# Patient Record
Sex: MALE | Race: BLACK OR AFRICAN AMERICAN | Employment: FULL TIME | URBAN - METROPOLITAN AREA
[De-identification: names, ages, dates, MRNs, and addresses within clinical notes are randomized per-mention and may not be internally consistent; named-entity substitution may affect disease eponyms.]

---

## 2017-02-09 ENCOUNTER — APPOINTMENT (OUTPATIENT)
Dept: GENERAL RADIOLOGY | Age: 47
End: 2017-02-09
Attending: EMERGENCY MEDICINE
Payer: SELF-PAY

## 2017-02-09 ENCOUNTER — HOSPITAL ENCOUNTER (EMERGENCY)
Age: 47
Discharge: HOME | End: 2017-02-10
Attending: EMERGENCY MEDICINE
Payer: SELF-PAY

## 2017-02-09 DIAGNOSIS — F17.200 SMOKER: ICD-10-CM

## 2017-02-09 DIAGNOSIS — R07.9 CHEST PAIN, UNSPECIFIED TYPE: Primary | ICD-10-CM

## 2017-02-09 LAB
ALBUMIN SERPL BCP-MCNC: 3.7 G/DL (ref 3.4–5)
ALBUMIN/GLOB SERPL: 1.1 {RATIO} (ref 0.8–1.7)
ALP SERPL-CCNC: 56 U/L (ref 45–117)
ALT SERPL-CCNC: 19 U/L (ref 16–61)
ANION GAP BLD CALC-SCNC: 8 MMOL/L (ref 3–18)
AST SERPL W P-5'-P-CCNC: 19 U/L (ref 15–37)
BASOPHILS # BLD AUTO: 0.1 K/UL (ref 0–0.1)
BASOPHILS # BLD: 1 % (ref 0–2)
BILIRUB SERPL-MCNC: 1.2 MG/DL (ref 0.2–1)
BUN SERPL-MCNC: 15 MG/DL (ref 7–18)
BUN/CREAT SERPL: 13 (ref 12–20)
CALCIUM SERPL-MCNC: 8.4 MG/DL (ref 8.5–10.1)
CHLORIDE SERPL-SCNC: 105 MMOL/L (ref 100–108)
CK MB CFR SERPL CALC: 0.4 % (ref 0–4)
CK MB SERPL-MCNC: 0.8 NG/ML (ref 0.5–3.6)
CK SERPL-CCNC: 205 U/L (ref 39–308)
CO2 SERPL-SCNC: 26 MMOL/L (ref 21–32)
CREAT SERPL-MCNC: 1.14 MG/DL (ref 0.6–1.3)
DIFFERENTIAL METHOD BLD: ABNORMAL
EOSINOPHIL # BLD: 0.2 K/UL (ref 0–0.4)
EOSINOPHIL NFR BLD: 3 % (ref 0–5)
ERYTHROCYTE [DISTWIDTH] IN BLOOD BY AUTOMATED COUNT: 13 % (ref 11.6–14.5)
GLOBULIN SER CALC-MCNC: 3.4 G/DL (ref 2–4)
GLUCOSE SERPL-MCNC: 157 MG/DL (ref 74–99)
HCT VFR BLD AUTO: 40.9 % (ref 36–48)
HGB BLD-MCNC: 13.7 G/DL (ref 13–16)
LYMPHOCYTES # BLD AUTO: 33 % (ref 21–52)
LYMPHOCYTES # BLD: 1.6 K/UL (ref 0.9–3.6)
MCH RBC QN AUTO: 31.8 PG (ref 24–34)
MCHC RBC AUTO-ENTMCNC: 33.5 G/DL (ref 31–37)
MCV RBC AUTO: 94.9 FL (ref 74–97)
MONOCYTES # BLD: 0.6 K/UL (ref 0.05–1.2)
MONOCYTES NFR BLD AUTO: 12 % (ref 3–10)
NEUTS SEG # BLD: 2.5 K/UL (ref 1.8–8)
NEUTS SEG NFR BLD AUTO: 51 % (ref 40–73)
PLATELET # BLD AUTO: 236 K/UL (ref 135–420)
PMV BLD AUTO: 10.3 FL (ref 9.2–11.8)
POTASSIUM SERPL-SCNC: 3.7 MMOL/L (ref 3.5–5.5)
PROT SERPL-MCNC: 7.1 G/DL (ref 6.4–8.2)
RBC # BLD AUTO: 4.31 M/UL (ref 4.7–5.5)
SODIUM SERPL-SCNC: 139 MMOL/L (ref 136–145)
TROPONIN I SERPL-MCNC: <0.02 NG/ML (ref 0–0.04)
WBC # BLD AUTO: 4.9 K/UL (ref 4.6–13.2)

## 2017-02-09 PROCEDURE — 93005 ELECTROCARDIOGRAM TRACING: CPT

## 2017-02-09 PROCEDURE — 71020 XR CHEST PA LAT: CPT

## 2017-02-09 PROCEDURE — 99284 EMERGENCY DEPT VISIT MOD MDM: CPT

## 2017-02-09 PROCEDURE — 80053 COMPREHEN METABOLIC PANEL: CPT | Performed by: EMERGENCY MEDICINE

## 2017-02-09 PROCEDURE — 82550 ASSAY OF CK (CPK): CPT | Performed by: EMERGENCY MEDICINE

## 2017-02-09 PROCEDURE — 85025 COMPLETE CBC W/AUTO DIFF WBC: CPT | Performed by: EMERGENCY MEDICINE

## 2017-02-10 VITALS
WEIGHT: 150 LBS | HEIGHT: 68 IN | RESPIRATION RATE: 18 BRPM | DIASTOLIC BLOOD PRESSURE: 73 MMHG | BODY MASS INDEX: 22.73 KG/M2 | TEMPERATURE: 97.8 F | HEART RATE: 72 BPM | OXYGEN SATURATION: 100 % | SYSTOLIC BLOOD PRESSURE: 116 MMHG

## 2017-02-10 LAB
ATRIAL RATE: 72 BPM
CALCULATED P AXIS, ECG09: 75 DEGREES
CALCULATED R AXIS, ECG10: 9 DEGREES
CALCULATED T AXIS, ECG11: 45 DEGREES
CK MB CFR SERPL CALC: 0.5 % (ref 0–4)
CK MB SERPL-MCNC: 0.8 NG/ML (ref 0.5–3.6)
CK SERPL-CCNC: 167 U/L (ref 39–308)
DIAGNOSIS, 93000: NORMAL
P-R INTERVAL, ECG05: 142 MS
Q-T INTERVAL, ECG07: 398 MS
QRS DURATION, ECG06: 94 MS
QTC CALCULATION (BEZET), ECG08: 435 MS
TROPONIN I SERPL-MCNC: <0.02 NG/ML (ref 0–0.04)
VENTRICULAR RATE, ECG03: 72 BPM

## 2017-02-10 PROCEDURE — 82550 ASSAY OF CK (CPK): CPT | Performed by: PHYSICIAN ASSISTANT

## 2017-02-10 NOTE — ED NOTES
Pt hourly rounding competed. Safety   Pt (x) resting on stretcher with side rails up and call bell in reach. () in chair    () in parents arms. Toileting   Pt offered ()Bedpan     (x)Assistance to Restroom     ()Urinal  Ongoing Updates  Updated on plan of care and status of test results.    Pain Management  Inquired as to comfort and offered comfort measures:    (x) warm blankets   () dimmed lights

## 2017-02-10 NOTE — ED NOTES
I have reviewed discharge instructions with the patient. The patient verbalized understanding. Patient looks comfortable, left ED in stable condition. Vital signs stable, denies chest pain or any other complaints at this time. Ambulatory, steady gait noted.

## 2017-02-10 NOTE — ED TRIAGE NOTES
Patient A/O x 4, complaining of chest pain off and on x 1 year. Patient states today pain has been worse. Patient complaining of chest tightness x 45 minutes. Denies SOB, N/V, abdominal pain. Patient received aspirin 325 mg PO in medic.

## 2017-02-10 NOTE — ED NOTES
Patient resting comfortably on stretcher at this time, denies any complaints of chest pain, SOB, N/V, abdominal pain. Will continue to closely monitor patient.

## 2017-02-10 NOTE — ED PROVIDER NOTES
HPI Comments: 40yo male with history of asthma and cigarette smoking presents to ER complaining of left back and chest discomfort. Patient states discomfort right after eating. Pain started in left upper back and then radiated to left chest.  Describes discomfort as pressure. States the discomfort lasted about 10 minutes. Denies any nausea, vomiting, abdominal pain, sweating. When pain occurred in chest,  patient states he walk over to neighbor's house who called EMS. Denies any worsening of chest pain with walking activity. Denies any sensation of food get stuck in throat/esophagus. Patient states his chest pain has not reoccurred. Patient states he has chest discomfort similar tonight \"everyday\", pressure like discomfort. States pain can be with rest or activity. Admits to sometimes he feels lightheaded with tingling discomfort only in left arm. Patient is a 40 y.o. male presenting with chest pain. Chest Pain (Angina)    Associated symptoms include back pain. Pertinent negatives include no abdominal pain, no cough, no diaphoresis, no fever, no headaches, no nausea, no numbness, no palpitations, no shortness of breath, no vomiting and no weakness. Past Medical History:   Diagnosis Date    Asthma        No past surgical history on file. No family history on file. Social History     Social History    Marital status: SINGLE     Spouse name: N/A    Number of children: N/A    Years of education: N/A     Occupational History    Not on file. Social History Main Topics    Smoking status: Current Every Day Smoker    Smokeless tobacco: Not on file    Alcohol use 0.5 oz/week     1 Cans of beer per week      Comment: every day    Drug use: No    Sexual activity: Not on file     Other Topics Concern    Not on file     Social History Narrative         ALLERGIES: Review of patient's allergies indicates no known allergies.     Review of Systems   Constitutional: Negative for activity change, appetite change, chills, diaphoresis, fatigue and fever. HENT: Negative. Respiratory: Negative for cough and shortness of breath. Cardiovascular: Positive for chest pain. Negative for palpitations and leg swelling. Gastrointestinal: Negative for abdominal pain, nausea and vomiting. Genitourinary: Negative for difficulty urinating. Musculoskeletal: Positive for back pain. Negative for gait problem and joint swelling. Skin: Negative. Neurological: Negative for weakness, light-headedness, numbness and headaches. Psychiatric/Behavioral: Negative for dysphoric mood. The patient is not nervous/anxious. All other systems reviewed and are negative. Vitals:    02/09/17 2153   BP: 135/85   Pulse: 72   Resp: 18   Temp: 96.5 °F (35.8 °C)   SpO2: 99%   Weight: 68 kg (150 lb)   Height: 5' 8\" (1.727 m)            Physical Exam   Constitutional: He is oriented to person, place, and time. He appears well-developed. No distress. Thin   HENT:   Head: Atraumatic. Mouth/Throat: Oropharynx is clear and moist.   Neck: Normal range of motion. Neck supple. No JVD present. Cardiovascular: Normal rate, regular rhythm and normal heart sounds. No murmur heard. Pulmonary/Chest: Effort normal and breath sounds normal.   Abdominal: Soft. Bowel sounds are normal. There is no tenderness. There is no guarding. Musculoskeletal: Normal range of motion. He exhibits no edema. Neurological: He is alert and oriented to person, place, and time. No cranial nerve deficit. Coordination normal.   Skin: Skin is warm and dry. He is not diaphoretic. Psychiatric: He has a normal mood and affect. Nursing note and vitals reviewed. MDM  Number of Diagnoses or Management Options  Diagnosis management comments: 40yo male presenting to ER complaining of left upper back and left chest pain that started about 30-40 minutes PTA immediately after eating. Denied getting food stuck in throat/esophagus/choking. Patient admits chest discomfort similar to pain he experiences everyday. Denies any reoccurrence of chest pain. Denies any other symptoms at this time. Afebrile, vitals WNL. EKG, CXR, labs reassuring. ED Course       Procedures      EKG- NSR WITH SINUS ARRHYTHMIA, RATE 72, NORMAL AXIS. INCOMPLETE RBBB.   NO STEMI, NO ST-T WAVE ABNORMALITIES  CXR- NO ACUTE PROCESS

## 2017-02-14 ENCOUNTER — HOSPITAL ENCOUNTER (OUTPATIENT)
Dept: NON INVASIVE DIAGNOSTICS | Age: 47
Discharge: HOME | End: 2017-02-14
Attending: PHYSICIAN ASSISTANT
Payer: SELF-PAY

## 2017-02-14 DIAGNOSIS — F17.200 SMOKER: ICD-10-CM

## 2017-02-14 DIAGNOSIS — R07.9 CHEST PAIN, UNSPECIFIED TYPE: ICD-10-CM

## 2017-02-14 PROCEDURE — A9500 TC99M SESTAMIBI: HCPCS

## 2017-02-14 PROCEDURE — 93017 CV STRESS TEST TRACING ONLY: CPT | Performed by: PHYSICIAN ASSISTANT

## 2017-02-14 PROCEDURE — 78452 HT MUSCLE IMAGE SPECT MULT: CPT | Performed by: PHYSICIAN ASSISTANT

## 2017-02-14 PROCEDURE — 74011250636 HC RX REV CODE- 250/636: Performed by: PHYSICIAN ASSISTANT

## 2017-02-14 RX ORDER — SODIUM CHLORIDE 9 MG/ML
250 INJECTION, SOLUTION INTRAVENOUS ONCE
Status: COMPLETED | OUTPATIENT
Start: 2017-02-14 | End: 2017-02-14

## 2017-02-14 RX ADMIN — SODIUM CHLORIDE 250 ML: 900 INJECTION, SOLUTION INTRAVENOUS at 08:20

## 2017-02-14 NOTE — PROGRESS NOTES
Patient was given 10.34 mCi of Sestamibi for the Resting pictures. Patient received 0.4 mg of Lexiscan for the exercise portion of the Stress test. Patient was then given 33 mCi of Sestamibi for the Stress pictures. Armband was removed and disposed of before the patient left.

## 2017-02-15 LAB
ATTENDING PHYSICIAN, CST07: NORMAL
DIAGNOSIS, 93000: NORMAL
DUKE TM SCORE RESULT, CST14: NORMAL
DUKE TREADMILL SCORE, CST13: NORMAL
ECG INTERP BEFORE EX, CST11: NORMAL
ECG INTERP DURING EX, CST12: NORMAL
FUNCTIONAL CAPACITY, CST17: NORMAL
KNOWN CARDIAC CONDITION, CST08: NORMAL
MAX. DIASTOLIC BP, CST04: 92 MMHG
MAX. HEART RATE, CST05: 151 BPM
MAX. SYSTOLIC BP, CST03: 160 MMHG
OVERALL BP RESPONSE TO EXERCISE, CST16: NORMAL
OVERALL HR RESPONSE TO EXERCISE, CST15: NORMAL
PEAK EX METS, CST10: 14.2 METS
PROTOCOL NAME, CST01: NORMAL
TEST INDICATION, CST09: NORMAL

## 2017-03-15 ENCOUNTER — HOSPITAL ENCOUNTER (OUTPATIENT)
Dept: LAB | Age: 47
Discharge: HOME | End: 2017-03-15

## 2017-03-15 ENCOUNTER — OFFICE VISIT (OUTPATIENT)
Dept: FAMILY MEDICINE CLINIC | Age: 47
End: 2017-03-15

## 2017-03-15 VITALS
TEMPERATURE: 96 F | BODY MASS INDEX: 20.31 KG/M2 | DIASTOLIC BLOOD PRESSURE: 83 MMHG | HEART RATE: 94 BPM | RESPIRATION RATE: 16 BRPM | WEIGHT: 134 LBS | HEIGHT: 68 IN | SYSTOLIC BLOOD PRESSURE: 139 MMHG | OXYGEN SATURATION: 99 %

## 2017-03-15 DIAGNOSIS — R94.39 ABNORMAL STRESS TEST: ICD-10-CM

## 2017-03-15 DIAGNOSIS — R09.81 NASAL CONGESTION: ICD-10-CM

## 2017-03-15 DIAGNOSIS — Z00.00 ROUTINE CHECK-UP: Primary | ICD-10-CM

## 2017-03-15 LAB
CHOLEST SERPL-MCNC: 210 MG/DL
EST. AVERAGE GLUCOSE BLD GHB EST-MCNC: 108 MG/DL
GLUCOSE POC: 111 MG/DL
HBA1C MFR BLD: 5.4 % (ref 4.2–5.6)
HDLC SERPL-MCNC: 121 MG/DL (ref 40–60)
HDLC SERPL: 1.7 {RATIO} (ref 0–5)
LDLC SERPL CALC-MCNC: 68.6 MG/DL (ref 0–100)
LIPID PROFILE,FLP: ABNORMAL
TRIGL SERPL-MCNC: 102 MG/DL (ref ?–150)
TSH SERPL DL<=0.05 MIU/L-ACNC: 0.3 UIU/ML (ref 0.36–3.74)
VLDLC SERPL CALC-MCNC: 20.4 MG/DL

## 2017-03-15 PROCEDURE — 84443 ASSAY THYROID STIM HORMONE: CPT | Performed by: FAMILY MEDICINE

## 2017-03-15 PROCEDURE — 80061 LIPID PANEL: CPT | Performed by: FAMILY MEDICINE

## 2017-03-15 PROCEDURE — 83036 HEMOGLOBIN GLYCOSYLATED A1C: CPT | Performed by: FAMILY MEDICINE

## 2017-03-15 RX ORDER — FLUTICASONE PROPIONATE 50 MCG
1 SPRAY, SUSPENSION (ML) NASAL DAILY
Qty: 1 BOTTLE | Refills: 0 | Status: SHIPPED | COMMUNITY
Start: 2017-03-15 | End: 2018-12-21 | Stop reason: ALTCHOICE

## 2017-03-15 NOTE — PATIENT INSTRUCTIONS
Call [employer] at [phone]. They will screen you for any insurance you might qualify for. This is the first step before you can receive discounts at the [facility] or [facility]. Additional contact numbers for [facility] are below:   For [place] patients: [phone]  For [place]/[facility] patients: [phone]  For [place]/[facility] patients: [phone]

## 2017-03-15 NOTE — PROGRESS NOTES
annia drawn. Appointment made for f/u in one month. Given PAP application. Discharge instructions reviewed with patient    Medication list and understanding of medications reviewed with patient. OTC and herbal medications reviewed and added to med list if applicable  Barriers to adherence assessed. Guidance given regarding new medications this visit, including reason for taking this medicine, and common side effects. Appointment for cardiology made.

## 2017-03-15 NOTE — PROGRESS NOTES
HPI  Victor Hugo Nuñez is a 40 y.o. male being seen today for   Chief Complaint   Patient presents with    Follow-up     ED and Diabetes   . he states that he had abnormal stress test in ED after episode of chest pain. Stress test in ED was abnormal but not totally consistent with CAD. Pt reports excellent exercise tolerance. Does smoke not quite 1/2 ppd. No known family history of heart disease. Also has a right groin bulge at times, would like eval    Nasal passages with constant congestion and tender nodules inside. Past Medical History:   Diagnosis Date    Asthma          ROS  Patient states that he is feeling well. Denies complaints of chest pain, shortness of breath, swelling of legs, dizziness or weakness. he denies nausea, vomiting or diarrhea. Current Outpatient Prescriptions   Medication Sig    fluticasone (FLONASE) 50 mcg/actuation nasal spray 1 [place] by Both Nostrils route daily.  acetaminophen (TYLENOL) 325 mg tablet Take 1 Tab by mouth every six (6) hours as needed for Pain. No current facility-administered medications for this visit. PE  Visit Vitals    /83 (BP 1 Location: Left arm, BP Patient Position: Sitting)    Pulse 94    Temp 96 °F (35.6 °C) (Oral)    Resp 16    Ht 5' 8\" (1.727 m)    Wt 134 lb (60.8 kg)    SpO2 99%    BMI 20.37 kg/m2        Alert and oriented with normal mood and affect. he is well developed and well nourished . Lungs are clear without wheezing. Heart rate is regular without murmurs or gallops. There is no lower extremity edema. Nasal passages with edema, clear mucus and ? Nasal polyps    Results for orders placed or performed in visit on 03/15/17   AMB POC GLUCOSE BLOOD, BY GLUCOSE MONITORING DEVICE   Result Value Ref Range    Glucose  mg/dL         Assessment and Plan:        ICD-10-CM ICD-9-CM    1. Routine check-up Z00.00 V70.0 AMB POC GLUCOSE BLOOD, BY GLUCOSE MONITORING DEVICE   2.  Abnormal stress test R94.39 794.39 TSH 3RD GENERATION      HEMOGLOBIN A1C WITH EAG      LIPID PANEL      REFERRAL TO CARDIOLOGY      T4, FREE   3. Nasal congestion  flonase sample R09.81 478.19      rtc for eval of possible inguinal hernia  Pap for beconase aq 42 mcg daily      Sharyle Addison, MD

## 2017-03-15 NOTE — PROGRESS NOTES
Chief Complaint   Patient presents with    Follow-up     Ed     1. When and where did you last receive medical care? Yes Where: [facility]    2. When and where did you last have preventive care such as mammogram, pap smears or colon screening? N/a   3. What is your current living situation (for example, live alone, live in home with immediate family members)? family    3. Do you have any problems with communication such trouble seeing, hearing, or understanding instructions? No    5. Do you have an advance directive? This is a document that you can give to family members with instructions for how you would want them to make health care decisions for you if you were unable to speak for yourself. (For example, unconscious, delerious)No    PMH/FH/Social Hx reviewed and updated as needed     Applicable screenings reviewed and updated as needed  Medication reconciliation performed. Patient does not need medication refills. Health Maintenance reviewed.     \"pt stated that he been losing weight dry mouth pt stated that this been going on for about 6 months\"

## 2017-03-15 NOTE — MR AVS SNAPSHOT
Visit Information Date & Time Provider Department Dept. Phone Encounter #  
 3/15/2017  9:45 AM Alok Barber MD [address] [place] [address] [id number] Your Appointments 3/16/2017  9:30 AM  
New Patient with Lázaro Rehman MD  
[facility]) Appt Note: abn stress care a [address], [address] [place] [address]  
[facility] [address] [place] [address] 4/19/2017 11:45 AM  
Follow Up with Alok Barber MD  
[address] [facility]) Appt Note: f/u APPOINTMENT  
 [facility] [address] [facility] [phone] [id number] Upcoming Health Maintenance Date Due Pneumococcal 19-64 Medium Risk (1 of 1 - PPSV23) 4/6/1990 DTaP/Tdap/Td series (1 - Tdap) 4/6/1992 INFLUENZA AGE 9 TO ADULT 8/1/2016 Allergies as of 3/15/2017  Review Complete On: 3/15/2017 By: Suni De Luna No Known Allergies Current Immunizations  Never Reviewed Name Date Rabies Vaccine IM 5/1/2015  9:02 PM  
  
 Not reviewed this visit You Were Diagnosed With   
  
 Codes Comments Routine check-up    -  Primary ICD-10-CM: Z00.00 ICD-9-CM: V70.0 Abnormal stress test     ICD-10-CM: R94.39 
ICD-9-CM: 794.39 Vitals BP Pulse Temp Resp Height(growth percentile) Weight(growth percentile) 139/83 (BP 1 Location: Left arm, BP Patient Position: Sitting) 94 96 °F (35.6 °C) (Oral) 16 5' 8\" (1.727 m) 134 lb (60.8 kg) SpO2 BMI Smoking Status 99% 20.37 kg/m2 Current Every Day Smoker Vitals History BMI and BSA Data Body Mass Index Body Surface Area  
 20.37 kg/m 2 1.71 m 2 Preferred Pharmacy Pharmacy Name Phone [facility] [address] [phone] Your Updated Medication List  
  
   
 This list is accurate as of: 3/15/17 11:13 AM.  Always use your most recent med list.  
  
  
  
  
 fluticasone 50 mcg/actuation nasal spray Commonly known as:  FLONASE  
1 [place] by Both Nostrils route daily. We Performed the Following AMB POC GLUCOSE BLOOD, BY GLUCOSE MONITORING DEVICE [17638 CPT(R)] REFERRAL TO CARDIOLOGY [KQB77 Custom] Comments:  
 Please evaluate patient for abnromal stress test  
  
Referral Information Referral ID Referred By Referred To  
  
 [id number] Paulinapardeep Andrewsneha [facility] WHITE Not Available Visits Status Start Date End Date 1 New Request 3/15/17 3/15/18 If your referral has a status of pending review or denied, additional information will be sent to support the outcome of this decision. Patient Instructions Call [employer] at [phone]. They will screen you for any insurance you might qualify for. This is the first step before you can receive discounts at the hospital or [facility] specialists. Additional contact numbers for [facility] are below: For [place]/[facility] patients: [phone] For [place]/[facility] patients: [phone] For [place]/[facility] patients: [phone] Introducing [facility] [place] [facility]! Britney Mccauley introduces [employer] patient portal. Now you can access parts of your medical record, email your doctor's office, and request medication refills online. 1. In your internet browser, go to [email or web address] 2. Click on the First Time User? Click Here link in the Sign In box. You will see the New Member Sign Up page. 3. Enter your [employer] Access Code exactly as it appears below. You will not need to use this code after youve completed the sign-up process. If you do not sign up before the expiration date, you must request a new code. · [employer] Access Code: [email or web address] Expires: 5/10/2017 11:13 PM 
 
 4. Enter the last four digits of your Social Security Number (xxxx) and Date of Birth (mm/dd/yyyy) as indicated and click Submit. You will be taken to the next sign-up page. 5. Create a [employer] ID. This will be your [employer] login ID and cannot be changed, so think of one that is secure and easy to remember. 6. Create a [employer] password. You can change your password at any time. 7. Enter your Password Reset Question and Answer. This can be used at a later time if you forget your password. 8. Enter your e-mail address. You will receive e-mail notification when new information is available in [address] 9. Click Sign Up. You can now view and download portions of your medical record. 10. Click the Download Summary menu link to download a portable copy of your medical information. If you have questions, please visit the Frequently Asked Questions section of the [employer] website. Remember, [employer] is NOT to be used for urgent needs. For medical emergencies, dial 911. Now available from your iPhone and Android! Please provide this summary of care documentation to your next provider. Your primary care clinician is listed as PROVIDER UNKNOWN. If you have any questions after today's visit, please call [phone].

## 2017-03-16 ENCOUNTER — OFFICE VISIT (OUTPATIENT)
Dept: CARDIOLOGY CLINIC | Age: 47
End: 2017-03-16

## 2017-03-16 VITALS
SYSTOLIC BLOOD PRESSURE: 124 MMHG | HEIGHT: 68 IN | BODY MASS INDEX: 20.16 KG/M2 | HEART RATE: 84 BPM | DIASTOLIC BLOOD PRESSURE: 81 MMHG | WEIGHT: 133 LBS

## 2017-03-16 DIAGNOSIS — R94.39 ABNORMAL STRESS TEST: ICD-10-CM

## 2017-03-16 DIAGNOSIS — F10.10 ALCOHOL ABUSE: ICD-10-CM

## 2017-03-16 DIAGNOSIS — R07.9 CHEST PAIN, UNSPECIFIED TYPE: Primary | ICD-10-CM

## 2017-03-16 DIAGNOSIS — Z71.6 TOBACCO ABUSE COUNSELING: ICD-10-CM

## 2017-03-16 PROBLEM — R09.81 NASAL CONGESTION: Status: ACTIVE | Noted: 2017-03-16

## 2017-03-16 RX ORDER — ACETAMINOPHEN 325 MG/1
325 TABLET ORAL
Qty: 50 TAB | Refills: 0
Start: 2017-03-16 | End: 2017-04-27

## 2017-03-16 NOTE — PROGRESS NOTES
HISTORY OF PRESENT ILLNESS  January Vargas is a 40 y.o. male. Chest Pain (Angina)    The history is provided by the patient. This is a recurrent problem. The current episode started more than 1 week ago (yrs). The problem has been gradually worsening (1 month). The problem occurs constantly (2 episodes in 1 month). The pain is associated with rest and movement. The pain is present in the lateral region and left side. The quality of the pain is described as dull. The pain does not radiate. Pertinent negatives include no claudication, no cough, no diaphoresis, no dizziness, no fever, no headaches, no malaise/fatigue, no nausea, no orthopnea, no palpitations, no PND, no shortness of breath and no vomiting. He has tried nothing for the symptoms. Review of Systems   Constitutional: Negative for chills, diaphoresis, fever, malaise/fatigue and weight loss. HENT: Negative for nosebleeds. Eyes: Negative for discharge. Respiratory: Negative for cough, shortness of breath and wheezing. Cardiovascular: Positive for chest pain. Negative for palpitations, orthopnea, claudication, leg swelling and PND. Gastrointestinal: Negative for diarrhea, nausea and vomiting. Genitourinary: Negative for dysuria and hematuria. Musculoskeletal: Negative for joint pain. Skin: Negative for rash. Neurological: Negative for dizziness, seizures, loss of consciousness and headaches. Endo/Heme/Allergies: Negative for polydipsia. Does not bruise/bleed easily. Psychiatric/Behavioral: Negative for depression and substance abuse. The patient does not have insomnia.       No Known Allergies    Past Medical History:   Diagnosis Date    Asthma        Family History   Problem Relation Age of Onset    Hypertension Mother     Cancer Father     Hypertension Brother     Asthma Daughter     Stroke Paternal Grandfather 79    Heart Attack Neg Hx        Social History   Substance Use Topics    Smoking status: Current Every Day Smoker     Packs/day: 0.40     Types: Cigarettes    Smokeless tobacco: Never Used      Comment: per patient stated 7 or 8 a day     Alcohol use 33.0 oz/week     35 Cans of beer, 20 Shots of liquor per week      Comment:  per patient 4 or 5 a day          Current Outpatient Prescriptions   Medication Sig    fluticasone (FLONASE) 50 mcg/actuation nasal spray 1 [place] by Both Nostrils route daily. No current facility-administered medications for this visit. History reviewed. No pertinent surgical history. Visit Vitals    /81    Pulse 84    Ht 5' 8\" (1.727 m)    Wt 60.3 kg (133 lb)    BMI 20.22 kg/m2       Diagnostic Studies:  I have reviewed the relevant tests done on the patient and show as follows  EKG tracings reviewed by me today. No flowsheet data found. Mr. Garald Spatz has a reminder for a \"due or due soon\" health maintenance. I have asked that he contact his primary care provider for follow-up on this health maintenance. 2/17 Stress Test     IMPRESSION:  1. Abnormal exercise nuclear stress test, primarily due to transient  ischemic dilatation of 1.28. 3-vessel coronary artery disease cannot be  excluded; however, the patient had excellent functional capacity  without chest pain. 2. Low-normal left ventricular systolic function calculated at 51%. No  regional wall motion abnormalities. 3. Small fixed apical defect. 4. Small amount of attenuation along the inferior wall, which improves  slightly during stress suggesting artifact. 5. No convincing evidence for focal ischemia or previous infarct. 6. Excellent functional capacity with the patient exercising over 12  minutes without chest pain and no EKG changes. Physical Exam   Constitutional: He is oriented to person, place, and time. He appears well-developed and well-nourished. No distress. HENT:   Head: Normocephalic and atraumatic. Mouth/Throat: Normal dentition. Eyes: Right eye exhibits no discharge.  Left eye exhibits no discharge. No scleral icterus. Neck: Neck supple. No JVD present. Carotid bruit is not present. No thyromegaly present. Cardiovascular: Normal rate, regular rhythm, S1 normal, S2 normal, normal heart sounds and intact distal pulses. Exam reveals no gallop and no friction rub. No murmur heard. Pulmonary/Chest: Effort normal and breath sounds normal. He has no wheezes. He has no rales. He exhibits tenderness (reproducible left precordial). Abdominal: Soft. He exhibits no mass. There is no tenderness. Musculoskeletal: He exhibits no edema. Lymphadenopathy:        Right cervical: No superficial cervical adenopathy present. Left cervical: No superficial cervical adenopathy present. Neurological: He is alert and oriented to person, place, and time. Skin: Skin is warm and dry. No rash noted. Psychiatric: He has a normal mood and affect. His behavior is normal.       ASSESSMENT and Pamella Allen was seen today for new patient, abnormal cardiac test and chest pain. Diagnoses and all orders for this visit:    Chest pain, unspecified type  Comments:  muscular clinically- try tylenol  Orders:  -     AMB POC EKG ROUTINE W/ 12 LEADS, INTER & REP  -     acetaminophen (TYLENOL) 325 mg tablet; Take 1 Tab by mouth every six (6) hours as needed for Pain. Abnormal stress test  Comments:  2/17 evidence of TID??CAD    Alcohol abuse  Comments:  Patient advised to take alcohol in moderation to avoid future cardiac and liver problems including arrhythmias, cardiomyopathy and congestive heart failure. Tobacco abuse counseling  Comments:  Patient advised to stop smoking to reduce future cardiovascular events. Pertinent laboratory and test data reviewed and discussed with patient. See patient instructions also for other medical advice given    There are no discontinued medications.     Follow-up Disposition:  Return in about 6 weeks (around 4/27/2017), or if symptoms worsen or fail to improve.

## 2017-03-16 NOTE — PATIENT INSTRUCTIONS
There are no discontinued medications. Orders Placed This Encounter    AMB POC EKG ROUTINE W/ 12 LEADS, INTER & REP     Order Specific Question:   Reason for Exam:     Answer:   new patient    acetaminophen (TYLENOL) 325 mg tablet     Sig: Take 1 Tab by mouth every six (6) hours as needed for Pain. Dispense:  50 Tab     Refill:  0          Chest Pain: Care Instructions  Your Care Instructions  There are many things that can cause chest pain. Some are not serious and will get better on their own in a few days. But some kinds of chest pain need more testing and treatment. Your doctor may have recommended a follow-up visit in the next 8 to 12 hours. If you are not getting better, you may need more tests or treatment. Even though your doctor has released you, you still need to watch for any problems. The doctor carefully checked you, but sometimes problems can develop later. If you have new symptoms or if your symptoms do not get better, get medical care right away. If you have worse or different chest pain or pressure that lasts more than 5 minutes or you passed out (lost consciousness), call 911 or seek other emergency help right away. A medical visit is only one step in your treatment. Even if you feel better, you still need to do what your doctor recommends, such as going to all suggested follow-up appointments and taking medicines exactly as directed. This will help you recover and help prevent future problems. How can you care for yourself at home? · Rest until you feel better. · Take your medicine exactly as prescribed. Call your doctor if you think you are having a problem with your medicine. · Do not drive after taking a prescription pain medicine. When should you call for help? Call 911 if:  · You passed out (lost consciousness). · You have severe difficulty breathing. · You have symptoms of a heart attack.  These may include:  ¨ Chest pain or pressure, or a strange feeling in your chest.  ¨ Sweating. ¨ Shortness of breath. ¨ Nausea or vomiting. ¨ Pain, pressure, or a strange feeling in your back, neck, jaw, or upper belly or in one or both shoulders or arms. ¨ Lightheadedness or sudden weakness. ¨ A fast or irregular heartbeat. After you call 911, the  may tell you to chew 1 adult-strength or 2 to 4 low-dose aspirin. Wait for an ambulance. Do not try to drive yourself. Call your doctor today if:  · You have any trouble breathing. · Your chest pain gets worse. · You are dizzy or lightheaded, or you feel like you may faint. · You are not getting better as expected. · You are having new or different chest pain. Where can you learn more? Go to http://dory-megan.info/. Enter A120 in the search box to learn more about \"Chest Pain: Care Instructions. \"  Current as of: May 27, 2016  Content Version: 11.1  © 6609-0560 Healthwise, Incorporated. Care instructions adapted under license by [employer] (which disclaims liability or warranty for this information). If you have questions about a medical condition or this instruction, always ask your healthcare professional. [place] [address] any warranty or liability for your use of this information.

## 2017-03-16 NOTE — PROGRESS NOTES
2/17 Stress Test     IMPRESSION:  1. Abnormal exercise nuclear stress test, primarily due to transient  ischemic dilatation of 1.28. 3-vessel coronary artery disease cannot be  excluded; however, the patient had excellent functional capacity  without chest pain. 2. Low-normal left ventricular systolic function calculated at 51%. No  regional wall motion abnormalities. 3. Small fixed apical defect. 4. Small amount of attenuation along the inferior wall, which improves  slightly during stress suggesting artifact. 5. No convincing evidence for focal ischemia or previous infarct. 6. Excellent functional capacity with the patient exercising over 12  minutes without chest pain and no EKG changes.

## 2017-03-16 NOTE — MR AVS SNAPSHOT
Visit Information Date & Time Provider Department Dept. Phone Encounter #  
 3/16/2017  9:30 AM Mary Camarillo MD [facility] [address] [place] [address] [id number] Follow-up Instructions Return in about 6 weeks (around 4/27/2017), or if symptoms worsen or fail to improve, for with ekg. Your Appointments 4/19/2017 11:45 AM  
Follow Up with Hailey Yo MD  
[address] [address] [facility]) Appt Note: f/u APPOINTMENT  
 [address] [facility] [phone] [facility]  
  
   
 [address] [facility] [phone] Upcoming Health Maintenance Date Due Pneumococcal 19-64 Medium Risk (1 of 1 - PPSV23) 4/6/1990 DTaP/Tdap/Td series (1 - Tdap) 4/6/1992 INFLUENZA AGE 9 TO ADULT 8/1/2016 Allergies as of 3/16/2017  Review Complete On: 3/16/2017 By: Mary Camarillo MD  
 No Known Allergies Current Immunizations  Never Reviewed Name Date Rabies Vaccine IM 5/1/2015  9:02 PM  
  
 Not reviewed this visit You Were Diagnosed With   
  
 Codes Comments Chest pain, unspecified type    -  Primary ICD-10-CM: R07.9 ICD-9-CM: 786.50 muscular clinically- try tylenol Abnormal stress test     ICD-10-CM: R94.39 
ICD-9-CM: 794.39 2/17 evidence of TID??CAD Alcohol abuse     ICD-10-CM: F10.10 ICD-9-CM: 305.00 Patient advised to take alcohol in moderation to avoid future cardiac and liver problems including arrhythmias, cardiomyopathy and congestive heart failure. Tobacco abuse counseling     ICD-10-CM: Z71.6 ICD-9-CM: V65.42, 305.1 Patient advised to stop smoking to reduce future cardiovascular events. Vitals BP Pulse Height(growth percentile) Weight(growth percentile) BMI Smoking Status 124/81 84 5' 8\" (1.727 m) 133 lb (60.3 kg) 20.22 kg/m2 Current Every Day Smoker Vitals History BMI and BSA Data Body Mass Index Body Surface Area  
 20.22 kg/m 2 1.7 m 2 Preferred Pharmacy Pharmacy Name Phone [facility] [address] [phone] Your Updated Medication List  
  
   
This list is accurate as of: 3/16/17 10:14 AM.  Always use your most recent med list.  
  
  
  
  
 acetaminophen 325 mg tablet Commonly known as:  TYLENOL Take 1 Tab by mouth every six (6) hours as needed for Pain. fluticasone 50 mcg/actuation nasal spray Commonly known as:  FLONASE  
1 [place] by Both Nostrils route daily. We Performed the Following AMB POC EKG ROUTINE W/ 12 LEADS, INTER & REP [76867 CPT(R)] Follow-up Instructions Return in about 6 weeks (around 4/27/2017), or if symptoms worsen or fail to improve, for with ekg. Patient Instructions There are no discontinued medications. Orders Placed This Encounter  AMB POC EKG ROUTINE W/ 12 LEADS, INTER & REP Order Specific Question:   Reason for Exam: Answer:   new patient  acetaminophen (TYLENOL) 325 mg tablet Sig: Take 1 Tab by mouth every six (6) hours as needed for Pain. Dispense:  50 Tab Refill:  0 Chest Pain: Care Instructions Your Care Instructions There are many things that can cause chest pain. Some are not serious and will get better on their own in a few days. But some kinds of chest pain need more testing and treatment. Your doctor may have recommended a follow-up visit in the next 8 to 12 hours. If you are not getting better, you may need more tests or treatment. Even though your doctor has released you, you still need to watch for any problems. The doctor carefully checked you, but sometimes problems can develop later. If you have new symptoms or if your symptoms do not get better, get medical care right away. If you have worse or different chest pain or pressure that lasts more than 5 minutes or you passed out (lost consciousness), call 911 or seek other emergency help right away. A medical visit is only one step in your treatment. Even if you feel better, you still need to do what your doctor recommends, such as going to all suggested follow-up appointments and taking medicines exactly as directed. This will help you recover and help prevent future problems. How can you care for yourself at home? · Rest until you feel better. · Take your medicine exactly as prescribed. Call your doctor if you think you are having a problem with your medicine. · Do not drive after taking a prescription pain medicine. When should you call for help? Call 911 if: 
· You passed out (lost consciousness). · You have severe difficulty breathing. · You have symptoms of a heart attack. These may include: ¨ Chest pain or pressure, or a strange feeling in your chest. 
¨ Sweating. ¨ Shortness of breath. ¨ Nausea or vomiting. ¨ Pain, pressure, or a strange feeling in your back, neck, jaw, or upper belly or in one or both shoulders or arms. ¨ Lightheadedness or sudden weakness. ¨ A fast or irregular heartbeat. After you call 911, the  may tell you to chew 1 adult-strength or 2 to 4 low-dose aspirin. Wait for an ambulance. Do not try to drive yourself. Call your doctor today if: 
· You have any trouble breathing. · Your chest pain gets worse. · You are dizzy or lightheaded, or you feel like you may faint. · You are not getting better as expected. · You are having new or different chest pain. Where can you learn more? Go to http://dory-megan.info/. Enter A120 in the search box to learn more about \"Chest Pain: Care Instructions. \" Current as of: May 27, 2016 Content Version: 11.1 © 4250-8627 [employer]. Care instructions adapted under license by [employer] (which disclaims liability or warranty for this information).  If you have questions about a medical condition or this instruction, always ask your healthcare professional. [employer] disclaims any warranty or liability for your use of this information. Introducing [facility]! [facility] introduces Quietyme patient portal. Now you can access parts of your medical record, email your doctor's office, and request medication refills online. 1. In your internet browser, go to [email or web address] 2. Click on the First Time User? Click Here link in the Sign In box. You will see the New Member Sign Up page. 3. Enter your Quietyme Access Code exactly as it appears below. You will not need to use this code after youve completed the sign-up process. If you do not sign up before the expiration date, you must request a new code. · Quietyme Access Code: [email or web address] Expires: 5/10/2017 11:13 PM 
 
4. Enter the last four digits of your Social Security Number (xxxx) and Date of Birth (mm/dd/yyyy) as indicated and click Submit. You will be taken to the next sign-up page. 5. Create a Quietyme ID. This will be your Quietyme login ID and cannot be changed, so think of one that is secure and easy to remember. 6. Create a Quietyme password. You can change your password at any time. 7. Enter your Password Reset Question and Answer. This can be used at a later time if you forget your password. 8. Enter your e-mail address. You will receive e-mail notification when new information is available in [address] 9. Click Sign Up. You can now view and download portions of your medical record. 10. Click the Download Summary menu link to download a portable copy of your medical information. If you have questions, please visit the Frequently Asked Questions section of the Quietyme website. Remember, Quietyme is NOT to be used for urgent needs. For medical emergencies, dial 911. Now available from your iPhone and Android! Please provide this summary of care documentation to your next provider. Your primary care clinician is listed as PROVIDER UNKNOWN. If you have any questions after today's visit, please call [phone].

## 2017-03-16 NOTE — PROGRESS NOTES
1. Have you been to the ER, urgent care clinic since your last visit? Hospitalized since your last visit? [facility] ER 2/9/17     2. Have you seen or consulted any other health care providers outside of the [address] [place] [address] since your last visit? Include any pap smears or colon screening. No     3. Since your last visit, have you had any of the following symptoms? Chest pain/ per patient stated CP started last night. Today he confirmed CP pain level 4 or 5     4. Have you had any blood work, X-rays or cardiac testing?       Yes      Requested: NO     In [facility]: YES        Medication confirmed verbally by patient

## 2017-04-27 ENCOUNTER — OFFICE VISIT (OUTPATIENT)
Dept: CARDIOLOGY CLINIC | Age: 47
End: 2017-04-27

## 2017-04-27 VITALS
HEIGHT: 68 IN | WEIGHT: 133 LBS | HEART RATE: 102 BPM | DIASTOLIC BLOOD PRESSURE: 69 MMHG | BODY MASS INDEX: 20.16 KG/M2 | SYSTOLIC BLOOD PRESSURE: 110 MMHG

## 2017-04-27 DIAGNOSIS — F10.10 ALCOHOL ABUSE: ICD-10-CM

## 2017-04-27 DIAGNOSIS — R79.89 LOW TSH LEVEL: ICD-10-CM

## 2017-04-27 DIAGNOSIS — R07.9 CHEST PAIN, UNSPECIFIED TYPE: Primary | ICD-10-CM

## 2017-04-27 DIAGNOSIS — Z71.6 TOBACCO ABUSE COUNSELING: ICD-10-CM

## 2017-04-27 RX ORDER — ASPIRIN 325 MG
TABLET, DELAYED RELEASE (ENTERIC COATED) ORAL
COMMUNITY
End: 2020-10-02

## 2017-04-27 NOTE — PATIENT INSTRUCTIONS
Please follow up with your PCP for thyroid abnormality  Medications Discontinued During This Encounter   Medication Reason    acetaminophen (TYLENOL) 325 mg tablet Not A Current Medication       Orders Placed This Encounter    AMB POC EKG ROUTINE W/ 12 LEADS, INTER & REP     Order Specific Question:   Reason for Exam:     Answer:   routine     Patient advised to stop smoking to reduce future cardiovascular events. Patient advised to take alcohol in moderation to avoid future cardiac and liver problems including arrhythmias, cardiomyopathy and congestive heart failure. Stopping Smoking: Care Instructions  Your Care Instructions  Cigarette smokers crave the nicotine in cigarettes. Giving it up is much harder than simply changing a habit. Your body has to stop craving the nicotine. It is hard to quit, but you can do it. There are many tools that people use to quit smoking. You may find that combining tools works best for you. There are several steps to quitting. First you get ready to quit. Then you get support to help you. After that, you learn new skills and behaviors to become a nonsmoker. For many people, a necessary step is getting and using medicine. Your doctor will help you set up the plan that best meets your needs. You may want to attend a smoking cessation program to help you quit smoking. When you choose a program, look for one that has proven success. Ask your doctor for ideas. You will greatly increase your chances of success if you take medicine as well as get counseling or join a cessation program.  Some of the changes you feel when you first quit tobacco are uncomfortable. Your body will miss the nicotine at first, and you may feel short-tempered and grumpy. You may have trouble sleeping or concentrating. Medicine can help you deal with these symptoms. You may struggle with changing your smoking habits and rituals. The last step is the tricky one:  Be prepared for the smoking urge to continue for a time. This is a lot to deal with, but keep at it. You will feel better. Follow-up care is a key part of your treatment and safety. Be sure to make and go to all appointments, and call your doctor if you are having problems. Its also a good idea to know your test results and keep a list of the medicines you take. How can you care for yourself at home? · Ask your family, friends, and coworkers for support. You have a better chance of quitting if you have help and support. · Join a support group, such as Nicotine Anonymous, for people who are trying to quit smoking. · Consider signing up for a smoking cessation program, such as the American Lung Association's Freedom from Smoking program.  · Set a quit date. Pick your date carefully so that it is not right in the middle of a big deadline or stressful time. Once you quit, do not even take a puff. Get rid of all ashtrays and lighters after your last cigarette. Clean your house and your clothes so that they do not smell of smoke. · Learn how to be a nonsmoker. Think about ways you can avoid those things that make you reach for a cigarette. ¨ Avoid situations that put you at greatest risk for smoking. For some people, it is hard to have a drink with friends without smoking. For others, they might skip a coffee break with coworkers who smoke. ¨ Change your daily routine. Take a different route to work or eat a meal in a different place. · Cut down on stress. Calm yourself or release tension by doing an activity you enjoy, such as reading a book, taking a hot bath, or gardening. · Talk to your doctor or pharmacist about nicotine replacement therapy, which replaces the nicotine in your body. You still get nicotine but you do not use tobacco. Nicotine replacement products help you slowly reduce the amount of nicotine you need.  These products come in several forms, many of them available over-the-counter:  ¨ Nicotine patches  ¨ Nicotine gum and lozenges  ¨ Nicotine inhaler  · Ask your doctor about bupropion (Wellbutrin) or varenicline (Chantix), which are prescription medicines. They do not contain nicotine. They help you by reducing withdrawal symptoms, such as stress and anxiety. · Some people find hypnosis, acupuncture, and massage helpful for ending the smoking habit. · Eat a healthy diet and get regular exercise. Having healthy habits will help your body move past its craving for nicotine. · Be prepared to keep trying. Most people are not successful the first few times they try to quit. Do not get mad at yourself if you smoke again. Make a list of things you learned and think about when you want to try again, such as next week, next month, or next year. Where can you learn more? Go to http://doryDegania Medicalmegan.info/. Enter O002 in the search box to learn more about \"Stopping Smoking: Care Instructions. \"  Current as of: May 26, 2016  Content Version: 11.2  © 9469-3189 [employer]. Care instructions adapted under license by "[employer] (which disclaims liability or warranty for this information). If you have questions about a medical condition or this instruction, always ask your healthcare professional. [address] any warranty or liability for your use of this information. Learning About Alcohol Misuse  What is alcohol misuse? Alcohol misuse means drinking so much that it causes problems for you or others. Early problems with alcohol can start at home. You may argue with loved ones about how much you're drinking. Your job may be affected because of drinking. You may drink when it's dangerous or illegal, such as when you drive. Drinking too much for a long time can lead to health conditions like high blood pressure and liver problems. What are the symptoms? Symptoms of alcohol misuse may include:  · Drinking much more than you planned.   · Drinking even though it's causing problems for you or others. · Putting yourself in situations where you might get hurt. · Wanting to cut down or stop drinking, but not being able to. · Feeling guilty about how much you're drinking. How is alcohol misuse treated? Getting help for problems with alcohol is up to you. But you don't have to do it alone. There are many people and kinds of treatments to help with alcohol problems. Talking to your doctor is the first step. When you get a doctor's help, treatment for alcohol problems can be safer and quicker. Treatment options can include:  · Treatment programs. Examples are group therapy, one or more types of counseling, and alcohol education. · Medicines. A doctor or counselor can help you know what kinds of medicines might help with cravings. · Free social support groups. These groups include AA (Alcoholics Anonymous) and SMART (Self-Management and Recovery Training). Your doctor can help you decide which type of program is best for you. Follow-up care is a key part of your treatment and safety. Be sure to make and go to all appointments, and call your doctor if you are having problems. It's also a good idea to know your test results and keep a list of the medicines you take. Where can you learn more? Go to http://dory-megan.info/. Enter 604 [id number] [id number] in the search box to learn more about \"Learning About Alcohol Misuse. \"  Current as of: January 3, 2017  Content Version: 11.2  © 9726-8029 [employer]. Care instructions adapted under license by [employer] (which disclaims liability or warranty for this information). If you have questions about a medical condition or this instruction, always ask your healthcare professional. [place] [address] any warranty or liability for your use of this information.

## 2017-04-27 NOTE — PROGRESS NOTES
HISTORY OF PRESENT ILLNESS  Keith Cortez is a 56 y.o. male. HPI Comments: 4/17 trying to reduce cigarettes and alcohol    Chest Pain (Angina)    The history is provided by the patient. This is a recurrent problem. The current episode started more than 1 week ago (yrs). The problem has been resolved (1 month). The problem occurs constantly (2 episodes in 1 month). The pain is associated with rest and movement. The pain is present in the lateral region and left side. The quality of the pain is described as dull. The pain does not radiate. Pertinent negatives include no claudication, no cough, no diaphoresis, no dizziness, no fever, no headaches, no malaise/fatigue, no nausea, no orthopnea, no palpitations, no PND, no shortness of breath and no vomiting. He has tried nothing for the symptoms. Review of Systems   Constitutional: Negative for chills, diaphoresis, fever, malaise/fatigue and weight loss. HENT: Negative for nosebleeds. Eyes: Negative for discharge. Respiratory: Negative for cough, shortness of breath and wheezing. Cardiovascular: Negative for chest pain, palpitations, orthopnea, claudication, leg swelling and PND. Gastrointestinal: Negative for diarrhea, nausea and vomiting. Genitourinary: Negative for dysuria and hematuria. Musculoskeletal: Negative for joint pain. Skin: Negative for rash. Neurological: Negative for dizziness, seizures, loss of consciousness and headaches. Endo/Heme/Allergies: Negative for polydipsia. Does not bruise/bleed easily. Psychiatric/Behavioral: Negative for depression and substance abuse. The patient does not have insomnia.       No Known Allergies    Past Medical History:   Diagnosis Date    Asthma        Family History   Problem Relation Age of Onset    Hypertension Mother    [employer] Father     Hypertension Brother     Asthma Daughter     Stroke Paternal Grandfather 79    Heart Attack Neg Hx        Social History   Substance Use Topics    Smoking status: Current Every Day Smoker     Packs/day: 0.40     Types: Cigarettes    Smokeless tobacco: Never Used      Comment: per patient stated 7 or 8 a day     Alcohol use 33.0 oz/week     35 Cans of beer, 20 Shots of liquor per week      Comment: per patient 4 a day         Current Outpatient Prescriptions   Medication Sig    aspirin delayed-release 325 mg tablet Take  by mouth every six (6) hours as needed for Pain.  fluticasone (FLONASE) 50 mcg/actuation nasal spray 1 [place] by Both Nostrils route daily. No current facility-administered medications for this visit. History reviewed. No pertinent surgical history. Visit Vitals    /69    Pulse (!) 102    Ht 5' 8\" (1.727 m)    Wt 60.3 kg (133 lb)    BMI 20.22 kg/m2       Diagnostic Studies:  I have reviewed the relevant tests done on the patient and show as follows  EKG tracings reviewed by me today. No flowsheet data found. Mr. Alida Anne has a reminder for a \"due or due soon\" health maintenance. I have asked that he contact his primary care provider for follow-up on this health maintenance. 2/17 Stress Test     IMPRESSION:  1. Abnormal exercise nuclear stress test, primarily due to transient  ischemic dilatation of 1.28. 3-vessel coronary artery disease cannot be  excluded; however, the patient had excellent functional capacity  without chest pain. 2. Low-normal left ventricular systolic function calculated at 51%. No  regional wall motion abnormalities. 3. Small fixed apical defect. 4. Small amount of attenuation along the inferior wall, which improves  slightly during stress suggesting artifact. 5. No convincing evidence for focal ischemia or previous infarct. 6. Excellent functional capacity with the patient exercising over 12  minutes without chest pain and no EKG changes. Physical Exam   Constitutional: He is oriented to person, place, and time. He appears well-developed and well-nourished.  No distress. HENT:   Head: Normocephalic and atraumatic. Mouth/Throat: Normal dentition. Eyes: Right eye exhibits no discharge. Left eye exhibits no discharge. No scleral icterus. Neck: Neck supple. No JVD present. Carotid bruit is not present. No thyromegaly present. Cardiovascular: Normal rate, regular rhythm, S1 normal, S2 normal, normal heart sounds and intact distal pulses. Exam reveals no gallop and no friction rub. No murmur heard. Pulmonary/Chest: Effort normal and breath sounds normal. He has no wheezes. He has no rales. He exhibits tenderness (reproducible left precordial). Abdominal: Soft. He exhibits no mass. There is no tenderness. Musculoskeletal: He exhibits no edema. Lymphadenopathy:        Right cervical: No superficial cervical adenopathy present. Left cervical: No superficial cervical adenopathy present. Neurological: He is alert and oriented to person, place, and time. Skin: Skin is warm and dry. No rash noted. Psychiatric: He has a normal mood and affect. His behavior is normal.       ASSESSMENT and PLAN        Buffy Fenton was seen today for chest pain. Diagnoses and all orders for this visit:    Chest pain, unspecified type  Comments:  Resolved  no further tsting for now  Orders:  -     AMB POC EKG ROUTINE W/ 12 LEADS, INTER & REP    Alcohol abuse  Comments:  4/17 20 beers/wk; Patient advised to take alcohol in moderation to avoid future cardiac and liver problems including arrhythmias, cardiomyopathy and congestive     Tobacco abuse counseling  Comments:  Patient advised to stop smoking to reduce future cardiovascular events. Low TSH level  Comments:  may have mild hyperthyroidism  w/u per PCP  I will rpt TSH and get freeT4        Pertinent laboratory and test data reviewed and discussed with patient.   See patient instructions also for other medical advice given    Medications Discontinued During This Encounter   Medication Reason    acetaminophen (TYLENOL) 325 mg tablet Not A Current Medication       Follow-up Disposition:  Return if symptoms worsen or fail to improve.

## 2017-04-27 NOTE — PROGRESS NOTES
1. Have you been to the ER, urgent care clinic since your last visit? Hospitalized since your last visit? NO  2. Have you seen or consulted any other health care providers outside of the [facility] since your last visit? Include any pap smears or colon screening. No     3. Since your last visit, have you had any of the following symptoms? None     4. Have you had any blood work, X-rays or cardiac testing? Dr. Delta Simmons ordered labs in [address] [place] [address] 3/15/17     Requested: NO     In [facility]: YES    5. Where do you normally have your labs drawn? [address]    6.  Do you need any refills today?  yes    Medications confirmed verbally by patient and bottles

## 2017-04-27 NOTE — LETTER
Sanjuana Scheuermann 1970 4/27/2017 Dear Kalina Eaton MD 
 
I had the pleasure of evaluating  Mr. Franc Lind in office today. Below are the relevant portions of my assessment and plan of care. ICD-10-CM ICD-9-CM 1. Chest pain, unspecified type R07.9 786.50 AMB POC EKG ROUTINE W/ 12 LEADS, INTER & REP Resolved 
no further tsting for now 2. Alcohol abuse F10.10 305.00   
 4/17 20 beers/wk; Patient advised to take alcohol in moderation to avoid future cardiac and liver problems including arrhythmias, cardiomyopathy and congestive 3. Tobacco abuse counseling Z71.6 V65.42   
  305.1 Patient advised to stop smoking to reduce future cardiovascular events. 4. Low TSH level R94.6 794.5   
 may have mild hyperthyroidism 
w/u per PCP I will rpt TSH and get freeT4 Current Outpatient Prescriptions Medication Sig Dispense Refill  aspirin delayed-release 325 mg tablet Take  by mouth every six (6) hours as needed for Pain.  fluticasone (FLONASE) 50 mcg/actuation nasal spray 1 [place] by Both Nostrils route daily. 1 Bottle 0 Orders Placed This Encounter  AMB POC EKG ROUTINE W/ 12 LEADS, INTER & REP Order Specific Question:   Reason for Exam: Answer:   routine  aspirin delayed-release 325 mg tablet Sig: Take  by mouth every six (6) hours as needed for Pain. If you have questions, please do not hesitate to call me. I look forward to following Mr. Franc Lind along with you. Sincerely, Cira Staton MD

## 2017-05-03 ENCOUNTER — OFFICE VISIT (OUTPATIENT)
Dept: FAMILY MEDICINE CLINIC | Age: 47
End: 2017-05-03

## 2017-05-03 ENCOUNTER — HOSPITAL ENCOUNTER (OUTPATIENT)
Dept: LAB | Age: 47
Discharge: HOME | End: 2017-05-03

## 2017-05-03 VITALS
WEIGHT: 134 LBS | HEART RATE: 96 BPM | DIASTOLIC BLOOD PRESSURE: 89 MMHG | TEMPERATURE: 98 F | OXYGEN SATURATION: 99 % | RESPIRATION RATE: 17 BRPM | HEIGHT: 68 IN | BODY MASS INDEX: 20.31 KG/M2 | SYSTOLIC BLOOD PRESSURE: 122 MMHG

## 2017-05-03 DIAGNOSIS — R79.89 LOW TSH LEVEL: ICD-10-CM

## 2017-05-03 DIAGNOSIS — R79.89 LOW TSH LEVEL: Primary | ICD-10-CM

## 2017-05-03 DIAGNOSIS — R07.9 CHEST PAIN, UNSPECIFIED TYPE: ICD-10-CM

## 2017-05-03 LAB
T4 FREE SERPL-MCNC: 1.1 NG/DL (ref 0.7–1.5)
TSH SERPL DL<=0.05 MIU/L-ACNC: 0.24 UIU/ML (ref 0.36–3.74)

## 2017-05-03 PROCEDURE — 84443 ASSAY THYROID STIM HORMONE: CPT | Performed by: FAMILY MEDICINE

## 2017-05-03 PROCEDURE — 84439 ASSAY OF FREE THYROXINE: CPT | Performed by: FAMILY MEDICINE

## 2017-05-03 NOTE — MR AVS SNAPSHOT
Visit Information Date & Time Provider Department Dept. Phone Encounter #  
 5/3/2017 10:45 AM Tod Garcia MD [address] [place] [address] [id number] Upcoming Health Maintenance Date Due Pneumococcal 19-64 Medium Risk (1 of 1 - PPSV23) 4/6/1990 DTaP/Tdap/Td series (1 - Tdap) 4/6/1992 INFLUENZA AGE 9 TO ADULT 8/1/2017 Allergies as of 5/3/2017  Review Complete On: 5/3/2017 By: Silverio Pierce No Known Allergies Current Immunizations  Never Reviewed Name Date Rabies Vaccine IM 5/1/2015  9:02 PM  
  
 Not reviewed this visit You Were Diagnosed With   
  
 Codes Comments Low TSH level    -  Primary ICD-10-CM: R94.6 ICD-9-CM: 794.5 Chest pain, unspecified type     ICD-10-CM: R07.9 ICD-9-CM: 786.50 Vitals BP Pulse Temp Resp Height(growth percentile) Weight(growth percentile) 122/89 (BP 1 Location: Right arm, BP Patient Position: Sitting) 96 98 °F (36.7 °C) (Oral) 17 5' 8\" (1.727 m) 134 lb (60.8 kg) SpO2 BMI Smoking Status 99% 20.37 kg/m2 Current Every Day Smoker Vitals History BMI and BSA Data Body Mass Index Body Surface Area  
 20.37 kg/m 2 1.71 m 2 Preferred Pharmacy Pharmacy Name Phone [facility] - [place] [address] [phone] Your Updated Medication List  
  
   
This list is accurate as of: 5/3/17 11:46 AM.  Always use your most recent med list.  
  
  
  
  
 aspirin delayed-release 325 mg tablet Take  by mouth every six (6) hours as needed for Pain. fluticasone 50 mcg/actuation nasal spray Commonly known as:  FLONASE  
1 [place] by Both Nostrils route daily. Patient Instructions Musculoskeletal Chest Pain: Care Instructions Your Care Instructions Chest pain is not always a sign that something is wrong with your heart or that you have another serious problem.  The doctor thinks your chest pain is caused by strained muscles or ligaments, inflamed chest cartilage, or another problem in your chest, rather than by your heart. You may need more tests to find the cause of your chest pain. Follow-up care is a key part of your treatment and safety. Be sure to make and go to all appointments, and call your doctor if you are having problems. Its also a good idea to know your test results and keep a list of the medicines you take. How can you care for yourself at home? · Take pain medicines exactly as directed. ¨ If the doctor gave you a prescription medicine for pain, take it as prescribed. ¨ If you are not taking a prescription pain medicine, ask your doctor if you can take an over-the-counter medicine. · Rest and protect the sore area. · Stop, change, or take a break from any activity that may be causing your pain or soreness. · Put ice or a cold pack on the sore area for 10 to 20 minutes at a time. Try to do this every 1 to 2 hours for the next 3 days (when you are awake) or until the swelling goes down. Put a thin cloth between the ice and your skin. · After 2 or 3 days, apply a heating pad set on low or a warm cloth to the area that hurts. Some doctors suggest that you go back and forth between hot and cold. · Do not wrap or tape your ribs for support. This may cause you to take smaller breaths, which could increase your risk of lung problems. · Mentholated creams such as Bengay or Icy Hot may soothe sore muscles. Follow the instructions on the package. · Follow your doctor's instructions for exercising. · Gentle stretching and massage may help you get better faster. Stretch slowly to the point just before pain begins, and hold the stretch for at least 15 to 30 seconds. Do this 3 or 4 times a day. Stretch just after you have applied heat. · As your pain gets better, slowly return to your normal activities. Any increased pain may be a sign that you need to rest a while longer. When should you call for help? Call 911 anytime you think you may need emergency care. For example, call if: 
· You have chest pain or pressure. This may occur with: ¨ Sweating. ¨ Shortness of breath. ¨ Nausea or vomiting. ¨ Pain that spreads from the chest to the neck, jaw, or one or both shoulders or arms. ¨ Dizziness or lightheadedness. ¨ A fast or uneven pulse. After calling 911, chew 1 adult-strength aspirin. Wait for an ambulance. Do not try to drive yourself. · You have sudden chest pain and shortness of breath, or you cough up blood. Call your doctor now or seek immediate medical care if: 
· You have any trouble breathing. · Your chest pain gets worse. · Your chest pain occurs consistently with exercise and is relieved by rest. 
Watch closely for changes in your health, and be sure to contact your doctor if: 
· Your chest pain does not get better after 1 week. Where can you learn more? Go to http://dory-megan.info/. Enter V293 in the search box to learn more about \"Musculoskeletal Chest Pain: Care Instructions. \" Current as of: May 27, 2016 Content Version: 11.2 © 8330-1538 [employer].. Care instructions adapted under license by [employer] (which disclaims liability or warranty for this information). If you have questions about a medical condition or this instruction, always ask your healthcare professional. [place] [address] any warranty or liability for your use of this information. Healthy Upper Back: Exercises Your Care Instructions Here are some examples of exercises for your upper back. Start each exercise slowly. Ease off the exercise if you start to have pain. Your doctor or physical therapist will tell you when you can start these exercises and which ones will work best for you. How to do the exercises Lower neck and upper back stretch 1. Stretch your arms out in front of your body.  Clasp one hand on top of your other hand. 2. Gently reach out so that you feel your shoulder blades stretching away from each other. 3. Gently bend your head forward. 4. Hold for 15 to 30 seconds. 5. Repeat 2 to 4 times. Midback stretch Note: If you have knee pain, do not do this exercise. 1. Kneel on the floor, and sit back on your ankles. 2. Lean forward, place your hands on the floor, and stretch your arms out in front of you. Rest your head between your arms. 3. Gently push your chest toward the floor, reaching as far in front of you as possible. 4. Hold for 15 to 30 seconds. 5. Repeat 2 to 4 times. Shoulder rolls 1. Sit comfortably with your feet shoulder-width apart. You can also do this exercise while standing. 2. Roll your shoulders up, then back, and then down in a smooth, circular motion. 3. Repeat 2 to 4 times. Wall push-up 1. Stand against a wall with your feet about 12 to 24 inches back from the wall. If you feel any pain when you do this exercise, stand closer to the wall. 2. Place your hands on the wall slightly wider apart than your shoulders, and lean forward. 3. Gently lean your body toward the wall. Then push back to your starting position. Keep the motion smooth and controlled. 4. Repeat 8 to 12 times. Resisted shoulder blade squeeze Note: For this exercise, you will need elastic exercise material, such as surgical tubing or Thera-Band. 1. Sit or stand, holding the band in both hands in front of you. Keep your elbows close to your sides, bent at a 90-degree angle. Your palms should face up. 2. Squeeze your shoulder blades together, and move your arms to the outside, stretching the band. Be sure to keep your elbows at your sides while you do this. 3. Relax. 4. Repeat 8 to 12 times. Resisted rows Note: For this exercise, you will need elastic exercise material, such as surgical tubing or Thera-Band.  
1. Put the band around a solid object, such as a bedpost, at about waist level. Hold one end of the band in each hand. 2. With your elbows at your sides and bent to 90 degrees, pull the band back to move your shoulder blades toward each other. Return to the starting position. 3. Repeat 8 to 12 times. Follow-up care is a key part of your treatment and safety. Be sure to make and go to all appointments, and call your doctor if you are having problems. It's also a good idea to know your test results and keep a list of the medicines you take. Where can you learn more? Go to http://dory-megan.info/. Enter S304 in the search box to learn more about \"Healthy Upper Back: Exercises. \" Current as of: May 23, 2016 Content Version: 11.2 © 5869-6047 [employer]. Care instructions adapted under license by [employer] (which disclaims liability or warranty for this information). If you have questions about a medical condition or this instruction, always ask your healthcare professional. [place] [address] any warranty or liability for your use of this information. Neck: Exercises Your Care Instructions Here are some examples of typical rehabilitation exercises for your condition. Start each exercise slowly. Ease off the exercise if you start to have pain. Your doctor or physical therapist will tell you when you can start these exercises and which ones will work best for you. How to do the exercises Note: Stretching should make you feel a gentle stretch, but no pain. Stop any strengthening exercise that makes pain worse. Neck stretch 6. This stretch works best if you keep your shoulder down as you lean away from it. To help you remember to do this, start by relaxing your shoulders and lightly holding on to your thighs or your chair. 7. Tilt your head toward your shoulder and hold for 15 to 30 seconds. Let the weight of your head stretch your muscles. 8. If you would like a little added stretch, use your hand to gently and steadily pull your head toward your shoulder. For example, keeping your right shoulder down, lean your head to the left. 9. Repeat 2 to 4 times toward each shoulder. Diagonal neck stretch 6. Turn your head slightly toward the direction you will be stretching, and tilt your head diagonally toward your chest and hold for 15 to 30 seconds. 7. If you would like a little added stretch, use your hand to gently and steadily pull your head forward on the diagonal. 
8. Repeat 2 to 4 times toward each side. Dorsal glide stretch 4. Sit or stand tall and look straight ahead. 5. Slowly tuck your chin as you glide your head backward over your body 6. Hold for a count of 6, and then relax for up to 10 seconds. 7. Repeat 8 to 12 times. Note: The dorsal glide stretches the back of the neck. If you feel pain, do not glide so far back. Some people find this exercise easier to do while lying on their backs with an ice pack on the neck. Chest and shoulder stretch 5. Sit or stand tall and glide your head backward as in the dorsal glide stretch. 6. Raise both arms so that your hands are next to your ears. 7. Take a deep breath, and as you breathe out, lower your elbows down and behind your back. You will feel your shoulder blades slide down and together, and at the same time you will feel a stretch across your chest and the front of your shoulders. 8. Hold for about 6 seconds, and then relax for up to 10 seconds. 9. Repeat 8 to 12 times. Strengthening: Hands on head 5. Move your head backward, forward, and side to side against gentle pressure from your hands, holding each position for about 6 seconds. 6. Repeat 8 to 12 times. Follow-up care is a key part of your treatment and safety. Be sure to make and go to all appointments, and call your doctor if you are having problems.  It's also a good idea to know your test results and keep a list of the medicines you take. Where can you learn more? Go to http://dory-megan.info/. Enter P975 in the search box to learn more about \"Neck: Exercises. \" Current as of: May 23, 2016 Content Version: 11.2 © 3661-8908 [employer]. Care instructions adapted under license by [employer] (which disclaims liability or warranty for this information). If you have questions about a medical condition or this instruction, always ask your healthcare professional. [place] [address] any warranty or liability for your use of this information. Introducing [facility]! [employer] introduces FPSI patient portal. Now you can access parts of your medical record, email your doctor's office, and request medication refills online. 1. In your internet browser, go to [email or web address]. [employer] Voice/[email or web address] 2. Click on the First Time User? Click Here link in the Sign In box. You will see the New Member Sign Up page. 3. Enter your FPSI Access Code exactly as it appears below. You will not need to use this code after youve completed the sign-up process. If you do not sign up before the expiration date, you must request a new code. · FPSI Access Code: [email or web address] Expires: 5/10/2017 11:13 PM 
 
4. Enter the last four digits of your Social Security Number (xxxx) and Date of Birth (mm/dd/yyyy) as indicated and click Submit. You will be taken to the next sign-up page. 5. Create a FPSI ID. This will be your FPSI login ID and cannot be changed, so think of one that is secure and easy to remember. 6. Create a FPSI password. You can change your password at any time. 7. Enter your Password Reset Question and Answer. This can be used at a later time if you forget your password. 8. Enter your e-mail address. You will receive e-mail notification when new information is available in [address] 9. Click Sign Up. You can now view and download portions of your medical record. 10. Click the Download Summary menu link to download a portable copy of your medical information. If you have questions, please visit the Frequently Asked Questions section of the [employer] website. Remember, [employer] is NOT to be used for urgent needs. For medical emergencies, dial 911. Now available from your iPhone and Android! Please provide this summary of care documentation to your next provider. Your primary care clinician is listed as [facility]. If you have any questions after today's visit, please call [phone].

## 2017-05-03 NOTE — PATIENT INSTRUCTIONS
Musculoskeletal Chest Pain: Care Instructions  Your Care Instructions  Chest pain is not always a sign that something is wrong with your heart or that you have another serious problem. The doctor thinks your chest pain is caused by strained muscles or ligaments, inflamed chest cartilage, or another problem in your chest, rather than by your heart. You may need more tests to find the cause of your chest pain. Follow-up care is a key part of your treatment and safety. Be sure to make and go to all appointments, and call your doctor if you are having problems. Its also a good idea to know your test results and keep a list of the medicines you take. How can you care for yourself at home? · Take pain medicines exactly as directed. ¨ If the doctor gave you a prescription medicine for pain, take it as prescribed. ¨ If you are not taking a prescription pain medicine, ask your doctor if you can take an over-the-counter medicine. · Rest and protect the sore area. · Stop, change, or take a break from any activity that may be causing your pain or soreness. · Put ice or a cold pack on the sore area for 10 to 20 minutes at a time. Try to do this every 1 to 2 hours for the next 3 days (when you are awake) or until the swelling goes down. Put a thin cloth between the ice and your skin. · After 2 or 3 days, apply a heating pad set on low or a warm cloth to the area that hurts. Some doctors suggest that you go back and forth between hot and cold. · Do not wrap or tape your ribs for support. This may cause you to take smaller breaths, which could increase your risk of lung problems. · Mentholated creams such as Bengay or Icy Hot may soothe sore muscles. Follow the instructions on the package. · Follow your doctor's instructions for exercising. · Gentle stretching and massage may help you get better faster. Stretch slowly to the point just before pain begins, and hold the stretch for at least 15 to 30 seconds.  Do this 3 or 4 times a day. Stretch just after you have applied heat. · As your pain gets better, slowly return to your normal activities. Any increased pain may be a sign that you need to rest a while longer. When should you call for help? Call 911 anytime you think you may need emergency care. For example, call if:  · You have chest pain or pressure. This may occur with:  ¨ Sweating. ¨ Shortness of breath. ¨ Nausea or vomiting. ¨ Pain that spreads from the chest to the neck, jaw, or one or both shoulders or arms. ¨ Dizziness or lightheadedness. ¨ A fast or uneven pulse. After calling 911, chew 1 adult-strength aspirin. Wait for an ambulance. Do not try to drive yourself. · You have sudden chest pain and shortness of breath, or you cough up blood. Call your doctor now or seek immediate medical care if:  · You have any trouble breathing. · Your chest pain gets worse. · Your chest pain occurs consistently with exercise and is relieved by rest.  Watch closely for changes in your health, and be sure to contact your doctor if:  · Your chest pain does not get better after 1 week. Where can you learn more? Go to http://dory-megan.info/. Enter V293 in the search box to learn more about \"Musculoskeletal Chest Pain: Care Instructions. \"  Current as of: May 27, 2016  Content Version: 11.2  © 4844-9862 [employer]. Care instructions adapted under license by [employer] (which disclaims liability or warranty for this information). If you have questions about a medical condition or this instruction, always ask your healthcare professional. [place] [address] any warranty or liability for your use of this information. Healthy Upper Back: Exercises  Your Care Instructions  Here are some examples of exercises for your upper back. Start each exercise slowly. Ease off the exercise if you start to have pain.   Your doctor or physical therapist will tell you when you can start these exercises and which ones will work best for you. How to do the exercises  Lower neck and upper back stretch    1. Stretch your arms out in front of your body. Clasp one hand on top of your other hand. 2. Gently reach out so that you feel your shoulder blades stretching away from each other. 3. Gently bend your head forward. 4. Hold for 15 to 30 seconds. 5. Repeat 2 to 4 times. Midback stretch    Note: If you have knee pain, do not do this exercise. 1. Kneel on the floor, and sit back on your ankles. 2. Lean forward, place your hands on the floor, and stretch your arms out in front of you. Rest your head between your arms. 3. Gently push your chest toward the floor, reaching as far in front of you as possible. 4. Hold for 15 to 30 seconds. 5. Repeat 2 to 4 times. Shoulder rolls    1. Sit comfortably with your feet shoulder-width apart. You can also do this exercise while standing. 2. Roll your shoulders up, then back, and then down in a smooth, circular motion. 3. Repeat 2 to 4 times. Wall push-up    1. Stand against a wall with your feet about 12 to 24 inches back from the wall. If you feel any pain when you do this exercise, stand closer to the wall. 2. Place your hands on the wall slightly wider apart than your shoulders, and lean forward. 3. Gently lean your body toward the wall. Then push back to your starting position. Keep the motion smooth and controlled. 4. Repeat 8 to 12 times. Resisted shoulder blade squeeze    Note: For this exercise, you will need elastic exercise material, such as surgical tubing or Thera-Band. 1. Sit or stand, holding the band in both hands in front of you. Keep your elbows close to your sides, bent at a 90-degree angle. Your palms should face up. 2. Squeeze your shoulder blades together, and move your arms to the outside, stretching the band. Be sure to keep your elbows at your sides while you do this. 3. Relax.   4. Repeat 8 to 12 times. Resisted rows    Note: For this exercise, you will need elastic exercise material, such as surgical tubing or Thera-Band. 1. Put the band around a solid object, such as a bedpost, at about waist level. Hold one end of the band in each hand. 2. With your elbows at your sides and bent to 90 degrees, pull the band back to move your shoulder blades toward each other. Return to the starting position. 3. Repeat 8 to 12 times. Follow-up care is a key part of your treatment and safety. Be sure to make and go to all appointments, and call your doctor if you are having problems. It's also a good idea to know your test results and keep a list of the medicines you take. Where can you learn more? Go to http://dory-megan.info/. Enter J541 in the search box to learn more about \"Healthy Upper Back: Exercises. \"  Current as of: May 23, 2016  Content Version: 11.2  © 2335-7704 [employer]. Care instructions adapted under license by [employer] (which disclaims liability or warranty for this information). If you have questions about a medical condition or this instruction, always ask your healthcare professional. [place] [address] any warranty or liability for your use of this information. Neck: Exercises  Your Care Instructions  Here are some examples of typical rehabilitation exercises for your condition. Start each exercise slowly. Ease off the exercise if you start to have pain. Your doctor or physical therapist will tell you when you can start these exercises and which ones will work best for you. How to do the exercises  Note: Stretching should make you feel a gentle stretch, but no pain. Stop any strengthening exercise that makes pain worse. Neck stretch    6. This stretch works best if you keep your shoulder down as you lean away from it.  To help you remember to do this, start by relaxing your shoulders and lightly holding on to your thighs or your chair. 7. Tilt your head toward your shoulder and hold for 15 to 30 seconds. Let the weight of your head stretch your muscles. 8. If you would like a little added stretch, use your hand to gently and steadily pull your head toward your shoulder. For example, keeping your right shoulder down, lean your head to the left. 9. Repeat 2 to 4 times toward each shoulder. Diagonal neck stretch    6. Turn your head slightly toward the direction you will be stretching, and tilt your head diagonally toward your chest and hold for 15 to 30 seconds. 7. If you would like a little added stretch, use your hand to gently and steadily pull your head forward on the diagonal.  8. Repeat 2 to 4 times toward each side. Dorsal glide stretch    4. Sit or stand tall and look straight ahead. 5. Slowly tuck your chin as you glide your head backward over your body  6. Hold for a count of 6, and then relax for up to 10 seconds. 7. Repeat 8 to 12 times. Note: The dorsal glide stretches the back of the neck. If you feel pain, do not glide so far back. Some people find this exercise easier to do while lying on their backs with an ice pack on the neck. Chest and shoulder stretch    5. Sit or stand tall and glide your head backward as in the dorsal glide stretch. 6. Raise both arms so that your hands are next to your ears. 7. Take a deep breath, and as you breathe out, lower your elbows down and behind your back. You will feel your shoulder blades slide down and together, and at the same time you will feel a stretch across your chest and the front of your shoulders. 8. Hold for about 6 seconds, and then relax for up to 10 seconds. 9. Repeat 8 to 12 times. Strengthening: Hands on head    5. Move your head backward, forward, and side to side against gentle pressure from your hands, holding each position for about 6 seconds. 6. Repeat 8 to 12 times. Follow-up care is a key part of your treatment and safety.  Be sure to make and go to all appointments, and call your doctor if you are having problems. It's also a good idea to know your test results and keep a list of the medicines you take. Where can you learn more? Go to http://dory-megan.info/. Enter P975 in the search box to learn more about \"Neck: Exercises. \"  Current as of: May 23, 2016  Content Version: 11.2  © 4080-7251 [employer]. Care instructions adapted under license by [employer] (which disclaims liability or warranty for this information). If you have questions about a medical condition or this instruction, always ask your healthcare professional. [address] any warranty or liability for your use of this information.

## 2017-05-03 NOTE — PROGRESS NOTES
No chief complaint on file. 1. Have you been to the ER, urgent care clinic since your last visit? Hospitalized since your last visit? No    2. Have you seen or consulted any other health care providers outside of the [address] [place] [address] since your last visit? No    3. When was your last Pap smear? No  When was your last Mammogram? No  When was your last Colon screening? No    PMH/FH/Social Hx reviewed and updated as needed      Applicable screenings reviewed and updated as needed  Medication reconciliation performed. Patient does not need medication refills. Health Maintenance reviewed.

## 2017-05-03 NOTE — PROGRESS NOTES
Discharge instructions reviewed with patient    Medication list and understanding of medications reviewed with patient. OTC and herbal medications reviewed and added to med list if applicable  Barriers to adherence assessed. Guidance given regarding new medications this visit, including reason for taking this medicine, and common side effects. Given Stretchy bands with exercises to do. Labs drawn.   AVS given

## 2017-05-03 NOTE — PROGRESS NOTES
HPI  Roderick Matthews is a 56 y.o. male being seen today for   Chief Complaint   Patient presents with    Chest Pain     Pt stated he has been having chest pain since 4/27 and when he cough it's worsen. .  he states that he has chest pain since February. Saw cardiology and had abnromal stress test but not dx of ischemia and no more testing for heart planned at this time. Normal CXR in ED  Pain is worse than usual today. Worse with stretching/pressure/turning head to right/cough. He thinks it could be due to position in sleep. 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 Low tsh on screening labs in march. Past Medical History:   Diagnosis Date    Asthma     Low TSH level 4/27/2017    may have mild hyperthyroidism w/u per PCP I will rpt TSH and get freeT4         ROS  Patient states that he is feeling well. Denies complaints of shortness of breath, swelling of legs, dizziness or weakness. he denies nausea, vomiting or diarrhea. Current Outpatient Prescriptions   Medication Sig    aspirin delayed-release 325 mg tablet Take  by mouth every six (6) hours as needed for Pain.  fluticasone (FLONASE) 50 mcg/actuation nasal spray 1 [place] by Both Nostrils route daily. No current facility-administered medications for this visit. PE  Visit Vitals    /89 (BP 1 Location: Right arm, BP Patient Position: Sitting)    Pulse 96    Temp 98 °F (36.7 °C) (Oral)    Resp 17    Ht 5' 8\" (1.727 m)    Wt 134 lb (60.8 kg)    SpO2 99%    BMI 20.37 kg/m2        Alert and oriented with normal mood and affect. he is well developed and well nourished . Lungs are clear without wheezing. Heart rate is regular without murmurs or gallops. Point tenderness with  Palpation lateral left chest.  Reproducible with turning head to right. Also has tenderness of left rhomboids and trapezius. There is no lower extremity edema. Assessment and Plan:        ICD-10-CM ICD-9-CM    1. Low TSH level R94.6 794.5    2. Chest pain, unspecified type  Musculoskeletal.   May have element of pinched nerve or neck pathology contributing. R07.9 786.50      Recheck thyroid labs  HEP for neck/upper back/chest pain    rtc 4-6 weeks or prn.  Consider xray cspine or pt referral if pain persists      Daniel Morgan MD

## 2017-06-07 ENCOUNTER — OFFICE VISIT (OUTPATIENT)
Dept: FAMILY MEDICINE CLINIC | Age: 47
End: 2017-06-07

## 2017-06-07 VITALS
TEMPERATURE: 97.8 F | DIASTOLIC BLOOD PRESSURE: 84 MMHG | HEART RATE: 67 BPM | HEIGHT: 68 IN | OXYGEN SATURATION: 100 % | RESPIRATION RATE: 16 BRPM | WEIGHT: 131 LBS | BODY MASS INDEX: 19.85 KG/M2 | SYSTOLIC BLOOD PRESSURE: 128 MMHG

## 2017-06-07 DIAGNOSIS — R09.81 NASAL CONGESTION: Primary | ICD-10-CM

## 2017-06-07 DIAGNOSIS — R79.89 LOW TSH LEVEL: ICD-10-CM

## 2017-06-07 NOTE — PATIENT INSTRUCTIONS
Call [employer] at [phone]. They will screen you for any insurance you might qualify for. This is the first step before you can receive discounts at the [facility] or [facility] Insurance specialists. Additional contact numbers for [facility] are below:   For [place] patients: [phone]  For [place]/[facility] patients: [phone]  For [place]: [phone]

## 2017-06-07 NOTE — PROGRESS NOTES
HPI  Kevin Rodriguez is a 56 y.o. male being seen today for   Chief Complaint   Patient presents with    Results     Thyroid results   . he states that chest pain better after stretches. s till has some if he sneezes. Used nasal spray which did help nasal congestion and sensitivity but sx returned when he stoped med. Received letter about thyroid and here to follow up. Past Medical History:   Diagnosis Date    Asthma     Low TSH level 4/27/2017    may have mild hyperthyroidism w/u per PCP I will rpt TSH and get freeT4         ROS  Patient states that he is feeling well. Denies complaints of chest pain, shortness of breath, swelling of legs, dizziness or weakness. he denies nausea, vomiting or diarrhea. Current Outpatient Prescriptions   Medication Sig    aspirin delayed-release 325 mg tablet Take  by mouth every six (6) hours as needed for Pain.  fluticasone (FLONASE) 50 mcg/actuation nasal spray 1 [place] by Both Nostrils route daily. No current facility-administered medications for this visit. PE  Visit Vitals    /84 (BP 1 Location: Left arm, BP Patient Position: Sitting)    Pulse 67    Temp 97.8 °F (36.6 °C) (Oral)    Resp 16    Ht 5' 8\" (1.727 m)    Wt 131 lb (59.4 kg)    SpO2 100%    BMI 19.92 kg/m2        Alert and oriented with normal mood and affect. he is well developed and well nourished . Lungs are clear without wheezing. Heart rate is regular without murmurs or gallops. There is no lower extremity edema. Thyroid normal in size and consistency. Nasal passages edematous and with clear mucus. Results for orders placed or performed during the hospital encounter of 05/03/17   TSH 3RD GENERATION   Result Value Ref Range    TSH 0.24 (L) 0.36 - 3.74 uIU/mL   T4, FREE   Result Value Ref Range    T4, Free 1.1 0.7 - 1.5 NG/DL         Assessment and Plan:        ICD-10-CM ICD-9-CM    1. Nasal congestion  Restart flonase R09.81 478.19    2.  Low TSH level  Labs show subclnical hyperthyroidism.   Monitor for now R94.6 794.5        rtc 2-4 mos recheck thyroid and nasal polyps  btring back PAP form for beconase  In the meantime he still has some flonase    Kyree Whittaker MD

## 2017-06-07 NOTE — PROGRESS NOTES
No chief complaint on file. 1. Have you been to the ER, urgent care clinic since your last visit? Hospitalized since your last visit? No    2. Have you seen or consulted any other health care providers outside of the [facility] since your last visit? No    3. When was your last Pap smear? No  When was your last Mammogram? No  When was your last Colon screening? No    PMH/FH/Social Hx reviewed and updated as needed      Applicable screenings reviewed and updated as needed  Medication reconciliation performed. Patient does not need medication refills. Health Maintenance reviewed.

## 2017-06-07 NOTE — PROGRESS NOTES
Call [employer] at [phone]. They will screen you for any insurance you might qualify for. This is the first step before you can receive discounts at the hospital or [facility] specialists. Additional contact numbers for [facility] are below: For [place]/[facility] patients: [phone]  For [place]/[facility] patients: [phone]  For [place]: [phone]    Given number for [facility], given return to work note for today. Discharge instructions reviewed with patient    Medication list and understanding of medications reviewed with patient. OTC and herbal medications reviewed and added to med list if applicable  Barriers to adherence assessed. Guidance given regarding new medications this visit, including reason for taking this medicine, and common side effects.

## 2017-07-19 ENCOUNTER — OFFICE VISIT (OUTPATIENT)
Dept: FAMILY MEDICINE CLINIC | Age: 47
End: 2017-07-19

## 2017-07-19 VITALS
HEIGHT: 68 IN | SYSTOLIC BLOOD PRESSURE: 138 MMHG | WEIGHT: 125 LBS | DIASTOLIC BLOOD PRESSURE: 98 MMHG | HEART RATE: 77 BPM | BODY MASS INDEX: 18.94 KG/M2 | OXYGEN SATURATION: 100 % | RESPIRATION RATE: 16 BRPM | TEMPERATURE: 98.1 F

## 2017-07-19 DIAGNOSIS — G56.02 CARPAL TUNNEL SYNDROME OF LEFT WRIST: ICD-10-CM

## 2017-07-19 DIAGNOSIS — R20.0 FINGER NUMBNESS: ICD-10-CM

## 2017-07-19 DIAGNOSIS — M79.602 LEFT ARM PAIN: Primary | ICD-10-CM

## 2017-07-19 DIAGNOSIS — M77.12 LATERAL EPICONDYLITIS OF LEFT ELBOW: ICD-10-CM

## 2017-07-19 DIAGNOSIS — M75.92 SUPRASPINATUS TENDINITIS, LEFT: ICD-10-CM

## 2017-07-19 RX ORDER — NAPROXEN 375 MG/1
375 TABLET ORAL 2 TIMES DAILY WITH MEALS
Qty: 60 TAB | Refills: 2 | Status: SHIPPED | OUTPATIENT
Start: 2017-07-19 | End: 2020-10-02

## 2017-07-19 NOTE — MR AVS SNAPSHOT
Visit Information Date & Time Provider Department Dept. Phone Encounter #  
 7/19/2017  9:45 AM Juancho Dean, RUFUS [address] [facility] [phone] [id number] Upcoming Health Maintenance Date Due Pneumococcal 19-64 Medium Risk (1 of 1 - PPSV23) 4/6/1990 DTaP/Tdap/Td series (1 - Tdap) 4/6/1992 INFLUENZA AGE 9 TO ADULT 8/1/2017 Allergies as of 7/19/2017  Review Complete On: 7/19/2017 By: Flor Patel No Known Allergies Current Immunizations  Never Reviewed Name Date Rabies Vaccine IM 5/1/2015  9:02 PM  
  
 Not reviewed this visit You Were Diagnosed With   
  
 Codes Comments Left arm pain    -  Primary ICD-10-CM: C31.671 ICD-9-CM: 729.5 Finger numbness     ICD-10-CM: R20.0 ICD-9-CM: 782.0 Lateral epicondylitis of left elbow     ICD-10-CM: M77.12 
ICD-9-CM: 726.32 Carpal tunnel syndrome of left wrist     ICD-10-CM: G56.02 
ICD-9-CM: 354.0 Supraspinatus tendinitis, left     ICD-10-CM: M75.92 
ICD-9-CM: 726.10 Vitals BP Pulse Temp Resp Height(growth percentile) Weight(growth percentile) (!) 138/98 77 98.1 °F (36.7 °C) (Oral) 16 5' 8\" (1.727 m) 125 lb (56.7 kg) SpO2 BMI Smoking Status 100% 19.01 kg/m2 Current Every Day Smoker Vitals History BMI and BSA Data Body Mass Index Body Surface Area 19.01 kg/m 2 1.65 m 2 Preferred Pharmacy Pharmacy Name Phone [facility] [address] [phone] Your Updated Medication List  
  
   
This list is accurate as of: 7/19/17 11:41 AM.  Always use your most recent med list.  
  
  
  
  
 aspirin delayed-release 325 mg tablet Take  by mouth every six (6) hours as needed for Pain. fluticasone 50 mcg/actuation nasal spray Commonly known as:  FLONASE  
1 [place] by Both Nostrils route daily. naproxen 375 mg tablet Commonly known as:  NAPROSYN  
 Take 1 Tab by mouth two (2) times daily (with meals). Prescriptions Sent to Pharmacy Refills  
 naproxen (NAPROSYN) 375 mg tablet 2 Sig: Take 1 Tab by mouth two (2) times daily (with meals). Class: Normal  
 Pharmacy: [facility] [address]  #: [phone] Route: Oral  
  
Patient Instructions Call Advanced Patient Advocacy at [phone]. They will screen you for any insurance you might qualify for. This is the first step before you can receive discounts at the [facility] or [employer] specialists. -CALL THIS #  TO Jana Hays [address] ELBOW BRACE TO HELP WITH PAIN IN LOWER ARM 
 
 
 CARPAL TUNNNEL BRACE - WEAR ALL DAY AND NIGHT FOR FINGER NUMBNESS Shoulder Blade: Exercises Your Care Instructions Here are some examples of typical exercises for your condition. Start each exercise slowly. Ease off the exercise if you start to have pain. Your doctor or physical therapist will tell you when you can start these exercises and which ones will work best for you. How to do the exercises Shoulder roll 1. Stand tall with your chin slightly tucked. Imagine that a string at the top of your head is pulling you straight up. 2. Keep your arms relaxed. All motion will be in your shoulders. 3. Shrug your shoulders up toward your ears, then up and back. [place] your shoulders down and back, like you're sliding your hands down into your back pants pockets. 4. Repeat the circles at least 2 to 4 times. This exercise is also helpful anytime you want to relax. Lower neck and upper back stretch 1. With your arms about shoulder height, clasp your hands in front of you. 2. Drop your chin toward your chest. 
3. Reach straight forward so you are rounding your upper back. Think about pulling your shoulder blades apart. Belen Whalen feel a stretch across your upper back and shoulders. Hold for at least 6 seconds. 4. Repeat 2 to 4 times. Triceps stretch 1. Reach your arm straight up. 2. Keeping your elbow in place, bend your arm and reach your hand down behind your back. 3. With your other hand, apply gentle pressure to the bent elbow. Earline Lazar feel a stretch at the back of your upper arm and shoulder. Hold about 6 seconds. 4. Repeat 2 to 4 times with each arm. Shoulder stretch 1. Relax your shoulders. 2. Raise one arm to shoulder height, and reach it across your chest. 
3. Pull the arm slightly toward you with your other arm. This will help you get a gentle stretch. Hold for about 6 seconds. 4. Repeat 2 to 4 times. Shoulder blade squeeze 1. Sit or stand up tall with your arms at your sides. 2. Keep your shoulders relaxed and down, not shrugged. 3. Squeeze your shoulder blades together. Hold for 6 seconds, then relax. 4. Repeat 8 to 12 times. Straight-arm shoulder blade squeeze 1. Sit or stand tall. Relax your shoulders. 2. With palms down, hold your elastic tubing or band straight out in front of you. 3. Start with slight tension in the tubing or band, with your hands about shoulder-width apart. 4. Slowly pull straight out to the sides, squeezing your shoulder blades together. Keep your arms straight and at shoulder height. Slowly release. 5. Repeat 8 to 12 times. Rowing 1. [place] your elastic tubing or band at about waist height. Take one end in each hand. 2. Sit or stand with your feet hip-width apart. 3. Hold your arms straight in front of you. Adjust your distance to create slight tension in the tubing or band. 4. Slightly tuck your chin. Relax your shoulders. 5. Without shrugging your shoulders, pull straight back. Your elbows will pass alongside your waist. 
Pull-downs 1. [place] your elastic tubing or band in the top of a closed door. Take one end in each hand. 2. Either sit or stand, depending on what is more comfortable. If you feel unsteady, sit on a chair. 3. Start with your arms up and comfortably apart, elbows straight. There should be a slight tension in the tubing or band. 4. Slightly tuck your chin, and look straight ahead. 5. Keeping your back straight, slowly pull down and back, bending your elbows. 6. Stop where your hands are level with your chin, in a \"goalpost\" position. 7. Repeat 8 to 12 times. Chest T stretch 1. Lie on your back. Raise your knees so they are bent. Plant your feet on the floor, hip-width apart. 2. Tuck your chin, and relax your shoulders. 3. Reach your arms straight out to the sides. If you don't feel a mild stretch in your shoulders and across your chest, use a foam roll or a tightly rolled blanket under your spine, from your tailbone to your head. 4. Relax in this position for at least 15 to 30 seconds while you breathe normally. Repeat 2 to 4 times. As you get used to this stretch, keep adding a little more time until you are able relax in this position for 2 or 3 minutes. When you can relax for at least 2 minutes, you only need to do the exercise 1 time per session. Chest goalpost stretch 1. Lie on your back. Raise your knees so they are bent. Plant your feet on the floor, hip-width apart. 2. Tuck your chin, and relax your shoulders. 3. Reach your arms straight out to the sides. 4. Bend your arms at the elbows, with your hands pointed toward the top of your head. Your arms should make an L on either side of your head. Your palms should be facing up. 5. If you don't feel a mild stretch in your shoulders and across your chest, use a foam roll or tightly rolled blanket under your spine, from your tailbone to your head. 6. Relax in this position for at least 15 to 30 seconds while you breathe normally. Repeat 2 to 4 times. Each day you do this exercise, add a little more time until you can relax in this position for 2 or 3 minutes.  When you can relax for at least 2 minutes, you only need to do the exercise 1 time per session. Follow-up care is a key part of your treatment and safety. Be sure to make and go to all appointments, and call your doctor if you are having problems. It's also a good idea to know your test results and keep a list of the medicines you take. Carpal Tunnel Syndrome: Exercises Your Care Instructions Here are some examples of typical rehabilitation exercises for your condition. Start each exercise slowly. Ease off the exercise if you start to have pain. Your doctor or your physical or occupational therapist will tell you when you can start these exercises and which ones will work best for you. How to do the exercises Note: When you no longer have pain or numbness, you can do exercises to help prevent carpal tunnel syndrome from coming back. Do not do any stretch or movement that is uncomfortable or painful. Warm-up stretches 5. Rotate your wrist up, down, and from side to side. Repeat 4 times. 6. Stretch your fingers far apart. Relax them, and then stretch them again. Repeat 4 times. 7. Stretch your thumb by pulling it back gently, holding it, and then releasing it. Repeat 4 times. Prayer stretch 5. Start with your palms together in front of your chest just below your chin. 6. Slowly lower your hands toward your waistline, keeping your hands close to your stomach and your palms together until you feel a mild to moderate stretch under your forearms. 7. Hold for at least 15 to 30 seconds. Repeat 2 to 4 times. Wrist flexor stretch 5. Extend your arm in front of you with your palm up. 6. Bend your wrist, pointing your hand toward the floor. 7. With your other hand, gently bend your wrist farther until you feel a mild to moderate stretch in your forearm. 8. Hold for at least 15 to 30 seconds. Repeat 2 to 4 times. Wrist extensor stretch Repeat steps 1 through 4 of the stretch above, but begin with your extended hand palm down. Follow-up care is a key part of your treatment and safety. Be sure to make and go to all appointments, and call your doctor if you are having problems. It's also a good idea to know your test results and keep a list of the medicines you take. Where can you learn more? Go to http://dory-megan.info/. Enter F938 in the search box to learn more about \"Carpal Tunnel Syndrome: Exercises. \" Current as of: March 21, 2017 Content Version: 11.3 © 4938-7439 mywaves. Care instructions adapted under license by [employer] (which disclaims liability or warranty for this information). If you have questions about a medical condition or this instruction, always ask your healthcare professional. [address] any warranty or liability for your use of this information. Call Advanced Patient Advocacy at [phone]. They will screen you for any insurance you might qualify for. This is the first step before you can receive discounts at the [facility] or [facility] specialists. Additional contact numbers for [facility] are below: For [place]/[facility] patients: [phone] For [place]/[facility] patients: [phone] For [place]/[facility] patients: [phone] Introducing [facility]! [facility] introduces iWarda patient portal. Now you can access parts of your medical record, email your doctor's office, and request medication refills online. 1. In your internet browser, go to [email or web address] 2. Click on the First Time User? Click Here link in the Sign In box. You will see the New Member Sign Up page. 3. Enter your iWarda Access Code exactly as it appears below. You will not need to use this code after youve completed the sign-up process. If you do not sign up before the expiration date, you must request a new code.  
 
· iWarda Access Code: [email or web address] 
 Expires: 9/5/2017 11:07 AM 
 
4. Enter the last four digits of your Social Security Number (xxxx) and Date of Birth (mm/dd/yyyy) as indicated and click Submit. You will be taken to the next sign-up page. 5. Create a Lumenpulse ID. This will be your Lumenpulse login ID and cannot be changed, so think of one that is secure and easy to remember. 6. Create a Lumenpulse password. You can change your password at any time. 7. Enter your Password Reset Question and Answer. This can be used at a later time if you forget your password. 8. Enter your e-mail address. You will receive e-mail notification when new information is available in [address] 9. Click Sign Up. You can now view and download portions of your medical record. 10. Click the Download Summary menu link to download a portable copy of your medical information. If you have questions, please visit the Frequently Asked Questions section of the Lumenpulse website. Remember, Lumenpulse is NOT to be used for urgent needs. For medical emergencies, dial 911. Now available from your iPhone and Android! Please provide this summary of care documentation to your next provider. Your primary care clinician is listed as [address]. If you have any questions after today's visit, please call [phone].

## 2017-07-19 NOTE — PROGRESS NOTES
KATE Woodward is a 56 y.o. male being seen today for 3 weeks ago woke up with left arm pain and weakness. Couldn't use the arm for 3 weeks and fingers still numb. Weakness and pain improved but still having pain/in fingers. Chief Complaint   Patient presents with    Arm Pain     \"pt stated that he been having pain in left arm pt stated that this started around 3 weeks ago\"   . he states that he had full work up by cardiology. Has had this arm pain before but never to this severity and never understood what caused it. Past Medical History:   Diagnosis Date    Asthma     Low TSH level 4/27/2017    may have mild hyperthyroidism w/u per PCP I will rpt TSH and get freeT4         ROS  Patient states that he is feeling well. Denies complaints of chest pain, shortness of breath, swelling of legs, dizziness or weakness. he denies nausea, vomiting or diarrhea. Current Outpatient Prescriptions   Medication Sig    aspirin delayed-release 325 mg tablet Take  by mouth every six (6) hours as needed for Pain.  fluticasone (FLONASE) 50 mcg/actuation nasal spray 1 [place] by Both Nostrils route daily. No current facility-administered medications for this visit. PE  Visit Vitals    BP (!) 138/98    Pulse 77    Temp 98.1 °F (36.7 °C) (Oral)    Resp 16    Ht 5' 8\" (1.727 m)    Wt 125 lb (56.7 kg)    SpO2 100%    BMI 19.01 kg/m2        Alert and oriented with normal mood and affect. he is well developed and well nourished . Lungs are clear without wheezing. Heart rate is regular without murmurs or gallops. There is no lower extremity edema. +phalens, TTP right medial aspect of shoulder blade, TTP right brachioradialis.       Results for orders placed or performed during the hospital encounter of 05/03/17   TSH 3RD GENERATION   Result Value Ref Range    TSH 0.24 (L) 0.36 - 3.74 uIU/mL   T4, FREE   Result Value Ref Range    T4, Free 1.1 0.7 - 1.5 NG/DL         Assessment and Plan: ICD-10-CM ICD-9-CM    1. Left arm pain M79.602 729.5    2. Finger numbness R20.0 782.0    3. Lateral epicondylitis of left elbow M77.12 726.32    4. Carpal tunnel syndrome of left wrist G56.02 354.0    5.  Supraspinatus tendinitis, left M75.92 726.10    exercises, carpal tunnel  Brace, tennis elbow band  F/u prn no improvement in various symptoms        Frederic Reeder, NP

## 2017-07-19 NOTE — PROGRESS NOTES
Discharge instructions reviewed with patient    Medication list and understanding of medications reviewed with patient. OTC and herbal medications reviewed and added to med list if applicable  Barriers to adherence assessed. Guidance given regarding new medications this visit, including reason for taking this medicine, and common side effects. Given PAP application for [place] with instruction, given f/U appointment and literature on exercises and # for APA.

## 2017-07-20 NOTE — PROGRESS NOTES
I have reviewed the attatched progress note and I agree with the plan and medical decision making as outlined by Jacobo Will MD

## 2018-12-21 ENCOUNTER — OFFICE VISIT (OUTPATIENT)
Dept: FAMILY MEDICINE CLINIC | Facility: CLINIC | Age: 48
End: 2018-12-21

## 2018-12-21 VITALS
TEMPERATURE: 97.8 F | OXYGEN SATURATION: 97 % | HEART RATE: 114 BPM | SYSTOLIC BLOOD PRESSURE: 109 MMHG | DIASTOLIC BLOOD PRESSURE: 79 MMHG | RESPIRATION RATE: 16 BRPM | WEIGHT: 136.8 LBS | HEIGHT: 68 IN | BODY MASS INDEX: 20.73 KG/M2

## 2018-12-21 DIAGNOSIS — K40.90 RIGHT INGUINAL HERNIA: ICD-10-CM

## 2018-12-21 DIAGNOSIS — R07.89 OTHER CHEST PAIN: ICD-10-CM

## 2018-12-21 DIAGNOSIS — J30.0 VASOMOTOR RHINITIS: ICD-10-CM

## 2018-12-21 DIAGNOSIS — E03.9 ACQUIRED HYPOTHYROIDISM: ICD-10-CM

## 2018-12-21 DIAGNOSIS — Z71.6 TOBACCO ABUSE COUNSELING: ICD-10-CM

## 2018-12-21 DIAGNOSIS — R79.89 LOW TSH LEVEL: ICD-10-CM

## 2018-12-21 DIAGNOSIS — R09.81 NASAL CONGESTION: ICD-10-CM

## 2018-12-21 DIAGNOSIS — R94.39 ABNORMAL STRESS TEST: Primary | ICD-10-CM

## 2018-12-21 DIAGNOSIS — G44.039 EPISODIC PAROXYSMAL HEMICRANIA, NOT INTRACTABLE: ICD-10-CM

## 2018-12-21 RX ORDER — FLUTICASONE PROPIONATE 50 MCG
SPRAY, SUSPENSION (ML) NASAL
Qty: 1 BOTTLE | Refills: 2 | Status: SHIPPED | OUTPATIENT
Start: 2018-12-21 | End: 2020-10-02

## 2018-12-21 NOTE — PROGRESS NOTES
Walt Morgan is a 53 y.o. male here to establish care       Walt Morgan is a 53 y.o. male (: 1970) presenting to address:    No chief complaint on file. Vitals:    18 0801   Weight: 136 lb 12.8 oz (62.1 kg)   Height: 5' 8\" (1.727 m)       Hearing/Vision:   No exam data present    Learning Assessment:     Learning Assessment 3/16/2017   PRIMARY LEARNER Patient   PRIMARY LANGUAGE ENGLISH   LEARNER PREFERENCE PRIMARY LISTENING   ANSWERED BY patient    RELATIONSHIP SELF     Depression Screening:     PHQ over the last two weeks 2017   Little interest or pleasure in doing things Not at all   Feeling down, depressed, irritable, or hopeless Not at all   Total Score PHQ 2 0     Fall Risk Assessment:   No flowsheet data found. Abuse Screening:   No flowsheet data found. Coordination of Care Questionaire:   1. Have you been to the ER, urgent care clinic since your last visit? [facility]    2. Have you seen or consulted any other health care providers outside of the [facility] since your last visit? Include any pap smears or colon screening. NO    Advanced Directive:   1. Do you have an Advanced Directive? NO    2. Would you like information on Advanced Directives?  NO

## 2018-12-21 NOTE — ASSESSMENT & PLAN NOTE
Uncontrolled, based on history, physical exam and review of pertinent labs, studies and medications; meds reconciled; continue current treatment plan, lifestyle modifications recommended. Key CAD CHF Meds             aspirin delayed-release 325 mg tablet Take  by mouth every six (6) hours as needed for Pain. Lab Results   Component Value Date/Time    WBC 4.9 02/09/2017 10:00 PM    HGB 13.7 02/09/2017 10:00 PM    HCT 40.9 02/09/2017 10:00 PM    PLATELET 784 55/14/2674 10:00 PM    MCV 94.9 02/09/2017 10:00 PM     No results found for: HBA1C, [id number], GLU, CREA, CREAPOC, CREATEXT, MALBUR, MCALPOCT, MCACRPOC, MCACR, MCAU, MCAU2, MALBEXT, CHOL, CHOLPOCT, HDL, HDLPOC, LDLC, LDL, LDLCEXT, LDLCPOC, TRIGL, TGLPOCT, [id number]      Lab Results   Component Value Date/Time    GFR est non-AA >60 02/09/2017 10:00 PM    GFR est AA >60 02/09/2017 10:00 PM    Creatinine 1.14 02/09/2017 10:00 PM    BUN 15 02/09/2017 10:00 PM    Sodium 139 02/09/2017 10:00 PM    Potassium 3.7 02/09/2017 10:00 PM    Chloride 105 02/09/2017 10:00 PM    CO2 26 02/09/2017 10:00 PM     Lab Results   Component Value Date/Time    ALT (SGPT) 19 02/09/2017 10:00 PM    AST (SGOT) 19 02/09/2017 10:00 PM    Alk.  phosphatase 56 02/09/2017 10:00 PM    Bilirubin, total 1.2 (H) 02/09/2017 10:00 PM    Albumin 3.7 02/09/2017 10:00 PM    Protein, total 7.1 02/09/2017 10:00 PM    PLATELET 346 85/96/6140 10:00 PM     No results found for: INR, PTP, PTINR, PTEXT, PTEXT

## 2018-12-21 NOTE — ASSESSMENT & PLAN NOTE
Uncontrolled, based on history, physical exam and review of pertinent labs, studies and medications; meds reconciled; changes to treatment plan as per orders. Key Antihyperglycemic Medications     Patient is on no antihyperglycemic meds. Key Anti-Platelet Anticoagulant Meds             aspirin delayed-release 325 mg tablet Take  by mouth every six (6) hours as needed for Pain. Key CAD CHF Meds             aspirin delayed-release 325 mg tablet Take  by mouth every six (6) hours as needed for Pain.

## 2018-12-21 NOTE — ASSESSMENT & PLAN NOTE
Stable, based on history, physical exam and review of pertinent labs, studies and medications; meds reconciled; continue current treatment plan, Will recheck TSH. Lab Results   Component Value Date/Time    WBC 4.9 02/09/2017 10:00 PM    HGB 13.7 02/09/2017 10:00 PM    HCT 40.9 02/09/2017 10:00 PM    PLATELET 482 94/48/2496 10:00 PM    MCV 94.9 02/09/2017 10:00 PM     No results found for: HBA1C, [id number], GLU, CREA, CREAPOC, CREATEXT, MALBUR, MCALPOCT, MCACRPOC, MCACR, MCAU, MCAU2, MALBEXT, CHOL, CHOLPOCT, HDL, HDLPOC, LDLC, LDL, LDLCEXT, LDLCPOC, TRIGL, TGLPOCT, [id number]      Lab Results   Component Value Date/Time    GFR est non-AA >60 02/09/2017 10:00 PM    GFR est AA >60 02/09/2017 10:00 PM    Creatinine 1.14 02/09/2017 10:00 PM    BUN 15 02/09/2017 10:00 PM    Sodium 139 02/09/2017 10:00 PM    Potassium 3.7 02/09/2017 10:00 PM    Chloride 105 02/09/2017 10:00 PM    CO2 26 02/09/2017 10:00 PM     Lab Results   Component Value Date/Time    ALT (SGPT) 19 02/09/2017 10:00 PM    AST (SGOT) 19 02/09/2017 10:00 PM    Alk.  phosphatase 56 02/09/2017 10:00 PM    Bilirubin, total 1.2 (H) 02/09/2017 10:00 PM    Albumin 3.7 02/09/2017 10:00 PM    Protein, total 7.1 02/09/2017 10:00 PM    PLATELET 508 10/04/8942 10:00 PM     No results found for: INR, PTP, PTINR, PTEXT, PTEXT  Lab Results   Component Value Date/Time    Cholesterol, total 210 (H) 03/15/2017 10:59 AM    HDL Cholesterol 121 (H) 03/15/2017 10:59 AM    LDL, calculated 68.6 03/15/2017 10:59 AM    Triglyceride 102 03/15/2017 10:59 AM    CHOL/HDL Ratio 1.7 03/15/2017 10:59 AM     No results found for: BNP, BNPP, BNPPPOC, HSCRP, NA, NAPOC, K, KPOCT, CHOL, CHOLPOCT, HDL, HDLPOC, LDLCPOC, LDLC, LDL, LDLCEXT, TRIGL, TGLPOCT, INR, PTP, PTINR, PTEXT, DIG, PTEXT  No results found for: WBC, WBCT, HGB, HGBPOC, HCT, HCTPOC, PLT, PLTPOC, BNP, BNPP, BNPPPOC, LORNA, DDIME, HDDQN, COTIN, HGBEXT, HCTEXT, PLTEXT  Lab Results   Component Value Date/Time    TSH 0.24 (L) 05/03/2017 11:45 AM    T4, Free 1.1 05/03/2017 11:45 AM      No results found for: NA, NAPOC, CREA, CREAPOC, CREATEXT, TSH, WBC, WBCT, WBCPOC, GPT, ALTPOC, ALT, SGOT, ASTPOC, GGT, LITHM, ATRPA, NORTR, TSHEXT

## 2018-12-21 NOTE — ASSESSMENT & PLAN NOTE
Stable, based on history, physical exam and review of pertinent labs, studies and medications; meds reconciled; continue current treatment plan. Key Antihyperglycemic Medications     Patient is on no antihyperglycemic meds. Key Anti-Platelet Anticoagulant Meds             aspirin delayed-release 325 mg tablet Take  by mouth every six (6) hours as needed for Pain. Key CAD CHF Meds             aspirin delayed-release 325 mg tablet Take  by mouth every six (6) hours as needed for Pain. Lab Results   Component Value Date/Time    WBC 4.9 02/09/2017 10:00 PM    HGB 13.7 02/09/2017 10:00 PM    HCT 40.9 02/09/2017 10:00 PM    PLATELET 541 98/70/5682 10:00 PM    MCV 94.9 02/09/2017 10:00 PM     No results found for: HBA1C, [id number], GLU, CREA, CREAPOC, CREATEXT, MALBUR, MCALPOCT, MCACRPOC, MCACR, MCAU, MCAU2, MALBEXT, CHOL, CHOLPOCT, HDL, HDLPOC, LDLC, LDL, LDLCEXT, LDLCPOC, TRIGL, TGLPOCT, [id number]      Lab Results   Component Value Date/Time    GFR est non-AA >60 02/09/2017 10:00 PM    GFR est AA >60 02/09/2017 10:00 PM    Creatinine 1.14 02/09/2017 10:00 PM    BUN 15 02/09/2017 10:00 PM    Sodium 139 02/09/2017 10:00 PM    Potassium 3.7 02/09/2017 10:00 PM    Chloride 105 02/09/2017 10:00 PM    CO2 26 02/09/2017 10:00 PM     Lab Results   Component Value Date/Time    ALT (SGPT) 19 02/09/2017 10:00 PM    AST (SGOT) 19 02/09/2017 10:00 PM    Alk.  phosphatase 56 02/09/2017 10:00 PM    Bilirubin, total 1.2 (H) 02/09/2017 10:00 PM    Albumin 3.7 02/09/2017 10:00 PM    Protein, total 7.1 02/09/2017 10:00 PM    PLATELET 738 80/63/8694 10:00 PM     No results found for: INR, PTP, PTINR, PTEXT, PTEXT

## 2018-12-21 NOTE — PROGRESS NOTES
HISTORY OF PRESENT ILLNESS  Daniela Mack is a 53 y.o. male. This is a 53year old male presenting to [facility] care. He voices the following concerns      Patient concerned about a cyst in the right groin present one month, non tender    Concerned about a nasal internal lesions that have been present before relieved by nasal spray    Family history of seizures        Headache   The history is provided by the patient (The patient awoke Tuesday 4 days ago and he became dizzy on arising. The patient had a headache and had left arm numbness with chest discomfort relieved by stress and stretching. The blood pressure was elevated on that day. The pain just stopped one day syl). This is a new (Several 3 weeks prior, one severe 4 days prior) problem. The problem has not changed since onset. Associated symptoms include chest pain and headaches. Pertinent negatives include no shortness of breath. Nothing aggravates the symptoms. Nothing relieves the symptoms. Chest Pain (Angina)    The history is provided by the patient. This is a recurrent (Occured during the headache episode. Non exertional, no nausea,diaphoresis  but positive palpitations) problem. The problem has been resolved. Associated with: Onset at rest.None during work as a  or on walking. The quality of the pain is described as burning. The pain does not radiate. Associated symptoms include dizziness, headaches and palpitations. Pertinent negatives include no back pain, no claudication, no cough, no hemoptysis, no nausea, no orthopnea, no PND, no shortness of breath, no sputum production and no vomiting. He has tried NSAIDs for the symptoms. Risk factors include smoking/tobacco exposure. Procedural history includes stress thallium (abnormal, seen by cardiology, no treatment recommended, he states). Pertinent negatives include no cardiac catheterization and no exercise treadmill test.  Thyroid Problem   The history is provided by the patient (Told in the past that he had an underactive thyroid. He is on no treatment). This is a chronic problem. Progression since onset: unknown. Associated symptoms include chest pain and headaches. Pertinent negatives include no shortness of breath. Nasal Congestion    History provided by: he feels there is a lesion in his nostril on the right, improved with a nasal spray. Associated symptoms include congestion, headaches and chest pain. Pertinent negatives include no ear pain, no cough, no shortness of breath, no neck pain and no neck pain. EKG: normal EKG, normal sinus rhythm, unchanged from previous tracings  . Review of Systems   Constitutional:        The patient denies any fever, chills, night sweats or weight loss  There has been no diaphoresis malaise or weakness   HENT: Positive for congestion and sinus pain. Negative for ear pain, hearing loss and nosebleeds. Eyes: Negative for discharge and redness. There is no history of blurred vision, double vision or blindness. The patient does not admit to any foreign body sensation or eye pain. There are no halos around lights  The patient has noted no jaundice   Respiratory: Negative for cough, hemoptysis, sputum production, shortness of breath, wheezing and stridor. Cardiovascular: Positive for chest pain and palpitations. Negative for orthopnea, claudication, leg swelling and PND. Gastrointestinal: Positive for heartburn. Negative for blood in stool, melena, nausea and vomiting. There is no history of nausea  The patient denies vomiting  There is no history of diarrhea or constipation  The patient denies heartburn     Genitourinary: Negative for dysuria, frequency and urgency. Musculoskeletal: Positive for myalgias. Negative for back pain, falls, joint pain and neck pain. Skin: Negative for itching and rash. Neurological: Positive for dizziness and headaches. Negative for tingling (left arm tingling), sensory change and speech change. Endo/Heme/Allergies: Negative for polydipsia. Psychiatric/Behavioral: Negative for depression and substance abuse. The patient is not nervous/anxious. Physical Exam   Constitutional: He is oriented to person, place, and time. HENT:   Head: Normocephalic and atraumatic. Right Ear: External ear normal.   Left Ear: External ear normal.   Nose: Mucosal edema and rhinorrhea present. No nose lacerations, sinus tenderness, nasal deformity, septal deviation or nasal septal hematoma. No epistaxis. Right sinus exhibits no maxillary sinus tenderness and no frontal sinus tenderness. Left sinus exhibits no maxillary sinus tenderness and no frontal sinus tenderness. Mouth/Throat: Oropharynx is clear and moist. No oropharyngeal exudate. No bruits are noted on scalp palpitation  The temporal arteries are non tender bilaterally and are not enlarged or hard  The tympani appear normal and hearing is normal  Normal swallow is appreciated. Nasal passages are red and swollen turbinates noted  The septum is thicker on the right   Eyes: Conjunctivae and EOM are normal. Pupils are equal, round, and reactive to light. Right eye exhibits no discharge. Left eye exhibits no discharge. No scleral icterus. The eyelids show no lesions  The cornea shows no defects  The Iris appears normal and functions normally to light  The funduscopic exam shows a normal appearing disc and vessels with no lesions noted  Visual fields are intact     Neck: Neck supple. No JVD present. No tracheal deviation present. No thyromegaly present. No carotid bruits are appreciated  No supraclavicular adenopathy is appreciated   Cardiovascular: Normal rate, regular rhythm and intact distal pulses. Exam reveals no gallop and no friction rub. No murmur heard. Pulmonary/Chest: Effort normal. No stridor. No respiratory distress. He has no wheezes. He has no rales. He exhibits no tenderness. No accessory muscles used   Abdominal: Soft.  Bowel sounds are normal. He exhibits no distension and no mass. There is no tenderness. There is no guarding. Right inguinal hernia, non tender, easily reduced   Musculoskeletal: He exhibits no edema, tenderness or deformity. There is good active and passive range of motion   No joint swelling or redness is appreciated  No  Warmth is felt over any joints   Lymphadenopathy:     He has no cervical adenopathy. Neurological: He is alert and oriented to person, place, and time. He displays normal reflexes. No cranial nerve deficit. He exhibits normal muscle tone. Coordination normal.   There is no tremor  Sensation is normal in all extremities   Skin: Skin is warm and dry. No rash noted. No erythema. No pallor. No cyanosis is appreciated  The extremities are not pale  No ulcers are appreciated  No abnormal lesions are appreciated   Psychiatric: He has a normal mood and affect. Judgment and thought content normal.   The patient does not appear outwardly anxious or depressed   Nursing note and vitals reviewed. MDM  Number of Diagnoses or Management Options  Abnormal stress test:   Acquired hypothyroidism:   Episodic paroxysmal hemicrania, not intractable:   Low TSH level: Other chest pain:   Right inguinal hernia: new, no workup  Tobacco abuse counseling: new, no workup  Vasomotor rhinitis:   Diagnosis management comments: The chest pain is atypical. The EKG shows no acute abnormality and no changes  Sinus rhythm,no ectopy  Will follow        ASSESSMENT and PLAN    ICD-10-CM ICD-9-CM    1. Abnormal stress test R94.39 794.39     No LAKHANI or chest pain on exertion  He did have left arm numbness  EKG ordered   2. Low TSH level R79.89 794.5     TSH ordered for follow up   3.  Tobacco abuse counseling Z71.6 V65.42      305.1     Counseled to stop smoking   4. Episodic paroxysmal hemicrania, not intractable G44.039 339.03 CT HEAD WO CONT    One episode of headache that was severe , age more than 40  Several smaller headaches  CT ordered   5. Acquired hypothyroidism E03.9 244.9 TSH 3RD GENERATION   6. Other chest pain R07.89 786.59 AMB POC EKG ROUTINE W/ 12 LEADS, INTER & REP    EKG unchanged from previous  Will follow and if exertional chest pain occurs or typical pain, will refer   7. Vasomotor rhinitis J30.0 477.9    8. Right inguinal hernia K40.90 550.90     Asymptomatic. Follow     Follow-up Disposition:  Return in about 3 months (around 3/21/2019).   very strongly urged to quit smoking to reduce cardiovascular risk

## 2018-12-21 NOTE — PATIENT INSTRUCTIONS
Call or go to ED if you have chest pain lasting more than 5 minutes, shortness of breath, sweating or any other concerns     Hypothyroidism: Care Instructions  Your Care Instructions    You have hypothyroidism, which means that your body is not making enough thyroid hormone. This hormone helps your body use energy. If your thyroid level is low, you may feel tired, be constipated, have an increase in your blood pressure, or have dry skin or memory problems. You may also get cold easily, even when it is warm. Women with low thyroid levels may have heavy menstrual periods. A blood test to find your thyroid-stimulating hormone (TSH) level is used to check for hypothyroidism. A high TSH level may mean that you have low thyroid. When your body is not making enough thyroid hormone, TSH levels rise in an effort to make the body produce more. The treatment for hypothyroidism is to take thyroid hormone pills. You should start to feel better in 1 to 2 weeks. But it can take several months to see changes in the TSH level. You will need regular visits with your doctor to make sure you have the right dose of medicine. Most people need treatment for the rest of their lives. You will need to see your doctor regularly to have blood tests and to make sure you are doing well. Follow-up care is a key part of your treatment and safety. Be sure to make and go to all appointments, and call your doctor if you are having problems. It's also a good idea to know your test results and keep a list of the medicines you take. How can you care for yourself at home? · Take your thyroid hormone medicine exactly as prescribed. Call your doctor if you think you are having a problem with your medicine. Most people do not have side effects if they take the right amount of medicine regularly. ? Take the medicine 30 minutes before breakfast, and do not take it with calcium, vitamins, or iron. ? Do not take extra doses of your thyroid medicine.  It will not help you get better any faster, and it may cause side effects. ? If you forget to take a dose, do NOT take a double dose of medicine. Take your usual dose the next day. · Tell your doctor about all prescription, herbal, or over-the-counter products you take. · Take care of yourself. Eat a healthy diet, get enough sleep, and get regular exercise. When should you call for help? Call 911 anytime you think you may need emergency care. For example, call if:    · You passed out (lost consciousness).     · You have severe trouble breathing.     · You have a very slow heartbeat (less than 60 beats a minute).     · You have a low body temperature (95°F or below).    Call your doctor now or seek immediate medical care if:    · You feel tired, sluggish, or weak.     · You have trouble remembering things or concentrating.     · You do not begin to feel better 2 weeks after starting your medicine.    Watch closely for changes in your health, and be sure to contact your doctor if you have any problems. Where can you learn more? Go to http://dory-megan.info/. Enter S463 in the search box to learn more about \"Hypothyroidism: Care Instructions. \"  Current as of: March 15, 2018  Content Version: 11.8  © 3923-7646 Healthwise, Incorporated. Care instructions adapted under license by [employer] (which disclaims liability or warranty for this information). If you have questions about a medical condition or this instruction, always ask your healthcare professional. [place] [address] any warranty or liability for your use of this information.

## 2019-05-20 ENCOUNTER — HOSPITAL ENCOUNTER (EMERGENCY)
Age: 49
Discharge: HOME | End: 2019-05-20
Attending: EMERGENCY MEDICINE
Payer: SELF-PAY

## 2019-05-20 VITALS
SYSTOLIC BLOOD PRESSURE: 144 MMHG | DIASTOLIC BLOOD PRESSURE: 78 MMHG | OXYGEN SATURATION: 100 % | RESPIRATION RATE: 16 BRPM | TEMPERATURE: 98.3 F | WEIGHT: 145 LBS | HEART RATE: 94 BPM | BODY MASS INDEX: 22.05 KG/M2

## 2019-05-20 DIAGNOSIS — M54.50 ACUTE LEFT-SIDED LOW BACK PAIN WITHOUT SCIATICA: Primary | ICD-10-CM

## 2019-05-20 LAB
APPEARANCE UR: CLEAR
BACTERIA URNS QL MICRO: NEGATIVE /HPF
BILIRUB UR QL: NEGATIVE
COLOR UR: YELLOW
EPITH CASTS URNS QL MICRO: NEGATIVE /LPF (ref 0–5)
GLUCOSE UR STRIP.AUTO-MCNC: NEGATIVE MG/DL
HGB UR QL STRIP: ABNORMAL
KETONES UR QL STRIP.AUTO: ABNORMAL MG/DL
LEUKOCYTE ESTERASE UR QL STRIP.AUTO: NEGATIVE
MUCOUS THREADS URNS QL MICRO: ABNORMAL /LPF
NITRITE UR QL STRIP.AUTO: NEGATIVE
PH UR STRIP: 5.5 [PH] (ref 5–8)
PROT UR STRIP-MCNC: NEGATIVE MG/DL
RBC #/AREA URNS HPF: ABNORMAL /HPF (ref 0–5)
SP GR UR REFRACTOMETRY: 1.02 (ref 1–1.03)
UROBILINOGEN UR QL STRIP.AUTO: 0.2 EU/DL (ref 0.2–1)
WBC URNS QL MICRO: ABNORMAL /HPF (ref 0–4)

## 2019-05-20 PROCEDURE — 99283 EMERGENCY DEPT VISIT LOW MDM: CPT

## 2019-05-20 PROCEDURE — 81001 URINALYSIS AUTO W/SCOPE: CPT

## 2019-05-20 RX ORDER — CYCLOBENZAPRINE HCL 10 MG
10 TABLET ORAL
Qty: 10 TAB | Refills: 0 | Status: SHIPPED | OUTPATIENT
Start: 2019-05-20 | End: 2020-10-02

## 2019-05-20 RX ORDER — PREDNISONE 5 MG/1
TABLET ORAL
Qty: 21 TAB | Refills: 0 | Status: SHIPPED | OUTPATIENT
Start: 2019-05-20 | End: 2020-10-02

## 2019-05-20 RX ORDER — IBUPROFEN 800 MG/1
800 TABLET ORAL
Qty: 20 TAB | Refills: 0 | Status: SHIPPED | OUTPATIENT
Start: 2019-05-20 | End: 2019-05-27

## 2019-05-20 NOTE — ED PROVIDER NOTES
EMERGENCY DEPARTMENT HISTORY AND PHYSICAL EXAM    6:54 PM      Date: 5/20/2019  Patient Name: Nara Ruvalcaba    History of Presenting Illness     Chief Complaint   Patient presents with    Back Pain         History Provided By: Patient    Chief Complaint: back pain       Additional History (Context): Nara Ruvalcaba is a 51 y.o. male with asthma who presents with back pain x 4 days. He states this is a recurrent issue for him, has seen a chiropractor but did not has not seen an orthopedic doctor. He does a lot of bending over and working under both at work and suspects that it is due to this. He has not had any recent trauma. He denies radiation of pain down into the leg, denies numbness, weakness, saddle anesthesias. He is taken NSAIDs for the pain with mild relief. Pain is rated as moderate. Pain is located in the left side of the lower back. PCP: None    Current Outpatient Medications   Medication Sig Dispense Refill    cyclobenzaprine (FLEXERIL) 10 mg tablet Take 1 Tab by mouth three (3) times daily as needed for Muscle Spasm(s). 10 Tab 0    predniSONE (STERAPRED) 5 mg dose pack See administration instruction per 5mg dose pack 21 Tab 0    ibuprofen (MOTRIN) 800 mg tablet Take 1 Tab by mouth every six (6) hours as needed for Pain for up to 7 days. 20 Tab 0    fluticasone (FLONASE) 50 mcg/actuation nasal spray One spray BID 1 Bottle 2    naproxen (NAPROSYN) 375 mg tablet Take 1 Tab by mouth two (2) times daily (with meals). 60 Tab 2    aspirin delayed-release 325 mg tablet Take  by mouth every six (6) hours as needed for Pain. Past History     Past Medical History:  Past Medical History:   Diagnosis Date    Asthma     Low TSH level 4/27/2017    may have mild hyperthyroidism w/u per PCP I will rpt TSH and get freeT4       Past Surgical History:  No past surgical history on file.     Family History:  Family History   Problem Relation Age of Onset    Hypertension Mother    Yoly Cancer Father     Hypertension Brother     Asthma Daughter     Stroke Paternal Grandfather 79    Heart Attack Neg Hx     Heart Disease Neg Hx        Social History:  Social History     Tobacco Use    Smoking status: Current Every Day Smoker     Packs/day: 0.25     Types: Cigarettes    Smokeless tobacco: Never Used    Tobacco comment: pt states he's cutting back   Substance Use Topics    Alcohol use: Yes     Alcohol/week: 33.0 oz     Types: 35 Cans of beer, 20 Shots of liquor per week     Comment: per patient 4 a day     Drug use: Yes     Types: Marijuana     Comment: 4 days in 1 week       Allergies:  No Known Allergies      Review of Systems       Review of Systems   Constitutional: Negative for fever. HENT: Negative for facial swelling. Eyes: Negative for visual disturbance. Respiratory: Negative for shortness of breath. Cardiovascular: Negative for chest pain. Gastrointestinal: Negative for abdominal pain. Genitourinary: Negative for dysuria. Musculoskeletal: Positive for back pain. Negative for neck pain. Skin: Negative for rash. Neurological: Negative for dizziness. Psychiatric/Behavioral: Negative for confusion. All other systems reviewed and are negative. Physical Exam     Visit Vitals  /78 (BP 1 Location: Left arm, BP Patient Position: At rest)   Pulse 94   Temp 98.3 °F (36.8 °C)   Resp 16   Wt 65.8 kg (145 lb)   SpO2 100%   BMI 22.05 kg/m²         Physical Exam   Constitutional: He appears well-developed and well-nourished. No distress. HENT:   Head: Normocephalic and atraumatic. Eyes: Conjunctivae are normal.   Neck: Normal range of motion. Neck supple. Cardiovascular: Normal rate. Pulmonary/Chest: Effort normal.   Abdominal: Soft. Musculoskeletal: Normal range of motion. Diffuse tenderness to left flank and left paraspinous lumbar musculature. Mild tenderness of lumbar spine throughout. Full range of motion. No hip tenderness.   No tenderness over the SI joint. Neurological: He is alert. No sensory or motor deficits. Strength and sensation equal to bilateral upper and lower extremities. Skin: Skin is warm and dry. He is not diaphoretic. Psychiatric: He has a normal mood and affect. Nursing note and vitals reviewed. Diagnostic Study Results     Labs -  Recent Results (from the past 12 hour(s))   URINALYSIS W/ RFLX MICROSCOPIC    Collection Time: 05/20/19  5:53 PM   Result Value Ref Range    Color YELLOW      Appearance CLEAR      Specific gravity 1.020 1.005 - 1.030      pH (UA) 5.5 5.0 - 8.0      Protein NEGATIVE  NEG mg/dL    Glucose NEGATIVE  NEG mg/dL    Ketone TRACE (A) NEG mg/dL    Bilirubin NEGATIVE  NEG      Blood TRACE (A) NEG      Urobilinogen 0.2 0.2 - 1.0 EU/dL    Nitrites NEGATIVE  NEG      Leukocyte Esterase NEGATIVE  NEG         Radiologic Studies -   No orders to display         Medical Decision Making   I am the first provider for this patient. I reviewed the vital signs, available nursing notes, past medical history, past surgical history, family history and social history. Vital Signs-Reviewed the patient's vital signs. Records Reviewed: Nursing Notes (Time of Review: 6:54 PM)    ED Course: Progress Notes, Reevaluation, and Consults:      Provider Notes (Medical Decision Making): MDM  Number of Diagnoses or Management Options  Acute left-sided low back pain without sciatica:   Diagnosis management comments: Recurrent left lower back pain without trauma. No red flag symptoms or signs of cauda equina. Imaging not necessary today. Likely muscular. Prescribed medication for symptoms. Referred to Ortho   Discussed treatment plan, return precautions, symptomatic relief, and expected time to improvement. All questions answered. Patient is stable for discharge and outpatient management. Diagnosis     Clinical Impression:   1.  Acute left-sided low back pain without sciatica Disposition: Discharged      Follow-up Information     Follow up With Specialties Details Why [facility]    [facility] EMERGENCY DEPT Emergency Medicine  Immediately if symptoms worsen [address]  [phone]    Noam Wild MD Orthopedic Surgery Schedule an appointment as soon as possible for a visit  [address] [facility] [phone]             Patient's Medications   Start Taking    CYCLOBENZAPRINE (FLEXERIL) 10 MG TABLET    Take 1 Tab by mouth three (3) times daily as needed for Muscle Spasm(s). IBUPROFEN (MOTRIN) 800 MG TABLET    Take 1 Tab by mouth every six (6) hours as needed for Pain for up to 7 days. PREDNISONE (STERAPRED) 5 MG DOSE PACK    See administration instruction per 5mg dose pack   Continue Taking    ASPIRIN DELAYED-RELEASE 325 MG TABLET    Take  by mouth every six (6) hours as needed for Pain. FLUTICASONE (FLONASE) 50 MCG/ACTUATION NASAL SPRAY    One spray BID    NAPROXEN (NAPROSYN) 375 MG TABLET    Take 1 Tab by mouth two (2) times daily (with meals). These Medications have changed    No medications on file   Stop Taking    No medications on file     _______________________________    Attestations:  Stew Mcginnis PA-C acting as a scribe for and in the presence of RUMA Houser      May 20, 2019 at [facility] PM       Provider Attestation:      I personally performed the services described in the documentation, reviewed the documentation, as recorded by the scribe in my presence, and it accurately and completely records my words and actions.  May 20, 2019 at 6:54 PM - RUMA Houser  _______________________________

## 2019-05-20 NOTE — LETTER
NOTIFICATION OF RETURN TO WORK / SCHOOL 
5/20/2019 Mr. Norman Goltz [address] [place] [phone] To Whom it may concern, Please excuse Norman Goltz from work 05/20/19 - 5/21/19 for medical reasons.    
 
 
 
 
 
 
Sincerely,  
 
 
 
 
Delfino Cornelius PA-C

## 2019-05-20 NOTE — ED TRIAGE NOTES
\"The left lower side of my back is really hurting. I have back problems but this is different, I want to make sure its not my kidneys. \"

## 2019-05-20 NOTE — DISCHARGE INSTRUCTIONS
Patient Education        Learning About Relief for Back Pain  What is back tension and strain? Back strain happens when you overstretch, or pull, a muscle in your back. You may hurt your back in an accident or when you exercise or lift something. Most back pain will get better with rest and time. You can take care of yourself at home to help your back heal.  What can you do first to relieve back pain? When you first feel back pain, try these steps:  · Walk. Take a short walk (10 to 20 minutes) on a level surface (no slopes, hills, or stairs) every 2 to 3 hours. Walk only distances you can manage without pain, especially leg pain. · Relax. Find a comfortable position for rest. Some people are comfortable on the floor or a medium-firm bed with a small pillow under their head and another under their knees. Some people prefer to lie on their side with a pillow between their knees. Don't stay in one position for too long. · Try heat or ice. Try using a heating pad on a low or medium setting, or take a warm shower, for 15 to 20 minutes every 2 to 3 hours. Or you can buy single-use heat wraps that last up to 8 hours. You can also try an ice pack for 10 to 15 minutes every 2 to 3 hours. You can use an ice pack or a bag of frozen vegetables wrapped in a thin towel. There is not strong evidence that either heat or ice will help, but you can try them to see if they help. You may also want to try switching between heat and cold. · Take pain medicine exactly as directed. ¨ If the doctor gave you a prescription medicine for pain, take it as prescribed. ¨ If you are not taking a prescription pain medicine, ask your doctor if you can take an over-the-counter medicine. What else can you do? · Stretch and exercise. Exercises that increase flexibility may relieve your pain and make it easier for your muscles to keep your spine in a good, neutral position. And don't forget to keep walking. · Do self-massage.  You can use self-massage to unwind after work or school or to energize yourself in the morning. You can easily massage your feet, hands, or neck. Self-massage works best if you are in comfortable clothes and are sitting or lying in a comfortable position. Use oil or lotion to massage bare skin. · Reduce stress. Back pain can lead to a vicious [place]: Distress about the pain tenses the muscles in your back, which in turn causes more pain. Learn how to relax your mind and your muscles to lower your stress. Where can you learn more? Go to http://dory-megan.info/. Enter P687 in the search box to learn more about \"Learning About Relief for Back Pain. \"  Current as of: March 21, 2017  Content Version: 11.5  © 0010-4186 Healthwise, Incorporated. Care instructions adapted under license by [employer] (which disclaims liability or warranty for this information). If you have questions about a medical condition or this instruction, always ask your healthcare professional. [address] any warranty or liability for your use of this information.

## 2020-06-28 NOTE — LETTER
NOTIFICATION RETURN TO WORK / SCHOOL 
 
3/16/2017 10:11 AM 
 
Mr. Jean Pierre Blue [phone] To Whom It May Concern: 
 
Jean Pierre Blue is currently under the care of [address] [place] [address],[address]. He will return to work/school on: 3/17/2017 with no restrictions. If there are questions or concerns please have the patient contact our office. Sincerely, MD Quang Mike
no

## 2020-08-09 ENCOUNTER — HOSPITAL ENCOUNTER (EMERGENCY)
Age: 50
Discharge: LEFT BEFORE DISCHARGE | End: 2020-08-09
Payer: COMMERCIAL

## 2020-08-09 VITALS
TEMPERATURE: 98.2 F | DIASTOLIC BLOOD PRESSURE: 79 MMHG | HEIGHT: 68 IN | WEIGHT: 150 LBS | OXYGEN SATURATION: 98 % | BODY MASS INDEX: 22.73 KG/M2 | HEART RATE: 81 BPM | SYSTOLIC BLOOD PRESSURE: 117 MMHG | RESPIRATION RATE: 16 BRPM

## 2020-08-09 PROCEDURE — 75810000275 HC EMERGENCY DEPT VISIT NO LEVEL OF CARE

## 2020-08-09 PROCEDURE — 87635 SARS-COV-2 COVID-19 AMP PRB: CPT

## 2020-08-09 NOTE — ED TRIAGE NOTES
Pt says within the last 3 days he has lost his sense of taste and smell, sore throat, headache, nausea, diarrhea, and shortness of breath. Pt works around people who have tested positive.

## 2020-08-12 LAB — SARS-COV-2, COV2NT: DETECTED

## 2020-10-02 ENCOUNTER — APPOINTMENT (OUTPATIENT)
Dept: CT IMAGING | Age: 50
End: 2020-10-02
Attending: EMERGENCY MEDICINE
Payer: COMMERCIAL

## 2020-10-02 ENCOUNTER — HOSPITAL ENCOUNTER (EMERGENCY)
Age: 50
Discharge: HOME | End: 2020-10-02
Attending: EMERGENCY MEDICINE
Payer: COMMERCIAL

## 2020-10-02 VITALS
BODY MASS INDEX: 21.22 KG/M2 | WEIGHT: 140 LBS | HEART RATE: 81 BPM | DIASTOLIC BLOOD PRESSURE: 78 MMHG | TEMPERATURE: 98.6 F | SYSTOLIC BLOOD PRESSURE: 119 MMHG | RESPIRATION RATE: 16 BRPM | OXYGEN SATURATION: 100 % | HEIGHT: 68 IN

## 2020-10-02 DIAGNOSIS — R03.0 ELEVATED BLOOD PRESSURE READING: Primary | ICD-10-CM

## 2020-10-02 LAB
ALBUMIN SERPL-MCNC: 4 G/DL (ref 3.4–5)
ALBUMIN/GLOB SERPL: 1.2 {RATIO} (ref 0.8–1.7)
ALP SERPL-CCNC: 58 U/L (ref 45–117)
ALT SERPL-CCNC: 19 U/L (ref 16–61)
ANION GAP SERPL CALC-SCNC: 6 MMOL/L (ref 3–18)
AST SERPL-CCNC: 22 U/L (ref 10–38)
ATRIAL RATE: 73 BPM
BASOPHILS # BLD: 0.1 K/UL (ref 0–0.1)
BASOPHILS NFR BLD: 1 % (ref 0–2)
BILIRUB SERPL-MCNC: 1.1 MG/DL (ref 0.2–1)
BUN SERPL-MCNC: 8 MG/DL (ref 7–18)
BUN/CREAT SERPL: 10 (ref 12–20)
CALCIUM SERPL-MCNC: 8.7 MG/DL (ref 8.5–10.1)
CALCULATED P AXIS, ECG09: 68 DEGREES
CALCULATED R AXIS, ECG10: 9 DEGREES
CALCULATED T AXIS, ECG11: 20 DEGREES
CHLORIDE SERPL-SCNC: 105 MMOL/L (ref 100–111)
CO2 SERPL-SCNC: 29 MMOL/L (ref 21–32)
CREAT SERPL-MCNC: 0.8 MG/DL (ref 0.6–1.3)
DIAGNOSIS, 93000: NORMAL
DIFFERENTIAL METHOD BLD: ABNORMAL
EOSINOPHIL # BLD: 0.1 K/UL (ref 0–0.4)
EOSINOPHIL NFR BLD: 3 % (ref 0–5)
ERYTHROCYTE [DISTWIDTH] IN BLOOD BY AUTOMATED COUNT: 13.3 % (ref 11.6–14.5)
GLOBULIN SER CALC-MCNC: 3.4 G/DL (ref 2–4)
GLUCOSE SERPL-MCNC: 87 MG/DL (ref 74–99)
HCT VFR BLD AUTO: 43.1 % (ref 36–48)
HGB BLD-MCNC: 14 G/DL (ref 13–16)
LYMPHOCYTES # BLD: 1.8 K/UL (ref 0.9–3.6)
LYMPHOCYTES NFR BLD: 35 % (ref 21–52)
MCH RBC QN AUTO: 31.9 PG (ref 24–34)
MCHC RBC AUTO-ENTMCNC: 32.5 G/DL (ref 31–37)
MCV RBC AUTO: 98.2 FL (ref 74–97)
MONOCYTES # BLD: 0.7 K/UL (ref 0.05–1.2)
MONOCYTES NFR BLD: 14 % (ref 3–10)
NEUTS SEG # BLD: 2.4 K/UL (ref 1.8–8)
NEUTS SEG NFR BLD: 47 % (ref 40–73)
P-R INTERVAL, ECG05: 136 MS
PLATELET # BLD AUTO: 229 K/UL (ref 135–420)
PMV BLD AUTO: 9.2 FL (ref 9.2–11.8)
POTASSIUM SERPL-SCNC: 3.5 MMOL/L (ref 3.5–5.5)
PROT SERPL-MCNC: 7.4 G/DL (ref 6.4–8.2)
Q-T INTERVAL, ECG07: 406 MS
QRS DURATION, ECG06: 92 MS
QTC CALCULATION (BEZET), ECG08: 447 MS
RBC # BLD AUTO: 4.39 M/UL (ref 4.7–5.5)
SODIUM SERPL-SCNC: 140 MMOL/L (ref 136–145)
TROPONIN I SERPL-MCNC: <0.02 NG/ML (ref 0–0.04)
VENTRICULAR RATE, ECG03: 73 BPM
WBC # BLD AUTO: 5 K/UL (ref 4.6–13.2)

## 2020-10-02 PROCEDURE — 99285 EMERGENCY DEPT VISIT HI MDM: CPT

## 2020-10-02 PROCEDURE — 80053 COMPREHEN METABOLIC PANEL: CPT

## 2020-10-02 PROCEDURE — 85025 COMPLETE CBC W/AUTO DIFF WBC: CPT

## 2020-10-02 PROCEDURE — 70450 CT HEAD/BRAIN W/O DYE: CPT

## 2020-10-02 PROCEDURE — 84484 ASSAY OF TROPONIN QUANT: CPT

## 2020-10-02 PROCEDURE — 93005 ELECTROCARDIOGRAM TRACING: CPT

## 2020-10-02 NOTE — ED PROVIDER NOTES
EMERGENCY DEPARTMENT HISTORY AND PHYSICAL EXAM    3:49 PM  Date: 10/2/2020  Patient Name: Vasiliy Collins    History of Presenting Illness     Chief Complaint   Patient presents with    Numbness        History Provided By: Patient    HPI: Vasiliy Collins is a 53 y.o. male with no significant past medical problems. Patient is presenting with elevated blood pressure. This morning around 930 he had an episode of severe headache and few minutes later he had a syncopal episode. He checked his blood pressure at work and his systolic was in the 542E. His symptoms had resolved since then. He does not have a diagnosis of high blood pressure but has a family history of it. He was at his primary care doctor yesterday because he has chronic left arm numbness, which is being worked up by his primary care doctor as cervical radiculopathy. However his blood pressure was normal when he was at his appointment yesterday and he follows up regularly and never had readings of elevated blood pressure. Denies any drug use or marijuana or caffeinated drinks. Currently asymptomatic. Location:  Severity:  Timing/course: Onset/Duration:     PCP: None    Past History     Past Medical History:  Past Medical History:   Diagnosis Date    Asthma     Low TSH level 4/27/2017    may have mild hyperthyroidism w/u per PCP I will rpt TSH and get freeT4       Past Surgical History:  History reviewed. No pertinent surgical history.     Family History:  Family History   Problem Relation Age of Onset    Hypertension Mother     Cancer Father     Hypertension Brother     Asthma Daughter     Stroke Paternal Grandfather 79    Heart Attack Neg Hx     Heart Disease Neg Hx        Social History:  Social History     Tobacco Use    Smoking status: Current Every Day Smoker     Packs/day: 0.25     Types: Cigarettes    Smokeless tobacco: Never Used    Tobacco comment: pt states he's cutting back   Substance Use Topics    Alcohol use: Yes     Alcohol/week: 55.0 standard drinks     Types: 35 Cans of beer, 20 Shots of liquor per week     Comment: per patient 4 a day     Drug use: Yes     Types: Marijuana     Comment: 4 days in 1 week       Allergies:  No Known Allergies    Review of Systems   Review of Systems   Neurological: Positive for syncope, light-headedness, numbness and headaches. All other systems reviewed and are negative. Physical Exam     Patient Vitals for the past 12 hrs:   Temp Pulse Resp BP SpO2   10/02/20 1541 98.6 °F (37 °C) 84 15 (!) 182/101 100 %       Physical Exam  Vitals signs and nursing note reviewed. Constitutional:       Appearance: Normal appearance. HENT:      Head: Normocephalic and atraumatic. Eyes:      Extraocular Movements: Extraocular movements intact. Neck:      Musculoskeletal: Normal range of motion and neck supple. Cardiovascular:      Rate and Rhythm: Normal rate. Pulmonary:      Effort: Pulmonary effort is normal. No respiratory distress. Musculoskeletal: Normal range of motion. General: No deformity. Neurological:      General: No focal deficit present. Mental Status: He is alert and oriented to person, place, and time. Cranial Nerves: No cranial nerve deficit. Motor: No weakness.       Gait: Gait normal.   Psychiatric:         Behavior: Behavior normal.         Diagnostic Study Results     Labs -  Recent Results (from the past 12 hour(s))   EKG, 12 LEAD, INITIAL    Collection Time: 10/02/20  3:36 PM   Result Value Ref Range    Ventricular Rate 73 BPM    Atrial Rate 73 BPM    P-R Interval 136 ms    QRS Duration 92 ms    Q-T Interval 406 ms    QTC Calculation (Bezet) 447 ms    Calculated P Axis 68 degrees    Calculated R Axis 9 degrees    Calculated T Axis 20 degrees    Diagnosis       Normal sinus rhythm  Possible Left atrial enlargement  Left ventricular hypertrophy  Cannot rule out Septal infarct , age undetermined  Abnormal ECG  When compared with ECG of 14-FEB-2017 08:13,  T wave inversion now evident in Inferior leads     CBC WITH AUTOMATED DIFF    Collection Time: 10/02/20  3:41 PM   Result Value Ref Range    WBC 5.0 4.6 - 13.2 K/uL    RBC 4.39 (L) 4.70 - 5.50 M/uL    HGB 14.0 13.0 - 16.0 g/dL    HCT 43.1 36.0 - 48.0 %    MCV 98.2 (H) 74.0 - 97.0 FL    MCH 31.9 24.0 - 34.0 PG    MCHC 32.5 31.0 - 37.0 g/dL    RDW 13.3 11.6 - 14.5 %    PLATELET 605 765 - 926 K/uL    MPV 9.2 9.2 - 11.8 FL    NEUTROPHILS 47 40 - 73 %    LYMPHOCYTES 35 21 - 52 %    MONOCYTES 14 (H) 3 - 10 %    EOSINOPHILS 3 0 - 5 %    BASOPHILS 1 0 - 2 %    ABS. NEUTROPHILS 2.4 1.8 - 8.0 K/UL    ABS. LYMPHOCYTES 1.8 0.9 - 3.6 K/UL    ABS. MONOCYTES 0.7 0.05 - 1.2 K/UL    ABS. EOSINOPHILS 0.1 0.0 - 0.4 K/UL    ABS. BASOPHILS 0.1 0.0 - 0.1 K/UL    DF AUTOMATED         Radiologic Studies -   No results found. Medical Decision Making     ED Course: Progress Notes, Reevaluation, and Consults:    3:49 PM Initial assessment performed. The patients presenting problems have been discussed, and they/their family are in agreement with the care plan formulated and outlined with them. I have encouraged them to ask questions as they arise throughout their visit. Provider Notes (Medical Decision Making): 72-year-old male with no significant past medical problems presenting after an episode of severe headache and syncope about 6 hours ago. Elevated blood pressure. Patient is well-appearing on exam and currently asymptomatic. Systolic blood pressure still elevated. Will get head CT to evaluate for bleed versus stroke. He has no focal neurological deficit. This could also be related to symptomatic blood pressure. I discussed with the patient if his work-up is negative and his blood pressure still is elevated I would not start him on outpatient therapy and have him follow-up with his primary care doctor on Monday since it is an isolated incident.     Procedures:     Critical Care Time:     Vital Signs-Reviewed the patient's vital signs. Reviewed pt's pulse ox reading. EKG: Interpreted by the EP. Time Interpreted:    Rate:    Rhythm:    Interpretation:   Comparison:     Records Reviewed: Nursing Notes (Time of Review: 3:49 PM)  -I am the first provider for this patient.  -I reviewed the vital signs, available nursing notes, past medical history, past surgical history, family history and social history. Clinical Impression     Clinical Impression: No diagnosis found. Disposition: DC      DISCHARGE NOTE:     Pt has been reexamined. Patient has no new complaints, changes, or physical findings. Care plan outlined and precautions discussed. Results of labs and imaging were reviewed with the patient. All medications were reviewed with the patient; will d/c home with return precautions. All of pt's questions and concerns were addressed. Patient was instructed and agrees to follow up with PCP, as well as to return to the ED upon further deterioration. Patient is ready to go home. This note was dictated utilizing voice recognition software which may lead to typographical errors. I apologize in advance if the situation occurs. If questions arise please do not hesitate to contact me or call our department.     Liza Perez MD  3:49 PM

## 2020-10-02 NOTE — DISCHARGE INSTRUCTIONS
Patient Education        Elevated Blood Pressure: Care Instructions  Your Care Instructions    Blood pressure is a measure of how hard the blood pushes against the walls of your arteries. It's normal for blood pressure to go up and down throughout the day. But if it stays up over time, you have high blood pressure. Two numbers tell you your blood pressure. The first number is the systolic pressure. It shows how hard the blood pushes when your heart is pumping. The second number is the diastolic pressure. It shows how hard the blood pushes between heartbeats, when your heart is relaxed and filling with blood. An ideal blood pressure in adults is less than 120/80 (say \"120 over 80\"). High blood pressure is 140/90 or higher. You have high blood pressure if your top number is 140 or higher or your bottom number is 90 or higher, or both. The main test for high blood pressure is simple, fast, and painless. To diagnose high blood pressure, your doctor will test your blood pressure at different times. After testing your blood pressure, your doctor may ask you to test it again when you are home. If you are diagnosed with high blood pressure, you can work with your doctor to make a long-term plan to manage it. Follow-up care is a key part of your treatment and safety. Be sure to make and go to all appointments, and call your doctor if you are having problems. It's also a good idea to know your test results and keep a list of the medicines you take. How can you care for yourself at home? · Do not smoke. Smoking increases your risk for heart attack and stroke. If you need help quitting, talk to your doctor about stop-smoking programs and medicines. These can increase your chances of quitting for good. · Stay at a healthy weight. · Try to limit how much sodium you eat to less than 2,300 milligrams (mg) a day. Your doctor may ask you to try to eat less than 1,500 mg a day. · Be physically active.  Get at least 30 minutes of exercise on most days of the week. Walking is a good choice. You also may want to do other activities, such as running, swimming, cycling, or playing tennis or team sports. · Avoid or limit alcohol. Talk to your doctor about whether you can drink any alcohol. · Eat plenty of fruits, vegetables, and low-fat dairy products. Eat less saturated and total fats. · Learn how to check your blood pressure at home. When should you call for help? Call your doctor now or seek immediate medical care if:  ? · Your blood pressure is much higher than normal (such as 180/110 or higher). ? · You think high blood pressure is causing symptoms such as:  ¨ Severe headache. ¨ Blurry vision. ? Watch closely for changes in your health, and be sure to contact your doctor if:  ? · You do not get better as expected. Where can you learn more? Go to http://www.gray.com/. Enter I031 in the search box to learn more about \"Elevated Blood Pressure: Care Instructions. \"  Current as of: September 21, 2016  Content Version: 11.4  © 8162-4073 [employer]. Care instructions adapted under license by [employer] (which disclaims liability or warranty for this information). If you have questions about a medical condition or this instruction, always ask your healthcare professional. [address] any warranty or liability for your use of this information.

## 2020-10-02 NOTE — ED TRIAGE NOTES
Pt c/o a HA that began at 10 am today after eating lunch. Pt states \" it was a headache I have never had before\"    Pt states when it occurred he became dizzy and lightheaded \" I felt myself falling out\"    Pt denies LOC. Patient clocked out of work 56 and awaited transportation to here. Declined ambulance this morning.

## 2020-10-03 ENCOUNTER — PATIENT OUTREACH (OUTPATIENT)
Dept: CASE MANAGEMENT | Age: 50
End: 2020-10-03

## 2020-10-03 NOTE — PROGRESS NOTES
Patient contacted regarding recent discharge and COVID-19 risk. Discussed COVID-19 related testing which was not done at this time. Test results were not done. Patient informed of results, if available? no    Care Transition Nurse/ Ambulatory Care Manager/ LPN Care Coordinator contacted the patient by telephone to perform post discharge assessment. Verified name and  with patient as identifiers. Patient has following risk factors of: asthma. CTN/ACM/LPN reviewed discharge instructions, medical action plan and red flags related to discharge diagnosis. Reviewed and educated them on any new and changed medications related to discharge diagnosis. Advised obtaining a 90-day supply of all daily and as-needed medications. Advance Care Planning:   Does patient have an Advance Directive:     Education provided regarding infection prevention, and signs and symptoms of COVID-19 and when to seek medical attention with patient who verbalized understanding. Discussed exposure protocols and quarantine from [address] you at higher risk for severe illness  and given an opportunity for questions and concerns. The patient agrees to contact PCP office for questions related to their healthcare. CTN/ACM/LPN provided contact information for future reference. From CDC: Are you at higher risk for severe illness?  Wash your hands often.  Avoid close contact (6 feet, which is about two arm lengths) with people who are sick.  Put distance between yourself and other people if COVID-19 is spreading in your community.  Clean and disinfect frequently touched surfaces.  Avoid all cruise travel and non-essential air travel.  Call your healthcare professional if you have concerns about COVID-19 and your underlying condition or if you are sick.     For more information on steps you can take to protect yourself, see CDC's How to Protect Yourself      Patient/family/caregiver given information for Manjit Orellana and agrees to enroll no  Patient's preferred e-mail:  n/a  Patient's preferred phone number: n/a  Based on Loop alert triggers, patient will be contacted by nurse care manager for worsening symptoms. Plan for follow-up call in 7-14 days based on severity of symptoms and risk factors.

## 2020-10-19 ENCOUNTER — PATIENT OUTREACH (OUTPATIENT)
Dept: CASE MANAGEMENT | Age: 50
End: 2020-10-19

## 2020-10-19 NOTE — PROGRESS NOTES
Patient resolved from Transition of Care episode on 10/19/2020  Discussed COVID-19 related testing which was not done at this time. Test results were not done. Patient informed of results, if available? n/a     Patient/family has been provided the following resources and education related to COVID-19:                         Signs, symptoms and red flags related to COVID-19            [facility] exposure and quarantine guidelines            [employer] exposure contact - [phone]            Contact for their local Department of Health                 Patient currently reports that the following symptoms have improved:  no symptoms. No further outreach scheduled with this CTN/ACM/LPN/HC/ MA. Episode of Care resolved. Patient has this CTN/ACM/LPN/HC/MA contact information if future needs arise.

## 2021-07-23 ENCOUNTER — APPOINTMENT (OUTPATIENT)
Dept: GENERAL RADIOLOGY | Age: 51
End: 2021-07-23
Attending: EMERGENCY MEDICINE

## 2021-07-23 ENCOUNTER — HOSPITAL ENCOUNTER (EMERGENCY)
Age: 51
Discharge: HOME | End: 2021-07-23
Attending: EMERGENCY MEDICINE

## 2021-07-23 VITALS
RESPIRATION RATE: 14 BRPM | TEMPERATURE: 98.5 F | DIASTOLIC BLOOD PRESSURE: 93 MMHG | OXYGEN SATURATION: 98 % | HEART RATE: 90 BPM | SYSTOLIC BLOOD PRESSURE: 123 MMHG

## 2021-07-23 DIAGNOSIS — K21.00 GASTROESOPHAGEAL REFLUX DISEASE WITH ESOPHAGITIS WITHOUT HEMORRHAGE: Primary | ICD-10-CM

## 2021-07-23 LAB
ALBUMIN SERPL-MCNC: 3.8 G/DL (ref 3.4–5)
ALBUMIN/GLOB SERPL: 1.1 {RATIO} (ref 0.8–1.7)
ALP SERPL-CCNC: 70 U/L (ref 45–117)
ALT SERPL-CCNC: 24 U/L (ref 16–61)
ANION GAP SERPL CALC-SCNC: 6 MMOL/L (ref 3–18)
APPEARANCE UR: CLEAR
AST SERPL-CCNC: 24 U/L (ref 10–38)
ATRIAL RATE: 87 BPM
BACTERIA URNS QL MICRO: NEGATIVE /HPF
BASOPHILS # BLD: 0.1 K/UL (ref 0–0.1)
BASOPHILS NFR BLD: 2 % (ref 0–2)
BILIRUB SERPL-MCNC: 0.4 MG/DL (ref 0.2–1)
BILIRUB UR QL: NEGATIVE
BNP SERPL-MCNC: 36 PG/ML (ref 0–900)
BUN SERPL-MCNC: 13 MG/DL (ref 7–18)
BUN/CREAT SERPL: 11 (ref 12–20)
CALCIUM SERPL-MCNC: 8.6 MG/DL (ref 8.5–10.1)
CALCULATED P AXIS, ECG09: 80 DEGREES
CALCULATED T AXIS, ECG11: 77 DEGREES
CHLORIDE SERPL-SCNC: 109 MMOL/L (ref 100–111)
CK MB CFR SERPL CALC: NORMAL % (ref 0–4)
CK MB SERPL-MCNC: <1 NG/ML (ref 5–25)
CK SERPL-CCNC: 132 U/L (ref 39–308)
CO2 SERPL-SCNC: 28 MMOL/L (ref 21–32)
COLOR UR: YELLOW
CREAT SERPL-MCNC: 1.15 MG/DL (ref 0.6–1.3)
DIAGNOSIS, 93000: NORMAL
DIFFERENTIAL METHOD BLD: ABNORMAL
EOSINOPHIL # BLD: 0.2 K/UL (ref 0–0.4)
EOSINOPHIL NFR BLD: 4 % (ref 0–5)
EPITH CASTS URNS QL MICRO: NORMAL /LPF (ref 0–5)
ERYTHROCYTE [DISTWIDTH] IN BLOOD BY AUTOMATED COUNT: 12.8 % (ref 11.6–14.5)
GLOBULIN SER CALC-MCNC: 3.6 G/DL (ref 2–4)
GLUCOSE SERPL-MCNC: 108 MG/DL (ref 74–99)
GLUCOSE UR STRIP.AUTO-MCNC: NEGATIVE MG/DL
HCT VFR BLD AUTO: 43.1 % (ref 36–48)
HGB BLD-MCNC: 14.2 G/DL (ref 13–16)
HGB UR QL STRIP: ABNORMAL
KETONES UR QL STRIP.AUTO: NEGATIVE MG/DL
LEUKOCYTE ESTERASE UR QL STRIP.AUTO: NEGATIVE
LIPASE SERPL-CCNC: 275 U/L (ref 73–393)
LYMPHOCYTES # BLD: 2 K/UL (ref 0.9–3.6)
LYMPHOCYTES NFR BLD: 39 % (ref 21–52)
MAGNESIUM SERPL-MCNC: 2.3 MG/DL (ref 1.6–2.6)
MCH RBC QN AUTO: 32 PG (ref 24–34)
MCHC RBC AUTO-ENTMCNC: 32.9 G/DL (ref 31–37)
MCV RBC AUTO: 97.1 FL (ref 74–97)
MONOCYTES # BLD: 0.6 K/UL (ref 0.05–1.2)
MONOCYTES NFR BLD: 12 % (ref 3–10)
NEUTS SEG # BLD: 2.2 K/UL (ref 1.8–8)
NEUTS SEG NFR BLD: 44 % (ref 40–73)
NITRITE UR QL STRIP.AUTO: NEGATIVE
P-R INTERVAL, ECG05: 148 MS
PH UR STRIP: 5.5 [PH] (ref 5–8)
PLATELET # BLD AUTO: 238 K/UL (ref 135–420)
PMV BLD AUTO: 9.1 FL (ref 9.2–11.8)
POTASSIUM SERPL-SCNC: 3.6 MMOL/L (ref 3.5–5.5)
PROT SERPL-MCNC: 7.4 G/DL (ref 6.4–8.2)
PROT UR STRIP-MCNC: NEGATIVE MG/DL
Q-T INTERVAL, ECG07: 362 MS
QRS DURATION, ECG06: 88 MS
QTC CALCULATION (BEZET), ECG08: 435 MS
RBC # BLD AUTO: 4.44 M/UL (ref 4.35–5.65)
RBC #/AREA URNS HPF: NORMAL /HPF (ref 0–5)
SODIUM SERPL-SCNC: 143 MMOL/L (ref 136–145)
SP GR UR REFRACTOMETRY: 1.01 (ref 1–1.03)
TROPONIN I SERPL-MCNC: <0.02 NG/ML (ref 0–0.04)
UROBILINOGEN UR QL STRIP.AUTO: 0.2 EU/DL (ref 0.2–1)
VENTRICULAR RATE, ECG03: 87 BPM
WBC # BLD AUTO: 5.1 K/UL (ref 4.6–13.2)
WBC URNS QL MICRO: NORMAL /HPF (ref 0–4)

## 2021-07-23 PROCEDURE — 82553 CREATINE MB FRACTION: CPT

## 2021-07-23 PROCEDURE — 93005 ELECTROCARDIOGRAM TRACING: CPT

## 2021-07-23 PROCEDURE — 80053 COMPREHEN METABOLIC PANEL: CPT

## 2021-07-23 PROCEDURE — 81001 URINALYSIS AUTO W/SCOPE: CPT

## 2021-07-23 PROCEDURE — C9113 INJ PANTOPRAZOLE SODIUM, VIA: HCPCS | Performed by: EMERGENCY MEDICINE

## 2021-07-23 PROCEDURE — 71045 X-RAY EXAM CHEST 1 VIEW: CPT

## 2021-07-23 PROCEDURE — 83735 ASSAY OF MAGNESIUM: CPT

## 2021-07-23 PROCEDURE — 96374 THER/PROPH/DIAG INJ IV PUSH: CPT

## 2021-07-23 PROCEDURE — 85025 COMPLETE CBC W/AUTO DIFF WBC: CPT

## 2021-07-23 PROCEDURE — 74011000250 HC RX REV CODE- 250: Performed by: EMERGENCY MEDICINE

## 2021-07-23 PROCEDURE — 99284 EMERGENCY DEPT VISIT MOD MDM: CPT

## 2021-07-23 PROCEDURE — 83690 ASSAY OF LIPASE: CPT

## 2021-07-23 PROCEDURE — 74011250637 HC RX REV CODE- 250/637: Performed by: EMERGENCY MEDICINE

## 2021-07-23 PROCEDURE — 83880 ASSAY OF NATRIURETIC PEPTIDE: CPT

## 2021-07-23 PROCEDURE — 74011250636 HC RX REV CODE- 250/636: Performed by: EMERGENCY MEDICINE

## 2021-07-23 RX ORDER — LIDOCAINE HYDROCHLORIDE 20 MG/ML
15 SOLUTION OROPHARYNGEAL
Status: COMPLETED | OUTPATIENT
Start: 2021-07-23 | End: 2021-07-23

## 2021-07-23 RX ORDER — PHENOBARBITAL, HYOSCYAMINE SULFATE, ATROPINE SULFATE AND SCOPOLAMINE HYDROBROMIDE .0194; .1037; 16.2; .0065 MG/1; MG/1; MG/1; MG/1
1 TABLET ORAL
Status: DISCONTINUED | OUTPATIENT
Start: 2021-07-23 | End: 2021-07-23

## 2021-07-23 RX ORDER — PANTOPRAZOLE SODIUM 40 MG/1
40 TABLET, DELAYED RELEASE ORAL DAILY
Qty: 20 TABLET | Refills: 0 | Status: SHIPPED | OUTPATIENT
Start: 2021-07-23 | End: 2021-08-12

## 2021-07-23 RX ADMIN — LIDOCAINE HYDROCHLORIDE 15 ML: 20 SOLUTION ORAL; TOPICAL at 13:42

## 2021-07-23 RX ADMIN — SODIUM CHLORIDE 40 MG: 9 INJECTION, SOLUTION INTRAMUSCULAR; INTRAVENOUS; SUBCUTANEOUS at 13:42

## 2021-07-23 RX ADMIN — ALUMINUM HYDROXIDE AND MAGNESIUM HYDROXIDE 30 ML: 200; 200 SUSPENSION ORAL at 13:42

## 2021-07-23 NOTE — LETTER
NOTIFICATION RETURN TO WORK / SCHOOL    7/23/2021 1:43 PM    Mr. Matias Campbell  [address] [id number]      To Whom It May Concern:    Matias Campbell is currently under the care of [facility]. He will return to work/school on: 7/24/21. If there are questions or concerns please have the patient contact our office.         Sincerely,      Yina Stuart PA-C

## 2021-07-23 NOTE — ED NOTES
I performed a brief evaluation, including history and physical, of the patient here in triage and I have determined that pt will need further treatment and evaluation from the main side ER physician. I have placed initial orders to help in expediting patients care. July 23, 2021 at 10:43 AM - ALIA Harrison      CC: 50 yo M smoker wo cardiac hx c/o constant midsternal \"sitting\" CP x 1 month, it is worse with pressure. No PCP. Will have med insurance on the 1st next month.     Visit Vitals  BP (!) 123/93 (BP 1 Location: Left upper arm, BP Patient Position: At rest)   Pulse 90   Temp 98.5 °F (36.9 °C)   Resp 14   SpO2 98%         ORDERS PLACED BY PIT:  Medications - No data to display     Labs Reviewed   METABOLIC PANEL, COMPREHENSIVE   CBC WITH AUTOMATED DIFF   CARDIAC PANEL,(CK, CKMB & TROPONIN)   NT-PRO BNP   URINALYSIS W/ RFLX MICROSCOPIC        EKG Results     Procedure 720 Value Units Date/Time    EKG, 12 LEAD, INITIAL [[id number]] Collected: 07/23/21 1040    Order Status: Completed Updated: 07/23/21 1041     Ventricular Rate 87 BPM      Atrial Rate 87 BPM      P-R Interval 148 ms      QRS Duration 88 ms      Q-T Interval 362 ms      QTC Calculation (Bezet) 435 ms      Calculated P Axis 80 degrees      Calculated T Axis 77 degrees      Diagnosis --     Normal sinus rhythm  Minimal voltage criteria for LVH, may be normal variant  Nonspecific T wave abnormality  Abnormal ECG  When compared with ECG of 02-OCT-2020 15:36,  Minimal criteria for Septal infarct are no longer present  T wave inversion no longer evident in Inferior leads  Nonspecific T wave abnormality now evident in Lateral leads           XR CHEST SNGL V    (Results Pending)

## 2021-07-23 NOTE — ED PROVIDER NOTES
EMERGENCY DEPARTMENT HISTORY AND PHYSICAL EXAM    Date: 7/23/2021  Patient Name: Matias Campbell    History of Presenting Illness     Chief Complaint   Patient presents with    Chest Pain         History Provided By: Patient    Additional History (Context): Matias Campbell is a 49 y.o. male with asthma who presents with retrosternal chest discomfort as well as epigastric pain constant for nearly a month. He says it is worse with things like drinking carbonated sodas or beers he experiences odd burping sensations that make it worse as well. Sometimes activity can make it worse. He has not identified food items that make it worse. He has never had an endoscopy or colonoscopy denies melena hematochezia or nausea vomiting. He smokes [place] cigarettes, uses marijuana, and drinks 3 beers daily. Denies family history of early MI daily aspirin prior cardiology evaluation recent stress testing echocardiogram history of hypertension diabetes or hypercholesterolemia. PCP: None    Current Facility-Administered Medications   Medication Dose Route Frequency Provider Last Rate Last Admin    pantoprazole (PROTONIX) 40 mg in 0.9% sodium chloride 10 mL injection  40 mg IntraVENous ONCE Diane Jain PA        lidocaine (XYLOCAINE) 2 % viscous solution 15 mL  15 mL Mouth/Throat NOW Diane Jain PA        aluminum-magnesium hydroxide (MAALOX) oral suspension 30 mL  30 mL Oral NOW Diane Jain PA         Current Outpatient Medications   Medication Sig Dispense Refill    pantoprazole (Protonix) 40 mg tablet Take 1 Tablet by mouth daily for 20 days. 20 Tablet 0       Past History     Past Medical History:  Past Medical History:   Diagnosis Date    Asthma     Low TSH level 4/27/2017    may have mild hyperthyroidism w/u per PCP I will rpt TSH and get freeT4       Past Surgical History:  History reviewed. No pertinent surgical history.     Family History:  Family History   Problem Relation Age of Onset    Hypertension Mother     Cancer Father     Hypertension Brother     Asthma Daughter     Stroke Paternal Grandfather 79    Heart Attack Neg Hx     Heart Disease Neg Hx        Social History:  Social History     Tobacco Use    Smoking status: Current Every Day Smoker     Packs/day: 0.25     Types: Cigarettes    Smokeless tobacco: Never Used    Tobacco comment: pt states he's cutting back   Substance Use Topics    Alcohol use: Yes     Alcohol/week: 55.0 standard drinks     Types: 35 Cans of beer, 20 Shots of liquor per week     Comment: per patient 4 a day     Drug use: Yes     Types: Marijuana     Comment: 4 days in 1 week       Allergies:  No Known Allergies      Review of Systems   Review of Systems   Constitutional: Negative for fever. HENT: Negative. Eyes: Negative. Respiratory: Negative for cough and shortness of breath. Cardiovascular: Positive for chest pain. Gastrointestinal: Positive for abdominal pain. Negative for blood in stool, diarrhea, nausea and vomiting. Endocrine: Negative. Genitourinary: Negative. Negative for flank pain. Musculoskeletal: Negative for back pain. Skin: Negative. Allergic/Immunologic: Negative. Neurological: Negative. Hematological: Negative. Psychiatric/Behavioral: Negative. All Other Systems Negative  Physical Exam     Vitals:    07/23/21 1034   BP: (!) 123/93   Pulse: 90   Resp: 14   Temp: 98.5 °F (36.9 °C)   SpO2: 98%     Physical Exam  Vitals and nursing note reviewed. Constitutional:       General: He is not in acute distress. Appearance: He is well-developed. He is not ill-appearing, toxic-appearing or diaphoretic. HENT:      Head: Normocephalic and atraumatic. Neck:      Thyroid: No thyromegaly. Vascular: No carotid bruit. Trachea: No tracheal deviation. Cardiovascular:      Rate and Rhythm: Normal rate and regular rhythm. Heart sounds: Normal heart sounds. No murmur heard. No friction rub.  No gallop. Comments: Equal radial pulses  Pulmonary:      Effort: Pulmonary effort is normal. No respiratory distress. Breath sounds: Normal breath sounds. No stridor. No wheezing or rales. Chest:      Chest wall: No tenderness. Abdominal:      General: There is no distension. Palpations: Abdomen is soft. There is no mass. Tenderness: There is abdominal tenderness. There is no guarding or rebound. Comments: Epigastric TTP   Musculoskeletal:         General: Normal range of motion. Cervical back: Normal range of motion and neck supple. Skin:     General: Skin is warm and dry. Coloration: Skin is not pale. Neurological:      Mental Status: He is alert. Psychiatric:         Speech: Speech normal.         Behavior: Behavior normal.         Thought Content: Thought content normal.         Judgment: Judgment normal.          Diagnostic Study Results     Labs -     Recent Results (from the past 12 hour(s))   METABOLIC PANEL, COMPREHENSIVE    Collection Time: 07/23/21 10:40 AM   Result Value Ref Range    Sodium 143 136 - 145 mmol/L    Potassium 3.6 3.5 - 5.5 mmol/L    Chloride 109 100 - 111 mmol/L    CO2 28 21 - 32 mmol/L    Anion gap 6 3.0 - 18 mmol/L    Glucose 108 (H) 74 - 99 mg/dL    BUN 13 7.0 - 18 MG/DL    Creatinine 1.15 0.6 - 1.3 MG/DL    BUN/Creatinine ratio 11 (L) 12 - 20      GFR est AA >60 >60 ml/min/1.73m2    GFR est non-AA >60 >60 ml/min/1.73m2    Calcium 8.6 8.5 - 10.1 MG/DL    Bilirubin, total 0.4 0.2 - 1.0 MG/DL    ALT (SGPT) 24 16 - 61 U/L    AST (SGOT) 24 10 - 38 U/L    Alk.  phosphatase 70 45 - 117 U/L    Protein, total 7.4 6.4 - 8.2 g/dL    Albumin 3.8 3.4 - 5.0 g/dL    Globulin 3.6 2.0 - 4.0 g/dL    A-G Ratio 1.1 0.8 - 1.7     CBC WITH AUTOMATED DIFF    Collection Time: 07/23/21 10:40 AM   Result Value Ref Range    WBC 5.1 4.6 - 13.2 K/uL    RBC 4.44 4.35 - 5.65 M/uL    HGB 14.2 13.0 - 16.0 g/dL    HCT 43.1 36.0 - 48.0 %    MCV 97.1 (H) 74.0 - 97.0 FL    MCH 32.0 24.0 - 34.0 PG    MCHC 32.9 31.0 - 37.0 g/dL    RDW 12.8 11.6 - 14.5 %    PLATELET 705 001 - 032 K/uL    MPV 9.1 (L) 9.2 - 11.8 FL    NEUTROPHILS 44 40 - 73 %    LYMPHOCYTES 39 21 - 52 %    MONOCYTES 12 (H) 3 - 10 %    EOSINOPHILS 4 0 - 5 %    BASOPHILS 2 0 - 2 %    ABS. NEUTROPHILS 2.2 1.8 - 8.0 K/UL    ABS. LYMPHOCYTES 2.0 0.9 - 3.6 K/UL    ABS. MONOCYTES 0.6 0.05 - 1.2 K/UL    ABS. EOSINOPHILS 0.2 0.0 - 0.4 K/UL    ABS.  BASOPHILS 0.1 0.0 - 0.1 K/UL    DF AUTOMATED     CARDIAC PANEL,(CK, CKMB & TROPONIN)    Collection Time: 07/23/21 10:40 AM   Result Value Ref Range    CK - MB <1.0 <3.6 ng/ml    CK-MB Index  0.0 - 4.0 %     CALCULATION NOT PERFORMED WHEN RESULT IS BELOW LINEAR LIMIT     39 - 308 U/L    Troponin-I, QT <0.02 0.0 - 0.045 NG/ML   NT-PRO BNP    Collection Time: 07/23/21 10:40 AM   Result Value Ref Range    NT pro-BNP 36 0 - 900 PG/ML   LIPASE    Collection Time: 07/23/21 10:40 AM   Result Value Ref Range    Lipase 275 73 - 393 U/L   MAGNESIUM    Collection Time: 07/23/21 10:40 AM   Result Value Ref Range    Magnesium 2.3 1.6 - 2.6 mg/dL   EKG, 12 LEAD, INITIAL    Collection Time: 07/23/21 10:40 AM   Result Value Ref Range    Ventricular Rate 87 BPM    Atrial Rate 87 BPM    P-R Interval 148 ms    QRS Duration 88 ms    Q-T Interval 362 ms    QTC Calculation (Bezet) 435 ms    Calculated P Axis 80 degrees    Calculated T Axis 77 degrees    Diagnosis       Normal sinus rhythm  Minimal voltage criteria for LVH, may be normal variant  Nonspecific T wave abnormality  Abnormal ECG  When compared with ECG of 02-OCT-2020 15:36,  Minimal criteria for Septal infarct are no longer present  T wave inversion no longer evident in Inferior leads  Nonspecific T wave abnormality now evident in Lateral leads     URINALYSIS W/ RFLX MICROSCOPIC    Collection Time: 07/23/21 10:42 AM   Result Value Ref Range    Color YELLOW      Appearance CLEAR      Specific gravity 1.010 1.005 - 1.030      pH (UA) 5.5 5.0 - 8.0 Protein Negative NEG mg/dL    Glucose Negative NEG mg/dL    Ketone Negative NEG mg/dL    Bilirubin Negative NEG      Blood TRACE (A) NEG      Urobilinogen 0.2 0.2 - 1.0 EU/dL    Nitrites Negative NEG      Leukocyte Esterase Negative NEG     URINE MICROSCOPIC ONLY    Collection Time: 07/23/21 10:42 AM   Result Value Ref Range    WBC 0 to 1 0 - 4 /hpf    RBC 0 to 1 0 - 5 /hpf    Epithelial cells FEW 0 - 5 /lpf    Bacteria Negative NEG /hpf       Radiologic Studies -   XR CHEST SNGL V   Final Result      Negative study. CT Results  (Last 48 hours)    None        CXR Results  (Last 48 hours)               07/23/21 1048  XR CHEST SNGL V Final result    Impression:      Negative study. Narrative:  EXAM:  Chest Portable. INDICATION:  Chest pain        COMPARISON:  02/09/17. TECHNIQUE:  Portable AP chest study       FINDINGS:        - Both lungs are clear.    - No pleural effusion or pneumothorax is detected. - Cardiac silhouette, mediastinum and hilar regions appear unremarkable. - No significant interval changes are noted. Medical Decision Making   I am the first provider for this patient. I reviewed the vital signs, available nursing notes, past medical history, past surgical history, family history and social history. Vital Signs-Reviewed the patient's vital signs. Records Reviewed: Nursing Notes    Procedures:  Procedures    Provider Notes (Medical Decision Making): Patient symptoms of his chest pain being worse with p.o. for carbonated beverages tobacco use and slightly positional are symptomatic of GERD. We will give him a dose of Protonix viscous lidocaine and Maalox prior to discharge home with Protonix and GI PCP follow-up. He has no pneumothorax or infiltrate on his chest x-ray. His blood work is otherwise unremarkable and his EKG is as well for staley who has had chest pain for nearly a month. He will he will be safe to discharge.   Heart score 2. MED RECONCILIATION:  Current Facility-Administered Medications   Medication Dose Route Frequency    pantoprazole (PROTONIX) 40 mg in 0.9% sodium chloride 10 mL injection  40 mg IntraVENous ONCE    lidocaine (XYLOCAINE) 2 % viscous solution 15 mL  15 mL Mouth/Throat NOW    aluminum-magnesium hydroxide (MAALOX) oral suspension 30 mL  30 mL Oral NOW     Current Outpatient Medications   Medication Sig    pantoprazole (Protonix) 40 mg tablet Take 1 Tablet by mouth daily for 20 days. Disposition:  home    DISCHARGE NOTE:   1:05 PM    Pt has been reexamined. Patient has no new complaints, changes, or physical findings. Care plan outlined and precautions discussed. Results of labs, CXR were reviewed with the patient. All medications were reviewed with the patient; will d/c home with protonix. All of pt's questions and concerns were addressed. Patient was instructed and agrees to follow up with GI, PCP, as well as to return to the ED upon further deterioration. Patient is ready to go home. Follow-up Information     Follow up With Specialties Details Why Contact Info    [facility] In 3 days  [address] [place], [place] [id number] [phone]      Megan Mcrae MD Internal Medicine Schedule an appointment as soon as possible for a visit in 3 days  [address] [place] [address] [phone]      [facility] DEPT Emergency Medicine  If symptoms worsen return immediately [address]  [phone]          Current Discharge Medication List      START taking these medications    Details   pantoprazole (Protonix) 40 mg tablet Take 1 Tablet by mouth daily for 20 days. Qty: 20 Tablet, Refills: 0  Start date: 7/23/2021, End date: 8/12/2021             Diagnosis     Clinical Impression:   1.  Gastroesophageal reflux disease with esophagitis without hemorrhage

## 2021-07-23 NOTE — ED NOTES
Instructed patient on the need for a urine specimen. Patient verbalized understanding. Specimen cup provided.

## 2022-01-11 ENCOUNTER — HOSPITAL ENCOUNTER (EMERGENCY)
Age: 52
Discharge: HOME | End: 2022-01-11
Attending: STUDENT IN AN ORGANIZED HEALTH CARE EDUCATION/TRAINING PROGRAM
Payer: COMMERCIAL

## 2022-01-11 ENCOUNTER — APPOINTMENT (OUTPATIENT)
Dept: GENERAL RADIOLOGY | Age: 52
End: 2022-01-11
Payer: COMMERCIAL

## 2022-01-11 VITALS
DIASTOLIC BLOOD PRESSURE: 92 MMHG | BODY MASS INDEX: 21.22 KG/M2 | SYSTOLIC BLOOD PRESSURE: 136 MMHG | HEIGHT: 68 IN | HEART RATE: 78 BPM | WEIGHT: 140 LBS | TEMPERATURE: 98.4 F | OXYGEN SATURATION: 99 % | RESPIRATION RATE: 18 BRPM

## 2022-01-11 DIAGNOSIS — Z20.822 SUSPECTED COVID-19 VIRUS INFECTION: ICD-10-CM

## 2022-01-11 DIAGNOSIS — M25.512 PAIN IN JOINT OF LEFT SHOULDER: Primary | ICD-10-CM

## 2022-01-11 DIAGNOSIS — R03.0 ELEVATED BLOOD PRESSURE READING: ICD-10-CM

## 2022-01-11 LAB
ATRIAL RATE: 74 BPM
CALCULATED P AXIS, ECG09: 80 DEGREES
CALCULATED R AXIS, ECG10: 8 DEGREES
CALCULATED T AXIS, ECG11: 38 DEGREES
DIAGNOSIS, 93000: NORMAL
P-R INTERVAL, ECG05: 140 MS
Q-T INTERVAL, ECG07: 392 MS
QRS DURATION, ECG06: 90 MS
QTC CALCULATION (BEZET), ECG08: 435 MS
SARS-COV-2, COV2: NORMAL
SARS-COV-2, NAA: NOT DETECTED
VENTRICULAR RATE, ECG03: 74 BPM

## 2022-01-11 PROCEDURE — 99282 EMERGENCY DEPT VISIT SF MDM: CPT

## 2022-01-11 PROCEDURE — 93005 ELECTROCARDIOGRAM TRACING: CPT

## 2022-01-11 PROCEDURE — 73030 X-RAY EXAM OF SHOULDER: CPT

## 2022-01-11 PROCEDURE — U0003 INFECTIOUS AGENT DETECTION BY NUCLEIC ACID (DNA OR RNA); SEVERE ACUTE RESPIRATORY SYNDROME CORONAVIRUS 2 (SARS-COV-2) (CORONAVIRUS DISEASE [COVID-19]), AMPLIFIED PROBE TECHNIQUE, MAKING USE OF HIGH THROUGHPUT TECHNOLOGIES AS DESCRIBED BY CMS-2020-01-R: HCPCS

## 2022-01-11 RX ORDER — NAPROXEN 500 MG/1
500 TABLET ORAL 2 TIMES DAILY WITH MEALS
Qty: 10 TABLET | Refills: 0 | Status: SHIPPED | OUTPATIENT
Start: 2022-01-11 | End: 2022-01-16

## 2022-01-11 RX ORDER — NAPROXEN 250 MG/1
500 TABLET ORAL ONCE
Status: DISCONTINUED | OUTPATIENT
Start: 2022-01-11 | End: 2022-01-11

## 2022-01-11 NOTE — ED TRIAGE NOTES
Pt reports left neck and shoulder pain times 3 day along with numbness down his left arm. Pt also reports chills and reports exposure to covid.

## 2022-01-11 NOTE — ED PROVIDER NOTES
EMERGENCY DEPARTMENT HISTORY AND PHYSICAL EXAM    11:21 AM      Date: 1/11/2022  Patient Name: Abdelrahman Boo    History of Presenting Illness     Chief Complaint   Patient presents with    Numbness    Shoulder Pain         History Provided By: Patient    Additional History (Context): Abdelrahman Boo is a 49 y.o. male with asthma who presents to the ED today with neck pain radiating to left shoulder X3 days associated with numbness and tingling. Patient states the pain is a constand aching sensation which sometimes becomes sharp. Rates pain 10/10. Has been taking tylenol with no relief. Reports when he takes a shower it makes the shoulder pain worse. 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 Denies injury or trauma to his left shoulder and arm. 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 Reports he has had headaches, loss of taste, congestion and body aches. Denies sore throat, cough, chest pain, shortness of breath, nausea or vomiting. Denies headache being the worse headache of his life. Not fully vaccinated. PCP: None        Past History     Past Medical History:  Past Medical History:   Diagnosis Date    Asthma     Low TSH level 4/27/2017    may have mild hyperthyroidism w/u per PCP I will rpt TSH and get freeT4       Past Surgical History:  No past surgical history on file.     Family History:  Family History   Problem Relation Age of Onset    Hypertension Mother     Cancer Father     Hypertension Brother     Asthma Daughter     Stroke Paternal Grandfather 79    Heart Attack Neg Hx     Heart Disease Neg Hx        Social History:  Social History     Tobacco Use    Smoking status: Current Every Day Smoker     Packs/day: 0.25     Types: Cigarettes    Smokeless tobacco: Never Used    Tobacco comment: pt states he's cutting back   Substance Use Topics    Alcohol use: Yes     Alcohol/week: 55.0 standard drinks     Types: 35 Cans of beer, 20 Shots of liquor per week     Comment: per patient 4 a day     Drug use: Yes     Types: Marijuana     Comment: 4 days in 1 week       Allergies:  No Known Allergies      Review of Systems       Review of Systems   Constitutional: Positive for fatigue and fever. Negative for chills. HENT: Positive for congestion. Negative for sore throat. Eyes: Negative for discharge. Respiratory: Negative for cough and shortness of breath. Cardiovascular: Negative for chest pain and leg swelling. Gastrointestinal: Negative for abdominal pain, nausea and vomiting. Genitourinary: Negative for difficulty urinating. Musculoskeletal: Positive for arthralgias and neck pain. Skin: Negative for rash. Neurological: Positive for headaches. Negative for dizziness and weakness. All other systems reviewed and are negative. Physical Exam     Visit Vitals  BP (!) 136/92   Pulse 78   Temp 98.4 °F (36.9 °C)   Resp 18   Ht 5' 8\" (1.727 m)   Wt 63.5 kg (140 lb)   SpO2 99%   BMI 21.29 kg/m²         Physical Exam  Vitals and nursing note reviewed. Constitutional:       General: He is not in acute distress. Appearance: He is well-developed. He is not diaphoretic. HENT:      Head: Normocephalic and atraumatic. Right Ear: Tympanic membrane and ear canal normal. There is no impacted cerumen. Left Ear: Tympanic membrane and ear canal normal. There is no impacted cerumen. Nose: No congestion or rhinorrhea. Mouth/Throat:      Mouth: Mucous membranes are moist.      Pharynx: No oropharyngeal exudate or posterior oropharyngeal erythema. Eyes:      General:         Right eye: No discharge. Left eye: No discharge. Pupils: Pupils are equal, round, and reactive to light. Cardiovascular:      Rate and Rhythm: Normal rate and regular rhythm. Heart sounds: Normal heart sounds. No murmur heard.   No friction rub. No gallop. Pulmonary:      Effort: Pulmonary effort is normal. No respiratory distress. Breath sounds: Normal breath sounds. No wheezing or rales. Abdominal:      General: Abdomen is flat. Bowel sounds are normal.      Palpations: Abdomen is soft. Tenderness: There is no abdominal tenderness. Musculoskeletal:         General: Tenderness (Left shoulder tenderness) present. No swelling, deformity or signs of injury. Normal range of motion. Right upper arm: No swelling, edema, deformity, lacerations, tenderness or bony tenderness. Left upper arm: No swelling, edema, deformity, lacerations or bony tenderness. Cervical back: Normal range of motion and neck supple. No edema, erythema or signs of trauma. No pain with movement. Normal range of motion. Right lower leg: No edema. Left lower leg: No edema. Comments: Pain reproduced with palpation and ROM. Reports numbness and tingling to left arm and fingertips. No numbness or tingling to right arm or lower extremities. Skin:     General: Skin is warm. Findings: No rash. Neurological:      Mental Status: He is alert. Coordination: Coordination is intact. Gait: Gait is intact.            Diagnostic Study Results     Labs -  Recent Results (from the past 12 hour(s))   EKG, 12 LEAD, INITIAL    Collection Time: 01/11/22 11:31 AM   Result Value Ref Range    Ventricular Rate 74 BPM    Atrial Rate 74 BPM    P-R Interval 140 ms    QRS Duration 90 ms    Q-T Interval 392 ms    QTC Calculation (Bezet) 435 ms    Calculated P Axis 80 degrees    Calculated R Axis 8 degrees    Calculated T Axis 38 degrees    Diagnosis       Poor data quality, interpretation may be adversely affected  Electrode noise  Normal sinus rhythm  Possible Left atrial enlargement  Minimal voltage criteria for LVH, may be normal variant ( Luiz product )  Septal infarct , age undetermined  Abnormal ECG  When compared with ECG of 23-JUL-2021 10:40,  Septal infarct is now present  Nonspecific T wave abnormality no longer evident in Lateral leads  Confirmed by Nain Mandel ([id number]) on 1/11/2022 2:46:28 PM     SARS-COV-2    Collection Time: 01/11/22  2:44 PM   Result Value Ref Range    SARS-CoV-2 Please find results under separate order         Radiologic Studies -   XR SHOULDER LT AP/LAT MIN 2 V   Final Result   Minimal glenohumeral joint degenerative changes. Medical Decision Making   I am the first provider for this patient. I reviewed the vital signs, available nursing notes, past medical history, past surgical history, family history and social history. Vital Signs-Reviewed the patient's vital signs. Pulse Oximetry Analysis -  99% on room air     Records Reviewed: Nursing Notes, Old Medical Records, Previous Radiology Studies and Previous Laboratory Studies (Time of Review: 11:21 AM)    ED Course: Progress Notes, Reevaluation, and Consults:  11:45 AM-Spoke with patient regarding plan of care. Patient agrees to xray and plan. Denies having any questions or concerns. 3:25 PM-Discussed X-ray results with patient and at home treatment. Patient waiting for Naprosyn dose. States he does not wish to wait any longer. Will send Naprosyn script to [facility] on [address].   4:00 PM-Patient discharge instructions explained and given to patient. Patient verbalized understanding. Discussed importance of PCP and ortho follow up. Patient states he will schedule appointment. Strict return to ED precautions provided. Patient instructed to isolate and will check mychart for results. Provider Notes (Medical Decision Making):     Patient is a 49 y.o. male who presents to the ED today with neck pain radiating to left shoulder X3 days associated with numbness and tingling. Will obtain imaging for further evaluation of patient complaints. Will continue to monitor and evaluate while in ED. Patient afebrile and non-toxic appearing.  Neck and left arm without edema, deformity, erythema, and patient has full ROM. Pain reproducible with palpation and ROM. Reports numbness and tingling to left arm and fingertips. No numbness or tingling to right arm or lower extremities. Shoulder X-ray revealed-Minimal glenohumeral joint degenerative changes. Has concerns for covid due to work exposure. Reports congestion, headache, loss of taste. No chest pain, shortness of breath. Will order covid test.    One or more blood pressure readings were noted elevated during the patient's presentation in the emergency department today. This abnormal reading has been cited in the patients' diagnosis, and they have been encouraged to follow up with their primary care physician, or referred to a consultant for further evaluation and treatment. Diagnosis     Clinical Impression:   1. Pain in joint of left shoulder    2. Suspected COVID-19 virus infection    3. Elevated blood pressure reading        Disposition:     Home    Follow-up Information     Follow up With Specialties Details Why [address]  Schedule an appointment as soon as possible for a visit in 1 day  [address]  [address] [facility]    [facility] EMERGENCY DEPT Emergency Medicine  If symptoms worsen [address]  Schedule an appointment as soon as possible for a visit   [address] [address]  [phone]           Discharge Medication List as of 1/11/2022  4:00 PM      START taking these medications    Details   naproxen (Naprosyn) 500 mg tablet Take 1 Tablet by mouth two (2) times daily (with meals) for 5 days. , Normal, Disp-10 Tablet, R-0             Dictation disclaimer:  Please note that this dictation was completed with [employer] the [employer] voice recognition software.   Quite often unanticipated grammatical, syntax, homophones, and other interpretive errors are inadvertently transcribed by the computer software. Please disregard these errors. Please excuse any errors that have escaped final proofreading.

## 2022-01-11 NOTE — Clinical Note
[address] [place] [address] [facility] EMERGENCY DEPT  [id number] [address] [phone]    Work/School Note    Date: 1/11/2022     To Whom It May concern:    Dea Wilson was evaluated by the following provider(s):  Attending Provider: Amanda Traylor DO  Nurse Practitioner: Kvng Irvin, [address] virus is suspected. Per the CDC guidelines we recommend home isolation until the following conditions are all met:    1. At least five days have passed since symptoms first appeared and/or had a close exposure,   2. After home isolation for five days, wearing a mask around others for the next five days,  3. At least 24 have passed since last fever without the use of fever-reducing medications and  4.  Symptoms (eg cough, shortness of breath) have improved      Sincerely,          Jennyfer Cruz NP

## 2022-01-11 NOTE — DISCHARGE INSTRUCTIONS
Take medication as prescribed. Follow up with PCP. Return to ED for any new or worsening symptoms to include: chest pain, shortness of breath, fevers, increase in pain, redness or swelling of shoulder.

## 2022-03-18 PROBLEM — Z71.6 TOBACCO ABUSE COUNSELING: Status: ACTIVE | Noted: 2017-03-16

## 2022-03-18 PROBLEM — R94.39 ABNORMAL STRESS TEST: Status: ACTIVE | Noted: 2017-03-15

## 2022-03-18 PROBLEM — R09.81 NASAL CONGESTION: Status: ACTIVE | Noted: 2017-03-16

## 2022-03-19 PROBLEM — R79.89 LOW TSH LEVEL: Status: ACTIVE | Noted: 2017-04-27

## 2022-03-19 PROBLEM — R07.9 CHEST PAIN: Status: ACTIVE | Noted: 2017-03-16

## 2022-03-20 PROBLEM — F10.10 ALCOHOL ABUSE: Status: ACTIVE | Noted: 2017-03-16

## 2022-07-28 ENCOUNTER — HOSPITAL ENCOUNTER (EMERGENCY)
Age: 52
Discharge: HOME | End: 2022-07-28
Attending: STUDENT IN AN ORGANIZED HEALTH CARE EDUCATION/TRAINING PROGRAM
Payer: COMMERCIAL

## 2022-07-28 VITALS
HEIGHT: 69 IN | BODY MASS INDEX: 20.67 KG/M2 | RESPIRATION RATE: 18 BRPM | OXYGEN SATURATION: 99 % | HEART RATE: 89 BPM | SYSTOLIC BLOOD PRESSURE: 159 MMHG | TEMPERATURE: 99.2 F | DIASTOLIC BLOOD PRESSURE: 98 MMHG

## 2022-07-28 DIAGNOSIS — M54.89 OTHER BACK PAIN, UNSPECIFIED CHRONICITY: Primary | ICD-10-CM

## 2022-07-28 PROCEDURE — 99283 EMERGENCY DEPT VISIT LOW MDM: CPT

## 2022-07-28 RX ORDER — IBUPROFEN 600 MG/1
600 TABLET ORAL
Qty: 20 TABLET | Refills: 0 | Status: SHIPPED | OUTPATIENT
Start: 2022-07-28 | End: 2022-08-11

## 2022-07-28 RX ORDER — CYCLOBENZAPRINE HCL 5 MG
5 TABLET ORAL 3 TIMES DAILY
Qty: 9 TABLET | Refills: 0 | Status: SHIPPED | OUTPATIENT
Start: 2022-07-28 | End: 2022-07-31

## 2022-07-28 NOTE — ED TRIAGE NOTES
Patient states he has had back pain for several years, today he woke up with back pain and he didn't go to work.

## 2022-07-28 NOTE — ED PROVIDER NOTES
[facility] EMERGENCY DEPT    Patient Name: Edyta Sevilla    History of Presenting Illness     47 y.o. male with a PMH of Chronic back pain presents to the ED c/o chronic back pain for the past 20 years. Patient states when he woke up this morning he knew he was not able to go to work. He describes a constant pain in his left upper and lower back, which is worse with range of motion. He does report having some slight radicular pain in his left leg. He did not take anything today for symptoms. Denies any numbness, weakness, bowel or bladder function loss, saddle paresthesias, abdominal pain, other symptoms. Patient denies any other associated signs or symptoms. Patient denies any other complaints. Nursing notes regarding the HPI and triage nursing notes were reviewed. Prior medical records were reviewed. Past History     Past Medical History:  Past Medical History:   Diagnosis Date    Asthma     Low TSH level 4/27/2017    may have mild hyperthyroidism w/u per PCP I will rpt TSH and get freeT4       Past Surgical History:  No past surgical history on file. Family History:  Family History   Problem Relation Age of Onset    Hypertension Mother     Cancer Father     Hypertension Brother     Asthma Daughter     Stroke Paternal Grandfather 79    Heart Attack Neg Hx     Heart Disease Neg Hx        Social History:  Social History     Tobacco Use    Smoking status: Every Day     Packs/day: 0.25     Types: Cigarettes    Smokeless tobacco: Never    Tobacco comments:     pt states he's cutting back   Substance Use Topics    Alcohol use:  Yes     Alcohol/week: 55.0 standard drinks     Types: 35 Cans of beer, 20 Shots of liquor per week     Comment: per patient 4 a day     Drug use: Yes     Types: Marijuana     Comment: 4 days in 1 week       Allergies:  No Known Allergies    Patient's primary care provider (as noted in EPIC):  None    Review of Systems  Constitutional:  Denies malaise, fever, chills. Cardiac:  Denies chest pain or palpitations. Respiratory:  Denies cough, wheezing, difficulty breathing, shortness of breath. GI/ABD:  Denies injury, pain, distention, nausea, vomiting, diarrhea. :  Denies injury, pain, dysuria or urgency. Back: + back pain. Extremity/MS:  Denies injury or pain. Neuro:  Denies headache, LOC, dizziness, neurologic symptoms/deficits/paresthesias. Skin: Denies injury, rash, itching or skin changes. All other systems negative as reviewed. Visit Vitals  BP (!) 159/98 (BP 1 Location: Left upper arm)   Pulse 89   Temp 99.2 °F (37.3 °C)   Resp 18   Ht 5' 9\" (1.753 m)   SpO2 99%   BMI 20.67 kg/m²       PHYSICAL EXAM:  CONSTITUTIONAL:  Alert, in no apparent distress;  well developed;  well nourished. HEAD:  Normocephalic, atraumatic. EYES:  EOMI. Non-icteric sclera. Normal conjunctiva. ENTM:  Nose:  no rhinorrhea. Throat:  no erythema or exudate, mucous membranes moist.  NECK:  Supple  RESPIRATORY:  Chest clear, equal breath sounds, good air movement. Without wheezes, rhonchi or rales. CARDIOVASCULAR:  Regular rate and rhythm. No murmurs, rubs, or gallops. GI:  Normal bowel sounds, abdomen soft and non-tender. No rebound or guarding. BACK:  Lower left paralumbar reproducible tenderness to palpation. No midline vertebral bony point tenderness or step-off. Normal bilateral straight leg raise. UPPER EXT:  Normal inspection. NEURO:  Moves all four extremities, and grossly normal motor exam.  SKIN:  No rashes;  Normal for age. PSYCH:  Alert and normal affect. IMPRESSION AND MEDICAL DECISION MAKING:  No perianal decreased sensation nor bowel/bladder incontinence to suggest a neurological emergency. The patient does not have fever, no other signs of infection to suggest an epidural or other back abscess that would require emergent intervention. The patient denies being an IVDA.     Based upon the patients presentation with noted HPI and PE, along with the work up done in the emergency department, I believe that the patient is having an exacerbation of his chronic pain. Will write for Flexeril and ibuprofen, patient to follow-up with orthopedics and spine specialist.  He may return for any acute worsening. Diagnosis:   1. Other back pain, unspecified chronicity      Disposition: Discharge    Follow-up Information       Follow up With Specialties Details Why [id number]  Schedule an appointment as soon as possible for a visit   [address] [facility] EMERGENCY DEPT Emergency Medicine  If symptoms worsen [address] [place] [id number]  [phone]            Discharge Medication List as of 7/28/2022  3:32 PM        START taking these medications    Details   cyclobenzaprine (FLEXERIL) 5 mg tablet Take 1 Tablet by mouth three (3) times daily for 3 days. , Normal, Disp-9 Tablet, R-0      ibuprofen (MOTRIN) 600 mg tablet Take 1 Tablet by mouth every six (6) hours as needed for Pain., Normal, Disp-20 Tablet, R-0           ALIA Saldana

## 2022-07-28 NOTE — Clinical Note
[address] [place] [address] [address] [facility] EMERGENCY DEPT  [id number] [address] [phone]    Work/School Note    Date: 7/28/2022    To Whom It May concern:      Nicolle Del Cid was seen and treated today in the emergency room by the following provider(s):  Attending Provider: Allie Vale DO  Physician Assistant: ALIA Hurtado. Nicolle Del Cid is excused from work/school on 07/28/22. He is clear to return to work/school on 07/29/22.         Sincerely,          Zahra Person PA

## 2022-08-11 ENCOUNTER — APPOINTMENT (OUTPATIENT)
Dept: MRI IMAGING | Age: 52
DRG: 069 | End: 2022-08-11
Attending: INTERNAL MEDICINE
Payer: COMMERCIAL

## 2022-08-11 ENCOUNTER — APPOINTMENT (OUTPATIENT)
Dept: CT IMAGING | Age: 52
DRG: 069 | End: 2022-08-11
Attending: PHYSICIAN ASSISTANT
Payer: COMMERCIAL

## 2022-08-11 ENCOUNTER — HOSPITAL ENCOUNTER (OUTPATIENT)
Age: 52
Setting detail: OBSERVATION
Discharge: HOME | DRG: 069 | End: 2022-08-13
Attending: EMERGENCY MEDICINE | Admitting: EMERGENCY MEDICINE
Payer: COMMERCIAL

## 2022-08-11 ENCOUNTER — APPOINTMENT (OUTPATIENT)
Age: 52
DRG: 069 | End: 2022-08-11
Attending: PHYSICIAN ASSISTANT
Payer: COMMERCIAL

## 2022-08-11 DIAGNOSIS — I67.82 CEREBRAL ISCHEMIA: Primary | ICD-10-CM

## 2022-08-11 DIAGNOSIS — R20.0 LEFT SIDED NUMBNESS: ICD-10-CM

## 2022-08-11 DIAGNOSIS — R53.1 LEFT-SIDED WEAKNESS: ICD-10-CM

## 2022-08-11 PROBLEM — M54.12 CERVICAL RADICULOPATHY: Status: ACTIVE | Noted: 2022-08-11

## 2022-08-11 LAB
AMPHET UR QL SCN: NEGATIVE
ANION GAP SERPL CALC-SCNC: 8 MMOL/L (ref 3–18)
BARBITURATES UR QL SCN: NEGATIVE
BASOPHILS # BLD: 0.1 K/UL (ref 0–0.1)
BASOPHILS NFR BLD: 2 % (ref 0–2)
BENZODIAZ UR QL: NEGATIVE
BUN SERPL-MCNC: 10 MG/DL (ref 7–18)
BUN/CREAT SERPL: 16 (ref 12–20)
CALCIUM SERPL-MCNC: 8.7 MG/DL (ref 8.5–10.1)
CANNABINOIDS UR QL SCN: POSITIVE
CHLORIDE SERPL-SCNC: 110 MMOL/L (ref 100–111)
CHOLEST SERPL-MCNC: 216 MG/DL
CO2 SERPL-SCNC: 25 MMOL/L (ref 21–32)
COCAINE UR QL SCN: NEGATIVE
CREAT SERPL-MCNC: 0.63 MG/DL (ref 0.6–1.3)
DIFFERENTIAL METHOD BLD: ABNORMAL
EOSINOPHIL # BLD: 0.2 K/UL (ref 0–0.4)
EOSINOPHIL NFR BLD: 4 % (ref 0–5)
ERYTHROCYTE [DISTWIDTH] IN BLOOD BY AUTOMATED COUNT: 13.1 % (ref 11.6–14.5)
EST. AVERAGE GLUCOSE BLD GHB EST-MCNC: 111 MG/DL
GLUCOSE SERPL-MCNC: 122 MG/DL (ref 74–99)
HBA1C MFR BLD: 5.5 % (ref 4.2–5.6)
HCT VFR BLD AUTO: 40.3 % (ref 36–48)
HDLC SERPL-MCNC: 127 MG/DL (ref 40–60)
HDLC SERPL: 1.7 {RATIO} (ref 0–5)
HDSCOM,HDSCOM: ABNORMAL
HGB BLD-MCNC: 13.5 G/DL (ref 13–16)
IMM GRANULOCYTES # BLD AUTO: 0 K/UL (ref 0–0.04)
IMM GRANULOCYTES NFR BLD AUTO: 0 % (ref 0–0.5)
LDLC SERPL CALC-MCNC: 75.2 MG/DL (ref 0–100)
LIPID PROFILE,FLP: ABNORMAL
LYMPHOCYTES # BLD: 1.5 K/UL (ref 0.9–3.6)
LYMPHOCYTES NFR BLD: 39 % (ref 21–52)
MCH RBC QN AUTO: 32.2 PG (ref 24–34)
MCHC RBC AUTO-ENTMCNC: 33.5 G/DL (ref 31–37)
MCV RBC AUTO: 96.2 FL (ref 78–100)
METHADONE UR QL: NEGATIVE
MONOCYTES # BLD: 0.4 K/UL (ref 0.05–1.2)
MONOCYTES NFR BLD: 11 % (ref 3–10)
NEUTS SEG # BLD: 1.8 K/UL (ref 1.8–8)
NEUTS SEG NFR BLD: 44 % (ref 40–73)
NRBC # BLD: 0 K/UL (ref 0–0.01)
NRBC BLD-RTO: 0 PER 100 WBC
OPIATES UR QL: NEGATIVE
PCP UR QL: NEGATIVE
PLATELET # BLD AUTO: 273 K/UL (ref 135–420)
PMV BLD AUTO: 8.9 FL (ref 9.2–11.8)
POTASSIUM SERPL-SCNC: 3.4 MMOL/L (ref 3.5–5.5)
RBC # BLD AUTO: 4.19 M/UL (ref 4.35–5.65)
SODIUM SERPL-SCNC: 143 MMOL/L (ref 136–145)
TRIGL SERPL-MCNC: 69 MG/DL (ref ?–150)
VLDLC SERPL CALC-MCNC: 13.8 MG/DL
WBC # BLD AUTO: 4 K/UL (ref 4.6–13.2)

## 2022-08-11 PROCEDURE — 74011250637 HC RX REV CODE- 250/637: Performed by: INTERNAL MEDICINE

## 2022-08-11 PROCEDURE — 2709999900 HC NON-CHARGEABLE SUPPLY

## 2022-08-11 PROCEDURE — G0378 HOSPITAL OBSERVATION PER HR: HCPCS

## 2022-08-11 PROCEDURE — 72141 MRI NECK SPINE W/O DYE: CPT

## 2022-08-11 PROCEDURE — 70496 CT ANGIOGRAPHY HEAD: CPT

## 2022-08-11 PROCEDURE — APPSS60 APP SPLIT SHARED TIME 46-60 MINUTES: Performed by: NURSE PRACTITIONER

## 2022-08-11 PROCEDURE — 74011250636 HC RX REV CODE- 250/636: Performed by: NURSE PRACTITIONER

## 2022-08-11 PROCEDURE — 74011250637 HC RX REV CODE- 250/637: Performed by: NURSE PRACTITIONER

## 2022-08-11 PROCEDURE — 70450 CT HEAD/BRAIN W/O DYE: CPT

## 2022-08-11 PROCEDURE — 74011000636 HC RX REV CODE- 636: Performed by: EMERGENCY MEDICINE

## 2022-08-11 PROCEDURE — 99285 EMERGENCY DEPT VISIT HI MDM: CPT

## 2022-08-11 PROCEDURE — 83036 HEMOGLOBIN GLYCOSYLATED A1C: CPT

## 2022-08-11 PROCEDURE — 80061 LIPID PANEL: CPT

## 2022-08-11 PROCEDURE — 80307 DRUG TEST PRSMV CHEM ANLYZR: CPT

## 2022-08-11 PROCEDURE — 99219 PR INITIAL OBSERVATION CARE/DAY 50 MINUTES: CPT | Performed by: INTERNAL MEDICINE

## 2022-08-11 PROCEDURE — 96372 THER/PROPH/DIAG INJ SC/IM: CPT

## 2022-08-11 PROCEDURE — 80048 BASIC METABOLIC PNL TOTAL CA: CPT

## 2022-08-11 PROCEDURE — 36415 COLL VENOUS BLD VENIPUNCTURE: CPT

## 2022-08-11 PROCEDURE — 70551 MRI BRAIN STEM W/O DYE: CPT

## 2022-08-11 PROCEDURE — 74011250637 HC RX REV CODE- 250/637: Performed by: PHYSICIAN ASSISTANT

## 2022-08-11 PROCEDURE — 85025 COMPLETE CBC W/AUTO DIFF WBC: CPT

## 2022-08-11 RX ORDER — AMLODIPINE BESYLATE 5 MG/1
5 TABLET ORAL DAILY
Status: DISCONTINUED | OUTPATIENT
Start: 2022-08-12 | End: 2022-08-13 | Stop reason: HOSPADM

## 2022-08-11 RX ORDER — GUAIFENESIN 100 MG/5ML
81 LIQUID (ML) ORAL DAILY
Status: DISCONTINUED | OUTPATIENT
Start: 2022-08-12 | End: 2022-08-13 | Stop reason: HOSPADM

## 2022-08-11 RX ORDER — ATORVASTATIN CALCIUM 20 MG/1
20 TABLET, FILM COATED ORAL
Status: DISCONTINUED | OUTPATIENT
Start: 2022-08-11 | End: 2022-08-12

## 2022-08-11 RX ORDER — IBUPROFEN 200 MG
1 TABLET ORAL DAILY
Status: DISCONTINUED | OUTPATIENT
Start: 2022-08-12 | End: 2022-08-11

## 2022-08-11 RX ORDER — IBUPROFEN 200 MG
1 TABLET ORAL DAILY
Status: DISCONTINUED | OUTPATIENT
Start: 2022-08-11 | End: 2022-08-13 | Stop reason: HOSPADM

## 2022-08-11 RX ORDER — ATORVASTATIN CALCIUM 40 MG/1
80 TABLET, FILM COATED ORAL
Status: DISCONTINUED | OUTPATIENT
Start: 2022-08-11 | End: 2022-08-11

## 2022-08-11 RX ORDER — POTASSIUM CHLORIDE 20 MEQ/1
20 TABLET, EXTENDED RELEASE ORAL
Status: COMPLETED | OUTPATIENT
Start: 2022-08-11 | End: 2022-08-11

## 2022-08-11 RX ORDER — LABETALOL HCL 20 MG/4 ML
5 SYRINGE (ML) INTRAVENOUS
Status: DISCONTINUED | OUTPATIENT
Start: 2022-08-11 | End: 2022-08-13 | Stop reason: HOSPADM

## 2022-08-11 RX ORDER — ENOXAPARIN SODIUM 100 MG/ML
40 INJECTION SUBCUTANEOUS EVERY 24 HOURS
Status: DISCONTINUED | OUTPATIENT
Start: 2022-08-11 | End: 2022-08-13 | Stop reason: HOSPADM

## 2022-08-11 RX ORDER — GUAIFENESIN 100 MG/5ML
324 LIQUID (ML) ORAL
Status: COMPLETED | OUTPATIENT
Start: 2022-08-11 | End: 2022-08-11

## 2022-08-11 RX ADMIN — IOPAMIDOL 80 ML: 755 INJECTION, SOLUTION INTRAVENOUS at 12:23

## 2022-08-11 RX ADMIN — ATORVASTATIN CALCIUM 20 MG: 20 TABLET, FILM COATED ORAL at 21:55

## 2022-08-11 RX ADMIN — POTASSIUM CHLORIDE 20 MEQ: 1500 TABLET, EXTENDED RELEASE ORAL at 18:26

## 2022-08-11 RX ADMIN — ENOXAPARIN SODIUM 40 MG: 100 INJECTION SUBCUTANEOUS at 18:26

## 2022-08-11 RX ADMIN — ASPIRIN 81 MG CHEWABLE TABLET 324 MG: 81 TABLET CHEWABLE at 12:45

## 2022-08-11 NOTE — ROUTINE PROCESS
TRANSFER - OUT REPORT:    Verbal report given to [facility], RN(name) on Sheyla Kruger  being transferred to [facility](unit) for routine progression of care       Report consisted of patients Situation, Background, Assessment and   Recommendations(SBAR). Information from the following report(s) ED Summary was reviewed with the receiving nurse. Lines:   Peripheral IV 08/11/22 Antecubital (Active)   Site Assessment Clean, dry, & intact 08/11/22 1200   Phlebitis Assessment 0 08/11/22 1200   Infiltration Assessment 0 08/11/22 1200   Dressing Status Clean, dry, & intact 08/11/22 1200   Dressing Type Transparent 08/11/22 1200   Hub Color/Line Status Pink;Patent; Flushed 08/11/22 1200   Action Taken Blood drawn 08/11/22 1200        Opportunity for questions and clarification was provided.       Patient transported with:   Monitor, INT

## 2022-08-11 NOTE — H&P
History & Physical    Patient: Providence Boast MRN: [id number]  CSN: [id number]    YOB: 1970  Age: 47 y.o. Sex: male      DOA: 8/11/2022  CC: left side weakness     PCP: None       HPI:     Providence Boast is a 47 y.o. male who is a transfer from [facility] for further medical management with concern for stroke. History of upper and neck pain, chronic with onset of left arm and left leg numbness started yesterday morning. Associated symptoms of generalized HA, visual changes both eyes and unsteady gait. These symptoms resolved with left side numbness persisted. No prior history of medical conditions or daily medications. Tylenol as needed for the upper back and neck pain. Stroke protocol initiated. CT no acute symptoms. MRI pending. Given 81 mg ASA. Patient is alert and oriented x4 with complains of left shoulder pain. No other complaints at this time. Tobacco use 10 cigarettes per day. ROS  GENERAL: upper neck and back pain, left shoulder pain, left side numbness and unsteady gait  HEENT: HA, vision change   NECK: No pain or stiffness. PULMONARY: No shortness of breath, no cough or wheeze. Cardiovascular: no pnd or orthopnea, no CP  GASTROINTESTINAL: No abdominal pain, no nausea, no vomiting or diarrhea, no melena or bright red blood per rectum. GENITOURINARY: No urinary frequency, no urgency, no hesitancy or dysuria. MUSCULOSKELETAL: No joint or muscle pain, no back pain, no recent trauma. DERMATOLOGIC: No rash, no itching, no lesions. ENDOCRINE: No polyuria, no polydipsia, no heat or cold intolerance. No recent change in weight. HEMATOLOGICAL: No anemia or easy bruising or bleeding. NEUROLOGIC: No headache, no seizures, no numbness, no tingling or weakness.         Past Medical History:   Diagnosis Date    Asthma     Back pain     Elevated blood pressure reading     GERD (gastroesophageal reflux disease)     Left shoulder pain     Low TSH level 04/27/2017    may have mild hyperthyroidism w/u per PCP I will rpt TSH and get freeT4    Low TSH level     Right inguinal hernia        History reviewed. No pertinent surgical history. Family History   Problem Relation Age of Onset    Hypertension Mother     Cancer Father     Hypertension Brother     Asthma Daughter     Stroke Paternal Grandfather 79    Heart Attack Neg Hx     Heart Disease Neg Hx        Social History     Socioeconomic History    Marital status: SINGLE   Tobacco Use    Smoking status: Every Day     Packs/day: 0.25     Types: Cigarettes    Smokeless tobacco: Never    Tobacco comments:     pt states he's cutting back   Substance and Sexual Activity    Alcohol use: Yes     Alcohol/week: 55.0 standard drinks     Types: 35 Cans of beer, 20 Shots of liquor per week     Comment: per patient 4 a day     Drug use: Not Currently     Types: Marijuana     Comment: 4 days in 1 week       Prior to Admission medications    Not on File       No Known Allergies           Physical Exam:      Visit Vitals  BP (!) 159/102 (BP 1 Location: Left upper arm)   Pulse 63   Temp 97.9 °F (36.6 °C)   Resp 18   Ht 5' 9\" (1.753 m)   Wt 63.5 kg (140 lb)   SpO2 99%   BMI 20.67 kg/m²       Physical Exam:  General:         Alert, cooperative, no acute distress    HEENT:           NC, Atraumatic. PERRLA, anicteric sclerae. Lungs:            CTA bilaterally. No Wheezing/Rhonchi/Rales  Heart:              RRR, No murmur, No Rubs, No Gallops  Abdomen:      Soft, Non distended, Non tender. +Bowel sounds, no HSM  Extremities:   No c/c/e  Psych:              Good insight. Not anxious or agitated. Neurologic:     Alert and oriented X 4.   No acute neurological deficits     Lab/Data Review:  Labs: Results:       Chemistry Recent Labs     08/11/22  1200   *      K 3.4*      CO2 25   BUN 10   CREA 0.63   CA 8.7   AGAP 8   BUCR 16      CBC w/Diff Recent Labs     08/11/22  1200   WBC 4.0*   RBC 4.19*   HGB 13.5   HCT 40.3      GRANS 44   LYMPH 39   EOS 4      Coagulation No results for input(s): PTP, INR, APTT, INREXT in the last 72 hours. Iron/Ferritin No results for input(s): IRON in the last 72 hours. No lab exists for component: TIBCCALC   BNP No results for input(s): BNPP in the last 72 hours. Cardiac Enzymes No results for input(s): CPK, CKND1, ABNER in the last 72 hours. No lab exists for component: CKRMB, TROIP   Liver Enzymes No results for input(s): TP, ALB, TBIL, AP in the last 72 hours. No lab exists for component: SGOT, GPT, DBIL   Thyroid Studies Lab Results   Component Value Date/Time    TSH 0.24 (L) 05/03/2017 11:45 AM          All Micro Results       None            Imaging Reviewed:  CT Results (most recent):  Results from Hospital Encounter encounter on 08/11/22    CTA HEAD NECK W CONT    Narrative  EXAM: CTA HEAD NECK W CONT    CLINICAL INDICATIONS: left sided numbness/weakness    TECHNIQUE: Helical CT scan of the brain and neck were performed at 1.25 mm  intervals during rapid IV bolus contrast administration. These data were  reconstructed at .625 mm intervals for vascular analysis. The data was also  reviewed at 2.5 mm intervals for accompanying soft tissue analysis. 3D post  processed images, including surface shaded displays, were produced for this exam  on independent console, permanently archived and interpreted. All CT scans at this facility are performed using dose optimization technique as  appropriate to a performed exam, to include automated exposure control,  adjustment of the MA and/or kV according to patient size (including appropriate  matching for site-specific examinations) or use of  iterative reconstruction  technique. CONTRAST: 80 Isovue 370    COMPARISON: No prior CTA. Head CT was performed immediately prior same day,  8/11/2022. CTA SOFT TISSUE ANALYSIS:  Lungs: Some motion related artifacts through the lung apices with loss of  details. No mass.   Upper chest: No mass or upper mediastinal lymphadenopathy seen. Neck: No neck mass. Lymph nodes: No findings of cervical lymphadenopathy. Orbits: Grossly normal for non-dedicated exam.  Paranasal sinuses: No significant paranasal sinus disease. Brain: No enhancing brain lesions. No midline shift  Bones: No acute findings. There are multilevel degenerative changes present. Some reversal of normal  cervical lordosis. Disc space loss with disc and osteophyte complexes at the 4/5  and C5/6 causing central spinal canal stenosis. Some degree of neural foraminal  narrowing at multiple levels. CTA NECK VASCULAR ANALYSIS:    Aortic arch: No stenosis or aneurysm. There is some motion at the level just  above the aortic arch with loss of details in some of the proximal great vessels  Innominate: Patent  Right Subclavian: Patent  Left Subclavian: Patent    Right carotid:  -CCA: Patent  -ECA: Patent  -ICA: Patent.  0% stenosis of proximal ICA by NASCET criteria. Left carotid:  -CCA: Disruption of images through the proximal CCA, otherwise normal  -ECA: Patent  -ICA: Patent  0% stenosis of proximal ICA by NASCET criteria. Right vertebral: Patent. No stenosis. Left vertebral: Patent. No stenosis. CTA BRAIN VASCULAR ANALYSIS:    Right anterior circulation:  -ICA: Patent  -OMAR: Patent  -MCA: Patent    Left anterior circulation:  -ICA: Patent  -OMAR: Patent  -MCA: Patent    -ACOM: Unremarkable    Posterior circulation:  -RVA: Patent  -LVA: Patent  -Basilar: Patent  -Right PCA: Patent  -Left PCA: Patent    Major dural venous sinuses: Grossly patent    Impression  1. Patent intracranial brain arteries. No LVO. 2. No significant stenosis in cervical carotid or vertebral arteries. 3. Multilevel degenerative changes in the cervical spine, most pronounced at  C4/5 and C5/6. Assessment:   Active Problems:    Left-sided weakness (8/11/2022)      Left sided numbness (8/11/2022)            Plan:   Continue stroke protocol. Permissive BP.  81 mg ASA and statin. Lipid panel and a1c pending. PT/OT/speech. MRI results pending. Neurology consult  Nicotine patch ordered. Smoking cessation and education for less than 5 minutes. Monitor metabolic panel and renal function. PO potassium ordered for tonight. Full code  No prescribed home meds. OTC tylenol as needed. Patient is alert and oriented x4 and of sound mind to update family  At discharge patient needs a PCP assigned     45 minutes in total spent today in preparation for visit, review of external notes and test results, review of test results, order placement, obtaining history, physical exam of the patient, and/or counseling the patient concerning diagnosis, test results, treatment plan and speaking with physicians and nurses involved in patient's care. Additional time spent in review and independent interpretation of external notes, imaging, labs via hospital EMR and/or Care Everywhere, as well as pre-charting activity all of which occur on the day of service.             Cosmo Mir NP  8/11/2022, 5:41 PM

## 2022-08-11 NOTE — ED PROVIDER NOTES
[address] [facility] EMERGENCY DEPT    Date: 8/11/2022  Patient Name: Shirin Perez    History of Presenting Illness     Chief Complaint   Patient presents with    Shoulder Pain    Leg Pain    Arm Pain    Numbness     47 y.o. male with a past medical history of asthma, HTN, GERD, heavy smoking presents to the ED complaining of left arm and leg weakness and numbness onset yesterday at 10 AM.  Patient states at the same time he had a headache, some visual changes, and felt slightly unsteady on his gait. He states that the extremity numbness and weakness persist, but the other symptoms have all resolved. Patient has no prior history of CVA, he does not take any aspirin or anticoagulants. Denies CP, SOB, speech changes, facial droop, or other symptoms. Patient denies any other associated signs or symptoms. Patient denies any other complaints. Nursing notes regarding the HPI and triage nursing notes were reviewed. Prior medical records were reviewed. Past History     Past Medical History:  Past Medical History:   Diagnosis Date    Asthma     Back pain     Elevated blood pressure reading     GERD (gastroesophageal reflux disease)     Left shoulder pain     Low TSH level 04/27/2017    may have mild hyperthyroidism w/u per PCP I will rpt TSH and get freeT4    Low TSH level     Right inguinal hernia        Past Surgical History:  History reviewed. No pertinent surgical history. Family History:  Family History   Problem Relation Age of Onset    Hypertension Mother     Cancer Father     Hypertension Brother     Asthma Daughter     Stroke Paternal Grandfather 79    Heart Attack Neg Hx     Heart Disease Neg Hx        Social History:  Social History     Tobacco Use    Smoking status: Every Day     Packs/day: 0.25     Types: Cigarettes    Smokeless tobacco: Never    Tobacco comments:     pt states he's cutting back   Substance Use Topics    Alcohol use:  Yes     Alcohol/week: 55.0 standard drinks     Types: 35 Cans of beer, 20 Shots of liquor per week     Comment: per patient 4 a day     Drug use: Not Currently     Types: Marijuana     Comment: 4 days in 1 week       Allergies:  No Known Allergies    Patient's primary care provider (as noted in EPIC):  None    Review of Systems  Constitutional:  Denies malaise, fever, chills. Head:  Denies injury. Neck:  Denies injury or pain. Chest:  Denies injury. Cardiac:  Denies chest pain or palpitations. Respiratory:  Denies cough, wheezing, difficulty breathing, shortness of breath. GI/ABD:  Denies injury, pain, distention, nausea, vomiting, diarrhea. :  Denies injury, pain, dysuria or urgency. Back:  Denies injury or pain. Pelvis:  Denies injury or pain. Extremity/MS:  Denies injury or pain. Neuro: + Resolved headache, blurred vision, unsteady gait. + persistent left sided weakness/numbness. Skin: Denies injury, rash, itching or skin changes. All other systems negative as reviewed. Visit Vitals  BP (!) 126/91 (BP 1 Location: Left upper arm)   Pulse 75   Temp 98.3 °F (36.8 °C)   Resp 18   Ht 5' 9\" (1.753 m)   Wt 63.5 kg (140 lb)   SpO2 100%   BMI 20.67 kg/m²       Patient Vitals for the past 12 hrs:   Temp Pulse Resp BP SpO2   08/11/22 1245 -- 75 18 (!) 126/91 100 %   08/11/22 1117 98.3 °F (36.8 °C) 87 16 113/83 99 %       PHYSICAL EXAM:    CONSTITUTIONAL:  Alert, in no apparent distress;  well developed;  well nourished. HEAD:  Normocephalic, atraumatic. EYES:  EOMI. Non-icteric sclera. Normal conjunctiva. ENTM:  Nose:  no rhinorrhea. Throat:  no erythema or exudate, mucous membranes moist.  NECK:  Supple  RESPIRATORY:  Chest clear, equal breath sounds, good air movement. CARDIOVASCULAR:  Regular rate and rhythm. No murmurs, rubs, or gallops. GI:  Normal bowel sounds, abdomen soft and non-tender. No rebound or guarding. BACK:  Non-tender. UPPER EXT:  Normal inspection. LOWER EXT:  No edema, no calf tenderness. Distal pulses intact. NEURO: Left upper and lower extremity with 4/5 strength and decreased sensation; 2+ distal pulses. No facial droop. Normal finger-to-nose. Negative pronator drift. SKIN:  No rashes;  Normal for age. PSYCH:  Alert and normal affect. ED COURSE AND MEDICAL DECISION MAKING:    Consult with Vic Apgar Dr. Alayne Kale, he will evaluate the patient. Recent Results (from the past 12 hour(s))   METABOLIC PANEL, BASIC    Collection Time: 22 12:00 PM   Result Value Ref Range    Sodium 143 136 - 145 mmol/L    Potassium 3.4 (L) 3.5 - 5.5 mmol/L    Chloride 110 100 - 111 mmol/L    CO2 25 21 - 32 mmol/L    Anion gap 8 3.0 - 18 mmol/L    Glucose 122 (H) 74 - 99 mg/dL    BUN 10 7.0 - 18 MG/DL    Creatinine 0.63 0.6 - 1.3 MG/DL    BUN/Creatinine ratio 16 12 - 20      GFR est AA >60 >60 ml/min/1.73m2    GFR est non-AA >60 >60 ml/min/1.73m2    Calcium 8.7 8.5 - 10.1 MG/DL   CBC WITH AUTOMATED DIFF    Collection Time: 22 12:00 PM   Result Value Ref Range    WBC 4.0 (L) 4.6 - 13.2 K/uL    RBC 4.19 (L) 4.35 - 5.65 M/uL    HGB 13.5 13.0 - 16.0 g/dL    HCT 40.3 36.0 - 48.0 %    MCV 96.2 78.0 - 100.0 FL    MCH 32.2 24.0 - 34.0 PG    MCHC 33.5 31.0 - 37.0 g/dL    RDW 13.1 11.6 - 14.5 %    PLATELET 109 042 - 328 K/uL    MPV 8.9 (L) 9.2 - 11.8 FL    NRBC 0.0 0  WBC    ABSOLUTE NRBC 0.00 0.00 - 0.01 K/uL    NEUTROPHILS 44 40 - 73 %    LYMPHOCYTES 39 21 - 52 %    MONOCYTES 11 (H) 3 - 10 %    EOSINOPHILS 4 0 - 5 %    BASOPHILS 2 0 - 2 %    IMMATURE GRANULOCYTES 0 0.0 - 0.5 %    ABS. NEUTROPHILS 1.8 1.8 - 8.0 K/UL    ABS. LYMPHOCYTES 1.5 0.9 - 3.6 K/UL    ABS. MONOCYTES 0.4 0.05 - 1.2 K/UL    ABS. EOSINOPHILS 0.2 0.0 - 0.4 K/UL    ABS. BASOPHILS 0.1 0.0 - 0.1 K/UL    ABS. IMM.  GRANS. 0.0 0.00 - 0.04 K/UL    DF AUTOMATED     DRUG SCREEN, URINE    Collection Time: 22 12:41 PM   Result Value Ref Range    BENZODIAZEPINES Negative NEG      BARBITURATES Negative NEG      THC (TH-CANNABINOL) Positive (A) NEG      OPIATES Negative NEG      PCP(PHENCYCLIDINE) Negative NEG      COCAINE Negative NEG      AMPHETAMINES Negative NEG      METHADONE Negative NEG      HDSCOM (NOTE)       CT HEAD WO CONT    Result Date: 8/11/2022  CT Of The Head Without Contrast CPT CODE: 29398 HISTORY: Left-sided numbness/weakness COMPARISON: 10/2/2020 CT head TECHNIQUE:  Axial CT images of the head from the skull base to the vertex without IV contrast. CT dose reduction was achieved through use of a standardized protocol tailored for this examination and automatic exposure control for dose modulation. FINDINGS: No acute intracranial abnormality identified. Negative for acute intracranial hemorrhage. No large territory acute cortical infarct. The brain attenuations appear normal. No mass or midline shift. Some prominence of the CSF space between the cerebellar folia suggestive of cerebellar atrophy is similar to the 10/2/2020 head CT. Normal supratentorial brain volumes. The ventricles are not enlarged, no hydrocephalus. The included paranasal sinuses are well aerated. Bones appear normal.     No acute intracranial abnormality. Mild cerebellar atrophy. CTA HEAD NECK W CONT    Result Date: 8/11/2022  PRELIMINARY REPORT EXAM: CTA HEAD NECK W CONT CLINICAL INDICATIONS: left sided numbness/weakness PRELIMINARY SUMMARY: 1. Patent intracranial brain arteries. No LVO. 2. No significant stenosis in cervical carotid or vertebral arteries. 3. Multilevel degenerative disc disease in the cervical spine with at least mild to moderate or possibly moderate central stenosis at multiple levels, appears most pronounced at C4/5 and C5/6. Teleneurology is directing for admission, stroke work-up. Consult with Dr. Ruddy Kincaid, he is excepting the patient on telemetry floor. Diagnosis:   1. Cerebral ischemia    2. Left sided numbness    3.  Left-sided weakness      Disposition: Admission    Patient's Medications   Start Taking    No medications on file Continue Taking    No medications on file   These Medications have changed    No medications on file   Stop Taking    IBUPROFEN (MOTRIN) 600 MG TABLET    Take 1 Tablet by mouth every six (6) hours as needed for Pain.      ALIA Hartmann

## 2022-08-11 NOTE — ED TRIAGE NOTES
Patient c/o pain to left shoulder, arm and leg that began yesterday. C/o intermittent numbness  to left arm since yesterday at 1000. Denies injury or fall.

## 2022-08-11 NOTE — ROUTINE PROCESS
1655-Received patient from [facility] via ambulance. Initial Assessment completed, call bell within reach, no distress noted, voiced no complaints. Tele-box applied. 1925 -- Bedside, Verbal and Written shift change report given to [address]) by Helyn Leyden (offgoing nurse). Report included the following information SBAR, Kardex, Intake/Output, MAR and Recent Results. Skin assessment completed.

## 2022-08-11 NOTE — PROGRESS NOTES
Bedside shift report given to Kirsten Riggs. Melida GOODSON from [facility]. Report including the following information SBAR, Kardex, recent results, MAR, intake/output and cardiac rhythm NSR.

## 2022-08-12 ENCOUNTER — APPOINTMENT (OUTPATIENT)
Dept: NON INVASIVE DIAGNOSTICS | Age: 52
DRG: 069 | End: 2022-08-12
Attending: INTERNAL MEDICINE
Payer: COMMERCIAL

## 2022-08-12 LAB
ANION GAP SERPL CALC-SCNC: 6 MMOL/L (ref 3–18)
ATRIAL RATE: 73 BPM
BUN SERPL-MCNC: 12 MG/DL (ref 7–18)
BUN/CREAT SERPL: 17 (ref 12–20)
CALCIUM SERPL-MCNC: 8.7 MG/DL (ref 8.5–10.1)
CALCULATED P AXIS, ECG09: 83 DEGREES
CALCULATED R AXIS, ECG10: -13 DEGREES
CALCULATED T AXIS, ECG11: 36 DEGREES
CHLORIDE SERPL-SCNC: 105 MMOL/L (ref 100–111)
CO2 SERPL-SCNC: 26 MMOL/L (ref 21–32)
CREAT SERPL-MCNC: 0.72 MG/DL (ref 0.6–1.3)
DIAGNOSIS, 93000: NORMAL
ECHO AO ROOT DIAM: 3.9 CM
ECHO AO ROOT INDEX: 2.19 CM/M2
ECHO EST RA PRESSURE: 3 MMHG
ECHO LA VOL 2C: 29 ML (ref 18–58)
ECHO LA VOL 4C: 43 ML (ref 18–58)
ECHO LA VOLUME AREA LENGTH: 42 ML
ECHO LA VOLUME INDEX A2C: 16 ML/M2 (ref 16–34)
ECHO LA VOLUME INDEX A4C: 24 ML/M2 (ref 16–34)
ECHO LA VOLUME INDEX AREA LENGTH: 24 ML/M2 (ref 16–34)
ECHO LV E' LATERAL VELOCITY: 13 CM/S
ECHO LV E' SEPTAL VELOCITY: 10 CM/S
ECHO LV FRACTIONAL SHORTENING: 24 % (ref 28–44)
ECHO LV INTERNAL DIMENSION DIASTOLE INDEX: 2.08 CM/M2
ECHO LV INTERNAL DIMENSION DIASTOLIC: 3.7 CM (ref 4.2–5.9)
ECHO LV INTERNAL DIMENSION SYSTOLIC INDEX: 1.57 CM/M2
ECHO LV INTERNAL DIMENSION SYSTOLIC: 2.8 CM
ECHO LV IVSD: 0.9 CM (ref 0.6–1)
ECHO LV MASS 2D: 104.6 G (ref 88–224)
ECHO LV MASS INDEX 2D: 58.7 G/M2 (ref 49–115)
ECHO LV POSTERIOR WALL DIASTOLIC: 1 CM (ref 0.6–1)
ECHO LV RELATIVE WALL THICKNESS RATIO: 0.54
ECHO LVOT AREA: 3.1 CM2
ECHO LVOT DIAM: 2 CM
ECHO LVOT MEAN GRADIENT: 2 MMHG
ECHO LVOT PEAK GRADIENT: 3 MMHG
ECHO LVOT PEAK VELOCITY: 0.9 M/S
ECHO LVOT STROKE VOLUME INDEX: 30.3 ML/M2
ECHO LVOT SV: 54 ML
ECHO LVOT VTI: 17.2 CM
ECHO MV A VELOCITY: 0.71 M/S
ECHO MV E DECELERATION TIME (DT): 227.4 MS
ECHO MV E VELOCITY: 0.6 M/S
ECHO MV E/A RATIO: 0.85
ECHO MV E/E' LATERAL: 4.62
ECHO MV E/E' RATIO (AVERAGED): 5.31
ECHO MV E/E' SEPTAL: 6
ECHO RIGHT VENTRICULAR SYSTOLIC PRESSURE (RVSP): 19 MMHG
ECHO TV REGURGITANT MAX VELOCITY: 1.97 M/S
ECHO TV REGURGITANT PEAK GRADIENT: 16 MMHG
ERYTHROCYTE [DISTWIDTH] IN BLOOD BY AUTOMATED COUNT: 12.9 % (ref 11.6–14.5)
ERYTHROCYTE [SEDIMENTATION RATE] IN BLOOD: 1 MM/HR (ref 0–20)
GLUCOSE BLD STRIP.AUTO-MCNC: 116 MG/DL (ref 70–110)
GLUCOSE SERPL-MCNC: 123 MG/DL (ref 74–99)
HCT VFR BLD AUTO: 41 % (ref 36–48)
HGB BLD-MCNC: 13.8 G/DL (ref 13–16)
MAGNESIUM SERPL-MCNC: 1.7 MG/DL (ref 1.6–2.6)
MCH RBC QN AUTO: 32.2 PG (ref 24–34)
MCHC RBC AUTO-ENTMCNC: 33.7 G/DL (ref 31–37)
MCV RBC AUTO: 95.8 FL (ref 78–100)
NRBC # BLD: 0 K/UL (ref 0–0.01)
NRBC BLD-RTO: 0 PER 100 WBC
P-R INTERVAL, ECG05: 136 MS
PLATELET # BLD AUTO: 256 K/UL (ref 135–420)
PMV BLD AUTO: 9.3 FL (ref 9.2–11.8)
POTASSIUM SERPL-SCNC: 3.6 MMOL/L (ref 3.5–5.5)
Q-T INTERVAL, ECG07: 410 MS
QRS DURATION, ECG06: 86 MS
QTC CALCULATION (BEZET), ECG08: 451 MS
RBC # BLD AUTO: 4.28 M/UL (ref 4.35–5.65)
SODIUM SERPL-SCNC: 137 MMOL/L (ref 136–145)
TROPONIN-HIGH SENSITIVITY: 5 NG/L (ref 0–78)
TROPONIN-HIGH SENSITIVITY: 5 NG/L (ref 0–78)
VENTRICULAR RATE, ECG03: 73 BPM
WBC # BLD AUTO: 5.8 K/UL (ref 4.6–13.2)

## 2022-08-12 PROCEDURE — 86147 CARDIOLIPIN ANTIBODY EA IG: CPT

## 2022-08-12 PROCEDURE — 85384 FIBRINOGEN ACTIVITY: CPT

## 2022-08-12 PROCEDURE — 74011250637 HC RX REV CODE- 250/637: Performed by: EMERGENCY MEDICINE

## 2022-08-12 PROCEDURE — 85670 THROMBIN TIME PLASMA: CPT

## 2022-08-12 PROCEDURE — 97165 OT EVAL LOW COMPLEX 30 MIN: CPT

## 2022-08-12 PROCEDURE — 85307 ASSAY ACTIVATED PROTEIN C: CPT

## 2022-08-12 PROCEDURE — 74011250637 HC RX REV CODE- 250/637: Performed by: STUDENT IN AN ORGANIZED HEALTH CARE EDUCATION/TRAINING PROGRAM

## 2022-08-12 PROCEDURE — 36415 COLL VENOUS BLD VENIPUNCTURE: CPT

## 2022-08-12 PROCEDURE — 96367 TX/PROPH/DG ADDL SEQ IV INF: CPT

## 2022-08-12 PROCEDURE — 96372 THER/PROPH/DIAG INJ SC/IM: CPT

## 2022-08-12 PROCEDURE — 99232 SBSQ HOSP IP/OBS MODERATE 35: CPT | Performed by: EMERGENCY MEDICINE

## 2022-08-12 PROCEDURE — 97161 PT EVAL LOW COMPLEX 20 MIN: CPT

## 2022-08-12 PROCEDURE — 85027 COMPLETE CBC AUTOMATED: CPT

## 2022-08-12 PROCEDURE — 96365 THER/PROPH/DIAG IV INF INIT: CPT

## 2022-08-12 PROCEDURE — 96376 TX/PRO/DX INJ SAME DRUG ADON: CPT

## 2022-08-12 PROCEDURE — 93005 ELECTROCARDIOGRAM TRACING: CPT

## 2022-08-12 PROCEDURE — 74011250636 HC RX REV CODE- 250/636: Performed by: INTERNAL MEDICINE

## 2022-08-12 PROCEDURE — 85730 THROMBOPLASTIN TIME PARTIAL: CPT

## 2022-08-12 PROCEDURE — 85613 RUSSELL VIPER VENOM DILUTED: CPT

## 2022-08-12 PROCEDURE — 81291 MTHFR GENE: CPT

## 2022-08-12 PROCEDURE — 85303 CLOT INHIBIT PROT C ACTIVITY: CPT

## 2022-08-12 PROCEDURE — 85301 ANTITHROMBIN III ANTIGEN: CPT

## 2022-08-12 PROCEDURE — 96375 TX/PRO/DX INJ NEW DRUG ADDON: CPT

## 2022-08-12 PROCEDURE — 2709999900 HC NON-CHARGEABLE SUPPLY

## 2022-08-12 PROCEDURE — 85610 PROTHROMBIN TIME: CPT

## 2022-08-12 PROCEDURE — 93306 TTE W/DOPPLER COMPLETE: CPT

## 2022-08-12 PROCEDURE — 85305 CLOT INHIBIT PROT S TOTAL: CPT

## 2022-08-12 PROCEDURE — G0378 HOSPITAL OBSERVATION PER HR: HCPCS

## 2022-08-12 PROCEDURE — 74011250636 HC RX REV CODE- 250/636: Performed by: NURSE PRACTITIONER

## 2022-08-12 PROCEDURE — 83090 ASSAY OF HOMOCYSTEINE: CPT

## 2022-08-12 PROCEDURE — 74011250637 HC RX REV CODE- 250/637: Performed by: NURSE PRACTITIONER

## 2022-08-12 PROCEDURE — 84484 ASSAY OF TROPONIN QUANT: CPT

## 2022-08-12 PROCEDURE — 85302 CLOT INHIBIT PROT C ANTIGEN: CPT

## 2022-08-12 PROCEDURE — 80048 BASIC METABOLIC PNL TOTAL CA: CPT

## 2022-08-12 PROCEDURE — 74011250637 HC RX REV CODE- 250/637: Performed by: INTERNAL MEDICINE

## 2022-08-12 PROCEDURE — 74011000250 HC RX REV CODE- 250: Performed by: EMERGENCY MEDICINE

## 2022-08-12 PROCEDURE — 82962 GLUCOSE BLOOD TEST: CPT

## 2022-08-12 PROCEDURE — 81241 F5 GENE: CPT

## 2022-08-12 PROCEDURE — 85420 FIBRINOLYTIC PLASMINOGEN: CPT

## 2022-08-12 PROCEDURE — 83735 ASSAY OF MAGNESIUM: CPT

## 2022-08-12 PROCEDURE — 85652 RBC SED RATE AUTOMATED: CPT

## 2022-08-12 PROCEDURE — 92610 EVALUATE SWALLOWING FUNCTION: CPT

## 2022-08-12 RX ORDER — ACETAMINOPHEN 325 MG/1
650 TABLET ORAL
Status: DISCONTINUED | OUTPATIENT
Start: 2022-08-12 | End: 2022-08-13 | Stop reason: HOSPADM

## 2022-08-12 RX ORDER — SODIUM CHLORIDE 9 MG/ML
10 INJECTION INTRAMUSCULAR; INTRAVENOUS; SUBCUTANEOUS
Status: ACTIVE | OUTPATIENT
Start: 2022-08-12 | End: 2022-08-12

## 2022-08-12 RX ORDER — CLOPIDOGREL 300 MG/1
300 TABLET, FILM COATED ORAL ONCE
Status: COMPLETED | OUTPATIENT
Start: 2022-08-12 | End: 2022-08-12

## 2022-08-12 RX ORDER — CLOPIDOGREL BISULFATE 75 MG/1
75 TABLET ORAL DAILY
Status: DISCONTINUED | OUTPATIENT
Start: 2022-08-13 | End: 2022-08-13 | Stop reason: HOSPADM

## 2022-08-12 RX ORDER — MAGNESIUM SULFATE HEPTAHYDRATE 40 MG/ML
2 INJECTION, SOLUTION INTRAVENOUS ONCE
Status: COMPLETED | OUTPATIENT
Start: 2022-08-12 | End: 2022-08-12

## 2022-08-12 RX ORDER — ATORVASTATIN CALCIUM 40 MG/1
40 TABLET, FILM COATED ORAL
Status: DISCONTINUED | OUTPATIENT
Start: 2022-08-12 | End: 2022-08-13 | Stop reason: HOSPADM

## 2022-08-12 RX ORDER — POTASSIUM CHLORIDE 7.45 MG/ML
10 INJECTION INTRAVENOUS
Status: COMPLETED | OUTPATIENT
Start: 2022-08-12 | End: 2022-08-12

## 2022-08-12 RX ORDER — POTASSIUM CHLORIDE 7.45 MG/ML
10 INJECTION INTRAVENOUS
Status: DISCONTINUED | OUTPATIENT
Start: 2022-08-12 | End: 2022-08-12

## 2022-08-12 RX ORDER — SODIUM CHLORIDE 9 MG/ML
10 INJECTION INTRAMUSCULAR; INTRAVENOUS; SUBCUTANEOUS
Status: COMPLETED | OUTPATIENT
Start: 2022-08-12 | End: 2022-08-12

## 2022-08-12 RX ADMIN — POTASSIUM CHLORIDE 10 MEQ: 7.46 INJECTION, SOLUTION INTRAVENOUS at 06:33

## 2022-08-12 RX ADMIN — ACETAMINOPHEN 650 MG: 325 TABLET, FILM COATED ORAL at 18:37

## 2022-08-12 RX ADMIN — MAGNESIUM SULFATE HEPTAHYDRATE 2 G: 40 INJECTION, SOLUTION INTRAVENOUS at 04:36

## 2022-08-12 RX ADMIN — POTASSIUM CHLORIDE 10 MEQ: 7.46 INJECTION, SOLUTION INTRAVENOUS at 10:29

## 2022-08-12 RX ADMIN — AMLODIPINE BESYLATE 5 MG: 5 TABLET ORAL at 08:58

## 2022-08-12 RX ADMIN — ENOXAPARIN SODIUM 40 MG: 100 INJECTION SUBCUTANEOUS at 18:37

## 2022-08-12 RX ADMIN — ASPIRIN 81 MG CHEWABLE TABLET 81 MG: 81 TABLET CHEWABLE at 08:58

## 2022-08-12 RX ADMIN — POTASSIUM CHLORIDE 10 MEQ: 7.46 INJECTION, SOLUTION INTRAVENOUS at 07:56

## 2022-08-12 RX ADMIN — POTASSIUM CHLORIDE 10 MEQ: 7.46 INJECTION, SOLUTION INTRAVENOUS at 09:00

## 2022-08-12 RX ADMIN — ATORVASTATIN CALCIUM 40 MG: 40 TABLET, FILM COATED ORAL at 22:45

## 2022-08-12 RX ADMIN — SODIUM CHLORIDE 10 ML: 9 INJECTION INTRAMUSCULAR; INTRAVENOUS; SUBCUTANEOUS at 11:46

## 2022-08-12 RX ADMIN — CLOPIDOGREL BISULFATE 300 MG: 300 TABLET, FILM COATED ORAL at 22:45

## 2022-08-12 NOTE — PROGRESS NOTES
[facility] Hospitalist Group  Progress Note    Patient: Jenifer Spaulding Age: 47 y.o. : 1970 MR#: [id number] SSN: [id number]  Date/Time: 2022    Subjective:     Patient is sitting in bed in no apparent distress, awake and alert, patient states that his symptoms are mostly resolved but he still has some numbness in the left upper extremity. Assessment/Plan:   TIA  Cervical spinal arthritis and stenosis  Tobacco use  Concern for patent haile ovale on echocardiogram  Hypertension    Plan  Continue stroke protocol. Neurology input appreciated  Continue aspirin and statin and Plavix  Spine surgery input appreciated.   Outpatient follow-up  I counseled patient regarding smoking cessation  On amlodipine  Will consult cardiology for possible PFO on echo  Discussed with patient    Case discussed with:  [x]Patient  []Family  []Nursing  []Case Management  DVT Prophylaxis:  [x]Lovenox  []Hep SQ  []SCDs  []Coumadin   []On Heparin gtt    Objective:   VS: Visit Vitals  /87 (BP 1 Location: Right upper arm, BP Patient Position: At rest)   Pulse 70   Temp 98.8 °F (37.1 °C)   Resp 16   Ht 5' 9\" (1.753 m)   Wt 63.5 kg (140 lb)   SpO2 98%   BMI 20.67 kg/m²      Tmax/24hrs: Temp (24hrs), Av.9 °F (37.2 °C), Min:98.8 °F (37.1 °C), Max:99.4 °F (37.4 °C)    Input/Output:   Intake/Output Summary (Last 24 hours) at 2022 1848  Last data filed at 2022 1029  Gross per 24 hour   Intake 250 ml   Output --   Net 250 ml       General:  Awake, alert  Cardiovascular:  S1S2+, RRR  Pulmonary:  CTA b/l  GI:  Soft, BS+, NT, ND  Extremities:  No edema      Labs:    Recent Results (from the past 24 hour(s))   EKG, 12 LEAD, SUBSEQUENT    Collection Time: 22 12:18 AM   Result Value Ref Range    Ventricular Rate 73 BPM    Atrial Rate 73 BPM    P-R Interval 136 ms    QRS Duration 86 ms    Q-T Interval 410 ms    QTC Calculation (Bezet) 451 ms    Calculated P Axis 83 degrees    Calculated R Axis -13 degrees    Calculated T Axis 36 degrees    Diagnosis       Normal sinus rhythm  Left atrial enlargement  RSR' or QR pattern in V1 suggests right ventricular conduction delay  Septal infarct (cited on or before 02-OCT-2020) possible  Abnormal ECG  When compared with ECG of 11-JAN-2022 11:31,  Questionable change in initial forces of Septal leads  Confirmed by Julieth [place] ([id number] [phone]) on 8/12/2022 10:23:03 AM     CBC W/O DIFF    Collection Time: 08/12/22  1:11 AM   Result Value Ref Range    WBC 5.8 4.6 - 13.2 K/uL    RBC 4.28 (L) 4.35 - 5.65 M/uL    HGB 13.8 13.0 - 16.0 g/dL    HCT 41.0 36.0 - 48.0 %    MCV 95.8 78.0 - 100.0 FL    MCH 32.2 24.0 - 34.0 PG    MCHC 33.7 31.0 - 37.0 g/dL    RDW 12.9 11.6 - 14.5 %    PLATELET 248 032 - 992 K/uL    MPV 9.3 9.2 - 11.8 FL    NRBC 0.0 0  WBC    ABSOLUTE NRBC 0.00 0.00 - 8.56 K/uL   METABOLIC PANEL, BASIC    Collection Time: 08/12/22  1:11 AM   Result Value Ref Range    Sodium 137 136 - 145 mmol/L    Potassium 3.6 3.5 - 5.5 mmol/L    Chloride 105 100 - 111 mmol/L    CO2 26 21 - 32 mmol/L    Anion gap 6 3.0 - 18 mmol/L    Glucose 123 (H) 74 - 99 mg/dL    BUN 12 7.0 - 18 MG/DL    Creatinine 0.72 0.6 - 1.3 MG/DL    BUN/Creatinine ratio 17 12 - 20      GFR est AA >60 >60 ml/min/1.73m2    GFR est non-AA >60 >60 ml/min/1.73m2    Calcium 8.7 8.5 - 10.1 MG/DL   TROPONIN-HIGH SENSITIVITY    Collection Time: 08/12/22  1:11 AM   Result Value Ref Range    Troponin-High Sensitivity 5 0 - 78 ng/L   MAGNESIUM    Collection Time: 08/12/22  1:11 AM   Result Value Ref Range    Magnesium 1.7 1.6 - 2.6 mg/dL   TROPONIN-HIGH SENSITIVITY    Collection Time: 08/12/22  7:05 AM   Result Value Ref Range    Troponin-High Sensitivity 5 0 - 78 ng/L   GLUCOSE, POC    Collection Time: 08/12/22  7:55 AM   Result Value Ref Range    Glucose (POC) 116 (H) 70 - 110 mg/dL   ECHO ADULT COMPLETE    Collection Time: 08/12/22 11:46 AM   Result Value Ref Range    IVSd 0.9 0.6 - 1.0 cm    LVIDd 3.7 (A) 4.2 - 5.9 cm    LVIDs 2.8 cm    LVOT Diameter 2.0 cm    LVPWd 1.0 0.6 - 1.0 cm    LVOT Peak Gradient 3 mmHg    LVOT Mean Gradient 2 mmHg    LVOT SV 54.0 ml    LVOT Peak Velocity 0.9 m/s    LVOT VTI 17.2 cm    LA Volume A/L 42 mL    LA Volume 2C 29 18 - 58 mL    LA Volume 4C 43 18 - 58 mL    MV A Velocity 0.71 m/s    MV E Wave Deceleration Time 227.4 ms    MV E Velocity 0.60 m/s    LV E' Lateral Velocity 13 cm/s    LV E' Septal Velocity 10 cm/s    TR Peak Gradient 16 mmHg    TR Max Velocity 1.97 m/s    Aortic Root 3.9 cm    Fractional Shortening 2D 24 28 - 44 %    LVIDd Index 2.08 cm/m2    LVIDs Index 1.57 cm/m2    LV RWT Ratio 0.54     LV Mass 2D 104.6 88 - 224 g    LV Mass 2D Index 58.7 49 - 115 g/m2    MV E/A 0.85     E/E' Ratio (Averaged) 5.31     E/E' Lateral 4.62     E/E' Septal 6.00     LA Volume Index A/L 24 16 - 34 mL/m2    LVOT Stroke Volume Index 30.3 mL/m2    LVOT Area 3.1 cm2    LA Volume Index 2C 16 16 - 34 mL/m2    LA Volume Index 4C 24 16 - 34 mL/m2    Ao Root Index 2.19 cm/m2    Est. RA Pressure 3 mmHg    RVSP 19 mmHg     Additional Data Reviewed:      Signed By: Rich Kovacs MD     August 12, 2022

## 2022-08-12 NOTE — ROUTINE PROCESS
0715- Bedside, Verbal and Written shift change report received by Velma Bullard (oncoming nurse) by Cyndi(offgoing nurse). Report included the following information SBAR, Kardex, Intake/Output, MAR and Recent Results. 0858-Assessment completed, call bell within reach, no distress noted. AM  medications administered, pt tolerated with ease, will continue to monitor. 1200- Shift reassessment, pt condition unchanged, will continue to monitor. 1600-  Shift reassessment, pt condition unchanged, will continue to monitor. 1910- Bedside, Verbal and Written shift change report given to [place] [address]) by Velma Bullard (offgoing nurse). Report included the following information SBAR, Kardex, Intake/Output, MAR and Recent Results. Skin assessment completed.

## 2022-08-12 NOTE — PROGRESS NOTES
INTERIM UPDATE - 5912 EST on 8/11/2022    Nursing Staff called to report that Telemetrist noted an 32-Beat Run of Ventricular Tachycardia. Last K+ was 3.4 mmmol/L (8/11/2022) and no Serum Magnesium this visit. Plan:  STAT EKG to assess QTc Interval, Troponin now and in 6 hours, and Serum Magnesium. Target K+ >=4.0 mmol/L, Target Serum Magnesium >=2.0 mg/dL. INTERIM UPDATE - 4445 EST on 8/12/2022    K+ returns at 3.6 mmol/L this AM and Serum Magnesium at 1.7 mg/dL. Plan:  IV Magnesium sulfate 2 grams now. IV Potassium chloride 10 mEq q1hr x4 doses starting at 0700 EST. Target K+ >=4.0 mmol/L, Target Serum Magnesium >=2.0 mg/dL.

## 2022-08-12 NOTE — CONSULTS
A 48 yo male patient with medical history of asthma, GERD, hypertension came to the emergency room for left-sided numbness and weakness. Initially, he has unsteadiness/dizzy when he is trying to walk. Developed numbness over the left upper and lower extremities. No significant weakness. No involvement of the face. No facial drooping. No changes in his speech. MRI of the brain did not show any acute stroke. It incidentally showed probably moderate central spinal canal stenosis at around C4-C5. Subsequently got MRI of the cervical spine which showed multilevel degenerative disc disease, facet hypertrophy causing central canal stenosis, cord compression and foraminal stenosis. Please see angiography showed patent intra and extracranial circulation. The weakness has subsided today. But he has mild numbness over the left upper extremity. Social History     Socioeconomic History    Marital status: SINGLE     Spouse name: Not on file    Number of children: Not on file    Years of education: Not on file    Highest education level: Not on file   Occupational History    Not on file   Tobacco Use    Smoking status: Every Day     Packs/day: 0.25     Types: Cigarettes    Smokeless tobacco: Never    Tobacco comments:     pt states he's cutting back   Substance and Sexual Activity    Alcohol use:  Yes     Alcohol/week: 55.0 standard drinks     Types: 35 Cans of beer, 20 Shots of liquor per week     Comment: per patient 4 a day     Drug use: Not Currently     Types: Marijuana     Comment: 4 days in 1 week    Sexual activity: Not on file   Other Topics Concern    Not on file   Social History Narrative    Not on file     Social Determinants of Health     Financial Resource Strain: Not on file   Food Insecurity: Not on file   Transportation Needs: Not on file   Physical Activity: Not on file   Stress: Not on file   Social Connections: Not on file   Intimate Partner Violence: Not on file   Housing Stability: Not on file Family History   Problem Relation Age of Onset    Hypertension Mother     Cancer Father     Hypertension Brother     Asthma Daughter     Stroke Paternal Grandfather 79    Heart Attack Neg Hx     Heart Disease Neg Hx         Current Facility-Administered Medications   Medication Dose Route Frequency Provider Last Rate Last Admin    0.9% NaCl bacteriostatic (NORMAL SALINE) 0.9 % injection 10 mL  10 mL IntraVENous RAD ONCE Ronnell Alfred MD        aspirin chewable tablet 81 mg  81 mg Oral DAILY Niki Alston NP   81 mg at 08/12/22 0858    labetaloL (NORMODYNE;TRANDATE) 20 mg/4 mL (5 mg/mL) injection 5 mg  5 mg IntraVENous Q10MIN PRN Niki Alston NP        enoxaparin (LOVENOX) injection 40 mg  40 mg SubCUTAneous Q24H Niki Alston NP   40 mg at 08/11/22 1826    nicotine (NICODERM CQ) 14 mg/24 hr patch 1 Patch  1 Patch TransDERmal DAILY Niki Alston NP   1 Patch at 08/12/22 0900    atorvastatin (LIPITOR) tablet 20 mg  20 mg Oral QHS Rosalino Carroll MD   20 mg at 08/11/22 2155    amLODIPine (NORVASC) tablet 5 mg  5 mg Oral DAILY Aleksandr Murrieta MD   5 mg at 08/12/22 2856       Past Medical History:   Diagnosis Date    Asthma     Back pain     Elevated blood pressure reading     GERD (gastroesophageal reflux disease)     Left shoulder pain     Low TSH level 04/27/2017    may have mild hyperthyroidism w/u per PCP I will rpt TSH and get freeT4    Low TSH level     Right inguinal hernia        History reviewed. No pertinent surgical history.     No Known Allergies    Patient Active Problem List   Diagnosis Code    Abnormal stress test R94.39    Chest pain R07.9    Alcohol abuse F10.10    Tobacco abuse counseling Z71.6    Nasal congestion R09.81    Low TSH level R79.89    Left-sided weakness R53.1    Left sided numbness R20.0    Cervical radiculopathy M54.12         Review of Systems:   Constitutional no fever or chills  Skin denies rash or itching  HENT  Denies tinnitus, hearing lose  Eyes denies diplopia vision lose  Respiratory: Occasional shortness of breath; left lateral chest pain. .  Cardiovascular: Mild shortness of breath  Gastrointestinal denies nausea, vomiting  Genitourinary no changes in urine. Musculoskeletal: Denies any neck pain. Has bilateral shoulder pain. Hematology denies easy bruising or bleeding   Neurological as above in HPI      PHYSICAL EXAMINATION:      VITAL SIGNS:  Visit Vitals  /87 (BP 1 Location: Right upper arm, BP Patient Position: At rest)   Pulse 70   Temp 98.8 °F (37.1 °C)   Resp 16   Ht 5' 9\" (1.753 m)   Wt 63.5 kg (140 lb)   SpO2 98%   BMI 20.67 kg/m²       GENERAL: In no apparent distress. EXTREMITIES: Muscle tone is normal.  HEAD:   The patient is normocephalic. NEUROLOGIC EXAMINATION  Mental status: Awake, alert, oriented , follows simple and complex commands, no neglect, no extinction to DSS or VSS  Speech and languge: fluent, coherent, and comprehension intact  CN: VFF, EOMI, PERRLA, face sensation intact , no facial asymmetry noted, palate elevation symmetric bilat, SS+SCM 5/5 bilat, tongue midline  Motor: no pronator drift, tone normal throughout, strength 5/5 throughout  Sensory: Slightly decreased light touch and pinprick over the left upper extremity. Coordination: FNF, HS accurate w/o dysmetria  DTR: 2+ throughout, negative Evan. Gait: not tested     Study Result    Narrative & Impression   MRI BRAIN WITHOUT CONTRAST     PROVIDED REASON FOR EXAM: suspected thalamic stroke, left sided numbness  Additional History: None  Comparison Studies: CTA head and neck, CT head without contrast 8/11/2022. Head  CT 10/2/2020     Imaging Technique:    Sequences:  Sagittal,  Coronal and axial T1 weighted, Diffusion Weighted  (DWI), Axial T2 weighted, Axial T2 FLAIR, and SWI/GRE MRI images of the brain.      Contrast Material:  NONE     Limiting Factors/Major Artifacts: Some mild motion artifacts are present  throughout the exam. The axial T2 GRE sequence was performed due to motion. FINDINGS:     Brain Parenchyma: Negative for acute infarct. Cerebral white matter is normal.   No chronic cortical infarcts or chronic lacunar infarcts. No visible masses or  midline shift. CSF Spaces:  Normal in size and morphology for the patient's age. No  hydrocephalus. Vascular System:  Grossly patent flow in basilar and internal cerebral arteries. No findings of dural sinus thrombosis. Hemorrhage:  No acute hemorrhage. No chronic microhemorrhage. Other Structures:     Calvarium: No suspicious marrow signal.     Sella: Pituitary is not enlarged. Visualized Upper Cervical Spine: No Chiari malformation. Estimated moderate  central canal narrowing suggested at C4/C5 disc level, only partially included  on this exam.  Orbits: Normal for non-dedicated exam.  Paranasal Sinuses: No significant paranasal sinus disease. Mastoid Air Cells:  Clear. IMPRESSION     No acute intracranial abnormality. No mass, hemorrhage, or acute infarct. The brain looks normal, no focal intracranial findings to explain symptoms. Partially imaged probable moderate central spinal canal stenosis at C4/C5,  similar to the findings described on CTA head and neck. Study Result    Narrative & Impression   EXAM: CTA HEAD NECK W CONT     CLINICAL INDICATIONS: left sided numbness/weakness     TECHNIQUE: Helical CT scan of the brain and neck were performed at 1.25 mm  intervals during rapid IV bolus contrast administration. These data were  reconstructed at .625 mm intervals for vascular analysis. The data was also  reviewed at 2.5 mm intervals for accompanying soft tissue analysis. 3D post  processed images, including surface shaded displays, were produced for this exam  on independent console, permanently archived and interpreted.      All CT scans at this facility are performed using dose optimization technique as  appropriate to a performed exam, to include automated exposure control,  adjustment of the MA and/or kV according to patient size (including appropriate  matching for site-specific examinations) or use of  iterative reconstruction  technique. CONTRAST: 80 Isovue 370     COMPARISON: No prior CTA. Head CT was performed immediately prior same day,  8/11/2022. CTA SOFT TISSUE ANALYSIS:  Lungs: Some motion related artifacts through the lung apices with loss of  details. No mass. Upper chest: No mass or upper mediastinal lymphadenopathy seen. Neck: No neck mass. Lymph nodes: No findings of cervical lymphadenopathy. Orbits: Grossly normal for non-dedicated exam.  Paranasal sinuses: No significant paranasal sinus disease. Brain: No enhancing brain lesions. No midline shift  Bones: No acute findings. There are multilevel degenerative changes present. Some reversal of normal  cervical lordosis. Disc space loss with disc and osteophyte complexes at the 4/5  and C5/6 causing central spinal canal stenosis. Some degree of neural foraminal  narrowing at multiple levels. CTA NECK VASCULAR ANALYSIS:     Aortic arch: No stenosis or aneurysm. There is some motion at the level just  above the aortic arch with loss of details in some of the proximal great vessels  Innominate: Patent  Right Subclavian: Patent  Left Subclavian: Patent     Right carotid:  -CCA: Patent  -ECA: Patent  -ICA: Patent.  0% stenosis of proximal ICA by NASCET criteria. Left carotid:  -CCA: Disruption of images through the proximal CCA, otherwise normal  -ECA: Patent  -ICA: Patent  0% stenosis of proximal ICA by NASCET criteria. Right vertebral: Patent. No stenosis. Left vertebral: Patent. No stenosis.         CTA BRAIN VASCULAR ANALYSIS:     Right anterior circulation:  -ICA: Patent  -OMAR: Patent  -MCA: Patent     Left anterior circulation:  -ICA: Patent  -OMAR: Patent  -MCA: Patent     -ACOM: Unremarkable     Posterior circulation:  -RVA: Patent  -LVA: Patent  -Basilar: Patent  -Right PCA: Patent  -Left PCA: Patent     Major dural venous sinuses: Grossly patent        IMPRESSION  1. Patent intracranial brain arteries. No LVO. 2. No significant stenosis in cervical carotid or vertebral arteries. 3. Multilevel degenerative changes in the cervical spine, most pronounced at  C4/5 and C5/6. Study Result    Narrative & Impression   Sagittal and axial multisequence MR images of cervical spine were obtained. HISTORY: Left arm weakness. Moderate reversal of cervical curvature with no significant spondylolisthesis. Chronic compression deformity in C4, C5, C6 and C7. Anterior and posterior  endplate spondylosis. Mild edema in C4. No Chiari I malformation. Soft tissues are grossly unremarkable. Mild underlying central canal narrowing throughout, from congenital basis. C2-C3: Mild posterior disc bulge. Mild posterior disc bulge. Mild to moderate  central stenosis. With facet hypertrophy, severe left and moderate right  foraminal stenosis. C3-C4: Posterior disc bulge, contacting spinal cord. Mild to moderate central  stenosis. No cord compression or edema. Facet hypertrophy, severe bilateral  foraminal stenosis. C4-C5: Similar posterior disc bulge with mild left posterior lateral disc  protrusion, more contact with spinal cord. Mild cord edema or myelomalacia on  the left. Moderate central stenosis. Severe bilateral foraminal stenosis with  endplate spondylosis and facet hypertrophy. C5-C6: Posterior disc bulge, contacting spinal cord with moderate to severe  central stenosis. Mild cord edema or myelomalacia on the right. Severe left and  moderately severe right foraminal stenosis. C6-C7: Posterior disc bulge. Facet and ligamentous hypertrophy. Severe central  stenosis with compression of the spinal cord from anterior and posterior sides. Severe bilateral foraminal stenosis. C7-T1: Posterior disc bulge. Moderate central stenosis.  Mild cord contact. Severe bilateral foraminal stenosis. IMPRESSION     Reversal of the cervical curvature with multilevel degenerative disc disease,  facet hypertrophy causing central stenosis, cord compression and foraminal  stenosis as described. Slightly more focal left sided disease at C4-C5           TTE: Interpretation Summary         Left Ventricle: Normal left ventricular systolic function with a visually estimated EF of 55 - 60%. Left ventricle is smaller than normal. Normal wall thickness. Normal wall motion. Normal diastolic function. Right Ventricle: Not well visualized. Interatrial Septum: Grade I Positive (1 to 9 bubbles). Agitated saline study was positive with Valsalva/provocation. Aorta: Dilated aortic root. Ao Root diameter is 3.9 cm. CBC:   Lab Results   Component Value Date/Time    WBC 5.8 08/12/2022 01:11 AM    RBC 4.28 (L) 08/12/2022 01:11 AM    HGB 13.8 08/12/2022 01:11 AM    HCT 41.0 08/12/2022 01:11 AM    PLATELET 485 34/16/3818 01:11 AM     BMP:   Lab Results   Component Value Date/Time    Glucose 123 (H) 08/12/2022 01:11 AM    Sodium 137 08/12/2022 01:11 AM    Potassium 3.6 08/12/2022 01:11 AM    Chloride 105 08/12/2022 01:11 AM    CO2 26 08/12/2022 01:11 AM    BUN 12 08/12/2022 01:11 AM    Creatinine 0.72 08/12/2022 01:11 AM    Calcium 8.7 08/12/2022 01:11 AM     CMP:   Lab Results   Component Value Date/Time    Glucose 123 (H) 08/12/2022 01:11 AM    Sodium 137 08/12/2022 01:11 AM    Potassium 3.6 08/12/2022 01:11 AM    Chloride 105 08/12/2022 01:11 AM    CO2 26 08/12/2022 01:11 AM    BUN 12 08/12/2022 01:11 AM    Creatinine 0.72 08/12/2022 01:11 AM    Calcium 8.7 08/12/2022 01:11 AM    Anion gap 6 08/12/2022 01:11 AM    BUN/Creatinine ratio 17 08/12/2022 01:11 AM    Alk.  phosphatase 70 07/23/2021 10:40 AM    Protein, total 7.4 07/23/2021 10:40 AM    Albumin 3.8 07/23/2021 10:40 AM    Globulin 3.6 07/23/2021 10:40 AM    A-G Ratio 1.1 07/23/2021 10:40 AM        Impression:   A 47years old male patient with above medical problems including hypertension and cigarette smoking came to the emergency room for left-sided  numbness manual involving the left upper and lower extremities; no facial involvement. Symptoms are resolving currently. MRI of the brain did not show any acute stroke. CT angiography of the head and unremarkable. Transthoracic echo showed normal ejection fraction with no thrombus or shunt. MRI of the cervical spine as shown multilevel degenerative changes with multilevel central canal and neural foraminal stenosis. Patient was seen by spine surgery [Dr. Olivia Salas; will follow him as an outpatient and no intervention at this time. Mild elevated total cholesterol as well as LDL within normal.  Hemoglobin A1c is normal.  Transthoracic echo showed a normal ejection fraction with no thrombus; grade 1 PFO with Valsalva. Patient is currently on atorvastatin and aspirin. Sudden onset of sensory symptoms on onset is most likely from possible TIA in this patient with multiple risk factors. We will put him on dual antiplatelets for 21 days; then he will continue with aspirin. Atorvastatin 40 mg reportedly; dose can be reduced on follow-up. Plan:   -Atorvastatin 40 mg p.o. per day  -Clopidogrel 300 mg p.o. stat; then 75 mg p.o. per day for 21 days  -Discussed driving restrictions after 3 months  -Hypercoagulable work-up  -Cardiology consultation for the PFO  -Follow-up in the neuro clinic in 4 weeks time  -Needs also follow-up with the spine center  -Call for questions. PLEASE NOTE:   This document has been produced using voice recognition software. Unrecognized errors in transcription may be present.

## 2022-08-12 NOTE — PROGRESS NOTES
Problem: Dysphagia (Adult)  Goal: *Acute Goals and Plan of Care (Insert Text)  Description: Patient will:  1. Tolerate PO trials with 0 s/s overt distress     Rec:     Regular with thin liquids  Aspiration precautions  HOB >45 during po intake, remain >30 for 30-45 minutes after po   Small bites/sips; alternate liquid/solid with slow feeding rate   Oral care TID  Meds per preference  MBS to further assess oropharyngeal swallow fxn  Outcome: Progressing Towards Goal   SPEECH LANGUAGE PATHOLOGY BEDSIDE SWALLOW EVALUATION AND DISCHARGE    Patient: Rossi Butcher (41 y.o. male)  Date: 8/12/2022  Primary Diagnosis: Left sided numbness [R20.0]  Left-sided weakness [R53.1]  Cervical radiculopathy [M54.12]       Precautions: Standard  PLOF: Per H&P    ASSESSMENT :  Clinical beside swallow eval completed per MD orders. Pt A&Ox4. Functional communication. Intelligibility >90%. Cognitive-linguistic function appears intact. OM examination revealed oral motor structures functional for mastication and deglutition. Presented with thin liquid, puree, and solid trials. Exhibited bolus cohesion, manipulation and A-P transit. Further exhibited efficient swallow timing/reflex and hyolaryngeal excursion. Pt able to manipulate and clear with 0 clinical s/s aspiration and/or oropharyngeal dysphagia. Pt safe for Regular solid, thin liquid diet. 0 formal ST needs for dysphagia indicated at this time. SLP educated pt on role of speech therapist in current setting with re: speech/swallow; verbalized comprehension. SLP available for re-evaluation if indicated by MD.    Thank you for this referral.   Roderick Morales, [place]  [place], [id number] [facility]  Speech-Language Pathologist       PLAN :  Recommendations and Planned Interventions:  No formal ST needs ID'd for dysphagia. Eval only. Discharge Recommendations: None     SUBJECTIVE:   Patient stated Can you get me another pillow? .     OBJECTIVE:     Past Medical History:   Diagnosis Date    Asthma Back pain     Elevated blood pressure reading     GERD (gastroesophageal reflux disease)     Left shoulder pain     Low TSH level 04/27/2017    may have mild hyperthyroidism w/u per PCP I will rpt TSH and get freeT4    Low TSH level     Right inguinal hernia    History reviewed. No pertinent surgical history. Home Situation:   Home Situation  Home Environment: Private residence  # Steps to Enter: 14  One/Two Story Residence: Two story  Living Alone: No  Support Systems: Spouse/Significant Other  Patient Expects to be Discharged to[de-identified] Home  Current DME Used/Available at Home: None      Cognitive and Communication Status:  Neurologic State: Alert  Orientation Level: Oriented X4  Cognition: Follows commands  Perception: Appears intact  Perseveration: No perseveration noted  Safety/Judgement: Awareness of environment  Oral Assessment:  Oral Assessment  Labial: No impairment  Dentition: Natural  Oral Hygiene: fair  Lingual: No impairment  Velum: No impairment  Mandible: No impairment  Gag Reflex: No impairment  P.O. Trials:  Patient Position: 70  Vocal quality prior to P.O.: No impairment  Consistency Presented: Thin liquid; Solid;Pudding  How Presented: Successive swallows;Straw     Bolus Acceptance: No impairment  Bolus Formation/Control: No impairment     Propulsion: No impairment  Oral Residue: None  Initiation of Swallow: No impairment  Laryngeal Elevation: Functional  Aspiration Signs/Symptoms: None  Pharyngeal Phase Characteristics: No impairment, issues, or problems   Effective Modifications: None  Cues for Modifications: None       Oral Phase Severity: No impairment  Pharyngeal Phase Severity : No impairment    PAIN:  Pain level pre-treatment: 0/10   Pain level post-treatment: 0/10   Pain Intervention(s): Medication (see MAR);  Rest, Ice, Repositioning  Response to intervention: Nurse notified, See doc flow    After evaluation:   []            Patient left in no apparent distress sitting up in chair  [x] Patient left in no apparent distress in bed  [x]            Call bell left within reach  []            Nursing notified  []            Family present  []            Caregiver present  []            Bed alarm activated      COMMUNICATION/EDUCATION:   [x]            Aspiration precautions; swallow safety; compensatory techniques. [x]            Patient/family have participated as able in goal setting and plan of care. [x]            Patient/family agree to work toward stated goals and plan of care. []            Patient understands intent and goals of therapy; neutral about participation. []            Patient unable to participate in goal setting/plan of care; educ ongoing with interdisciplinary staff  [x]         Posted safety precautions in patient's room.     Thank you for this referral.  Geoffrey Lyons, SLP  MA, CCC-SLP  Speech-Language Pathologist    Time Calculation: 11 mins

## 2022-08-12 NOTE — PROGRESS NOTES
OCCUPATIONAL THERAPY EVALUATION/DISCHARGE    Patient: Kavita Rehman (52 y.o. male)  Date: 8/12/2022  Primary Diagnosis: Left sided numbness [R20.0]  Left-sided weakness [R53.1]  Cervical radiculopathy [M54.12]       Precautions:   (standard)  PLOF: Pt reports living in 2 story house with roommates. Independent for all self care tasks. ASSESSMENT AND RECOMMENDATIONS:  Based on the objective data described below, the patient presents with no functional decline. Pt continues to be independent in all self care tasks without AE. Skilled occupational therapy is not indicated at this time. Further Equipment Recommendations for Discharge: N/A    AMPAC: At this time and based on an AM-PAC score of 24/24, no further OT is recommended upon discharge due to patient at baseline functional status. Recommend patient returns to prior setting with prior services. This AMPAC score should be considered in conjunction with interdisciplinary team recommendations to determine the most appropriate discharge setting. Patient's social support, diagnosis, medical stability, and prior level of function should also be taken into consideration. SUBJECTIVE:   Patient stated I don't think I need therapy.     OBJECTIVE DATA SUMMARY:     Past Medical History:   Diagnosis Date    Asthma     Back pain     Elevated blood pressure reading     GERD (gastroesophageal reflux disease)     Left shoulder pain     Low TSH level 04/27/2017    may have mild hyperthyroidism w/u per PCP I will rpt TSH and get freeT4    Low TSH level     Right inguinal hernia    History reviewed. No pertinent surgical history.   Barriers to Learning/Limitations: None  Compensate with: visual, verbal, tactile, kinesthetic cues/model    Home Situation:   Home Situation  Home Environment: Private residence  # Steps to Enter: 14  Rails to Enter: Yes  One/Two Story Residence: Two story  Living Alone: No  Support Systems:  (roommates)  Patient Expects to be Discharged to[de-identified] Home  Current DME Used/Available at Home: None  []     Right hand dominant   []     Left hand dominant    Cognitive/Behavioral Status:  Neurologic State: Alert  Orientation Level: Oriented X4  Cognition: Appropriate decision making; Appropriate for age attention/concentration; Appropriate safety awareness  Safety/Judgement: Awareness of environment    Skin: intact  Edema: none noted     Vision/Perceptual:    Acuity: Within Defined Limits      Coordination: BUE  Coordination: Within functional limits  Fine Motor Skills-Upper: Left Intact; Right Intact    Gross Motor Skills-Upper: Left Intact; Right Intact    Balance:  Sitting: Intact; Without support  Standing: Intact; Without support    Strength: BUE  Strength: Within functional limits    Tone & Sensation: BUE  Tone: Normal  Sensation: Intact    Range of Motion: BUE  AROM: Within functional limits    Functional Mobility and Transfers for ADLs:  Bed Mobility:  Supine to Sit: Independent  Sit to Supine: Independent  Scooting: Independent  Transfers:  Sit to Stand: Independent  Stand to Sit: Independent   Toilet Transfer : Independent   Bathroom Mobility: Independent    ADL Assessment:  Feeding: Independent  Oral Facial Hygiene/Grooming: Independent  Bathing: Independent  Upper Body Dressing: Independent  Lower Body Dressing: Independent  Toileting: Independent    ADL Intervention:  Cognitive Retraining  Safety/Judgement: Awareness of environment    Pain:  Pain level pre-treatment: 0/10   Pain level post-treatment: 0/10   Pain Intervention(s): Medication (see MAR); Rest, Ice, Repositioning   Response to intervention: Nurse notified, See doc flow    Activity Tolerance:   Good   Please refer to the flowsheet for vital signs taken during this treatment.   After treatment:   []  Patient left in no apparent distress sitting up in chair  [x]  Patient left in no apparent distress in bed  [x]  Call bell left within reach  [x]  Nursing notified  [x]  Caregiver present  []  Bed alarm activated    COMMUNICATION/EDUCATION:   [x]      Role of Occupational Therapy in the acute care setting  [x]      Home safety education was provided and the patient/caregiver indicated understanding. [x]      Patient/family have participated as able and agree with findings and recommendations. []      Patient is unable to participate in plan of care at this time. Thank you for this referral.  Raymond Victor OT  Time Calculation: 11 mins      Eval Complexity: History: LOW Complexity : Brief history review ; Examination: LOW Complexity : 1-3 performance deficits relating to physical, cognitive , or psychosocial skils that result in activity limitations and / or participation restrictions ; Decision Making:LOW Complexity : No comorbidities that affect functional and no verbal or physical assistance needed to complete eval tasks     Alessandra Oleary -PAC® Daily Activity Inpatient Short Form (6-Clicks)*    How much HELP from another person does the patient currently need    (If the patient hasn't done an activity recently, how much help from another person do you think he/she would need if he/she tried?)   Total (Total A or Dep)   A Lot  (Mod to Max A)   A Little (Sup or Min A)   None (Mod I to I)   Putting on and taking off regular lower body clothing? [] 1 [] 2 [] 3 [x] 4   2. Bathing (including washing, rinsing,      drying)? [] 1 [] 2 [] 3 [x] 4   3. Toileting, which includes using toilet, bedpan or urinal?   [] 1 [] 2 [] 3 [x] 4   4. Putting on and taking off regular upper body clothing? [] 1 [] 2 [] 3 [x] 4   5. Taking care of personal grooming such as brushing teeth? [] 1 [] 2 [] 3 [x] 4   6. Eating meals?    [] 1 [] 2 [] 3 [x] 4

## 2022-08-12 NOTE — CONSULTS
Consult    Patient: Gela Pratt MRN: [id number]  SSN: [id number]    YOB: 1970  Age: 47 y.o. Sex: male      Subjective:      Geal Pratt is a 47 y.o. male who is being seen for numbness and pain left upper extremity symptoms began 10:00 yesterday. He had been feeling lightheaded and dizzy the past couple of days. He does not endorse any weakness but she has numbness in his left arm and left leg severe pain at first at this point pain is resolved the numbness is nearly resolved there is no weakness. He feels quite normal he states currently. She denies any neck pain any history of radiculopathy myelopathy gait instability loss of dexterity etc.  He works doing manual labor tasks. Out of admission to the ER there was concern appropriately for an acute ischemic event MRI and CTA demonstrated no acute pathology that is treatable. MRI of the neck had been done demonstrates cervical spondylosis with relative kyphosis C3 [id number]. There is cord compression evident at [id number] is not lateralized. There is a hint of may be signal change in the central cord. Again not lateralized. Patient currently is comfortable neurologically intact with normal strength sensation reflexes no long track signs no clonus no Floyd's no Babinski he denies any headache or neck pain. .    Past Medical History:   Diagnosis Date    Asthma     Back pain     Elevated blood pressure reading     GERD (gastroesophageal reflux disease)     Left shoulder pain     Low TSH level 04/27/2017    may have mild hyperthyroidism w/u per PCP I will rpt TSH and get freeT4    Low TSH level     Right inguinal hernia      History reviewed. No pertinent surgical history.    Family History   Problem Relation Age of Onset    Hypertension Mother     Cancer Father     Hypertension Brother     Asthma Daughter     Stroke Paternal Grandfather 79    Heart Attack Neg Hx     Heart Disease Neg Hx      Social History     Tobacco Use    Smoking status: Every Day     Packs/day: 0.25     Types: Cigarettes    Smokeless tobacco: Never    Tobacco comments:     pt states he's cutting back   Substance Use Topics    Alcohol use: Yes     Alcohol/week: 55.0 standard drinks     Types: 35 Cans of beer, 20 Shots of liquor per week     Comment: per patient 4 a day       Current Facility-Administered Medications   Medication Dose Route Frequency Provider Last Rate Last Admin    0.9% NaCl bacteriostatic (NORMAL SALINE) 0.9 % injection 10 mL  10 mL IntraVENous RAD ONCE Ronnell Alfred MD        aspirin chewable tablet 81 mg  81 mg Oral DAILY Niki Alston NP   81 mg at 08/12/22 0858    labetaloL (NORMODYNE;TRANDATE) 20 mg/4 mL (5 mg/mL) injection 5 mg  5 mg IntraVENous Q10MIN PRN Niki Alston NP        enoxaparin (LOVENOX) injection 40 mg  40 mg SubCUTAneous Q24H Niki Alston NP   40 mg at 08/11/22 1826    nicotine (NICODERM CQ) 14 mg/24 hr patch 1 Patch  1 Patch TransDERmal DAILY Niki Alston NP   1 Patch at 08/12/22 0900    atorvastatin (LIPITOR) tablet 20 mg  20 mg Oral QHS Rosalino Diaz MD   20 mg at 08/11/22 2155    amLODIPine (NORVASC) tablet 5 mg  5 mg Oral DAILY Alf Kan MD   5 mg at 08/12/22 0858        No Known Allergies    Review of Systems:  Pertinent items are noted in the History of Present Illness. Objective:     Vitals:    08/12/22 0438 08/12/22 0800 08/12/22 1105 08/12/22 1248   BP: 129/88 (!) 151/89 (!) 151/89 (!) 143/79   Pulse: 70 69  76   Resp: 18 16  16   Temp: 98.8 °F (37.1 °C) 98.8 °F (37.1 °C)  98.9 °F (37.2 °C)   SpO2: 98% 100%  99%   Weight:   63.5 kg (140 lb)    Height:   5' 9\" (1.753 m)         Physical Exam:  General:  Alert, cooperative, no distress, appears stated age. Eyes:  Conjunctivae/corneas clear. PERRL, EOMs intact. Fundi benign   Ears:  Normal TMs and external ear canals both ears. Nose: Nares normal. Septum midline.  Mucosa normal. No drainage or sinus tenderness. Mouth/Throat: Lips, mucosa, and tongue normal. Teeth and gums normal.   Neck: Supple, symmetrical, trachea midline, no adenopathy, thyroid: no enlargment/tenderness/nodules, no carotid bruit and no JVD. Back:   Symmetric, no curvature. ROM normal. No CVA tenderness. Lungs:   Clear to auscultation bilaterally. Heart:  Regular rate and rhythm, S1, S2 normal, no murmur, click, rub or gallop. Abdomen:   Soft, non-tender. Bowel sounds normal. No masses,  No organomegaly. Extremities: Extremities normal, atraumatic, no cyanosis or edema. Pulses: 2+ and symmetric all extremities. Skin: Skin color, texture, turgor normal. No rashes or lesions   Lymph nodes: Cervical, supraclavicular, and axillary nodes normal.   Neurologic: CNII-XII intact. Normal strength, sensation and reflexes throughout. MRI of the cervical spine demonstrates cervical spondylosis with foraminal stenosis across multiple segments central stenosis most noted at 5 6 and 6 7 hint of possible signal change within the cord. Stenosis is not particularly lateralized. Assessment:       I agree with the admitting physicians evaluation. I believe the patient's symptoms are most consistent with an acute ischemic event. That would be an event given the left arm and leg numbness. There is no motor dysfunction. All symptoms have resolved in short order. Baby seems to have started along with a period of lightheadedness and dizziness. His blood pressure has been somewhat labile since admission. Of the patient does have cervical spondylosis he is essentially asymptomatic he has no neck pain and gives no history of myelopathic symptoms. It is possible that cervical stenosis can become symptomatic and associated with low blood pressure with poor flow situation but I do not see any reason from the studies why would be only isolated to the left upper extremity. I will add the left lower extremity.   And it was global in the extremities not radicular only in the low cervical region where his stenosis but is present. My overall sense is that this was a transient ischemic event. I would recommend no intervention for the cervical spine at this time asymptomatic I recommend a thorough neurologic evaluation monitoring of the patient's blood pressure and pulse with his hemodynamic ability. I be happy to see him in follow-up in the office but again at this point I do not have any strong believe that he is cervical spondylosis well significant radiographically is causing any clinical symptoms    Plan:     Continue plan as per neurology. Stable from discharge from an orthopedic standpoint. Happy to do follow-up for cervical spondylosis in my office. I do not believe his cervical arthritis and relative cervical stenosis would cause of his current presentation.     Signed By: Shemar Posada MD     August 12, 2022

## 2022-08-12 NOTE — PROGRESS NOTES
Problem: Aspiration - Risk of  Goal: *Absence of aspiration  Outcome: Progressing Towards Goal     Problem: Patient Education: Go to Patient Education Activity  Goal: Patient/Family Education  Outcome: Progressing Towards Goal

## 2022-08-12 NOTE — REHAB NOTE
ARU/IPR REFERRAL CONTACT NOTE  [id number] [place] [address] for Physical Rehabilitation      Thank you for the opportunity to review this patient's case for admission to [id number] [place] [address] for Physical Rehabilitation. Based on our pre-admission screening:     [ x] This patient does not meet criteria for admission to [facility] for Physical Rehabilitation due to:    [x ] Documents do not reflect active medical necessity requiring close Physician involvement and Rehabilitation Nursing. [x ] Too functional, per documentation, patient does not require both Physical and Occupational Therapy Services at an acute rehabilitation level of intensity. any questions please do not hesitate to call. Pt MRI was negative for a CVA. Orthopedics consulted and determined that patient is not a candidate for intervention at this time. PT/OT evaluated and discharged patient as well. Will sign off. Please re-refer should pt present with a need for IPR.     Sincerely,  Cristiane Wilson Liaison  [facility] for Physical [facility]  [phone]

## 2022-08-12 NOTE — PROGRESS NOTES
Primary nurse called MD back for revision of orders d/t  pt K+ coming back at 3.6. New orders received and followed.

## 2022-08-12 NOTE — PROGRESS NOTES
PT orders received and chart reviewed. PT eval attempted at 1100. Pt currently off floor at ECHO. Will follow up.      Thank you for this referral.   Merari Gaviria PT DPT

## 2022-08-12 NOTE — PROGRESS NOTES
Tele tech called this nurse to notify that pt had 32 beats of v tach. Pt resting comfortable in bed with no s/s of distress. MD on call paged awaiting call back.

## 2022-08-12 NOTE — PROGRESS NOTES
Problem: Mobility Impaired (Adult and Pediatric)  Goal: *Acute Goals and Plan of Care (Insert Text)  Outcome: Resolved/Met   PHYSICAL THERAPY EVALUATION AND DISCHARGE    Patient: Celina Tang (43 y.o. male)  Date: 8/12/2022  Primary Diagnosis: Left sided numbness [R20.0]  Left-sided weakness [R53.1]  Cervical radiculopathy [M54.12]       Precautions:   (standard)  PLOF: Pt reports living in 2story house with roommates. Independent without AD PTA. ASSESSMENT :  Based on the objective data described below, the patient presents with baseline functional mobility. Demonstrating independent bed mobility, transfers and ambulation. Pt declined need for stair training. Appropriate gait pattern observed x200 feet. Pt with no questions or concerns regarding mobility. Will sign off. Patient does not require further skilled intervention at this level of care. PLAN :  Recommendations and Planned Interventions:   No formal PT needs identified at this time. Further Equipment Recommendations for Discharge: n/a    AMPAC:   At this time and based on an AM-PAC score of 24/24, no further PT is recommended upon discharge due to patient at baseline functional status. Recommend patient returns to prior setting with prior services. This AMPAC score should be considered in conjunction with interdisciplinary team recommendations to determine the most appropriate discharge setting. Patient's social support, diagnosis, medical stability, and prior level of function should also be taken into consideration. SUBJECTIVE:   Patient stated I haven't been up yet.     OBJECTIVE DATA SUMMARY:     Past Medical History:   Diagnosis Date    Asthma     Back pain     Elevated blood pressure reading     GERD (gastroesophageal reflux disease)     Left shoulder pain     Low TSH level 04/27/2017    may have mild hyperthyroidism w/u per PCP I will rpt TSH and get freeT4    Low TSH level     Right inguinal hernia    History reviewed. No pertinent surgical history. Barriers to Learning/Limitations: None  Compensate with: N/A  Home Situation:   Home Situation  Home Environment: Private residence  # Steps to Enter: 14  Rails to Enter: Yes  One/Two Story Residence: Two story  Living Alone: No  Support Systems:  (roommates)  Patient Expects to be Discharged to[de-identified] Home  Current DME Used/Available at Home: None  Critical Behavior:  Neurologic State: Alert  Orientation Level: Oriented X4  Cognition: Appropriate decision making  Safety/Judgement: Awareness of environment  Psychosocial  Patient Behaviors: Calm; Cooperative    Strength:    Strength: Within functional limits    Tone & Sensation:   Tone: Normal    Sensation: Intact    Range Of Motion:  AROM: Within functional limits       Posture:  Posture (WDL): Within defined limits      Functional Mobility:  Bed Mobility:     Supine to Sit: Independent     Scooting: Independent  Transfers:  Sit to Stand: Independent  Stand to Sit: Independent       Balance:   Sitting: Intact  Standing: Intact    Ambulation/Gait Training:    Gait Description (WDL): Within defined limits    Pain:  Pain level pre-treatment: 0/10   Pain level post-treatment: 0/10    Activity Tolerance:   Good     Please refer to the flowsheet for vital signs taken during this treatment. After treatment:   []         Patient left in no apparent distress sitting up in chair  [x]         Patient left in no apparent distress in bed  [x]         Call bell left within reach  [x]         Nursing notified  []         Caregiver present  []         Bed alarm activated  []         SCDs applied    COMMUNICATION/EDUCATION:   [x]         Role of Physical Therapy in the acute care setting. [x]         Fall prevention education was provided and the patient/caregiver indicated understanding. [x]         Patient/family have participated as able in goal setting and plan of care.   [x]         Patient/family agree to work toward stated goals and plan of care. []         Patient understands intent and goals of therapy, but is neutral about his/her participation. []         Patient is unable to participate in goal setting/plan of care: ongoing with therapy staff.  []         Other: Thank you for this referral.  Kaylie Shea, PT   Time Calculation: 9 mins      Eval Complexity: History: MEDIUM  Complexity : 1-2 comorbidities / personal factors will impact the outcome/ POC Exam:LOW Complexity : 1-2 Standardized tests and measures addressing body structure, function, activity limitation and / or participation in recreation  Presentation: LOW Complexity : Stable, uncomplicated  Clinical Decision Making:Low Complexity low  Overall Complexity:LOW     [address] [facility] [address] [address] AM-PAC® Basic Mobility Inpatient Short Form (6-Clicks) Version 2    How much HELP from another person does the patient currently need    (If the patient hasn't done an activity recently, how much help from another person do you think he/she would need if he/she tried?)   Total (Total A or Dep)   A Lot  (Mod to Max A)   A Little (Sup or Min A)   None (Mod I to I)   Turning from your back to your side while in a flat bed without using bedrails? [] 1 [] 2 [] 3 [x] 4   2. Moving from lying on your back to sitting on the side of a flat bed without using bedrails? [] 1 [] 2 [] 3 [x] 4   3. Moving to and from a bed to a chair (including a wheelchair)? [] 1 [] 2 [] 3 [x] 4   4. Standing up from a chair using your arms (e.g., wheelchair, or bedside chair)? [] 1 [] 2 [] 3 [x] 4   5. Walking in hospital room? [] 1 [] 2 [] 3 [x] 4   6. Climbing 3-5 steps with a railing?+   [] 1 [] 2 [] 3 [x] 4   +If stair climbing cannot be assessed, skip item #6. Sum responses from items 1-5.

## 2022-08-12 NOTE — PROGRESS NOTES
OT orders received and chart reviewed. OT eval attempted at 1057. Pt currently off floor at ECHO. Will follow up.      Thank you for this referral.   Latoya Oliveira OTD, OTR/L

## 2022-08-13 VITALS
TEMPERATURE: 98.5 F | SYSTOLIC BLOOD PRESSURE: 125 MMHG | RESPIRATION RATE: 18 BRPM | DIASTOLIC BLOOD PRESSURE: 84 MMHG | OXYGEN SATURATION: 99 % | BODY MASS INDEX: 20.73 KG/M2 | HEIGHT: 69 IN | WEIGHT: 140 LBS | HEART RATE: 87 BPM

## 2022-08-13 PROBLEM — G45.9 TIA (TRANSIENT ISCHEMIC ATTACK): Status: ACTIVE | Noted: 2022-08-13

## 2022-08-13 LAB
ANION GAP SERPL CALC-SCNC: 10 MMOL/L (ref 3–18)
APTT IMM NP PPP: ABNORMAL SEC
APTT PPP: 27.5 SEC (ref 23–36.4)
APTT PPP: 32.4 SEC (ref 22.1–31)
BASOPHILS # BLD: 0.1 K/UL (ref 0–0.1)
BASOPHILS NFR BLD: 2 % (ref 0–2)
BUN SERPL-MCNC: 11 MG/DL (ref 7–18)
BUN/CREAT SERPL: 17 (ref 12–20)
CALCIUM SERPL-MCNC: 9.1 MG/DL (ref 8.5–10.1)
CHLORIDE SERPL-SCNC: 102 MMOL/L (ref 100–111)
CO2 SERPL-SCNC: 23 MMOL/L (ref 21–32)
CREAT SERPL-MCNC: 0.63 MG/DL (ref 0.6–1.3)
DIFFERENTIAL METHOD BLD: ABNORMAL
EOSINOPHIL # BLD: 0.1 K/UL (ref 0–0.4)
EOSINOPHIL NFR BLD: 3 % (ref 0–5)
ERYTHROCYTE [DISTWIDTH] IN BLOOD BY AUTOMATED COUNT: 12.7 % (ref 11.6–14.5)
FIBRINOGEN PPP-MCNC: 264 MG/DL (ref 210–451)
GLUCOSE SERPL-MCNC: 76 MG/DL (ref 74–99)
HCT VFR BLD AUTO: 44.4 % (ref 36–48)
HCYS SERPL-SCNC: 10.2 UMOL/L (ref 3.7–13.9)
HGB BLD-MCNC: 14.9 G/DL (ref 13–16)
IMM GRANULOCYTES # BLD AUTO: 0 K/UL (ref 0–0.04)
IMM GRANULOCYTES NFR BLD AUTO: 0 % (ref 0–0.5)
INR PPP: 1 (ref 0.8–1.2)
INR PPP: 1 (ref 0.9–1.1)
LYMPHOCYTES # BLD: 1.4 K/UL (ref 0.9–3.6)
LYMPHOCYTES NFR BLD: 29 % (ref 21–52)
MAGNESIUM SERPL-MCNC: 2 MG/DL (ref 1.6–2.6)
MCH RBC QN AUTO: 32.3 PG (ref 24–34)
MCHC RBC AUTO-ENTMCNC: 33.6 G/DL (ref 31–37)
MCV RBC AUTO: 96.1 FL (ref 78–100)
MONOCYTES # BLD: 0.7 K/UL (ref 0.05–1.2)
MONOCYTES NFR BLD: 16 % (ref 3–10)
NEUTS SEG # BLD: 2.4 K/UL (ref 1.8–8)
NEUTS SEG NFR BLD: 51 % (ref 40–73)
NRBC # BLD: 0 K/UL (ref 0–0.01)
NRBC BLD-RTO: 0 PER 100 WBC
PLATELET # BLD AUTO: 259 K/UL (ref 135–420)
PMV BLD AUTO: 9.6 FL (ref 9.2–11.8)
POTASSIUM SERPL-SCNC: 3.5 MMOL/L (ref 3.5–5.5)
PROTHROMBIN TIME: 10.9 SEC (ref 9–11.1)
PROTHROMBIN TIME: 13.8 SEC (ref 11.5–15.2)
PT IMM NP PPP: NORMAL SEC
PT MIXING STUDY,CMS3: NORMAL
PTT MIXING STUDY,CMS2: ABNORMAL
RBC # BLD AUTO: 4.62 M/UL (ref 4.35–5.65)
SODIUM SERPL-SCNC: 135 MMOL/L (ref 136–145)
WBC # BLD AUTO: 4.7 K/UL (ref 4.6–13.2)

## 2022-08-13 PROCEDURE — 74011250637 HC RX REV CODE- 250/637: Performed by: STUDENT IN AN ORGANIZED HEALTH CARE EDUCATION/TRAINING PROGRAM

## 2022-08-13 PROCEDURE — 99232 SBSQ HOSP IP/OBS MODERATE 35: CPT | Performed by: EMERGENCY MEDICINE

## 2022-08-13 PROCEDURE — G0378 HOSPITAL OBSERVATION PER HR: HCPCS

## 2022-08-13 PROCEDURE — 74011250637 HC RX REV CODE- 250/637: Performed by: INTERNAL MEDICINE

## 2022-08-13 PROCEDURE — 99239 HOSP IP/OBS DSCHRG MGMT >30: CPT | Performed by: EMERGENCY MEDICINE

## 2022-08-13 PROCEDURE — 74011250637 HC RX REV CODE- 250/637: Performed by: NURSE PRACTITIONER

## 2022-08-13 PROCEDURE — 65270000029 HC RM PRIVATE

## 2022-08-13 PROCEDURE — 83735 ASSAY OF MAGNESIUM: CPT

## 2022-08-13 PROCEDURE — 94760 N-INVAS EAR/PLS OXIMETRY 1: CPT

## 2022-08-13 PROCEDURE — 85730 THROMBOPLASTIN TIME PARTIAL: CPT

## 2022-08-13 PROCEDURE — 36415 COLL VENOUS BLD VENIPUNCTURE: CPT

## 2022-08-13 PROCEDURE — 85384 FIBRINOGEN ACTIVITY: CPT

## 2022-08-13 PROCEDURE — 85025 COMPLETE CBC W/AUTO DIFF WBC: CPT

## 2022-08-13 PROCEDURE — 80048 BASIC METABOLIC PNL TOTAL CA: CPT

## 2022-08-13 PROCEDURE — 85610 PROTHROMBIN TIME: CPT

## 2022-08-13 PROCEDURE — 99222 1ST HOSP IP/OBS MODERATE 55: CPT | Performed by: INTERNAL MEDICINE

## 2022-08-13 RX ORDER — AMLODIPINE BESYLATE 5 MG/1
5 TABLET ORAL DAILY
Qty: 30 TABLET | Refills: 0 | Status: SHIPPED | OUTPATIENT
Start: 2022-08-14

## 2022-08-13 RX ORDER — CLOPIDOGREL BISULFATE 75 MG/1
75 TABLET ORAL DAILY
Qty: 20 TABLET | Refills: 0 | Status: SHIPPED | OUTPATIENT
Start: 2022-08-14 | End: 2022-09-03

## 2022-08-13 RX ORDER — ATORVASTATIN CALCIUM 40 MG/1
40 TABLET, FILM COATED ORAL
Qty: 30 TABLET | Refills: 0 | Status: SHIPPED | OUTPATIENT
Start: 2022-08-13

## 2022-08-13 RX ORDER — GUAIFENESIN 100 MG/5ML
81 LIQUID (ML) ORAL DAILY
Qty: 30 TABLET | Refills: 0 | Status: SHIPPED | OUTPATIENT
Start: 2022-08-14

## 2022-08-13 RX ADMIN — ASPIRIN 81 MG CHEWABLE TABLET 81 MG: 81 TABLET CHEWABLE at 08:55

## 2022-08-13 RX ADMIN — AMLODIPINE BESYLATE 5 MG: 5 TABLET ORAL at 08:55

## 2022-08-13 RX ADMIN — CLOPIDOGREL BISULFATE 75 MG: 75 TABLET ORAL at 08:55

## 2022-08-13 NOTE — PROGRESS NOTES
Patient is sitting in bed in no apparent distress, awake and alert. Patient states symptoms have improved. Patient wishes to go home. Cardiology input noted. Neurology has cleared patient for discharge. I discussed discharge plans and medications with the patient and he verbalized understanding and agreed. I counseled patient regarding smoking cessation. Patient verbalized understanding. Home today.

## 2022-08-13 NOTE — PROGRESS NOTES
D/C order noted for today. Orders reviewed. No needs identified at this time. CM spoke with patient, he said he is looking for a ride home. CM remains available if needed.          Shivani Elmore, [phone]

## 2022-08-13 NOTE — PROGRESS NOTES
CM emailed via [address] [facility] [address] Specialist and [facility] to assist with finding a PCP for patient on Monday, and to update patient with PCP. CM spoke with patient, and updated him that CM will be calling him Monday to let him know about finding a PCP for him.              Navjot Mckenna RN  Case Management [phone]

## 2022-08-13 NOTE — PROGRESS NOTES
Problem: Aspiration - Risk of  Goal: *Absence of aspiration  Outcome: Progressing Towards Goal     Problem: Patient Education: Go to Patient Education Activity  Goal: Patient/Family Education  Outcome: Progressing Towards Goal     Problem: Patient Education: Go to Patient Education Activity  Goal: Patient/Family Education  Outcome: Progressing Towards Goal     Problem: Patient Education: Go to Patient Education Activity  Goal: Patient/Family Education  Outcome: Progressing Towards Goal

## 2022-08-13 NOTE — PROGRESS NOTES
Reason for Admission:  Left sided numbness [R20.0]  Left-sided weakness [R53.1]  Cervical radiculopathy [M54.12]  TIA (transient ischemic attack) [G45.9]                 RUR Score:    10%            Plan for utilizing home health:    No, not at this time. Patient said he is ambulatory, and self-care. Likelihood of Readmission:   LOW                         Transition of Care Plan:              Initial assessment completed with patient. Cognitive status of patient: oriented to time, place, person and situation. Face sheet information confirmed:  yes. The patient designates his brother Tracy Hunter [phone] to participate in his discharge plan and to receive any needed information. This patient lives in a single family home with 2 roommates, with 14 steps to enter. Patient is able to navigate steps as needed. Prior to hospitalization, patient was considered to be independent with ADLs/IADLS : yes . Patient has a current ACP document on file: no.      Healthcare Decision Maker:   Primary Decision Maker: Danile Hadley - Other Relative - [phone]    Secondary Decision Maker: Sonamjennfabiola Cagle - Daughter - [phone]    Click here to complete [address] including selection of the Healthcare Decision Maker Relationship (ie \"Primary\")        Patient is finding someone to transport patient home upon discharge. The patient already has none reported,  medical equipment available in the home. Patient is not currently active with home health. Patient has not stayed in a skilled nursing facility or rehab. This patient is on dialysis :no.        Currently, the discharge plan is Home. The patient states that he can obtain his medications from the pharmacy, and take his medications as directed.     Patient's current insurance is [employer].        Care Management Interventions  PCP Verified by CM: No (Patient does not have a PCP, CM office will be notified that patient needs a PCP. )  Mode of Transport at Discharge: Self (Patient said he is working on a ride home. )  Transition of Care Consult (CM Consult): Discharge Planning  Discharge Durable Medical Equipment: No  Physical Therapy Consult: Yes  Occupational Therapy Consult: Yes  Speech Therapy Consult: No  Support Systems: Other (Comment) (Patient lives with 2 roommates.)  Confirm Follow Up Transport: Friends  Discharge Location  Patient Expects to be Discharged to[de-identified]  Jazmyne Mills RN  Case Management [phone]

## 2022-08-13 NOTE — ROUTINE PROCESS
0715- Bedside, Verbal and Written shift change report received by Nehemias Keyes (oncoming nurse) by Cyndi(offgoing nurse). Report included the following information SBAR, Kardex, Intake/Output, MAR and Recent Results. 0854-Assessment completed, call bell within reach, no distress noted. AM  medications administered, pt tolerated with ease, will continue to monitor. 1200-  Shift reassessment, pt condition unchanged, will continue to monitor. 1500-discharge instructions given, As part of the discharge instructions, medications already given today were discussed with the patient. The next dose due of all ordered meds was highlighted as part of the medication discharge instructions. Discussed with the patient the importance of taking medications as directed, as well as the side effects and adverse reactions to medications ordered. verbalized understanding. 1530-discharge home via wheelchair.

## 2022-08-13 NOTE — DISCHARGE SUMMARY
[address] [place] [address] Dr  [address], [facility], [address]     DISCHARGE SUMMARY    Name: Jenifer Spaulding MRN: [id number]   Age / Sex: 47 y.o. / male CSN: [id number]   YOB: 1970 Length of Stay: 1 days   Admit Date: 8/11/2022 Discharge Date:        PRIMARY CARE PHYSICIAN: None      DISCHARGE DIAGNOSES:    TIA  Cervical spinal arthritis and stenosis  Tobacco use  Concern for patent haile ovale on echocardiogram  Hypertension    CONSULTS CALLED: Spine surgery, neurology, cardiology      PROCEDURES DONE: None      COURSE IN [facility]: This is a 67-year-old male who presented to the ED with complaints of some left arm weakness and numbness. Patient was evaluated and was admitted. There was concern for TIA/CVA. There was also concern for cervical radiculopathy. Spine surgery was consulted and felt that patient symptoms were related to TIA/CVA. Neurology saw the patient. Patient was continued on aspirin and Plavix and statin per neurology recommendations. Patient symptoms showed improvement. Patient was evaluated by PT and OT. Case management was involved. Patient was noted to have a patent haile ovale on echocardiogram.  Cardiology was consulted. Cardiology cleared the patient for discharge. Neurology cleared the patient for discharge. Patient felt better. Discharge plans and medications were discussed with the patient and he verbalized understanding and agreed. Patient was discharged home. MEDICATIONS ON DISCHARGE:    Current Discharge Medication List        START taking these medications    Details   amLODIPine (NORVASC) 5 mg tablet Take 1 Tablet by mouth in the morning. Qty: 30 Tablet, Refills: 0  Start date: 8/14/2022      aspirin 81 mg chewable tablet Take 1 Tablet by mouth in the morning. Qty: 30 Tablet, Refills: 0  Start date: 8/14/2022      atorvastatin (LIPITOR) 40 mg tablet Take 1 Tablet by mouth nightly.   Qty: 30 Tablet, Refills: 0  Start date: 8/13/2022 clopidogreL (PLAVIX) 75 mg tab Take 1 Tablet by mouth in the morning for 20 doses. Qty: 20 Tablet, Refills: 0  Start date: 8/14/2022, End date: 9/3/2022      !! OTHER This is to certify that Tanya Franklin was admitted to [facility] on 8/11/22 and discharged on 8/13/22, and has been advised to take rest at home for 7 more days and then resume work if symptom free. Qty: 1 Each, Refills: 0  Start date: 8/13/2022      !! OTHER No driving for at least 3 months and then prior to resuming driving please obtain clearance from your neurologist  Qty: 1 Each, Refills: 0  Start date: 8/13/2022       !! - Potential duplicate medications found. Please discuss with provider. DISCHARGE VITAL SIGNS:  Visit Vitals  /84 (BP 1 Location: Left upper arm, BP Patient Position: At rest)   Pulse 87   Temp 98.5 °F (36.9 °C)   Resp 18   Ht 5' 9\" (1.753 m)   Wt 63.5 kg (140 lb)   SpO2 99%   BMI 20.67 kg/m²         CONDITION ON DISCHARGE: Stable. DISPOSITION: Home      FOLLOW-UP RECOMMENDATIONS:   Follow-up Information       Follow up With Specialties Details Why Contact Info    None    None ([phone]) Patient stated that they have no PCP              OTHER INSTRUCTIONS:        TIME SPENT ON DISCHARGE ACTIVITIES: More than 35 minutes. Dragon medical dictation software was used for portions of this report. Unintended errors may occur.       Signed:  Keri Streeter MD      8/13/2022

## 2022-08-13 NOTE — CONSULTS
Cardiology Consult Note    Consultation request by Caroline Betancur MD for advice/opinion related to evaluating patent foramen ovale    Date of  Admission: 8/11/2022 11:21 AM   Primary Care Physician:  None       Assessment:     Hospital Problems  Date Reviewed: 12/21/2018            Codes Class Noted POA    TIA (transient ischemic attack) ICD-10-CM: G45.9  ICD-9-CM: 435.9  8/13/2022 Unknown        Left-sided weakness ICD-10-CM: R53.1  ICD-9-CM: 728.87  8/11/2022 Unknown        Left sided numbness ICD-10-CM: R20.0  ICD-9-CM: 782.0  8/11/2022 Unknown        Cervical radiculopathy ICD-10-CM: M54.12  ICD-9-CM: 723.4  8/11/2022 Unknown           --Very small patent foramen ovale. This was an incidental finding on his echocardiogram this admission. He had a minimal right to left shunt with Valsalva only. This is typically of no clinical consequence. --Transient left-sided numbness. Do not suspect an acute CVA given his normal brain MRI. --Cervical radiculopathy. --Tobacco use disorder. Plan:     No further cardiac work-up required. Stable for discharge from a cardiac standpoint today. No need for outpatient follow-up. History of Present Illness: This is a 47 y.o. male admitted for Left sided numbness [R20.0]; Left-sided weakness [R53.1]; Cervical radiculopathy [M54.12];TIA (transient ischemic attack) [G45.9]. Patient admitted for a 2-day history of left sided numbness involving his upper and lower extremity. He was subsequently diagnosed with cervical radiculopathy. He states his symptoms have improved. His brain MRI was negative for any acute ischemic event. His echocardiogram done this admission demonstrated a very small patent foramen ovale with trivial left-to-right shunting with Valsalva only. He denies having any chest pain, shortness of breath, heart palpitations, dizziness, nor syncope.       Cardiac risk factors: smoking/ tobacco exposure, male gender      Review of Symptoms:  Except as stated above include:  Constitutional:  negative  Respiratory:  negative  Cardiovascular:  negative  Gastrointestinal: negative  Genitourinary:  negative  Musculoskeletal:  Negative  Neurological:  Negative  Dermatological:  Negative  Endocrinological: Negative  Psychological:  Negative    A comprehensive review of systems was negative except for that written in the HPI. Past Medical History:     Past Medical History:   Diagnosis Date    Asthma     Back pain     Elevated blood pressure reading     GERD (gastroesophageal reflux disease)     Left shoulder pain     Low TSH level 04/27/2017    may have mild hyperthyroidism w/u per PCP I will rpt TSH and get freeT4    Low TSH level     Right inguinal hernia     TIA (transient ischemic attack) 8/13/2022         Social History:     Social History     Socioeconomic History    Marital status: SINGLE   Tobacco Use    Smoking status: Every Day     Packs/day: 0.25     Types: Cigarettes    Smokeless tobacco: Never    Tobacco comments:     pt states he's cutting back   Substance and Sexual Activity    Alcohol use:  Yes     Alcohol/week: 55.0 standard drinks     Types: 35 Cans of beer, 20 Shots of liquor per week     Comment: per patient 4 a day     Drug use: Not Currently     Types: Marijuana     Comment: 4 days in 1 week        Family History:     Family History   Problem Relation Age of Onset    Hypertension Mother     Cancer Father     Hypertension Brother     Asthma Daughter     Stroke Paternal Grandfather 79    Heart Attack Neg Hx     Heart Disease Neg Hx         Medications:   No Known Allergies     Current Facility-Administered Medications   Medication Dose Route Frequency    acetaminophen (TYLENOL) tablet 650 mg  650 mg Oral Q6H PRN    atorvastatin (LIPITOR) tablet 40 mg  40 mg Oral QHS    clopidogreL (PLAVIX) tablet 75 mg  75 mg Oral DAILY    aspirin chewable tablet 81 mg  81 mg Oral DAILY    labetaloL (NORMODYNE;TRANDATE) 20 mg/4 mL (5 mg/mL) injection 5 mg  5 mg IntraVENous Q10MIN PRN    enoxaparin (LOVENOX) injection 40 mg  40 mg SubCUTAneous Q24H    nicotine (NICODERM CQ) 14 mg/24 hr patch 1 Patch  1 Patch TransDERmal DAILY    amLODIPine (NORVASC) tablet 5 mg  5 mg Oral DAILY         Physical Exam:   Visit Vitals  /84 (BP 1 Location: Left upper arm, BP Patient Position: At rest)   Pulse 87   Temp 98.5 °F (36.9 °C)   Resp 18   Ht 5' 9\" (1.753 m)   Wt 63.5 kg (140 lb)   SpO2 99%   BMI 20.67 kg/m²       TELE: normal sinus rhythm    BP Readings from Last 3 Encounters:   08/13/22 125/84   07/28/22 (!) 159/98   01/11/22 (!) 136/92     Pulse Readings from Last 3 Encounters:   08/13/22 87   07/28/22 89   01/11/22 78     Wt Readings from Last 3 Encounters:   08/12/22 63.5 kg (140 lb)   01/11/22 63.5 kg (140 lb)   10/02/20 63.5 kg (140 lb)       General:  alert, cooperative, no distress, appears stated age  Neck:  no carotid bruit, no JVD  Lungs:  clear to auscultation bilaterally  Heart:  regular rate and rhythm, S1, S2 normal, no murmur, click, rub or gallop  Abdomen:  abdomen is soft without significant tenderness, masses, organomegaly or guarding  Extremities:  extremities normal, atraumatic, no cyanosis or edema  Skin: Warm and dry.  no hyperpigmentation, vitiligo, or suspicious lesions  Neuro: alert, oriented x3, affect appropriate, no focal neurological deficits, moves all extremities well, no involuntary movements  Psych: non focal     Data Review:     Recent Labs     08/13/22  0030 08/12/22  0111 08/11/22  1200   WBC 4.7 5.8 4.0*   HGB 14.9 13.8 13.5   HCT 44.4 41.0 40.3    256 273     Recent Labs     08/13/22  0950 08/13/22  0030 08/12/22  1922 08/12/22  0111 08/11/22  1200   NA  --  135*  --  137 143   K  --  3.5  --  3.6 3.4*   CL  --  102  --  105 110   CO2  --  23  --  26 25   GLU  --  76  --  123* 122*   BUN  --  11  --  12 10   CREA  --  0.63  --  0.72 0.63   CA  --  9.1  --  8.7 8.7   MG  --  2.0  --  1.7  --    INR 1.0  --  1.0  --   --        Results for orders placed or performed during the hospital encounter of 01/11/22   EKG, 12 LEAD, INITIAL   Result Value Ref Range    Ventricular Rate 74 BPM    Atrial Rate 74 BPM    P-R Interval 140 ms    QRS Duration 90 ms    Q-T Interval 392 ms    QTC Calculation (Bezet) 435 ms    Calculated P Axis 80 degrees    Calculated R Axis 8 degrees    Calculated T Axis 38 degrees    Diagnosis       Poor data quality, interpretation may be adversely affected  Electrode noise  Normal sinus rhythm  Possible Left atrial enlargement  Minimal voltage criteria for LVH, may be normal variant ( [place] product )  Septal infarct , age undetermined  Abnormal ECG  When compared with ECG of 23-JUL-2021 10:40,  Septal infarct is now present  Nonspecific T wave abnormality no longer evident in Lateral leads  Confirmed by Luiz Lanes ([id number]) on 1/11/2022 2:46:28 PM         All Cardiac Markers in the last 24 hours:  No results found for: CPK, CK, CKMMB, CKMB, RCK3, CKMBT, CKNDX, CKND1, ABNER, TROPT, TROIQ, KRISTINE, TROPT, TNIPOC, BNP, BNPP    Last Lipid:    Lab Results   Component Value Date/Time    Cholesterol, total 216 (H) 08/11/2022 06:30 PM    HDL Cholesterol 127 (H) 08/11/2022 06:30 PM    LDL, calculated 75.2 08/11/2022 06:30 PM    Triglyceride 69 08/11/2022 06:30 PM    CHOL/HDL Ratio 1.7 08/11/2022 06:30 PM       Cardiographics:     EKG Results       Procedure 720 Value Units Date/Time    EKG, 12 LEAD, SUBSEQUENT [[id number]] Collected: 08/12/22 0018    Order Status: Completed Updated: 08/12/22 1023     Ventricular Rate 73 BPM      Atrial Rate 73 BPM      P-R Interval 136 ms      QRS Duration 86 ms      Q-T Interval 410 ms      QTC Calculation (Bezet) 451 ms      Calculated P Axis 83 degrees      Calculated R Axis -13 degrees      Calculated T Axis 36 degrees      Diagnosis --     Normal sinus rhythm  Left atrial enlargement  RSR' or QR pattern in V1 suggests right ventricular conduction delay  Septal infarct (cited on or before 02-OCT-2020) possible  Abnormal ECG  When compared with ECG of 11-JAN-2022 11:31,  Questionable change in initial forces of Septal leads  Confirmed by Elyse Cartwright ([id number]) on 8/12/2022 10:23:03 AM            08/11/22    ECHO ADULT COMPLETE 08/12/2022 8/12/2022    Interpretation Summary  Formatting of this result is different from the original.      Left Ventricle: Normal left ventricular systolic function with a visually estimated EF of 55 - 60%. Left ventricle is smaller than normal. Normal wall thickness. Normal wall motion. Normal diastolic function. Right Ventricle: Not well visualized. Interatrial Septum: Grade I Positive (1 to 9 bubbles). Agitated saline study was positive with Valsalva/provocation. Aorta: Dilated aortic root. Ao Root diameter is 3.9 cm. Signed by: Josias Wyatt MD on 8/12/2022  4:13 PM            XR Results (most recent):  Results from Hospital Encounter encounter on 01/11/22    XR SHOULDER LT AP/LAT MIN 2 V    Narrative  EXAM: XR SHOULDER LT AP/LAT MIN 2 V    INDICATION: 49 years Male. Left shoulder and arm pain. ADDITIONAL HISTORY: Neck and left shoulder pain for 3 days with numbness. No  history of trauma. TECHNIQUE: 2 views of the left shoulder. COMPARISON: No comparison study is available at the time of this dictation. FINDINGS:  No acute fracture or dislocation is detected. Alignment is anatomic. No  aggressive osseous lesion identified. Joint spaces are essentially preserved. Small glenoid rim osteophytes. Thoracic scoliosis with multilevel degenerative  disc disease. Impression  Minimal glenohumeral joint degenerative changes.         Signed By: Zeynep Saucedo MD     August 13, 2022

## 2022-08-13 NOTE — PROGRESS NOTES
Bedside shift report given to Henri Mace. Melida GOODSON from [facility]. Report including the following information SBAR, Kardex, recent results, MAR, intake/output and cardiac rhythm NSR.

## 2022-08-13 NOTE — PROGRESS NOTES
Bedside shift report given to Laurence Montez RN from [facility]. Report including the following information SBAR, Kardex, recent results, MAR, intake/output and cardiac rhythm NSR.

## 2022-08-15 LAB
CARDIOLIPIN IGA SER IA-ACNC: <9 APL U/ML (ref 0–11)
CARDIOLIPIN IGG SER IA-ACNC: <9 GPL U/ML (ref 0–14)
CARDIOLIPIN IGM SER IA-ACNC: <9 MPL U/ML (ref 0–12)

## 2022-08-15 NOTE — PROGRESS NOTES
Patient has transitional care follow up with [facility] on 8/22/2022 at 10:30 am. Patient is aware. Call made to Dr. Lilo Anthony office, patient called this morning and scheduled his own follow up.

## 2022-08-17 LAB
APCR PPP: 2.8 RATIO (ref 2.2–3.5)
AT III AG PPP IA-ACNC: 89 % (ref 72–124)
INTERPRETATION, 117893: NORMAL
PLG PPP CHRO-ACNC: 124 % (ref 70–150)
PROT C AG PPP IA-ACNC: 82 % (ref 60–150)
PROT C PPP-ACNC: 107 % (ref 73–180)
PROT S AG ACT/NOR PPP IA: 89 % (ref 60–150)
PROT S FREE AG ACT/NOR PPP IA: 134 % (ref 61–136)
SCREEN APTT: 43 SEC (ref 0–51.9)
SCREEN DRVVT: 36.4 SEC (ref 0–47)
THROMBIN TIME: 20.3 SEC (ref 0–23)

## 2022-08-22 LAB — F5 P.R506Q BLD/T QL: NORMAL

## 2022-08-23 LAB — MTHFR GENE MUT ANL BLD/T: NORMAL

## 2022-08-29 ENCOUNTER — OFFICE VISIT (OUTPATIENT)
Dept: SURGERY | Age: 52
End: 2022-08-29
Payer: COMMERCIAL

## 2022-08-29 ENCOUNTER — CLINICAL SUPPORT (OUTPATIENT)
Dept: CARDIOLOGY CLINIC | Age: 52
End: 2022-08-29

## 2022-08-29 VITALS
BODY MASS INDEX: 19.55 KG/M2 | HEIGHT: 68 IN | DIASTOLIC BLOOD PRESSURE: 82 MMHG | SYSTOLIC BLOOD PRESSURE: 128 MMHG | WEIGHT: 129 LBS | HEART RATE: 98 BPM | OXYGEN SATURATION: 99 %

## 2022-08-29 VITALS
DIASTOLIC BLOOD PRESSURE: 81 MMHG | HEART RATE: 94 BPM | OXYGEN SATURATION: 97 % | SYSTOLIC BLOOD PRESSURE: 114 MMHG | TEMPERATURE: 98.2 F | WEIGHT: 129 LBS | HEIGHT: 68 IN | BODY MASS INDEX: 19.55 KG/M2

## 2022-08-29 DIAGNOSIS — R07.9 CHEST PAIN, UNSPECIFIED TYPE: ICD-10-CM

## 2022-08-29 DIAGNOSIS — G45.9 TIA (TRANSIENT ISCHEMIC ATTACK): ICD-10-CM

## 2022-08-29 DIAGNOSIS — K40.90 LEFT INGUINAL HERNIA: ICD-10-CM

## 2022-08-29 DIAGNOSIS — R94.39 ABNORMAL STRESS TEST: ICD-10-CM

## 2022-08-29 DIAGNOSIS — R07.9 CHEST PAIN, UNSPECIFIED TYPE: Primary | ICD-10-CM

## 2022-08-29 DIAGNOSIS — K40.90 RIGHT INGUINAL HERNIA: Primary | ICD-10-CM

## 2022-08-29 PROCEDURE — 99204 OFFICE O/P NEW MOD 45 MIN: CPT | Performed by: SURGERY

## 2022-08-29 PROCEDURE — 93000 ELECTROCARDIOGRAM COMPLETE: CPT | Performed by: INTERNAL MEDICINE

## 2022-08-29 NOTE — PROGRESS NOTES
General Surgery Consult      Yolanda Wei  Admit date: (Not on file)    MRN: [id number]     : 1970     Age: 47 y.o. Attending Physician: Danielle Miller MD, [facility]      History of Present Illness:     Yolanda Wei is a 47 y.o. male who was referred to me by Chely Lau for evaluation of symptomatic right inguinal hernia. The patient recently has been discharged from the hospital because of transient ischemic attack and he is currently on Plavix and also has a history of chest pain for which she is being followed by the cardiology team.  The patient stated that he had a bulge on the right side for more than 2 years and it is getting bigger in size and causing some discomfort and pain. He denies any previous abdominal surgeries. Patient Active Problem List    Diagnosis Date Noted    TIA (transient ischemic attack) 2022    Left-sided weakness 2022    Left sided numbness 2022    Cervical radiculopathy 2022    Low TSH level 2017    Chest pain 2017    Alcohol abuse 2017    Tobacco abuse counseling 2017    Nasal congestion 2017    Abnormal stress test 03/15/2017     Past Medical History:   Diagnosis Date    Asthma     Back pain     Elevated blood pressure reading     GERD (gastroesophageal reflux disease)     Left shoulder pain     Low TSH level 2017    may have mild hyperthyroidism w/u per PCP I will rpt TSH and get freeT4    Low TSH level     Right inguinal hernia     TIA (transient ischemic attack) 2022      History reviewed. No pertinent surgical history. Social History     Tobacco Use    Smoking status: Every Day     Packs/day: 0.25     Types: Cigarettes    Smokeless tobacco: Never    Tobacco comments:     pt states he's cutting back   Substance Use Topics    Alcohol use:  Yes     Alcohol/week: 55.0 standard drinks     Types: 35 Cans of beer, 20 Shots of liquor per week     Comment: Occ      Social History Tobacco Use   Smoking Status Every Day    Packs/day: 0.25    Types: Cigarettes   Smokeless Tobacco Never   Tobacco Comments    pt states he's cutting back     Family History   Problem Relation Age of Onset    Hypertension Mother     Cancer Father     Hypertension Brother     Asthma Daughter     Stroke Paternal Grandfather 79    Heart Attack Neg Hx     Heart Disease Neg Hx       Current Outpatient Medications   Medication Sig    amLODIPine (NORVASC) 5 mg tablet Take 1 Tablet by mouth in the morning. aspirin 81 mg chewable tablet Take 1 Tablet by mouth in the morning. atorvastatin (LIPITOR) 40 mg tablet Take 1 Tablet by mouth nightly. clopidogreL (PLAVIX) 75 mg tab Take 1 Tablet by mouth in the morning for 20 doses. OTHER This is to certify that Kimo Mathews was admitted to [facility] on 8/11/22 and discharged on 8/13/22, and has been advised to take rest at home for 7 more days and then resume work if symptom free. OTHER No driving for at least 3 months and then prior to resuming driving please obtain clearance from your neurologist     No current facility-administered medications for this visit.       No Known Allergies     Review of Systems:  Constitutional: negative  Eyes: negative  Ears, Nose, Mouth, Throat, and Face: negative  Respiratory: negative  Cardiovascular: positive for chest pain  Gastrointestinal: positive for right groin bulge and pain  Genitourinary:negative  Integument/Breast: negative  Hematologic/Lymphatic: negative  Musculoskeletal:negative  Neurological: negative  Behavioral/Psychiatric: negative  Endocrine: negative  Allergic/Immunologic: negative    Objective:     Visit Vitals  /81 (BP 1 Location: Left lower arm, BP Patient Position: Sitting, BP Cuff Size: Small adult)   Pulse 94   Temp 98.2 °F (36.8 °C) (Temporal)   Ht 5' 8\" (1.727 m)   Wt 58.5 kg (129 lb)   SpO2 97%   BMI 19.61 kg/m²       Physical Exam:      General:  in no apparent distress, alert, oriented times 3, and afebrile   Eyes:  conjunctivae and sclerae normal, pupils equal, round, reactive to light   Throat & Neck: no erythema or exudates noted and neck supple and symmetrical; no palpable masses   Lungs:   clear to auscultation bilaterally   Heart:  Regular rate and rhythm   Abdomen:   flat, soft, nontender, nondistended, no masses or organomegaly. There is a right inguinal hernia that is relatively large but reducible with gentle continuous pressure but it bulges back out as soon as the pressure is taken of. There is also a left inguinal hernia that is nontender and reducible. Extremities: extremities normal, atraumatic, no cyanosis or edema   Skin: Normal.       Imaging and Lab Review:     CBC:   Lab Results   Component Value Date/Time    WBC 4.7 08/13/2022 12:30 AM    RBC 4.62 08/13/2022 12:30 AM    HGB 14.9 08/13/2022 12:30 AM    HCT 44.4 08/13/2022 12:30 AM    PLATELET 741 24/25/2206 12:30 AM     BMP:   Lab Results   Component Value Date/Time    Glucose 76 08/13/2022 12:30 AM    Sodium 135 (L) 08/13/2022 12:30 AM    Potassium 3.5 08/13/2022 12:30 AM    Chloride 102 08/13/2022 12:30 AM    CO2 23 08/13/2022 12:30 AM    BUN 11 08/13/2022 12:30 AM    Creatinine 0.63 08/13/2022 12:30 AM    Calcium 9.1 08/13/2022 12:30 AM     CMP:  Lab Results   Component Value Date/Time    Glucose 76 08/13/2022 12:30 AM    Sodium 135 (L) 08/13/2022 12:30 AM    Potassium 3.5 08/13/2022 12:30 AM    Chloride 102 08/13/2022 12:30 AM    CO2 23 08/13/2022 12:30 AM    BUN 11 08/13/2022 12:30 AM    Creatinine 0.63 08/13/2022 12:30 AM    Calcium 9.1 08/13/2022 12:30 AM    Anion gap 10 08/13/2022 12:30 AM    BUN/Creatinine ratio 17 08/13/2022 12:30 AM    Alk.  phosphatase 70 07/23/2021 10:40 AM    Protein, total 7.4 07/23/2021 10:40 AM    Albumin 3.8 07/23/2021 10:40 AM    Globulin 3.6 07/23/2021 10:40 AM    A-G Ratio 1.1 07/23/2021 10:40 AM       No results found for this or any previous visit (from the past 24 hour(s)). images and reports reviewed    Assessment:   Nicolle Del Cid is a 47 y.o. male who is presenting with a symptomatic right inguinal hernia and asymptomatic left inguinal hernia. I had a long discussion with the patient and explained to him that we should repair both of them and though he is symptomatic from the right inguinal hernia but he currently has significant medical conditions that I believe we need to get clearance for sure before embarking into any surgical repair. I explained to him that first he needs to see the cardiology again for his chest pain and get clearance from them as well as from his neurologist because of his transient ischemic attacks that he had because we will need to stop the Plavix before the surgery. So I told the patient we will need clearance from the cardiology and neurology as well as his primary care team before deciding on the surgery. Annabelle help the patient getting him the number for his providers and he is going to call them today. Plan:     Hold on the surgery for his bilateral inguinal hernias because of his medical conditions for now  Clearance by the cardiology, neurology, and primary team.  Once he is cleared and it is okay to stop the Plavix then I will see the patient to discuss the surgery with him and we will schedule him for robotic bilateral inguinal hernia repair with meshes.     Please call me if you have any questions (cell phone: [phone])     Signed By: Leticia Del Cid MD     August 29, 2022

## 2022-08-29 NOTE — PROGRESS NOTES
Mich Small is a 47 y.o. male (: 1970) presenting to address:    Chief Complaint   Patient presents with    New Patient     Femoral hernia/ referred by SAMANTHA Smith with [facility]       Medication list and allergies have been reviewed with Mich Small and updated as of today's date. I have gone over all Medical, Surgical and Social History with Mich Small and updated/added the information accordingly.

## 2022-08-29 NOTE — PROGRESS NOTES
Patient walked into office with concerns about having a funny feeling in his chest since yesterday after sneezing. He state it has be constant ever since. Patient saw Dr Mike Ramires this morning and was instructed to come here. Patient is tender to touch 6 intercostal and states he can see his heart doing funny things through his shirt. Patient denies any other concerns. Patient is seeing his heart beat through his shirt. EKG done, Medication reviewed and vitals and all reviewed by Dr Rebecca Parikh. EKG is WNL. Patient was reassured and will need to keep appt on Sept 13 with Dr Rebecac Parikh.

## 2022-09-13 ENCOUNTER — OFFICE VISIT (OUTPATIENT)
Dept: CARDIOLOGY CLINIC | Age: 52
End: 2022-09-13
Payer: COMMERCIAL

## 2022-09-13 VITALS
WEIGHT: 123 LBS | OXYGEN SATURATION: 97 % | BODY MASS INDEX: 18.64 KG/M2 | HEART RATE: 96 BPM | HEIGHT: 68 IN | DIASTOLIC BLOOD PRESSURE: 84 MMHG | SYSTOLIC BLOOD PRESSURE: 120 MMHG

## 2022-09-13 DIAGNOSIS — I10 PRIMARY HYPERTENSION: ICD-10-CM

## 2022-09-13 DIAGNOSIS — Q21.12 PFO (PATENT FORAMEN OVALE): Primary | ICD-10-CM

## 2022-09-13 DIAGNOSIS — Z71.6 TOBACCO ABUSE COUNSELING: ICD-10-CM

## 2022-09-13 PROCEDURE — 99214 OFFICE O/P EST MOD 30 MIN: CPT | Performed by: INTERNAL MEDICINE

## 2022-09-13 RX ORDER — CLOPIDOGREL BISULFATE 75 MG/1
75 TABLET ORAL DAILY
COMMUNITY

## 2022-09-13 NOTE — LETTER
NOTIFICATION RETURN TO WORK     9/13/2022 11:04 AM    Mr. Mnio Olivarez  [address] [place] [address],[address] [id number]      To Whom It May Concern:    Mino Olivarez is currently under the care of [address] [place] [address]. He was seen today for an appointment and  will return to work today. If there are questions or concerns please have the patient contact our office.         Sincerely,        Shekhar Bradford MD

## 2022-09-13 NOTE — PROGRESS NOTES
HISTORY OF PRESENT ILLNESS  Gela Pratt is a 47 y.o. male. Hospital Follow Up  Pertinent negatives include no chest pain, no abdominal pain, no headaches and no shortness of breath. Patient presents for a post hospital follow-up office visit. He was recently hospitalized in August 2022 with concerns for a TIA when he presented with left-sided weakness. However, his brain MRI did not show any evidence of acute infarct. There was mention of cervical spine arthritis, spinal stenosis and possibly a radiculopathy. He was monitored on telemetry where he did not have any evidence of an arrhythmia. His echocardiogram demonstrated preserved LV function, EF 55 to 60% with a very small PFO with a tiny right to left shunt. Apparently he was discharged home with treatment for a CVA and has since been following up with neurology. Past Medical History:   Diagnosis Date    Asthma     Back pain     Elevated blood pressure reading     GERD (gastroesophageal reflux disease)     Left shoulder pain     Low TSH level 04/27/2017    may have mild hyperthyroidism w/u per PCP I will rpt TSH and get freeT4    Low TSH level     Primary hypertension 9/13/2022    Right inguinal hernia     TIA (transient ischemic attack) 8/13/2022     Current Outpatient Medications   Medication Sig Dispense Refill    clopidogreL (Plavix) 75 mg tab Take 75 mg by mouth daily. amLODIPine (NORVASC) 5 mg tablet Take 1 Tablet by mouth in the morning. 30 Tablet 0    aspirin 81 mg chewable tablet Take 1 Tablet by mouth in the morning. 30 Tablet 0    atorvastatin (LIPITOR) 40 mg tablet Take 1 Tablet by mouth nightly. 30 Tablet 0    OTHER This is to certify that Karla Martinez was admitted to [facility] on 8/11/22 and discharged on 8/13/22, and has been advised to take rest at home for 7 more days and then resume work if symptom free.  1 Each 0    OTHER No driving for at least 3 months and then prior to resuming driving please obtain clearance from your neurologist 1 Each 0     No Known Allergies     Social History     Tobacco Use    Smoking status: Every Day     Packs/day: 0.25     Types: Cigarettes    Smokeless tobacco: Never    Tobacco comments:     pt states he's cutting back   Substance Use Topics    Alcohol use: Yes     Alcohol/week: 55.0 standard drinks     Types: 35 Cans of beer, 20 Shots of liquor per week     Comment: Occ    Drug use: Yes     Types: Marijuana     Comment: 4 days in 1 week     Family History   Problem Relation Age of Onset    Hypertension Mother     Cancer Father     Hypertension Brother     Asthma Daughter     Stroke Paternal Grandfather 79    Heart Attack Neg Hx     Heart Disease Neg Hx          Review of Systems   Constitutional:  Negative for chills, fever and weight loss. HENT:  Negative for nosebleeds. Eyes:  Negative for blurred vision and double vision. Respiratory:  Negative for cough, shortness of breath and wheezing. Cardiovascular:  Negative for chest pain, palpitations, orthopnea, claudication, leg swelling and PND. Gastrointestinal:  Negative for abdominal pain, heartburn, nausea and vomiting. Genitourinary:  Negative for dysuria and hematuria. Musculoskeletal:  Negative for falls and myalgias. Skin:  Negative for rash. Neurological:  Negative for dizziness, focal weakness and headaches. Endo/Heme/Allergies:  Does not bruise/bleed easily. Psychiatric/Behavioral:  Negative for substance abuse. Visit Vitals  /84 (BP 1 Location: Left upper arm, BP Patient Position: Sitting, BP Cuff Size: Adult)   Pulse 96   Ht 5' 8\" (1.727 m)   Wt 55.8 kg (123 lb)   SpO2 97%   BMI 18.70 kg/m²       Physical Exam  Constitutional:       Appearance: He is well-developed. HENT:      Head: Normocephalic and atraumatic. Eyes:      Conjunctiva/sclera: Conjunctivae normal.   Neck:      Vascular: No carotid bruit or JVD.    Cardiovascular:      Rate and Rhythm: Normal rate and regular rhythm. Pulses: Normal pulses. Heart sounds: S1 normal and S2 normal. No murmur heard. No gallop. No S3 sounds. Pulmonary:      Breath sounds: Normal breath sounds. No wheezing or rales. Abdominal:      General: Bowel sounds are normal.      Palpations: Abdomen is soft. Tenderness: There is no abdominal tenderness. Musculoskeletal:         General: No swelling or deformity. Cervical back: Neck supple. Skin:     General: Skin is warm and dry. Neurological:      General: No focal deficit present. Mental Status: He is alert and oriented to person, place, and time. Psychiatric:         Mood and Affect: Mood normal.     August 29, 2022 EKG: Normal sinus rhythm, normal axis, normal QTc interval, borderline voltage criteria for LVH, no ST or T wave changes concerning for ischemia. No change compared to prior tracings. ASSESSMENT and PLAN  Encounter Diagnoses   Name Primary? PFO (patent foramen ovale) Yes    Tobacco abuse counseling     Primary hypertension      Patent foramen ovale. Incidental finding on echocardiogram during his hospital stay in August 2022. This was small in size with only a very small right to left shunt. This is typically not clinically significant especially since his brain MRI did not show any acute CVA. Hypertension. Patient blood pressure appears to be well controlled on amlodipine as monotherapy. Tobacco use disorder. Patient is contemplating quitting smoking. He was encouraged to quit as soon as possible. Possible TIA. Patient remains on dual antiplatelet therapy and a potent statin. I will defer duration of this treatment to his neurologist.    Patient can follow-up in the future as needed.

## 2022-09-13 NOTE — PROGRESS NOTES
Nicolle Del Cid presents today for   Chief Complaint   Patient presents with    Hospital Follow Up     8/11/22-8/13/22 at [facility]: left sided weakness       Nicolle Del Cid preferred language for health care discussion is english/other. Is someone accompanying this pt? no    Is the patient using any DME equipment during [address]? no    Depression Screening:  3 most recent PHQ Screens 9/13/2022   Little interest or pleasure in doing things Not at all   Feeling down, depressed, irritable, or hopeless Not at all   Total Score PHQ 2 0       Learning Assessment:  Learning Assessment 9/13/2022   PRIMARY LEARNER Patient   HIGHEST LEVEL OF EDUCATION - PRIMARY LEARNER  -   BARRIERS PRIMARY LEARNER -   CO-LEARNER CAREGIVER -   PRIMARY LANGUAGE ENGLISH   LEARNER PREFERENCE PRIMARY DEMONSTRATION     -   ANSWERED BY patient   RELATIONSHIP SELF       Abuse Screening:  Abuse Screening Questionnaire 9/13/2022   Do you ever feel afraid of your partner? N   Are you in a relationship with someone who physically or mentally threatens you? N   Is it safe for you to go home? Y       Fall Risk  No flowsheet data found. Pt currently taking Anticoagulant therapy? no    Pt currently taking Antiplatelet therapy ? Aspirin 81 mg daily and plavix 75 mg daily      Coordination of Care:  1. Have you been to the ER, urgent care clinic since your last visit? Hospitalized since your last visit? no    2. Have you seen or consulted any other health care providers outside of the [address] [place] [address] since your last visit? Include any pap smears or colon screening.  no

## 2022-09-15 ENCOUNTER — HOSPITAL ENCOUNTER (EMERGENCY)
Age: 52
Discharge: HOME | End: 2022-09-15
Attending: EMERGENCY MEDICINE
Payer: COMMERCIAL

## 2022-09-15 ENCOUNTER — APPOINTMENT (OUTPATIENT)
Dept: CT IMAGING | Age: 52
End: 2022-09-15
Attending: EMERGENCY MEDICINE
Payer: COMMERCIAL

## 2022-09-15 ENCOUNTER — APPOINTMENT (OUTPATIENT)
Dept: GENERAL RADIOLOGY | Age: 52
End: 2022-09-15
Attending: EMERGENCY MEDICINE
Payer: COMMERCIAL

## 2022-09-15 VITALS
SYSTOLIC BLOOD PRESSURE: 157 MMHG | DIASTOLIC BLOOD PRESSURE: 93 MMHG | TEMPERATURE: 98.4 F | RESPIRATION RATE: 16 BRPM | HEART RATE: 70 BPM | OXYGEN SATURATION: 100 %

## 2022-09-15 DIAGNOSIS — R20.0 NUMBNESS: ICD-10-CM

## 2022-09-15 DIAGNOSIS — M54.12 CERVICAL RADICULOPATHY: Primary | ICD-10-CM

## 2022-09-15 LAB
ALBUMIN SERPL-MCNC: 4 G/DL (ref 3.4–5)
ALBUMIN/GLOB SERPL: 1.2 {RATIO} (ref 0.8–1.7)
ALP SERPL-CCNC: 59 U/L (ref 45–117)
ALT SERPL-CCNC: 23 U/L (ref 16–61)
ANION GAP SERPL CALC-SCNC: 8 MMOL/L (ref 3–18)
APTT PPP: 26.8 SEC (ref 23–36.4)
AST SERPL-CCNC: 25 U/L (ref 10–38)
BASOPHILS # BLD: 0.1 K/UL (ref 0–0.1)
BASOPHILS NFR BLD: 1 % (ref 0–2)
BILIRUB SERPL-MCNC: 1.2 MG/DL (ref 0.2–1)
BUN SERPL-MCNC: 9 MG/DL (ref 7–18)
BUN/CREAT SERPL: 14 (ref 12–20)
CALCIUM SERPL-MCNC: 9.6 MG/DL (ref 8.5–10.1)
CHLORIDE SERPL-SCNC: 103 MMOL/L (ref 100–111)
CO2 SERPL-SCNC: 26 MMOL/L (ref 21–32)
CREAT SERPL-MCNC: 0.63 MG/DL (ref 0.6–1.3)
DIFFERENTIAL METHOD BLD: ABNORMAL
EOSINOPHIL # BLD: 0.1 K/UL (ref 0–0.4)
EOSINOPHIL NFR BLD: 2 % (ref 0–5)
ERYTHROCYTE [DISTWIDTH] IN BLOOD BY AUTOMATED COUNT: 12.7 % (ref 11.6–14.5)
GLOBULIN SER CALC-MCNC: 3.4 G/DL (ref 2–4)
GLUCOSE BLD STRIP.AUTO-MCNC: 103 MG/DL (ref 70–110)
GLUCOSE SERPL-MCNC: 97 MG/DL (ref 74–99)
HCT VFR BLD AUTO: 43.6 % (ref 36–48)
HGB BLD-MCNC: 14.6 G/DL (ref 13–16)
IMM GRANULOCYTES # BLD AUTO: 0 K/UL (ref 0–0.04)
IMM GRANULOCYTES NFR BLD AUTO: 0 % (ref 0–0.5)
INR PPP: 0.9 (ref 0.8–1.2)
LYMPHOCYTES # BLD: 1.5 K/UL (ref 0.9–3.6)
LYMPHOCYTES NFR BLD: 31 % (ref 21–52)
MCH RBC QN AUTO: 31.5 PG (ref 24–34)
MCHC RBC AUTO-ENTMCNC: 33.5 G/DL (ref 31–37)
MCV RBC AUTO: 94.2 FL (ref 78–100)
MONOCYTES # BLD: 0.6 K/UL (ref 0.05–1.2)
MONOCYTES NFR BLD: 12 % (ref 3–10)
NEUTS SEG # BLD: 2.6 K/UL (ref 1.8–8)
NEUTS SEG NFR BLD: 54 % (ref 40–73)
NRBC # BLD: 0 K/UL (ref 0–0.01)
NRBC BLD-RTO: 0 PER 100 WBC
PLATELET # BLD AUTO: 244 K/UL (ref 135–420)
PMV BLD AUTO: 9.5 FL (ref 9.2–11.8)
POTASSIUM SERPL-SCNC: 4.1 MMOL/L (ref 3.5–5.5)
PROT SERPL-MCNC: 7.4 G/DL (ref 6.4–8.2)
PROTHROMBIN TIME: 12.7 SEC (ref 11.5–15.2)
RBC # BLD AUTO: 4.63 M/UL (ref 4.35–5.65)
SODIUM SERPL-SCNC: 137 MMOL/L (ref 136–145)
WBC # BLD AUTO: 4.9 K/UL (ref 4.6–13.2)

## 2022-09-15 PROCEDURE — 93005 ELECTROCARDIOGRAM TRACING: CPT

## 2022-09-15 PROCEDURE — 85025 COMPLETE CBC W/AUTO DIFF WBC: CPT

## 2022-09-15 PROCEDURE — 80053 COMPREHEN METABOLIC PANEL: CPT

## 2022-09-15 PROCEDURE — 85730 THROMBOPLASTIN TIME PARTIAL: CPT

## 2022-09-15 PROCEDURE — 99285 EMERGENCY DEPT VISIT HI MDM: CPT

## 2022-09-15 PROCEDURE — 70450 CT HEAD/BRAIN W/O DYE: CPT

## 2022-09-15 PROCEDURE — 82962 GLUCOSE BLOOD TEST: CPT

## 2022-09-15 PROCEDURE — 85610 PROTHROMBIN TIME: CPT

## 2022-09-15 PROCEDURE — 72040 X-RAY EXAM NECK SPINE 2-3 VW: CPT

## 2022-09-15 RX ORDER — METHYLPREDNISOLONE 4 MG/1
4 TABLET ORAL
Qty: 1 DOSE PACK | Refills: 0 | Status: SHIPPED | OUTPATIENT
Start: 2022-09-15

## 2022-09-15 RX ORDER — GABAPENTIN 300 MG/1
300 CAPSULE ORAL 2 TIMES DAILY
Qty: 20 CAPSULE | Refills: 0 | Status: SHIPPED | OUTPATIENT
Start: 2022-09-15

## 2022-09-15 NOTE — PROGRESS NOTES
HISTORY AND EXAM;  47year old right handed male with history of left side paresthesias a month ago that resolved, patient was admitted to hospital for possible stroke, discharged on aspirin and statin, patient recent visit for left hand weakness and paresthesias, patient denies neck pain or radiating paresthesias, MRI BRAIN was unremarkable but cervical MRI showed-multilevel degenerative disc disease,facet hypertrophy causing central stenosis, cord compression and foraminal  stenosis as described. Slightly more focal left sided disease at C4-C5, patient admit heavy lifting in the past but no accident was seen by spine but as per patient no surgery recommended. Patient denies another neurological complains. Social History     Socioeconomic History    Marital status: SINGLE     Spouse name: Not on file    Number of children: Not on file    Years of education: Not on file    Highest education level: Not on file   Occupational History    Not on file   Tobacco Use    Smoking status: Every Day     Packs/day: 0.25     Types: Cigarettes    Smokeless tobacco: Never    Tobacco comments:     pt states he's cutting back   Substance and Sexual Activity    Alcohol use: Yes     Alcohol/week: 55.0 standard drinks     Types: 35 Cans of beer, 20 Shots of liquor per week     Comment:  Occ    Drug use: Yes     Types: Marijuana     Comment: 4 days in 1 week    Sexual activity: Not on file   Other Topics Concern    Not on file   Social History Narrative    Not on file     Social Determinants of Health     Financial Resource Strain: Not on file   Food Insecurity: Not on file   Transportation Needs: Not on file   Physical Activity: Not on file   Stress: Not on file   Social Connections: Not on file   Intimate Partner Violence: Not on file   Housing Stability: Not on file       Family History   Problem Relation Age of Onset    Hypertension Mother     Cancer Father     Hypertension Brother     Asthma Daughter     Stroke Paternal Grandfather 67    Heart Attack Neg Hx     Heart Disease Neg Hx         Current Outpatient Medications   Medication Sig Dispense Refill    methylPREDNISolone (Medrol, Adrian,) 4 mg tablet Take 1 Tablet by mouth Specific Days and Specific Times. 1 Dose Pack 0    gabapentin (NEURONTIN) 300 mg capsule Take 1 Capsule by mouth two (2) times a day. Max Daily Amount: 600 mg. Causes drowsiness so be sure to avoid working for at least 8 hours while taking the medication 20 Capsule 0    clopidogreL (Plavix) 75 mg tab Take 75 mg by mouth daily. amLODIPine (NORVASC) 5 mg tablet Take 1 Tablet by mouth in the morning. 30 Tablet 0    aspirin 81 mg chewable tablet Take 1 Tablet by mouth in the morning. 30 Tablet 0    atorvastatin (LIPITOR) 40 mg tablet Take 1 Tablet by mouth nightly. 30 Tablet 0    OTHER This is to certify that Dony Monique was admitted to [facility] on 8/11/22 and discharged on 8/13/22, and has been advised to take rest at home for 7 more days and then resume work if symptom free. 1 Each 0    OTHER No driving for at least 3 months and then prior to resuming driving please obtain clearance from your neurologist 1 Each 0       Past Medical History:   Diagnosis Date    Asthma     Back pain     Elevated blood pressure reading     GERD (gastroesophageal reflux disease)     Left shoulder pain     Low TSH level 04/27/2017    may have mild hyperthyroidism w/u per PCP I will rpt TSH and get freeT4    Low TSH level     Primary hypertension 9/13/2022    Right inguinal hernia     TIA (transient ischemic attack) 8/13/2022       No past surgical history on file.     No Known Allergies    Patient Active Problem List   Diagnosis Code    Abnormal stress test R94.39    Chest pain R07.9    Alcohol abuse F10.10    Tobacco abuse counseling Z71.6    Nasal congestion R09.81    Low TSH level R79.89    Left-sided weakness R53.1    Left sided numbness R20.0    Cervical radiculopathy M54.12    TIA (transient ischemic attack) G45.9    Primary hypertension I10    PFO (patent foramen ovale) Q21.1         Review of Systems:   As above otherwise 11 point review of systems negative including;   Constitutional no fever or chills  Skin denies rash or itching  HENT  Denies tinnitus, hearing lose  Eyes denies diplopia vision lose  Respiratory denies shortness of breath  Cardiovascular denies chest pain, dyspnea on exertion  Gastrointestinal denies nausea, vomiting, diarrhea, constipation  Genitourinary denies incontinence  Musculoskeletal denies joint pain or swelling  Endocrine denies weight change  Hematology denies easy bruising or bleeding   Neurological as above in HPI      PHYSICAL EXAMINATION:      VITAL SIGNS:  There were no vitals taken for this visit. GENERAL: The patient is well developed, well nourished, and in no apparent distress. EXTREMITIES: No clubbing, cyanosis, or edema is identified. Pulses 2+ and symmetrical.  Muscle tone is normal.  HEAD:   Ear, nose, and throat appear to be without trauma. The patient is normocephalic. NEUROLOGIC EXAMINATION  Mental status: Awake, alert, oriented x3, follows simple,   Speech and languge: fluent, coherent, naming and repitition intact, reading and comprehension intact  CN: VFF, EOMI, PERRLA, face sensation intact , no facial asymmetry noted, palate elevation symmetric bilat, SS+SCM 5/5 bilat, tongue midline  Motor: no pronator drift, tone normal throughout, strength 5/5 throughout, left tinnel +ve  Sensory: decreased over the left upper extremity  Coordination: FNF, HS accurate w/o dysmetria  DTR: Brisk all over no clonus but up going toes?   Gait: normal      Impression:   47year old right handed male with history of left side paresthesias a month ago that resolved, patient was admitted to hospital for possible stroke, discharged on aspirin and statin, patient recent visit for left hand weakness and paresthesias, patient denies neck pain or radiating paresthesias, MRI BRAIN was unremarkable but cervical MRI showed-multilevel degenerative disc disease,facet hypertrophy causing central stenosis, cord compression and foraminal  stenosis as described. Slightly more focal left sided disease at C4-C5, patient admit heavy lifting in the past but no accident was seen by spine but as per patient no surgery recommended. Patient denies another neurological complains. Patient do admit problem with balance at times, family history NC, found to have brisk reflexes, left tinnel +ve    CERVICAL DISC DISEASE/CTS  Left sided paresthesias and weakness related to disc disease cord compression with superimposed CTS. MRI showing multilevel degenerative disc disease,facet hypertrophy causing central stenosis, cord compression and foraminal  stenosis as described. Slightly more focal left sided disease at C4-C5. RECOMMEND  Spine surgery consult. Gabapentin 300 mg BID  Check B 12 and TSH  EMG/NC study outpatient. Plan: As above. I spent 30 minutes with the patient in face-to-face consultation, of which greater than 50% was spent in counseling and coordination of care as described above. PLEASE NOTE:   This document has been produced using voice recognition software. Unrecognized errors in transcription may be present.

## 2022-09-15 NOTE — ED PROVIDER NOTES
EMERGENCY DEPARTMENT HISTORY AND PHYSICAL EXAM    12:31 PM      Date: 9/15/2022  Patient Name: Mayda Fountain    History of Presenting Illness     Chief Complaint   Patient presents with    Numbness         History Provided By: Patient  Location/Duration/Severity/Modifying factors   Patient is a 52-year-old male with a history of hypertension, CVA TIA admission this month on dual antiplatelet therapy, thyroid disease, asthma, the presents emergency department complaint of left hand numbness this been present since Friday and has not been getting better. Patient was admitted to the hospital with left arm numbness and weakness and was -discharged with concern for stroke. The patient has been taking his medications except for his nightly calcium medication which she says has been missing because of fall asleep early. The patient works in [employer] ([employer]) for [employer], smoke cigarettes, smokes marijuana, and drinks alcohol 3 times a week. Patient denies any other drug use. PCP: Niharika Sahu NP    Current Outpatient Medications   Medication Sig Dispense Refill    methylPREDNISolone (Medrol, Adrian,) 4 mg tablet Take 1 Tablet by mouth Specific Days and Specific Times. 1 Dose Pack 0    gabapentin (NEURONTIN) 300 mg capsule Take 1 Capsule by mouth two (2) times a day. Max Daily Amount: 600 mg. Causes drowsiness so be sure to avoid working for at least 8 hours while taking the medication 20 Capsule 0    clopidogreL (Plavix) 75 mg tab Take 75 mg by mouth daily. amLODIPine (NORVASC) 5 mg tablet Take 1 Tablet by mouth in the morning. 30 Tablet 0    aspirin 81 mg chewable tablet Take 1 Tablet by mouth in the morning. 30 Tablet 0    atorvastatin (LIPITOR) 40 mg tablet Take 1 Tablet by mouth nightly.  30 Tablet 0    OTHER This is to certify that Minoo Posada was admitted to [facility] on 8/11/22 and discharged on 8/13/22, and has been advised to take rest at home for 7 more days and then resume work if symptom free. 1 Each 0    OTHER No driving for at least 3 months and then prior to resuming driving please obtain clearance from your neurologist 1 Each 0       Past History     Past Medical History:  Past Medical History:   Diagnosis Date    Asthma     Back pain     Elevated blood pressure reading     GERD (gastroesophageal reflux disease)     Left shoulder pain     Low TSH level 04/27/2017    may have mild hyperthyroidism w/u per PCP I will rpt TSH and get freeT4    Low TSH level     Primary hypertension 9/13/2022    Right inguinal hernia     TIA (transient ischemic attack) 8/13/2022       Past Surgical History:  No past surgical history on file. Family History:  Family History   Problem Relation Age of Onset    Hypertension Mother     Cancer Father     Hypertension Brother     Asthma Daughter     Stroke Paternal Grandfather 79    Heart Attack Neg Hx     Heart Disease Neg Hx        Social History:  Social History     Tobacco Use    Smoking status: Every Day     Packs/day: 0.25     Types: Cigarettes    Smokeless tobacco: Never    Tobacco comments:     pt states he's cutting back   Substance Use Topics    Alcohol use: Yes     Alcohol/week: 55.0 standard drinks     Types: 35 Cans of beer, 20 Shots of liquor per week     Comment: Occ    Drug use: Yes     Types: Marijuana     Comment: 4 days in 1 week       Allergies:  No Known Allergies      Review of Systems       Review of Systems   Constitutional:  Negative for activity change, fatigue and fever. HENT:  Negative for congestion and rhinorrhea. Eyes:  Negative for visual disturbance. Respiratory:  Negative for shortness of breath. Cardiovascular:  Negative for chest pain and palpitations. Gastrointestinal:  Negative for abdominal pain, diarrhea, nausea and vomiting. Genitourinary:  Negative for dysuria and hematuria. Musculoskeletal:  Negative for back pain. Skin:  Negative for rash. Neurological:  Positive for numbness.  Negative for dizziness, weakness and light-headedness. All other systems reviewed and are negative. Physical Exam   Visit Vitals  BP (!) 131/109 (BP 1 Location: Left upper arm)   Pulse 93   Temp 98.4 °F (36.9 °C)   Resp 16   SpO2 98%         Physical Exam  Vitals and nursing note reviewed. Constitutional:       General: He is not in acute distress. Appearance: He is well-developed. HENT:      Head: Normocephalic and atraumatic. Right Ear: External ear normal.      Left Ear: External ear normal.      Nose: Nose normal.   Eyes:      General: No scleral icterus. Conjunctiva/sclera: Conjunctivae normal.      Pupils: Pupils are equal, round, and reactive to light. Neck:      Thyroid: No thyromegaly. Vascular: No JVD. Trachea: No tracheal deviation. Cardiovascular:      Rate and Rhythm: Normal rate and regular rhythm. Heart sounds: Normal heart sounds. No murmur heard. No friction rub. No gallop. Pulmonary:      Effort: Pulmonary effort is normal.      Breath sounds: Normal breath sounds. Chest:      Chest wall: No tenderness. Abdominal:      General: Bowel sounds are normal. There is no distension. Palpations: Abdomen is soft. Tenderness: There is no abdominal tenderness. There is no guarding or rebound. Musculoskeletal:         General: No tenderness. Normal range of motion. Cervical back: Normal range of motion and neck supple. Lymphadenopathy:      Cervical: No cervical adenopathy. Skin:     General: Skin is warm and dry. Neurological:      Mental Status: He is alert and oriented to person, place, and time. Cranial Nerves: No cranial nerve deficit. Coordination: Coordination normal.      Comments: Left arm numbness from the mid forearm to the hand with some pain at the base of the thumb, no arm or leg drift, full strength, gait steady, no facial symptoms   Psychiatric:         Behavior: Behavior normal.         Thought Content:  Thought content normal.         Judgment: Judgment normal.         Diagnostic Study Results     Labs -  Recent Results (from the past 12 hour(s))   CBC WITH AUTOMATED DIFF    Collection Time: 09/15/22 12:15 PM   Result Value Ref Range    WBC 4.9 4.6 - 13.2 K/uL    RBC 4.63 4.35 - 5.65 M/uL    HGB 14.6 13.0 - 16.0 g/dL    HCT 43.6 36.0 - 48.0 %    MCV 94.2 78.0 - 100.0 FL    MCH 31.5 24.0 - 34.0 PG    MCHC 33.5 31.0 - 37.0 g/dL    RDW 12.7 11.6 - 14.5 %    PLATELET 408 959 - 926 K/uL    MPV 9.5 9.2 - 11.8 FL    NRBC 0.0 0  WBC    ABSOLUTE NRBC 0.00 0.00 - 0.01 K/uL    NEUTROPHILS 54 40 - 73 %    LYMPHOCYTES 31 21 - 52 %    MONOCYTES 12 (H) 3 - 10 %    EOSINOPHILS 2 0 - 5 %    BASOPHILS 1 0 - 2 %    IMMATURE GRANULOCYTES 0 0.0 - 0.5 %    ABS. NEUTROPHILS 2.6 1.8 - 8.0 K/UL    ABS. LYMPHOCYTES 1.5 0.9 - 3.6 K/UL    ABS. MONOCYTES 0.6 0.05 - 1.2 K/UL    ABS. EOSINOPHILS 0.1 0.0 - 0.4 K/UL    ABS. BASOPHILS 0.1 0.0 - 0.1 K/UL    ABS. IMM. GRANS. 0.0 0.00 - 0.04 K/UL    DF AUTOMATED     PROTHROMBIN TIME + INR    Collection Time: 09/15/22 12:15 PM   Result Value Ref Range    Prothrombin time 12.7 11.5 - 15.2 sec    INR 0.9 0.8 - 1.2     METABOLIC PANEL, COMPREHENSIVE    Collection Time: 09/15/22 12:15 PM   Result Value Ref Range    Sodium 137 136 - 145 mmol/L    Potassium 4.1 3.5 - 5.5 mmol/L    Chloride 103 100 - 111 mmol/L    CO2 26 21 - 32 mmol/L    Anion gap 8 3.0 - 18 mmol/L    Glucose 97 74 - 99 mg/dL    BUN 9 7.0 - 18 MG/DL    Creatinine 0.63 0.6 - 1.3 MG/DL    BUN/Creatinine ratio 14 12 - 20      GFR est AA >60 >60 ml/min/1.73m2    GFR est non-AA >60 >60 ml/min/1.73m2    Calcium 9.6 8.5 - 10.1 MG/DL    Bilirubin, total 1.2 (H) 0.2 - 1.0 MG/DL    ALT (SGPT) 23 16 - 61 U/L    AST (SGOT) 25 10 - 38 U/L    Alk.  phosphatase 59 45 - 117 U/L    Protein, total 7.4 6.4 - 8.2 g/dL    Albumin 4.0 3.4 - 5.0 g/dL    Globulin 3.4 2.0 - 4.0 g/dL    A-G Ratio 1.2 0.8 - 1.7     PTT    Collection Time: 09/15/22 12:15 PM   Result Value Ref Range    aPTT 26.8 23.0 - 36.4 SEC   GLUCOSE, POC    Collection Time: 09/15/22 12:20 PM   Result Value Ref Range    Glucose (POC) 103 70 - 110 mg/dL   EKG, 12 LEAD, INITIAL    Collection Time: 09/15/22 12:21 PM   Result Value Ref Range    Ventricular Rate 79 BPM    Atrial Rate 79 BPM    P-R Interval 138 ms    QRS Duration 82 ms    Q-T Interval 374 ms    QTC Calculation (Bezet) 428 ms    Calculated P Axis 77 degrees    Calculated R Axis 9 degrees    Calculated T Axis 33 degrees    Diagnosis       Normal sinus rhythm  Minimal voltage criteria for LVH, may be normal variant ( Sokolow-Johnson )  Nonspecific T wave abnormality  Abnormal ECG  When compared with ECG of 12-AUG-2022 00:18,  RSR' pattern in V1 is no longer present  Criteria for Septal infarct are no longer present         Radiologic Studies -   CT HEAD WO CONT   Final Result         No acute intracranial process. All CT scans at this facility are performed using dose optimization technique as   appropriate to the performed exam, to include automated exposure control,   adjustment of the mA and/or kV according to patient's size (Including   appropriate matching for site-specific examinations), or use of iterative   reconstruction technique. XR SPINE CERV PA LAT ODONT 3 V MAX    (Results Pending)         Medical Decision Making   I am the first provider for this patient. I reviewed the vital signs, available nursing notes, past medical history, past surgical history, family history and social history. Vital Signs-Reviewed the patient's vital signs. EKG: Sinus rhythm at 79, LVH, no STEMI, interpreted by me    Records Reviewed: Nursing Notes, Old Medical Records, Previous Radiology Studies, and Previous Laboratory Studies (Time of Review: 12:31 PM)    ED Course: Progress Notes, Reevaluation, and Consults: The patient was seen by neurologist on-call Dr. Bernhard Oppenheim and the patient has 2 peripheral processes per Dr. Bernhard Oppenheim.   She suspects the patient has carpal tunnel syndrome as well as cervical radiculopathy. She would recommend the patient get placed in a wrist splint and then be referred to outpatient setting for spine evaluation as well as nerve conduction test.    I discussed the case with Dr. James Perry over secure text and reviewed the imaging with him. He will see the patient in the office to evaluate for possible intervention on his spine. Recommends that I get preoperative labs and an x-ray of his spine which she will follow in the office. He will see the patient in the office on Tuesday of next week. He would like the patient to go home on a Medrol Dosepak and Dr. Salvatore Hernandes would recommend gabapentin for his symptoms. We will proceed with close outpatient care. Workup and recommendations were reviewed with the patient and all questions were answered. The patient understands the plan and will proceed with close outpatient care. I have encouraged the patient to return if at all worsened or concerned. Francois Lovell,  3:36 PM      Provider Notes (Medical Decision Making):   MDM  Number of Diagnoses or Management Options  Diagnosis management comments: Patient is a 31-year-old male smoker with a history of hypertension, concern for CVA TIA and admission with discharge last month now on dual antiplatelet therapy, oral statin therapy, who presents emergency department with less than a week of left hand numbness that began 6 days prior. The patient my evaluation has numbness from the mid forearm to the the hands and all the digits and has no motor findings. The patient had a CTA of the head neck, MRI of the brain done during his last admission and was found to have C4-5 cervical disease. Patient may have peripheral process however we will CT the patient's head, follows basic labs as he said he feels like his calcium may be low as has been in the past, and then reevaluate. Francois Lovell,  12:40 PM            Procedures          Diagnosis Clinical Impression:   1. Cervical radiculopathy    2. Numbness        Disposition: DC    Follow-up Information       Follow up With Specialties Details Why Contact Info    Riley Sy MD Orthopedic Surgery On 9/20/2022 Call the office for the time to come in but Dr. Andrew Said will see you to evaluate your neck disc problems [address] [facility]      Chyna Steven MD Neurology In 1 week  [address] [facility] [address]  [phone]      [facility] EMERGENCY DEPT Emergency Medicine  As needed, If symptoms worsen [address] [facility] [address]  [phone]             Patient's Medications   Start Taking    GABAPENTIN (NEURONTIN) 300 MG CAPSULE    Take 1 Capsule by mouth two (2) times a day. Max Daily Amount: 600 mg. Causes drowsiness so be sure to avoid working for at least 8 hours while taking the medication    METHYLPREDNISOLONE (MEDROL, CIERRA,) 4 MG TABLET    Take 1 Tablet by mouth Specific Days and Specific Times. Continue Taking    AMLODIPINE (NORVASC) 5 MG TABLET    Take 1 Tablet by mouth in the morning. ASPIRIN 81 MG CHEWABLE TABLET    Take 1 Tablet by mouth in the morning. ATORVASTATIN (LIPITOR) 40 MG TABLET    Take 1 Tablet by mouth nightly. CLOPIDOGREL (PLAVIX) 75 MG TAB    Take 75 mg by mouth daily. OTHER    This is to certify that Nadia Jacobs was admitted to [facility] on 8/11/22 and discharged on 8/13/22, and has been advised to take rest at home for 7 more days and then resume work if symptom free. OTHER    No driving for at least 3 months and then prior to resuming driving please obtain clearance from your neurologist   These Medications have changed    No medications on file   Stop Taking    No medications on file     Disclaimer: Sections of this note are dictated using utilizing voice recognition software. Minor typographical errors may be present.  If questions arise, please do not hesitate to contact me or call our department.

## 2022-09-15 NOTE — ED TRIAGE NOTES
Left hand numbness onset Friday, states hx of stroke 1 month ago,. Per patient numbness has not went away since Friday. Patient states numbness goes up left arm. Patient is on blood thinners, patient with steady gait.  Speech clear, no facial droop noted

## 2022-09-15 NOTE — ED NOTES
Wrist splint placed on pt left wrist. Pt given instructions on splint. Pt discharged. All instructions given with handout. All questions answered. All belongings taken by pt.

## 2022-09-15 NOTE — Clinical Note
[address] [place] [address] [address] [facility] EMERGENCY DEPT  [address] [address] [phone]    Work/School Note    Date: 9/15/2022    To Whom It May concern:      Yolanda Wei was seen and treated today in the emergency room by the following provider(s):  Attending Provider: Nico Boo MD.      Yolanda Wei is excused from work/school on 09/15/22. He is clear to return to work/school on 09/16/22.         Sincerely,          Ginette Sherman MD

## 2022-09-15 NOTE — Clinical Note
[address] [place] [address] [facility] EMERGENCY DEPT  [address] [address] [phone]    Work/School Note    Date: 9/15/2022    To Whom It May concern:    Nicolle Del Cid was seen and treated today in the emergency room by the following provider(s):  Attending Provider: Douglas Lopez MD.      Nicolle Del Cid is excused from work/school on 09/15/22 and 09/16/22. He is medically clear to return to work/school on 9/17/2022.        Sincerely,          Lisette Ruby MD

## 2022-09-16 LAB
ATRIAL RATE: 79 BPM
CALCULATED P AXIS, ECG09: 77 DEGREES
CALCULATED R AXIS, ECG10: 9 DEGREES
CALCULATED T AXIS, ECG11: 33 DEGREES
DIAGNOSIS, 93000: NORMAL
P-R INTERVAL, ECG05: 138 MS
Q-T INTERVAL, ECG07: 374 MS
QRS DURATION, ECG06: 82 MS
QTC CALCULATION (BEZET), ECG08: 428 MS
VENTRICULAR RATE, ECG03: 79 BPM

## 2022-09-27 ENCOUNTER — OFFICE VISIT (OUTPATIENT)
Dept: NEUROLOGY | Age: 52
End: 2022-09-27
Payer: COMMERCIAL

## 2022-09-27 VITALS
SYSTOLIC BLOOD PRESSURE: 118 MMHG | RESPIRATION RATE: 20 BRPM | HEART RATE: 96 BPM | OXYGEN SATURATION: 97 % | BODY MASS INDEX: 19.07 KG/M2 | HEIGHT: 68 IN | WEIGHT: 125.8 LBS | DIASTOLIC BLOOD PRESSURE: 76 MMHG

## 2022-09-27 DIAGNOSIS — M54.12 CERVICAL RADICULOPATHY: Primary | ICD-10-CM

## 2022-09-27 DIAGNOSIS — G56.02 CARPAL TUNNEL SYNDROME OF LEFT WRIST: ICD-10-CM

## 2022-09-27 PROCEDURE — 99204 OFFICE O/P NEW MOD 45 MIN: CPT | Performed by: STUDENT IN AN ORGANIZED HEALTH CARE EDUCATION/TRAINING PROGRAM

## 2022-09-27 NOTE — PROGRESS NOTES
Armando Cash is a 47 y.o. male . presents for ED Follow-up and TIA       A 46 yo male patient with medical history of asthma, GERD, hypertension here for follow-up after hospital discharge. Patient was admitted to [facility] on August 11, 2022 after presenting with numbness over the left-sided weakness; in addition was feeling unsteady/dizzy when trying to walk. Numbness was involving both the left upper and lower extremities. No obvious weakness. No facial involvement and no significant change in his speech. MRI of the brain did not show any acute stroke. MRI of the cervical spine was done which showed degenerative changes with facet hypertrophy causing central canal stenosis and cord compression; multilevel foraminal stenosis also seen. Had a CT angiography which was unremarkable. Transthoracic echo showed a positive bubble study [grade 1; with provocation]; otherwise no thrombus and has a normal ejection fraction. Patient was discharged with aspirin and Plavix to be taken for 21 days; but he is still taking both. Was seen by spine surgery; outpatient follow-up recommended. He continues to have intermittent numbness over his left upper extremity. After hospital discharge, also started to have tingling and numbness feeling when trying to reach for something with his right upper extremity. No problem with his . No lower extremity weakness. Patient had nighttime numbness over the left toes. No problem walking. No falls recently. He denied having any neck pain or pain radiating from the neck to the arm. Hands feel tingly and numb but more on the left side. Occasional complaints of tremor when holding something; mostly when nervous. No problem eating. Review of Systems   Constitutional:  Negative for chills, fever and weight loss. HENT:  Negative for hearing loss and tinnitus. Eyes:  Negative for blurred vision and double vision.    Respiratory:  Negative for cough and shortness of breath. Cardiovascular:  Negative for chest pain and leg swelling. Gastrointestinal:  Negative for nausea and vomiting. Genitourinary:  Negative for dysuria, frequency and urgency. Musculoskeletal:  Positive for back pain. Negative for neck pain. Skin:  Negative for itching and rash. Neurological:  Positive for dizziness (Sometimes), tingling, tremors, sensory change and headaches. Negative for speech change, focal weakness and seizures. Psychiatric/Behavioral:  Negative for depression. The patient is nervous/anxious. Past Medical History:   Diagnosis Date    Asthma     Back pain     Elevated blood pressure reading     GERD (gastroesophageal reflux disease)     Left shoulder pain     Low TSH level 04/27/2017    may have mild hyperthyroidism w/u per PCP I will rpt TSH and get freeT4    Low TSH level     Primary hypertension 9/13/2022    Right inguinal hernia     TIA (transient ischemic attack) 8/13/2022       No past surgical history on file. Family History   Problem Relation Age of Onset    Hypertension Mother     Cancer Father     Hypertension Brother     Asthma Daughter     Stroke Paternal Grandfather 79    Heart Attack Neg Hx     Heart Disease Neg Hx         Social History     Socioeconomic History    Marital status: SINGLE     Spouse name: Not on file    Number of children: Not on file    Years of education: Not on file    Highest education level: Not on file   Occupational History    Not on file   Tobacco Use    Smoking status: Every Day     Packs/day: 0.25     Types: Cigarettes    Smokeless tobacco: Never    Tobacco comments:     pt states he's cutting back   Substance and Sexual Activity    Alcohol use: Yes     Alcohol/week: 55.0 standard drinks     Types: 35 Cans of beer, 20 Shots of liquor per week     Comment:  Occ    Drug use: Yes     Types: Marijuana     Comment: 4 days in 1 week    Sexual activity: Not on file   Other Topics Concern    Not on file   Social History Narrative    Not on file     Social Determinants of Health     Financial Resource Strain: Not on file   Food Insecurity: Not on file   Transportation Needs: Not on file   Physical Activity: Not on file   Stress: Not on file   Social Connections: Not on file   Intimate Partner Violence: Not on file   Housing Stability: Not on file        No Known Allergies      Current Outpatient Medications   Medication Sig Dispense Refill    methylPREDNISolone (Medrol, Adrian,) 4 mg tablet Take 1 Tablet by mouth Specific Days and Specific Times. 1 Dose Pack 0    gabapentin (NEURONTIN) 300 mg capsule Take 1 Capsule by mouth two (2) times a day. Max Daily Amount: 600 mg. Causes drowsiness so be sure to avoid working for at least 8 hours while taking the medication 20 Capsule 0    clopidogreL (Plavix) 75 mg tab Take 75 mg by mouth daily. amLODIPine (NORVASC) 5 mg tablet Take 1 Tablet by mouth in the morning. 30 Tablet 0    aspirin 81 mg chewable tablet Take 1 Tablet by mouth in the morning. 30 Tablet 0    atorvastatin (LIPITOR) 40 mg tablet Take 1 Tablet by mouth nightly. 30 Tablet 0    OTHER This is to certify that Eldridge Meckel was admitted to [facility] on 8/11/22 and discharged on 8/13/22, and has been advised to take rest at home for 7 more days and then resume work if symptom free. 1 Each 0    OTHER No driving for at least 3 months and then prior to resuming driving please obtain clearance from your neurologist 1 Each 0         Physical Exam  Constitutional:       Appearance: Normal appearance. HENT:      Head: Normocephalic and atraumatic. Mouth/Throat:      Mouth: Mucous membranes are moist.      Pharynx: Oropharynx is clear. No oropharyngeal exudate. Eyes:      Extraocular Movements: Extraocular movements intact. Pupils: Pupils are equal, round, and reactive to light. Pulmonary:      Effort: Pulmonary effort is normal. No respiratory distress. Musculoskeletal:         General: Normal range of motion. Cervical back: Normal range of motion and neck supple. Right lower leg: No edema. Left lower leg: No edema. Neurological:      Mental Status: He is alert. Comments: Mental status: Awake, alert, oriented x3, follows simple and complex commands, no neglect, no extinction. Speech and languge: fluent, coherent,  and comprehension intact  CN: VFF, EOMI, PERRLA, face sensation intact , no facial asymmetry noted, palate elevation symmetric bilat, SS+SCM 5/5 bilat, tongue midline  Motor: no pronator drift, tone normal throughout, strength 5/5 throughout  Sensory: intact to light touch throughout  Coordination: FNF, HS accurate w/o dysmetria  DTR: 2+ throughout  Gait: normal.         Admission on 09/15/2022, Discharged on 09/15/2022   Component Date Value Ref Range Status    WBC 09/15/2022 4.9  4.6 - 13.2 K/uL Final    RBC 09/15/2022 4.63  4.35 - 5.65 M/uL Final    HGB 09/15/2022 14.6  13.0 - 16.0 g/dL Final    HCT 09/15/2022 43.6  36.0 - 48.0 % Final    MCV 09/15/2022 94.2  78.0 - 100.0 FL Final    MCH 09/15/2022 31.5  24.0 - 34.0 PG Final    MCHC 09/15/2022 33.5  31.0 - 37.0 g/dL Final    RDW 09/15/2022 12.7  11.6 - 14.5 % Final    PLATELET 69/65/6163 976  135 - 420 K/uL Final    MPV 09/15/2022 9.5  9.2 - 11.8 FL Final    NRBC 09/15/2022 0.0  0  WBC Final    ABSOLUTE NRBC 09/15/2022 0.00  0.00 - 0.01 K/uL Final    NEUTROPHILS 09/15/2022 54  40 - 73 % Final    LYMPHOCYTES 09/15/2022 31  21 - 52 % Final    MONOCYTES 09/15/2022 12 (A)  3 - 10 % Final    EOSINOPHILS 09/15/2022 2  0 - 5 % Final    BASOPHILS 09/15/2022 1  0 - 2 % Final    IMMATURE GRANULOCYTES 09/15/2022 0  0.0 - 0.5 % Final    ABS. NEUTROPHILS 09/15/2022 2.6  1.8 - 8.0 K/UL Final    ABS. LYMPHOCYTES 09/15/2022 1.5  0.9 - 3.6 K/UL Final    ABS. MONOCYTES 09/15/2022 0.6  0.05 - 1.2 K/UL Final    ABS. EOSINOPHILS 09/15/2022 0.1  0.0 - 0.4 K/UL Final    ABS. BASOPHILS 09/15/2022 0.1  0.0 - 0.1 K/UL Final    ABS. IMM.  GRANS. 09/15/2022 0.0 0.00 - 0.04 K/UL Final    DF 09/15/2022 AUTOMATED    Final    Prothrombin time 09/15/2022 12.7  11.5 - 15.2 sec Final    INR 09/15/2022 0.9  0.8 - 1.2   Final    Comment:            INR Therapeutic Ranges         (on stable oral anticoagulant):     INDICATION                INR  DVT/PE/Atrial Fib          2.0-3.0  MI/Mechanical Heart Valve  2.5-3.5      Sodium 09/15/2022 137  136 - 145 mmol/L Final    Potassium 09/15/2022 4.1  3.5 - 5.5 mmol/L Final    Chloride 09/15/2022 103  100 - 111 mmol/L Final    CO2 09/15/2022 26  21 - 32 mmol/L Final    Anion gap 09/15/2022 8  3.0 - 18 mmol/L Final    Glucose 09/15/2022 97  74 - 99 mg/dL Final    BUN 09/15/2022 9  7.0 - 18 MG/DL Final    Creatinine 09/15/2022 0.63  0.6 - 1.3 MG/DL Final    BUN/Creatinine ratio 09/15/2022 14  12 - 20   Final    GFR est AA 09/15/2022 >60  >60 ml/min/1.73m2 Final    GFR est non-AA 09/15/2022 >60  >60 ml/min/1.73m2 Final    Comment: (NOTE)  Estimated GFR is calculated using the Modification of Diet in Renal   Disease (MDRD) Study equation, reported for both  Americans   (GFRAA) and non- Americans (GFRNA), and normalized to 1.73m2   body surface area. The physician must decide which value applies to   the patient. The MDRD study equation should only be used in   individuals age 25 or older. It has not been validated for the   following: pregnant women, patients with serious comorbid conditions,   or on certain medications, or persons with extremes of body size,   muscle mass, or nutritional status. Calcium 09/15/2022 9.6  8.5 - 10.1 MG/DL Final    Bilirubin, total 09/15/2022 1.2 (A)  0.2 - 1.0 MG/DL Final    ALT (SGPT) 09/15/2022 23  16 - 61 U/L Final    AST (SGOT) 09/15/2022 25  10 - 38 U/L Final    Alk.  phosphatase 09/15/2022 59  45 - 117 U/L Final    Protein, total 09/15/2022 7.4  6.4 - 8.2 g/dL Final    Albumin 09/15/2022 4.0  3.4 - 5.0 g/dL Final    Globulin 09/15/2022 3.4  2.0 - 4.0 g/dL Final    A-G Ratio 09/15/2022 1.2 0.8 - 1.7   Final    aPTT 09/15/2022 26.8  23.0 - 36.4 SEC Final    Glucose (POC) 09/15/2022 103  70 - 110 mg/dL Final    Comment: (NOTE)  The FDA has indicated that no capillary point of care blood glucose   monitoring systems are approved for use in \"critically ill\" patients,   however they have not defined this population. The College of   American Pathologists has recommended that these devices should not   be used in cases such as severe hypotension, dehydration, shock, and   hyperglycemic-hyperosmolar state, amongst others. Venous or arterial   collection is the recommended specimen for testing these patients.       Ventricular Rate 09/15/2022 79  BPM Final    Atrial Rate 09/15/2022 79  BPM Final    P-R Interval 09/15/2022 138  ms Final    QRS Duration 09/15/2022 82  ms Final    Q-T Interval 09/15/2022 374  ms Final    QTC Calculation (Bezet) 09/15/2022 428  ms Final    Calculated P Axis 09/15/2022 77  degrees Final    Calculated R Axis 09/15/2022 9  degrees Final    Calculated T Axis 09/15/2022 33  degrees Final    Diagnosis 09/15/2022    Final                    Value:Normal sinus rhythm  Minimal voltage criteria for LVH, may be normal variant ( Sokolow-Johnson )  Nonspecific T wave abnormality  Abnormal ECG  When compared with ECG of 12-AUG-2022 00:18,  RSR' pattern in V1 is no longer present  Criteria for Septal infarct are no longer present  Confirmed by Annalee Thompson ([id number]) on 9/16/2022 8:13:13 AM     Admission on 08/11/2022, Discharged on 08/13/2022   Component Date Value Ref Range Status    Sodium 08/11/2022 143  136 - 145 mmol/L Final    Potassium 08/11/2022 3.4 (A)  3.5 - 5.5 mmol/L Final    Chloride 08/11/2022 110  100 - 111 mmol/L Final    CO2 08/11/2022 25  21 - 32 mmol/L Final    Anion gap 08/11/2022 8  3.0 - 18 mmol/L Final    Glucose 08/11/2022 122 (A)  74 - 99 mg/dL Final    BUN 08/11/2022 10  7.0 - 18 MG/DL Final    Creatinine 08/11/2022 0.63  0.6 - 1.3 MG/DL Final    BUN/Creatinine ratio 08/11/2022 16  12 - 20   Final    GFR est AA 08/11/2022 >60  >60 ml/min/1.73m2 Final    GFR est non-AA 08/11/2022 >60  >60 ml/min/1.73m2 Final    Comment: (NOTE)  Estimated GFR is calculated using the Modification of Diet in Renal   Disease (MDRD) Study equation, reported for both  Americans   (GFRAA) and non- Americans (GFRNA), and normalized to 1.73m2   body surface area. The physician must decide which value applies to   the patient. The MDRD study equation should only be used in   individuals age 25 or older. It has not been validated for the   following: pregnant women, patients with serious comorbid conditions,   or on certain medications, or persons with extremes of body size,   muscle mass, or nutritional status. Calcium 08/11/2022 8.7  8.5 - 10.1 MG/DL Final    WBC 08/11/2022 4.0 (A)  4.6 - 13.2 K/uL Final    RBC 08/11/2022 4.19 (A)  4.35 - 5.65 M/uL Final    HGB 08/11/2022 13.5  13.0 - 16.0 g/dL Final    HCT 08/11/2022 40.3  36.0 - 48.0 % Final    MCV 08/11/2022 96.2  78.0 - 100.0 FL Final    MCH 08/11/2022 32.2  24.0 - 34.0 PG Final    MCHC 08/11/2022 33.5  31.0 - 37.0 g/dL Final    RDW 08/11/2022 13.1  11.6 - 14.5 % Final    PLATELET 96/44/3369 622  135 - 420 K/uL Final    MPV 08/11/2022 8.9 (A)  9.2 - 11.8 FL Final    NRBC 08/11/2022 0.0  0  WBC Final    ABSOLUTE NRBC 08/11/2022 0.00  0.00 - 0.01 K/uL Final    NEUTROPHILS 08/11/2022 44  40 - 73 % Final    LYMPHOCYTES 08/11/2022 39  21 - 52 % Final    MONOCYTES 08/11/2022 11 (A)  3 - 10 % Final    EOSINOPHILS 08/11/2022 4  0 - 5 % Final    BASOPHILS 08/11/2022 2  0 - 2 % Final    IMMATURE GRANULOCYTES 08/11/2022 0  0.0 - 0.5 % Final    ABS. NEUTROPHILS 08/11/2022 1.8  1.8 - 8.0 K/UL Final    ABS. LYMPHOCYTES 08/11/2022 1.5  0.9 - 3.6 K/UL Final    ABS. MONOCYTES 08/11/2022 0.4  0.05 - 1.2 K/UL Final    ABS. EOSINOPHILS 08/11/2022 0.2  0.0 - 0.4 K/UL Final    ABS. BASOPHILS 08/11/2022 0.1  0.0 - 0.1 K/UL Final    ABS. IMM. GRANS. 08/11/2022 0.0  0.00 - 0.04 K/UL Final    DF 08/11/2022 AUTOMATED    Final    BENZODIAZEPINES 08/11/2022 Negative  NEG   Final    BARBITURATES 08/11/2022 Negative  NEG   Final    THC (TH-CANNABINOL) 08/11/2022 Positive (A)  NEG   Final    OPIATES 08/11/2022 Negative  NEG   Final    PCP(PHENCYCLIDINE) 08/11/2022 Negative  NEG   Final    COCAINE 08/11/2022 Negative  NEG   Final    AMPHETAMINES 08/11/2022 Negative  NEG   Final    METHADONE 08/11/2022 Negative  NEG   Final    HDSCOM 08/11/2022 (NOTE)   Final    Comment: Specimen analysis was performed without chain of custody handling. These results should be used for medical purposes only and not for   legal or employment purposes. Unconfirmed screening results must not   be used for non-medical purposes. The cut-off concentration for positive results are as follows:    AMPH     1000 ng/mL  TRENTON      200 ng/mL  JYOTI      200 ng/mL  ELEONORA       300 ng/mL  METH      300 ng/mL  OPI       300 ng/mL  PCP        25 ng/mL  THC        50 ng/mL        LIPID PROFILE 08/11/2022        Final    Cholesterol, total 08/11/2022 216 (A)  <200 MG/DL Final    Triglyceride 08/11/2022 69  <150 MG/DL Final    Comment: The drugs N-acetylcysteine (NAC) and  Metamiszole have been found to cause falsely  low results in this chemical assay. Please  be sure to submit blood samples obtained  BEFORE administration of either of these  drugs to assure correct results.       HDL Cholesterol 08/11/2022 127 (A)  40 - 60 MG/DL Final    LDL, calculated 08/11/2022 75.2  0 - 100 MG/DL Final    VLDL, calculated 08/11/2022 13.8  MG/DL Final    CHOL/HDL Ratio 08/11/2022 1.7  0 - 5.0   Final    Hemoglobin A1c 08/11/2022 5.5  4.2 - 5.6 % Final    Comment: (NOTE)  HbA1C Interpretive Ranges  <5.7              Normal  5.7 - 6.4         Consider Prediabetes  >6.5              Consider Diabetes      Est. average glucose 08/11/2022 111  mg/dL Final    Comment: (NOTE)  The eAG should be interpreted with patient characteristics in mind   since ethnicity, interindividual differences, red cell lifespan,   variation in rates of glycation, etc. may affect the validity of the   calculation.       IVSd 08/12/2022 0.9  0.6 - 1.0 cm Final    LVIDd 08/12/2022 3.7 (A)  4.2 - 5.9 cm Final    LVIDs 08/12/2022 2.8  cm Final    LVOT Diameter 08/12/2022 2.0  cm Final    LVPWd 08/12/2022 1.0  0.6 - 1.0 cm Final    LVOT Peak Gradient 08/12/2022 3  mmHg Final    LVOT Mean Gradient 08/12/2022 2  mmHg Final    LVOT SV 08/12/2022 54.0  ml Final    LVOT Peak Velocity 08/12/2022 0.9  m/s Final    LVOT VTI 08/12/2022 17.2  cm Final    LA Volume A/L 08/12/2022 42  mL Final    LA Volume 2C 08/12/2022 29  18 - 58 mL Final    LA Volume 4C 08/12/2022 43  18 - 58 mL Final    MV A Velocity 08/12/2022 0.71  m/s Final    MV E Wave Deceleration Time 08/12/2022 227.4  ms Final    MV E Velocity 08/12/2022 0.60  m/s Final    LV E' Lateral Velocity 08/12/2022 13  cm/s Final    LV E' Septal Velocity 08/12/2022 10  cm/s Final    TR Peak Gradient 08/12/2022 16  mmHg Final    TR Max Velocity 08/12/2022 1.97  m/s Final    Aortic Root 08/12/2022 3.9  cm Final    Fractional Shortening 2D 08/12/2022 24  28 - 44 % Final    LVIDd Index 08/12/2022 2.08  cm/m2 Final    LVIDs Index 08/12/2022 1.57  cm/m2 Final    LV RWT Ratio 08/12/2022 0.54   Final    LV Mass 2D 08/12/2022 104.6  88 - 224 g Final    LV Mass 2D Index 08/12/2022 58.7  49 - 115 g/m2 Final    MV E/A 08/12/2022 0.85   Final    E/E' Ratio (Averaged) 08/12/2022 5.31   Final    E/E' Lateral 08/12/2022 4.62   Final    E/E' Septal 08/12/2022 6.00   Final    LA Volume Index A/L 08/12/2022 24  16 - 34 mL/m2 Final    LVOT Stroke Volume Index 08/12/2022 30.3  mL/m2 Final    LVOT Area 08/12/2022 3.1  cm2 Final    LA Volume Index 2C 08/12/2022 16  16 - 34 mL/m2 Final    LA Volume Index 4C 08/12/2022 24  16 - 34 mL/m2 Final    Ao Root Index 08/12/2022 2.19  cm/m2 Final    Est. RA Pressure 08/12/2022 3  mmHg Final    RVSP 08/12/2022 19  mmHg Final    WBC 08/12/2022 5.8  4.6 - 13.2 K/uL Final    RBC 08/12/2022 4.28 (A)  4.35 - 5.65 M/uL Final    HGB 08/12/2022 13.8  13.0 - 16.0 g/dL Final    HCT 08/12/2022 41.0  36.0 - 48.0 % Final    MCV 08/12/2022 95.8  78.0 - 100.0 FL Final    MCH 08/12/2022 32.2  24.0 - 34.0 PG Final    MCHC 08/12/2022 33.7  31.0 - 37.0 g/dL Final    RDW 08/12/2022 12.9  11.6 - 14.5 % Final    PLATELET 81/97/9081 460  135 - 420 K/uL Final    MPV 08/12/2022 9.3  9.2 - 11.8 FL Final    NRBC 08/12/2022 0.0  0  WBC Final    ABSOLUTE NRBC 08/12/2022 0.00  0.00 - 0.01 K/uL Final    Sodium 08/12/2022 137  136 - 145 mmol/L Final    Potassium 08/12/2022 3.6  3.5 - 5.5 mmol/L Final    Chloride 08/12/2022 105  100 - 111 mmol/L Final    CO2 08/12/2022 26  21 - 32 mmol/L Final    Anion gap 08/12/2022 6  3.0 - 18 mmol/L Final    Glucose 08/12/2022 123 (A)  74 - 99 mg/dL Final    BUN 08/12/2022 12  7.0 - 18 MG/DL Final    Creatinine 08/12/2022 0.72  0.6 - 1.3 MG/DL Final    BUN/Creatinine ratio 08/12/2022 17  12 - 20   Final    GFR est AA 08/12/2022 >60  >60 ml/min/1.73m2 Final    GFR est non-AA 08/12/2022 >60  >60 ml/min/1.73m2 Final    Calcium 08/12/2022 8.7  8.5 - 10.1 MG/DL Final    Ventricular Rate 08/12/2022 73  BPM Final    Atrial Rate 08/12/2022 73  BPM Final    P-R Interval 08/12/2022 136  ms Final    QRS Duration 08/12/2022 86  ms Final    Q-T Interval 08/12/2022 410  ms Final    QTC Calculation (Bezet) 08/12/2022 451  ms Final    Calculated P Axis 08/12/2022 83  degrees Final    Calculated R Axis 08/12/2022 -13  degrees Final    Calculated T Axis 08/12/2022 36  degrees Final    Diagnosis 08/12/2022    Final                    Value:Normal sinus rhythm  Left atrial enlargement  RSR' or QR pattern in V1 suggests right ventricular conduction delay  Septal infarct (cited on or before 02-OCT-2020) possible  Abnormal ECG  When compared with ECG of 11-JAN-2022 11:31,  Questionable change in initial forces of Septal leads  Confirmed by Rahul Uriarte ([id number]) on 8/12/2022 10:23:03 AM      Troponin-High Sensitivity 08/12/2022 5  0 - 78 ng/L Final    Comment: A HS troponin value change of (+ or -) 50% or more below the 99th percentile, in a 1/2/3 hr interval represents a significant change. Clinical correcation is recommended. A HS troponin value change of (+ or -) 20% or above the 99th percentile, in a 1/2/3 hr interval represents a significant change. Clinical correlation is recommended. 99th Percentile:    Women:  0-54 ng/L                                                               Men:  0-78 ng/L      Magnesium 08/12/2022 1.7  1.6 - 2.6 mg/dL Final    Troponin-High Sensitivity 08/12/2022 5  0 - 78 ng/L Final    Comment: A HS troponin value change of (+ or -) 50% or more below the 99th percentile, in a 1/2/3 hr interval represents a significant change. Clinical correcation is recommended. A HS troponin value change of (+ or -) 20% or above the 99th percentile, in a 1/2/3 hr interval represents a significant change. Clinical correlation is recommended. 99th Percentile:    Women:  0-54 ng/L                                                               Men:  0-78 ng/L      Glucose (POC) 08/12/2022 116 (A)  70 - 110 mg/dL Final    Comment: (NOTE)  The FDA has indicated that no capillary point of care blood glucose   monitoring systems are approved for use in \"critically ill\" patients,   however they have not defined this population. The College of   American Pathologists has recommended that these devices should not   be used in cases such as severe hypotension, dehydration, shock, and   hyperglycemic-hyperosmolar state, amongst others. Venous or arterial   collection is the recommended specimen for testing these patients.       Sed rate, automated 08/12/2022 1  0 - 20 mm/hr Final    Antithrombin Ag 08/12/2022 89  72 - 124 % Final    Comment: (NOTE)  This test was developed and its performance characteristics  determined by [facility]. It has not been cleared or approved  by the Food and Drug Administration. Performed At: [facility] [place] [id number]  Jessica Solo MD S      Act. Prt.C Resist. 2022 2.8  2.2 - 3.5 ratio Final    Comment: (NOTE)  The APCR result may be falsely increased (masking an abnormal, low  APCR result) in patients on direct Xa inhibitor (e.g., rivaroxaban,  apixaban, edoxaban) or a direct thrombin inhibitor (e.g.,  dabigatran) anticoagulant therapy due to assay interference by these  drugs. Performed At: [facility] [place] [id number]  Jessica Solo MD [id number]      PTT-LA 2022 43.0  0.0 - 51.9 sec Final    dRVVT 2022 36.4  0.0 - 47.0 sec Final    Comment: (NOTE)  Performed At: [facility] [place] [id number]  Jessica Solo MD QA:[id number]      Interpretation 2022 Comment:   Final    Comment: (NOTE)  No lupus anticoagulant was detected. Performed At: [facility]  [address], [place] [id number]  Jessica Solo MD ZE:[id number]      Plasminogen 2022 124  70 - 150 % Final    Comment: (NOTE)  Performed At: [facility] [place] [id number]  Jessica Solo MD WL:[id number]      Protein C Antigen 2022 82  60 - 150 % Final    Comment: (NOTE)  Performed At: [facility] [place] [id number]  Jessica Solo MD [id number]:[id number]      Protein C-Functional 2022 107  73 - 180 % Final    Comment: (NOTE)  Performed At: [facility] [place] [id number]  Jessica Solo MD NP:[id number]      Protein S, Total 2022 89  60 - 150 % Final    Comment: (NOTE)  This test was developed and its performance characteristics  determined by [facility]. It has not been cleared or approved  by the Food and Drug Administration.       Protein S, Free 2022 134  61 - 136 % Final    Comment: (NOTE)  Performed At: [facility] [place]  [address] [id number]  Adalberto Gaucher MD UD:[id number]      PT mixing study 08/12/2022        Final    Prothrombin time 08/12/2022 10.9  9.0 - 11.1 sec Final    INR 08/12/2022 1.0  0.9 - 1.1   Final    A single therapeutic range for Vit K antagonists may not be optimal for all indications - see June, 2008 issue of Chest, American College of Chest Physicians Evidence-Based Clinical Practice Guidelines, 8th Edition. PT 1:1 mix patient 08/12/2022 Mixing study not performed as it is uninterpretable when PT is normal or <= 5 seconds prolonged. sec Final    aPTT mixing study 08/12/2022        Final    aPTT 08/12/2022 32.4 (A)  22.1 - 31.0 sec Final    In addition to factor deficiency, monitoring heparin therapy, etc., evaluation of a prolonged aPTT result should include consideration of preanalytic variables such as heparin flush contamination, specimen integrity issues, etc.    aPTT 1:1 mix patient 08/12/2022 Mixing study not performed as it is uninterpretable when PTT is normal or <= 5 seconds prolonged.   sec Final    Thrombin time 08/12/2022 20.3  0.0 - 23.0 sec Final    Comment: (NOTE)  Performed At: [facility] & [address] [id number]  Adalberto Gaucher MD OH:[id number]      Homocysteine, plasma 08/12/2022 10.2  3.7 - 13.9 umol/L Final    Cardiolipin Ab, IgG 08/12/2022 <9  0 - 14 GPL U/mL Final    Comment: (NOTE)                           Negative:              <15                           Indeterminate:     15 - 20                           Low-Med Positive: >20 - 80                           High Positive:         >80      Cardiolipin Ab, IgM 08/12/2022 <9  0 - 12 MPL U/mL Final    Comment: (NOTE)                           Negative:              <13                           Indeterminate:     13 - 20                           Low-Med Positive: >20 - 80                           High Positive: >80      Cardiolipin Ab, IgA 08/12/2022 <9  0 - 11 APL U/mL Final    Comment: (NOTE)                           Negative:              <12                           Indeterminate:     12 - 20                           Low-Med Positive: >20 - 80                           High Positive:         >80  Performed At: [facility] [address], [place] [id number]  Monica Edwards MD II:[id number]      Factor V Leiden 08/12/2022 Comment    Final    Comment: (NOTE)  Result: c.1601G>A (p.[id number]) - Not Detected  This result is not associated with an increased risk for venous  thromboembolism. See Additional Clinical Information and  Comments. Additional Clinical Information:  Venous thromboembolism is a multifactorial disease influenced by  genetic, environmental, and circumstantial risk factors. The  c.1601G>A (p. Jeg649Dwo) variant in the F5 gene, commonly referred to  as Factor V Leiden, is a genetic risk factor for venous  thromboembolism. Heterozygous carriers of this variant have a 6- to 8-  fold increased risk for venous thromboembolism. Individuals  homozygous for this variant (ie, with a copy of the variant on each  chromosome) have an approximately 80-fold increased risk for venous  thromboembolism. Individuals who carry both a c.*97G>A variant in the  F2 gene and Factor V Leiden have an approximately 20-fold increased  risk for venous thromboembolism. Risks are likely to be even higher  in more complex genotype combinations in                           volving the F2 c.*97G>A  variant and Factor V Leiden (PMID: 88296036). Additional risk factors  include but are not limited to: deficiency of protein C, protein S,  or antithrombin III, age, male sex, personal or family history of  deep vein thromboembolism, smoking, surgery, prolonged  immobilization, malignant neoplasm, tamoxifen treatment, raloxifene  treatment, oral contraceptive use, hormone replacement therapy, and  pregnancy.  Management of thrombotic risk and thrombotic events should  follow established guidelines and fit the clinical circumstance. This  result cannot predict the occurrence or recurrence of a thrombotic  event. Comment:  Genetic counseling is recommended to discuss the potential clinical  implications of positive results, as well as recommendations for  testing family members. Genetic Coordinators are available for health care providers to  discuss results at [phone] ([phone]). Test Details:  Variant Analyzed: c.1601G>A (p. [id number]), referred to as Fact                           or V  Leiden  Methods/Limitations:  DNA analysis of the F5 gene (NM_000130.5) was performed by PCR  amplification followed by restriction enzyme analysis. The  diagnostic sensitivity is >99%. Results must be combined with  clinical information for the most accurate interpretation. Molecular-  based testing is highly accurate, but as in any laboratory test,  diagnostic errors may occur. False positive or false negative results  may occur for reasons that include genetic variants, blood  transfusions, bone marrow transplantation, somatic or tissue-specific  mosaicism, mislabeled samples, or erroneous representation of family  relationships. This test was developed and its performance characteristics  determined by [facility]. It has not been cleared or approved by the  Food and Drug Administration. References:  Amanda Dorsey; ACMG  Professional Practice and Guidelines Committee. Addendum: [address] consensus statement on fac                           tor V Leiden  mutation testing. Radha Med. 2021 Mar 5. doi: 75.3837/Q96486-411-  86143-w. PMID: 69897675. Jean Marie Castellanos. Factor V Leiden Thrombophilia. 1999 May 14  [Updated 2018 Jan 4]. In: Gloria Prescott, Marcelo MCKEON, et al.,  editors. Estelle(R) [Internet]. [facility] ([facility]): [facility], Megan ; [phone].  Available from:  Chace Mccarthy CS;  [facility] Laboratory  Committee. Venous thromboembolism  laboratory testing (factor V Leiden and factor II c.*97G>A), 2018  update: a technical standard of the [address] ([facility]). Radha Med. 2018 Dec;20(12):9518-7718.  doi: 78.1579/T64940-089-0528-V. Epub 2018 Oct 5. PMID: 04935053. Portia Guillaume, PhD, Dulce Tinoco, PhD  Armen Blancas, PhD, Liborio Delgado, PhD, Cesar Nash, PhD, Jordi Rob, PhD, Tito Kramer, PhD, Amanda Nye,                            PhD, [facility], [facility].  Performed At: [address] [id number]  Ahmed Prom MDPhD WD:[id number]      MTHFR, DNA Analysis 08/12/2022 Comment    Final    Comment: (NOTE)  Result:  c.665C>T (p. Evy027Cre), legacy name: C677T - Detected,  heterozygous c.1286A>C (p. Vsf891Eej), legacy name: [id number] - Not  Detected Interpretation: This result is not associated with an increased risk for  hyperhomocysteinemia. See Additional Clinical Information and  Comments. Additional Clinical Information:  Hyperhomocysteinemia is multifactorial involving genetic, clinical,  and environmental risk factors. Reduced enzyme activity of  methylenetetrahydrofolate reductase (MTHFR) is a genetic risk  factor for hyperhomocysteinemia, particularly when serum folate  levels are low. There are two common variants in the MTHFR gene  that can decrease enzyme activity; c.665C>T (p. Jkq477Fep), legacy  name C677T, and c.1286A>C (p. Gsu093Dlx), legacy name [id number]. These  variants do not independently increase risk of conditions related  to hyperhomocysteinemia in the absence of elevated homocysteine  levels. Measurement of total plasma homocysteine is recommended.   Patients sh                           ould share their MTHFR genotype with physicians who are  making decisions regarding chemotherapy treatments that depend on  folate, such as methotrexate. Guidelines do not recommend genotyping of these two MTHFR variants  in the evaluation of venous thrombosis or obstetric risk due to  limited evidence of clinical utility (PMID: 80652846). Comments:  Genetic Coordinators are available for health care providers to  discuss results at [phone] ([phone]). Test Details:  Variants Analyzed: c.665C>T (p. Lyg217Hgc), legacy name: C677T and  c.1286A>C (p. Xoq511Kxy), legacy name: [id number]  Methods/Limitations:  DNA analysis of the MTHFR gene was performed by PCR amplification  followed by restriction enzyme analysis. The diagnostic sensitivity  is >99%. Results must be combined with clinical information for the  most accurate interpretation. Molecular-based testing is highly  accurate, but as in any laboratory test, diagnostic errors may  occur. False positive or false negative results may                            occur for  reasons that include genetic variants, blood transfusions, bone  marrow transplantation, somatic or tissue-specific mosaicism,  mislabeled samples, or erroneous representation of family  relationships. This test was developed and its performance characteristics  determined by [facility]. It has not been cleared or approved by the  Food and Drug Administration. References:  Octavio Chadwick. ACMG Practice Guideline: lack of  evidence for MTHFR polymorphism testing. Radha Med. 2013 Feb;15(2):153-6. doi: 10.1038/gim. 2012.165. Epub 2013 Igor 3. PMID:  51015024. [employer] of Obstetricians and Gynecologists' Committee on  Practice Bulletins-Obstetrics. ACOG Practice Bulletin No. 197: Inherited Thrombophilias in Pregnancy. Obstet Gynecol. 2018  Jul;132(1):e18-e34. doi: 10.1097/AOG. 0357067550388829. Erratum in:  Obstet Gynecol. 2018 Oct;132(4):1069. PMID: 89146300.   Saba Lucas, PhD, Rosio Valdivia, PhD  Tomás Valdez, PhD, Martinez Willson P                           hD, Radha Dash, PhD, Nathan Santizo, PhD, Keyonna Luke, PhD, Monse Mello, PhD, [facility]  Performed At: [address] [id number]  [facility] TF:[id number]      WBC 08/13/2022 4.7  4.6 - 13.2 K/uL Final    RBC 08/13/2022 4.62  4.35 - 5.65 M/uL Final    HGB 08/13/2022 14.9  13.0 - 16.0 g/dL Final    HCT 08/13/2022 44.4  36.0 - 48.0 % Final    MCV 08/13/2022 96.1  78.0 - 100.0 FL Final    MCH 08/13/2022 32.3  24.0 - 34.0 PG Final    MCHC 08/13/2022 33.6  31.0 - 37.0 g/dL Final    RDW 08/13/2022 12.7  11.6 - 14.5 % Final    PLATELET 89/45/5669 538  135 - 420 K/uL Final    MPV 08/13/2022 9.6  9.2 - 11.8 FL Final    NRBC 08/13/2022 0.0  0  WBC Final    ABSOLUTE NRBC 08/13/2022 0.00  0.00 - 0.01 K/uL Final    NEUTROPHILS 08/13/2022 51  40 - 73 % Final    LYMPHOCYTES 08/13/2022 29  21 - 52 % Final    MONOCYTES 08/13/2022 16 (A)  3 - 10 % Final    EOSINOPHILS 08/13/2022 3  0 - 5 % Final    BASOPHILS 08/13/2022 2  0 - 2 % Final    IMMATURE GRANULOCYTES 08/13/2022 0  0.0 - 0.5 % Final    ABS. NEUTROPHILS 08/13/2022 2.4  1.8 - 8.0 K/UL Final    ABS. LYMPHOCYTES 08/13/2022 1.4  0.9 - 3.6 K/UL Final    ABS. MONOCYTES 08/13/2022 0.7  0.05 - 1.2 K/UL Final    ABS. EOSINOPHILS 08/13/2022 0.1  0.0 - 0.4 K/UL Final    ABS. BASOPHILS 08/13/2022 0.1  0.0 - 0.1 K/UL Final    ABS. IMM.  GRANS. 08/13/2022 0.0  0.00 - 0.04 K/UL Final    DF 08/13/2022 AUTOMATED    Final    Sodium 08/13/2022 135 (A)  136 - 145 mmol/L Final    Potassium 08/13/2022 3.5  3.5 - 5.5 mmol/L Final    Chloride 08/13/2022 102  100 - 111 mmol/L Final    CO2 08/13/2022 23  21 - 32 mmol/L Final    Anion gap 08/13/2022 10  3.0 - 18 mmol/L Final    Glucose 08/13/2022 76  74 - 99 mg/dL Final    BUN 08/13/2022 11  7.0 - 18 MG/DL Final    Creatinine 08/13/2022 0.63  0.6 - 1.3 MG/DL Final    BUN/Creatinine ratio 08/13/2022 17  12 - 20   Final GFR est AA 08/13/2022 >60  >60 ml/min/1.73m2 Final    GFR est non-AA 08/13/2022 >60  >60 ml/min/1.73m2 Final    Calcium 08/13/2022 9.1  8.5 - 10.1 MG/DL Final    Magnesium 08/13/2022 2.0  1.6 - 2.6 mg/dL Final    Prothrombin time 08/13/2022 13.8  11.5 - 15.2 sec Final    INR 08/13/2022 1.0  0.8 - 1.2   Final    Comment:            INR Therapeutic Ranges         (on stable oral anticoagulant):     INDICATION                INR  DVT/PE/Atrial Fib          2.0-3.0  MI/Mechanical Heart Valve  2.5-3.5      aPTT 08/13/2022 27.5  23.0 - 36.4 SEC Final    Fibrinogen 08/13/2022 264  210 - 451 mg/dL Final             ICD-10-CM ICD-9-CM    1. Cervical radiculopathy  M54.12 723.4 EMG LIMITED      2. Carpal tunnel syndrome of left wrist  G56.02 354.0 EMG LIMITED        A 47years old male patient here for follow-up after hospital discharge. Admitted to [facility] in August for transient left upper extremity numbness; mild involvement of the left lower extremity. He still complains of intermittent numbness over the left upper extremity. He now also has some tingling and numbness on the right side. Brain MRI of the brain is unremarkable. Patient did not have any neck pain or pain arising from the neck to the upper extremities. Has bilateral hand numbness and tingling but more on the left side. MRI of the cervical spine was done in the hospital which showed multifocal degenerative changes with moderate to severe spinal canal stenosis with cord compression; slightly more on the left side at C4-5. The intermittent numbness and tingling over the left upper extremity with mild right upper extremity involvement could be from cervical radiculopathy. For additional carpal tunnel syndrome, will get EMG of the upper extremities. Patient needs to follow with the spine surgery. He will continue with aspirin 81 mg p.o. per day; have told him to stop the Plavix. He will continue follow-up with his his primary care provider.   We will see him again when he comes for the EMG.

## 2023-01-20 ENCOUNTER — OFFICE VISIT (OUTPATIENT)
Dept: ORTHOPEDIC SURGERY | Age: 53
End: 2023-01-20
Payer: COMMERCIAL

## 2023-01-20 VITALS
SYSTOLIC BLOOD PRESSURE: 126 MMHG | HEART RATE: 89 BPM | WEIGHT: 125 LBS | OXYGEN SATURATION: 99 % | DIASTOLIC BLOOD PRESSURE: 89 MMHG | HEIGHT: 68 IN | TEMPERATURE: 97 F | BODY MASS INDEX: 18.94 KG/M2

## 2023-01-20 DIAGNOSIS — M54.12 CERVICAL RADICULOPATHY: ICD-10-CM

## 2023-01-20 DIAGNOSIS — R94.131 ABNORMAL EMG: ICD-10-CM

## 2023-01-20 DIAGNOSIS — G56.21 CUBITAL TUNNEL SYNDROME ON RIGHT: ICD-10-CM

## 2023-01-20 DIAGNOSIS — R20.0 NUMBNESS AND TINGLING OF BOTH UPPER EXTREMITIES: ICD-10-CM

## 2023-01-20 DIAGNOSIS — R20.0 NUMBNESS AND TINGLING OF LEFT LOWER EXTREMITY: ICD-10-CM

## 2023-01-20 DIAGNOSIS — R20.2 NUMBNESS AND TINGLING OF LEFT LOWER EXTREMITY: ICD-10-CM

## 2023-01-20 DIAGNOSIS — R20.2 NUMBNESS AND TINGLING OF BOTH UPPER EXTREMITIES: Primary | ICD-10-CM

## 2023-01-20 DIAGNOSIS — R20.0 NUMBNESS AND TINGLING OF BOTH UPPER EXTREMITIES: Primary | ICD-10-CM

## 2023-01-20 DIAGNOSIS — R20.2 NUMBNESS AND TINGLING OF BOTH UPPER EXTREMITIES: ICD-10-CM

## 2023-01-20 NOTE — PROGRESS NOTES
[address]  [address], [address]  [address]  Phone: [phone]  Fax: [phone]        Robinson Dailey  : 1970  PCP: Rahel Ingram MD  2023    ELECTROMYOGRAPHY AND NERVE CONDUCTION STUDIES    Nancie Gomez was referred by Dr. Aleena Maldonado for electrodiagnostic evaluation of numbness and tingling of BUE and LLE. NCV & EMG Findings of BUE:  Evaluation of the left ulnar (ADM) motor and the right ulnar (ADM) motor nerves showed decreased conduction velocity (Abv Elbow-Bel Elbow, L42, R38 m/s). The right ulnar sensory nerve showed reduced amplitude (6 µV). All remaining nerves (as indicated in the following tables) were within normal limits. NCV & EMG Findings of LLE:  Evaluation of the left fibular (EDB) motor nerve showed decreased conduction velocity (Pop Fossa-Bel Fib Head, 40 m/s). All remaining nerves (as indicated in the following tables) were within normal limits. INTERPRETATION  This is an abnormal electrodiagnostic examination. These findings may be consistent with:   Ongoing chronic left C7 cervical radiculopathy  Moderate ulnar mononeuropathy at the right elbow (cubital tunnel syndrome)   Mild ulnar mononeuropathy at the left elbow (cubital tunnel syndrome)     There are no electrodiagnostic findings consistent with lumbar radiculopathy, lumbosacral or brachial plexopathy, myopathy, polyneuropathy or any other cervical radiculopathy or mononeuropathy. CLINICAL INTERPRETATION  His electrodiagnostic findings of left C7 radiculopathy and bilateral ulnar mononeuropathy at the elbow likely explain his bilateral arm symptoms. HISTORY OF PRESENT ILLNESS  Nancie Gomez is a 47 y.o. male. Pt presents today with BUE and LLE EMG evaluation for numbness and tingling of both his hands (L>R) and his left leg for the last few months.  Notes that sxs are most severe in 4th and 5th digits of both hands, where he has difficulty moving those digits as well. Notes that sxs started to worsen in LUE about 3 weeks ago. Pt was in the ED on 8/11/22 for weakness of left side of the body, because of a mini-stroke. Pt noted that his bp was very high that day. He went back to the ED on 9/15/22 for left hand numbness that radiates up RUE. Pt used to be on blood-thinner medications, but was taken off of them because of another medication he takes now. Denies hx of neck surgery, but has been recommended in the past to have surgery performed. Pt is right-handed, but notes he does a lot of things with his left hand. Pt also notes of left leg numbness, especially at his big toe. PAST MEDICAL HISTORY   Past Medical History:   Diagnosis Date    Asthma     Back pain     Elevated blood pressure reading     GERD (gastroesophageal reflux disease)     Left shoulder pain     Low TSH level 04/27/2017    may have mild hyperthyroidism w/u per PCP I will rpt TSH and get freeT4    Low TSH level     Primary hypertension 9/13/2022    Right inguinal hernia     TIA (transient ischemic attack) 8/13/2022       No past surgical history on file. Leydi Vargas MEDICATIONS      Current Outpatient Medications   Medication Sig Dispense Refill    methylPREDNISolone (Medrol, Adrian,) 4 mg tablet Take 1 Tablet by mouth Specific Days and Specific Times. 1 Dose Pack 0    gabapentin (NEURONTIN) 300 mg capsule Take 1 Capsule by mouth two (2) times a day. Max Daily Amount: 600 mg. Causes drowsiness so be sure to avoid working for at least 8 hours while taking the medication 20 Capsule 0    clopidogreL (PLAVIX) 75 mg tab Take 75 mg by mouth daily. amLODIPine (NORVASC) 5 mg tablet Take 1 Tablet by mouth in the morning. 30 Tablet 0    aspirin 81 mg chewable tablet Take 1 Tablet by mouth in the morning. 30 Tablet 0    atorvastatin (LIPITOR) 40 mg tablet Take 1 Tablet by mouth nightly.  30 Tablet 0    OTHER This is to certify that Malcom Rafita was admitted [facility] on 8/11/22 and discharged on 8/13/22, and has been advised to take rest at home for 7 more days and then resume work if symptom free. 1 Each 0    OTHER No driving for at least 3 months and then prior to resuming driving please obtain clearance from your neurologist 1 Each 0        ALLERGIES  No Known Allergies       SOCIAL HISTORY    Social History     Socioeconomic History    Marital status: SINGLE   Tobacco Use    Smoking status: Every Day     Packs/day: 0.25     Types: Cigarettes    Smokeless tobacco: Never    Tobacco comments:     pt states he's cutting back   Substance and Sexual Activity    Alcohol use: Yes     Alcohol/week: 55.0 standard drinks     Types: 35 Cans of beer, 20 Shots of liquor per week     Comment: Occ    Drug use: Yes     Types: Marijuana     Comment: 4 days in 1 week       FAMILY HISTORY    Family History   Problem Relation Age of Onset    Hypertension Mother     Cancer Father     Hypertension Brother     Asthma Daughter     Stroke Paternal Grandfather 79    Heart Attack Neg Hx     Heart Disease Neg Hx          PHYSICAL EXAMINATION  There were no vitals taken for this visit. No flowsheet data found. Constitutional:  Well developed, well nourished, in no acute distress. Psychiatric: Affect and mood are appropriate. Integumentary: No rashes or abrasions noted on exposed areas. SPINE/MUSCULOSKELETAL EXAM    On brief examination: None.       NCV & EMG Findings of BUE:  NCS+  Motor Nerve Results      Latency Amplitude F-Lat Segment Distance CV Comment   Site (ms) Norm (mV) Norm (ms)  (cm) (m/s) Norm    Left Median (APB) Motor   Wrist 3.9  < 4.7 13.4  > 4.2  Wrist-APB 8      Elbow 8.4 - 13.2 -  Elbow-Wrist 24 53  > 47    Right Median (APB) Motor   Wrist 3.4  < 4.7 10.9  > 4.2  Wrist-APB 8      Elbow 7.7 - 10.6 -  Elbow-Wrist 22.5 52  > 47    Left Ulnar (ADM) Motor   Wrist 3.3  < 3.7 9.1  > 7.9  Wrist-ADM 8      Bel Elbow 6.9 - 9.0 -  Bel Elbow-Wrist 19.5 54  > 52    Abv Elbow 9.3 - 8.9 -  Abv Elbow-Bel Elbow 10 42  > 43    Right Ulnar (ADM) Motor   Wrist 2.7  < 3.7 8.8  > 7.9  Wrist-ADM 8      Bel Elbow 6.0 - 7.5 -  Bel Elbow-Wrist 19.5 59  > 52    Abv Elbow 8.6 - 6.7 -  Abv Elbow-Bel Elbow 10 38  > 43      Sensory Sites       Latency (Onset) Latency (Peak)  Amplitude (O-P) Segment Distance CV (Onset) Comment   Site ms Norm (ms) Norm µV Norm  mm m/s Norm    Left Median Sensory   Wrist-Dig II 2.4  < 3.3 3.1  < 4.0 22  > 9 Wrist-Dig II 14 58 -    Right Median Sensory   Wrist-Dig II 2.3  < 3.3 3.0  < 4.0 11  > 9 Wrist-Dig II 14 61 -    Left Radial Sensory   Forearm-Wrist 1.75  < 2.2 2.3  < 2.8 37  > 7 Forearm-Wrist 10 57 -    Right Radial Sensory   Forearm-Wrist 1.70  < 2.2 2.2  < 2.8 25  > 7 Forearm-Wrist 10 59 -    Left Ulnar Sensory   Wrist-Dig V 2.7  < 3.1 3.6  < 4.0 15  > 9 Wrist-Dig V 14 52 -    Right Ulnar Sensory   Wrist-Dig V 2.7  < 3.1 3.5  < 4.0 6  > 9 Wrist-Dig V 14 52 -      EMG+     Side Muscle Nerve Root Ins Act Fibs Psw Fascics Other Amp Dur Poly Recrt Activation Comment Misc   Right Biceps Musculocut C5-C6 Nml Nml Nml Nml 0 Nml Nml 0 Nml Nml     Right Triceps Radial C6-C8 Nml Nml Nml Nml 0 Nml Nml 0 Nml Nml     Right Pronator Teres Median C6-C7 Nml Nml Nml Nml 0 Nml Nml 0 Nml Nml     Right FDI Median,  Ulnar C8-T1 Nml Nml Nml Nml 0 Nml Nml 0 Nml Nml     Right APB Median C8-T1 Nml Nml Nml Nml 0 Nml Nml 0 Nml Nml     Right Cervical Parasp (Upper) Rami C1-C3 Nml Nml Nml Nml 0          Right Cervical Parasp (Mid) Rami C4-C6 Nml Nml Nml Nml 0          Right Cervical Parasp (Lower) Rami C7-C8 Nml Nml Nml Nml 0          Left Biceps Musculocut C5-C6 Nml Nml Nml Nml 0 Nml Nml 0 Nml Nml     Left Triceps Radial C6-C8 Incr 1+ 1+ Nml 0 1+ Nml 1+ Sl. Red Nml     Left Pronator Teres Median C6-C7 Incr Nml 1+ Nml 0 1+ Nml 1+ Sl. Red Nml     Left FDI Median,  Ulnar C8-T1 Nml Nml Nml Nml 0 Nml Nml 0 Nml Nml     Left APB Median C8-T1 Nml Nml Nml Nml 0 Nml Nml 0 Nml Nml     Left Brachiorad Radial C5-C6 Nml Nml Nml Nml 0 Nml Nml 0 Nml Nml     Left Cervical Parasp (Upper) Rami C1-C3 Nml Nml Nml Nml 0          Left Cervical Parasp (Mid) Rami C4-C6 Nml Nml Nml Nml 0          Left Cervical Parasp (Lower) Rami C7-C8 Nml Nml Nml Nml 0                  Waveforms:    Motor                Sensory                      NCV & EMG Findings of LLE:  NCS+  Motor Nerve Results      Latency Amplitude F-Lat Segment Distance CV Comment   Site (ms) Norm (mV) Norm (ms)  (cm) (m/s) Norm    Left Fibular (EDB) Motor   Ankle 3.9  < 6.5 7.5  > 1.10 50.6 Ankle-EDB 8      Bel Fib Head 10.8 - 7.3 -  Bel Fib Head-Ankle 32 46  > 36    Pop Fossa 12.3 - 8.2 -  Pop Fossa-Bel Fib Head 6 40  > 42    Left Tibial (AHB) Motor   Ankle 4.1  < 6.1 10.4  > 5.3  Ankle-AHB 8      Knee 12.7 - 7.7 -  Knee-Ankle 38.5 45  > 34      Sensory Sites       Latency (Onset) Latency (Peak)  Amplitude (O-P) Segment Distance CV (Onset) Comment   Site ms Norm (ms) Norm µV Norm  mm m/s Norm    Left Superficial Fibular Sensory   14 cm-Ankle 3.3 - 4.1  < 4.2 14 - 14 cm-Ankle 14 42 -    Left Sural Sensory   Calf-Lat Mall 2.9  < 3.6 3.6  < 4.5 11 - Calf-Lat Mall 14 48 -      EMG+     Side Muscle Nerve Root Ins Act Fibs Psw Fascics Other Amp Dur Poly Recrt Activation Comment Misc   Left Vastus Med Femoral L2-L4 Nml Nml Nml Nml 0 Nml Nml 0 Nml Nml     Left Tib Anterior Fibular,  Deep Fibula. .. L4-L5 Nml Nml Nml Nml 0 Nml Nml 0 Nml Nml     Left Fib Longus Fibular,  Superficial...  L5-S1 Nml Nml Nml Nml 0 Nml Nml 0 Nml Nml     Left Tib Posterior Tibial L5-S1 Nml Nml Nml Nml 0 Nml Nml 0 Nml Nml     Left Gastroc MH Tibial S1-S2 Nml Nml Nml Nml 0 Nml Nml 0 Nml Nml     Left Lumbo Parasp (Upper) Rami L1-L2 Nml Nml Nml Nml 0          Left Lumbo Parasp (Mid) Rami L3-L4 Nml Nml Nml Nml 0          Left Lumbo Parasp (Lower) Rami L5-S1 Nml Nml Nml Nml 0                  Waveforms:    Motor         F-Wave       Sensory               [facility]  OFFICE PROCEDURE PROGRESS NOTE        Chart reviewed for the following:   Beltran OCAMPO, have reviewed the History, Physical and updated the Allergic reactions for SUZY Cheatham 54 performed immediately prior to start of procedure:   Aracely Christian, have performed the following reviews on Karli Corona prior to the start of the procedure:            * Patient was identified by name and date of birth   * Agreement on procedure being performed was verified  * Risks and Benefits explained to the patient  * Procedure site verified and marked as necessary  * Patient was positioned for comfort  * Consent was signed and verified     Time: 2:54 PM     Date of procedure: 1/20/2023    Procedure performed by:  Namita Cole MD    Provider accompanied by: Dayana.     Patient accompanied by another individual: No    How tolerated by patient: tolerated the procedure well with no complications    Post Procedural Pain Scale: 0 - No Hurt    Comments: none    Written by Titi Vasquez as dictated by Beltran Ordonez MD

## 2023-01-20 NOTE — PROGRESS NOTES
Dinah Whitney presents today for   Chief Complaint   Patient presents with    Arm Pain       Is someone accompanying this pt? no    Is the patient using any DME equipment during [address]? no    Depression Screening:  3 most recent PHQ Screens 9/13/2022   Little interest or pleasure in doing things Not at all   Feeling down, depressed, irritable, or hopeless Not at all   Total Score PHQ 2 0       Learning Assessment:  Learning Assessment 9/13/2022   PRIMARY LEARNER Patient   HIGHEST LEVEL OF EDUCATION - PRIMARY LEARNER  -   BARRIERS PRIMARY LEARNER -   CO-LEARNER CAREGIVER -   PRIMARY LANGUAGE ENGLISH   LEARNER PREFERENCE PRIMARY DEMONSTRATION     -   ANSWERED BY patient   RELATIONSHIP SELF       Abuse Screening:  Abuse Screening Questionnaire 9/13/2022   Do you ever feel afraid of your partner? N   Are you in a relationship with someone who physically or mentally threatens you? N   Is it safe for you to go home? Y       Fall Risk  No flowsheet data found. OPIOID RISK TOOL  No flowsheet data found. Coordination of Care:  1. Have you been to the ER, urgent care clinic since your last visit? no  Hospitalized since your last visit? no    2. Have you seen or consulted any other health care providers outside of the [address] [place] [address] since your last visit? no Include any pap smears or colon screening.  no

## 2023-01-20 NOTE — LETTER
NOTIFICATION RETURN TO WORK / SCHOOL    1/20/2023 2:52 PM    Mr. Sukhwinder Rivera  [address] [place] [address],[address] [id number]      To Whom It May Concern:    Sukhwinder Rivera is currently under the care of [facility]. Sukhwinder Rivera was seen in the office today on 01/20/23 for an appointment. If there are questions or concerns please have the patient contact our office.       Sincerely,      Rodrigo Crump MD

## 2023-02-13 ENCOUNTER — OFFICE VISIT (OUTPATIENT)
Age: 53
End: 2023-02-13
Payer: COMMERCIAL

## 2023-02-13 VITALS
TEMPERATURE: 99.1 F | HEART RATE: 96 BPM | HEIGHT: 68 IN | OXYGEN SATURATION: 96 % | DIASTOLIC BLOOD PRESSURE: 82 MMHG | WEIGHT: 129.4 LBS | SYSTOLIC BLOOD PRESSURE: 122 MMHG | BODY MASS INDEX: 19.61 KG/M2 | RESPIRATION RATE: 20 BRPM

## 2023-02-13 DIAGNOSIS — G56.23 ULNAR NEUROPATHY OF BOTH UPPER EXTREMITIES: ICD-10-CM

## 2023-02-13 DIAGNOSIS — M54.12 CERVICAL RADICULOPATHY: Primary | ICD-10-CM

## 2023-02-13 PROCEDURE — 3074F SYST BP LT 130 MM HG: CPT | Performed by: STUDENT IN AN ORGANIZED HEALTH CARE EDUCATION/TRAINING PROGRAM

## 2023-02-13 PROCEDURE — 99214 OFFICE O/P EST MOD 30 MIN: CPT | Performed by: STUDENT IN AN ORGANIZED HEALTH CARE EDUCATION/TRAINING PROGRAM

## 2023-02-13 PROCEDURE — 3079F DIAST BP 80-89 MM HG: CPT | Performed by: STUDENT IN AN ORGANIZED HEALTH CARE EDUCATION/TRAINING PROGRAM

## 2023-02-13 RX ORDER — DULOXETIN HYDROCHLORIDE 60 MG/1
60 CAPSULE, DELAYED RELEASE ORAL DAILY
Qty: 30 CAPSULE | Refills: 3 | Status: SHIPPED | OUTPATIENT
Start: 2023-02-13 | End: 2023-03-15

## 2023-02-13 RX ORDER — ASPIRIN 81 MG/1
81 TABLET, CHEWABLE ORAL DAILY
COMMUNITY
Start: 2022-08-14

## 2023-02-13 RX ORDER — IBUPROFEN 600 MG/1
600 TABLET ORAL EVERY 12 HOURS PRN
Qty: 40 TABLET | Refills: 2 | Status: SHIPPED | OUTPATIENT
Start: 2023-02-13

## 2023-02-13 NOTE — LETTER
2/13/2023        Marybel Mack  [facility] [employer] [id number]      RETURN TO WORK    To Whom It May Concern:    Marybel Mack, date of birth 1970, is under the care of [facility]. He is released to return to work or school on 2/14/2023 with the following restrictions on activity: none. Please have the patient contact our office if there are questions or concerns.       Sincerely,      Sherri Douglass MD

## 2023-02-13 NOTE — PROGRESS NOTES
Eliu Kelly is a 47 y.o. male . presents for follow-up of cervical radiculopathy/myelopathy and left upper extremity sensory symptoms. He was seen in the clinic in September. For possible compressive neuropathy, EMG of the upper extremities was ordered. The EMG was done by Dr. Alma Zafar on January 20: Evidence for left C7 radiculopathy; moderate ulnar neuropathy at the right elbow and mild ulnar neuropathy at the left elbow; normal median nerve. Patient denied any symptoms over the right extremity. He still complains of numbness and tingling over the left fourth and fifth fingers. Also complains of left upper extremity weakness; difficulty extending his elbow and handgrip. Also complains of neck pain which radiates to the left upper extremity. He also complains of general left lower extremity weakness and numbness. No recent falls. From initial encounter:  A 46 yo male patient with medical history of asthma, GERD, hypertension here for follow-up after hospital discharge. Patient was admitted to [facility] on August 11, 2022 after presenting with numbness over the left-sided weakness; in addition was feeling unsteady/dizzy when trying to walk. Numbness was involving both the left upper and lower extremities. No obvious weakness. No facial involvement and no significant change in his speech. MRI of the brain did not show any acute stroke. MRI of the cervical spine was done which showed degenerative changes with facet hypertrophy causing central canal stenosis and cord compression; multilevel foraminal stenosis also seen. Had a CT angiography which was unremarkable. Transthoracic echo showed a positive bubble study [grade 1; with provocation]; otherwise no thrombus and has a normal ejection fraction. Patient was discharged with aspirin and Plavix to be taken for 21 days; but he is still taking both. Was seen by spine surgery; outpatient follow-up recommended.   He continues to have intermittent numbness over his left upper extremity. After hospital discharge, also started to have tingling and numbness feeling when trying to reach for something with his right upper extremity. No problem with his . No lower extremity weakness. Patient had nighttime numbness over the left toes. No problem walking. No falls recently. He denied having any neck pain or pain radiating from the neck to the arm. Hands feel tingly and numb but more on the left side. Occasional complaints of tremor when holding something; mostly when nervous. No problem eating. Review of Systems   Constitutional:  Negative for chills, fever. HENT:  Negative for hearing loss and tinnitus. Eyes:  Negative for blurred vision and double vision. Respiratory:  Negative for cough and shortness of breath. Cardiovascular:  Negative for chest pain and leg swelling. Gastrointestinal:  Negative for nausea and vomiting. Genitourinary:  Negative for dysuria, frequency and urgency. Incomplete emptying. Musculoskeletal:  Positive for back pain and neck pain. Skin:  Negative for itching and rash. Neurological:  Positive for dizziness (Sometimes), tingling, tremors, sensory change and headaches. Negative for speech change, focal weakness and seizures. Psychiatric/Behavioral:  The patient is nervous/anxious. Past Medical History:   Diagnosis Date    Asthma     Back pain     Elevated blood pressure reading     GERD (gastroesophageal reflux disease)     Left shoulder pain     Low TSH level 04/27/2017    may have mild hyperthyroidism w/u per PCP I will rpt TSH and get freeT4    Low TSH level     Primary hypertension 9/13/2022    Right inguinal hernia     TIA (transient ischemic attack) 8/13/2022       No past surgical history on file.      Family History   Problem Relation Age of Onset    Hypertension Mother     Cancer Father     Hypertension Brother     Asthma Daughter     Stroke Paternal Grandfather 79    Heart Attack Neg Hx Heart Disease Neg Hx         Social History     Socioeconomic History    Marital status: SINGLE     Spouse name: Not on file    Number of children: Not on file    Years of education: Not on file    Highest education level: Not on file   Occupational History    Not on file   Tobacco Use    Smoking status: Every Day     Packs/day: 0.25     Types: Cigarettes    Smokeless tobacco: Never    Tobacco comments:     pt states he's cutting back   Substance and Sexual Activity    Alcohol use: Yes     Alcohol/week: 55.0 standard drinks     Types: 35 Cans of beer, 20 Shots of liquor per week     Comment: Occ    Drug use: Yes     Types: Marijuana     Comment: 4 days in 1 week    Sexual activity: Not on file   Other Topics Concern    Not on file   Social History Narrative    Not on file     Social Determinants of Health     Financial Resource Strain: Not on file   Food Insecurity: Not on file   Transportation Needs: Not on file   Physical Activity: Not on file   Stress: Not on file   Social Connections: Not on file   Intimate Partner Violence: Not on file   Housing Stability: Not on file        No Known Allergies      Current Outpatient Medications   Medication Sig Dispense Refill    methylPREDNISolone (Medrol, Enrique,) 4 mg tablet Take 1 Tablet by mouth Specific Days and Specific Times. 1 Dose Pack 0    gabapentin (NEURONTIN) 300 mg capsule Take 1 Capsule by mouth two (2) times a day. Max Daily Amount: 600 mg. Causes drowsiness so be sure to avoid working for at least 8 hours while taking the medication 20 Capsule 0    clopidogreL (Plavix) 75 mg tab Take 75 mg by mouth daily. amLODIPine (NORVASC) 5 mg tablet Take 1 Tablet by mouth in the morning. 30 Tablet 0    aspirin 81 mg chewable tablet Take 1 Tablet by mouth in the morning. 30 Tablet 0    atorvastatin (LIPITOR) 40 mg tablet Take 1 Tablet by mouth nightly.  30 Tablet 0    OTHER This is to certify that Lisa Kolb was admitted to [facility] on 8/11/22 and discharged on 8/13/22, and has been advised to take rest at home for 7 more days and then resume work if symptom free. 1 Each 0    OTHER No driving for at least 3 months and then prior to resuming driving please obtain clearance from your neurologist 1 Each 0         Physical Exam  Constitutional:       Appearance: Normal appearance. HENT:      Head: Normocephalic and atraumatic. Mouth/Throat:      Mouth: Mucous membranes are moist.      Pharynx: Oropharynx is clear. No oropharyngeal exudate. Eyes:      Extraocular Movements: Extraocular movements intact. Pupils: Pupils are equal, round, and reactive to light. Pulmonary:      Effort: Pulmonary effort is normal. No respiratory distress. Musculoskeletal:         General: Normal range of motion. Cervical back: Normal range of motion and neck supple. Right lower leg: No edema. Left lower leg: No edema. Neurological:      Mental Status: He is alert. Comments: Mental status: Awake, alert, oriented x3, follows simple and complex commands, no neglect, no extinction. Speech and languge: fluent, coherent,  and comprehension intact  CN: VFF, EOMI, PERRLA, face sensation intact , no facial asymmetry noted, palate elevation symmetric bilat, SS+SCM 5/5 bilat, tongue midline  Motor: no pronator drift, tone normal throughout, strength 5/5 throughout except the left upper extremity: Triceps 4/5; hand  4+/5. Sensory: Decreased light touch and prick over the left upper extremity mainly over the ulnar nerve distribution.   Coordination: FNF, HS accurate w/o dysmetria  DTR: 2+ throughout  Gait: normal.         Admission on 09/15/2022, Discharged on 09/15/2022   Component Date Value Ref Range Status    WBC 09/15/2022 4.9  4.6 - 13.2 K/uL Final    RBC 09/15/2022 4.63  4.35 - 5.65 M/uL Final    HGB 09/15/2022 14.6  13.0 - 16.0 g/dL Final    HCT 09/15/2022 43.6  36.0 - 48.0 % Final    MCV 09/15/2022 94.2  78.0 - 100.0 FL Final    [facility] 09/15/2022 31.5  24.0 - 34.0 PG Final    MCHC 09/15/2022 33.5  31.0 - 37.0 g/dL Final    RDW 09/15/2022 12.7  11.6 - 14.5 % Final    PLATELET 09/19/8817 231  135 - 420 K/uL Final    MPV 09/15/2022 9.5  9.2 - 11.8 FL Final    NRBC 09/15/2022 0.0  0  WBC Final    ABSOLUTE NRBC 09/15/2022 0.00  0.00 - 0.01 K/uL Final    NEUTROPHILS 09/15/2022 54  40 - 73 % Final    LYMPHOCYTES 09/15/2022 31  21 - 52 % Final    MONOCYTES 09/15/2022 12 (A)  3 - 10 % Final    EOSINOPHILS 09/15/2022 2  0 - 5 % Final    BASOPHILS 09/15/2022 1  0 - 2 % Final    IMMATURE GRANULOCYTES 09/15/2022 0  0.0 - 0.5 % Final    ABS. NEUTROPHILS 09/15/2022 2.6  1.8 - 8.0 K/UL Final    ABS. LYMPHOCYTES 09/15/2022 1.5  0.9 - 3.6 K/UL Final    ABS. MONOCYTES 09/15/2022 0.6  0.05 - 1.2 K/UL Final    ABS. EOSINOPHILS 09/15/2022 0.1  0.0 - 0.4 K/UL Final    ABS. BASOPHILS 09/15/2022 0.1  0.0 - 0.1 K/UL Final    ABS. IMM.  GRANS. 09/15/2022 0.0  0.00 - 0.04 K/UL Final    DF 09/15/2022 AUTOMATED    Final    Prothrombin time 09/15/2022 12.7  11.5 - 15.2 sec Final    INR 09/15/2022 0.9  0.8 - 1.2   Final    Comment:            INR Therapeutic Ranges         (on stable oral anticoagulant):     INDICATION                INR  DVT/PE/Atrial Fib          2.0-3.0  MI/Mechanical Heart Valve  2.5-3.5      Sodium 09/15/2022 137  136 - 145 mmol/L Final    Potassium 09/15/2022 4.1  3.5 - 5.5 mmol/L Final    Chloride 09/15/2022 103  100 - 111 mmol/L Final    CO2 09/15/2022 26  21 - 32 mmol/L Final    Anion gap 09/15/2022 8  3.0 - 18 mmol/L Final    Glucose 09/15/2022 97  74 - 99 mg/dL Final    BUN 09/15/2022 9  7.0 - 18 MG/DL Final    Creatinine 09/15/2022 0.63  0.6 - 1.3 MG/DL Final    BUN/Creatinine ratio 09/15/2022 14  12 - 20   Final    GFR est AA 09/15/2022 >60  >60 ml/min/1.73m2 Final    GFR est non-AA 09/15/2022 >60  >60 ml/min/1.73m2 Final    Comment: (NOTE)  Estimated GFR is calculated using the Modification of Diet in Renal   Disease (MDRD) Study equation, reported for both  Americans   (GFRAA) and non- Americans (GFRNA), and normalized to 1.73m2   body surface area. The physician must decide which value applies to   the patient. The MDRD study equation should only be used in   individuals age 25 or older. It has not been validated for the   following: pregnant women, patients with serious comorbid conditions,   or on certain medications, or persons with extremes of body size,   muscle mass, or nutritional status. Calcium 09/15/2022 9.6  8.5 - 10.1 MG/DL Final    Bilirubin, total 09/15/2022 1.2 (A)  0.2 - 1.0 MG/DL Final    ALT (SGPT) 09/15/2022 23  16 - 61 U/L Final    AST (SGOT) 09/15/2022 25  10 - 38 U/L Final    Alk. phosphatase 09/15/2022 59  45 - 117 U/L Final    Protein, total 09/15/2022 7.4  6.4 - 8.2 g/dL Final    Albumin 09/15/2022 4.0  3.4 - 5.0 g/dL Final    Globulin 09/15/2022 3.4  2.0 - 4.0 g/dL Final    A-G Ratio 09/15/2022 1.2  0.8 - 1.7   Final    aPTT 09/15/2022 26.8  23.0 - 36.4 SEC Final    Glucose (POC) 09/15/2022 103  70 - 110 mg/dL Final    Comment: (NOTE)  The FDA has indicated that no capillary point of care blood glucose   monitoring systems are approved for use in \"critically ill\" patients,   however they have not defined this population. The College of   American Pathologists has recommended that these devices should not   be used in cases such as severe hypotension, dehydration, shock, and   hyperglycemic-hyperosmolar state, amongst others. Venous or arterial   collection is the recommended specimen for testing these patients.       Ventricular Rate 09/15/2022 79  BPM Final    Atrial Rate 09/15/2022 79  BPM Final    P-R Interval 09/15/2022 138  ms Final    QRS Duration 09/15/2022 82  ms Final    Q-T Interval 09/15/2022 374  ms Final    QTC Calculation (Bezet) 09/15/2022 428  ms Final    Calculated P Axis 09/15/2022 77  degrees Final    Calculated R Axis 09/15/2022 9  degrees Final    Calculated T Axis 09/15/2022 33  degrees Final Diagnosis 09/15/2022    Final                    Value:Normal sinus rhythm  Minimal voltage criteria for LVH, may be normal variant ( Sokolow-Miramontes )  Nonspecific T wave abnormality  Abnormal ECG  When compared with ECG of 12-AUG-2022 00:18,  RSR' pattern in V1 is no longer present  Criteria for Septal infarct are no longer present  Confirmed by Brenda Fritz ([id number]) on 9/16/2022 8:13:13 AM     Admission on 08/11/2022, Discharged on 08/13/2022   Component Date Value Ref Range Status    Sodium 08/11/2022 143  136 - 145 mmol/L Final    Potassium 08/11/2022 3.4 (A)  3.5 - 5.5 mmol/L Final    Chloride 08/11/2022 110  100 - 111 mmol/L Final    CO2 08/11/2022 25  21 - 32 mmol/L Final    Anion gap 08/11/2022 8  3.0 - 18 mmol/L Final    Glucose 08/11/2022 122 (A)  74 - 99 mg/dL Final    BUN 08/11/2022 10  7.0 - 18 MG/DL Final    Creatinine 08/11/2022 0.63  0.6 - 1.3 MG/DL Final    BUN/Creatinine ratio 08/11/2022 16  12 - 20   Final    GFR est AA 08/11/2022 >60  >60 ml/min/1.73m2 Final    GFR est non-AA 08/11/2022 >60  >60 ml/min/1.73m2 Final    Comment: (NOTE)  Estimated GFR is calculated using the Modification of Diet in Renal   Disease (MDRD) Study equation, reported for both  Americans   (GFRAA) and non- Americans (GFRNA), and normalized to 1.73m2   body surface area. The physician must decide which value applies to   the patient. The MDRD study equation should only be used in   individuals age 25 or older. It has not been validated for the   following: pregnant women, patients with serious comorbid conditions,   or on certain medications, or persons with extremes of body size,   muscle mass, or nutritional status.       Calcium 08/11/2022 8.7  8.5 - 10.1 MG/DL Final    WBC 08/11/2022 4.0 (A)  4.6 - 13.2 K/uL Final    RBC 08/11/2022 4.19 (A)  4.35 - 5.65 M/uL Final    HGB 08/11/2022 13.5  13.0 - 16.0 g/dL Final    HCT 08/11/2022 40.3  36.0 - 48.0 % Final    MCV 08/11/2022 96.2  78.0 - 100.0 FL Final    MCH 08/11/2022 32.2  24.0 - 34.0 PG Final    MCHC 08/11/2022 33.5  31.0 - 37.0 g/dL Final    RDW 08/11/2022 13.1  11.6 - 14.5 % Final    PLATELET 63/78/1557 741  135 - 420 K/uL Final    MPV 08/11/2022 8.9 (A)  9.2 - 11.8 FL Final    NRBC 08/11/2022 0.0  0  WBC Final    ABSOLUTE NRBC 08/11/2022 0.00  0.00 - 0.01 K/uL Final    NEUTROPHILS 08/11/2022 44  40 - 73 % Final    LYMPHOCYTES 08/11/2022 39  21 - 52 % Final    MONOCYTES 08/11/2022 11 (A)  3 - 10 % Final    EOSINOPHILS 08/11/2022 4  0 - 5 % Final    BASOPHILS 08/11/2022 2  0 - 2 % Final    IMMATURE GRANULOCYTES 08/11/2022 0  0.0 - 0.5 % Final    ABS. NEUTROPHILS 08/11/2022 1.8  1.8 - 8.0 K/UL Final    ABS. LYMPHOCYTES 08/11/2022 1.5  0.9 - 3.6 K/UL Final    ABS. MONOCYTES 08/11/2022 0.4  0.05 - 1.2 K/UL Final    ABS. EOSINOPHILS 08/11/2022 0.2  0.0 - 0.4 K/UL Final    ABS. BASOPHILS 08/11/2022 0.1  0.0 - 0.1 K/UL Final    ABS. IMM. GRANS. 08/11/2022 0.0  0.00 - 0.04 K/UL Final    DF 08/11/2022 AUTOMATED    Final    BENZODIAZEPINES 08/11/2022 Negative  NEG   Final    BARBITURATES 08/11/2022 Negative  NEG   Final    THC (TH-CANNABINOL) 08/11/2022 Positive (A)  NEG   Final    OPIATES 08/11/2022 Negative  NEG   Final    PCP(PHENCYCLIDINE) 08/11/2022 Negative  NEG   Final    COCAINE 08/11/2022 Negative  NEG   Final    AMPHETAMINES 08/11/2022 Negative  NEG   Final    METHADONE 08/11/2022 Negative  NEG   Final    HDSCOM 08/11/2022 (NOTE)   Final    Comment: Specimen analysis was performed without chain of custody handling. These results should be used for medical purposes only and not for   legal or employment purposes. Unconfirmed screening results must not   be used for non-medical purposes.     The cut-off concentration for positive results are as follows:    AMPH     1000 ng/mL  MARCO ANTONIO      200 ng/mL  NARCISO      200 ng/mL  MALINI       300 ng/mL  METH      300 ng/mL  OPI       300 ng/mL  PCP        25 ng/mL  THC        50 ng/mL        LIPID PROFILE 08/11/2022 Final    Cholesterol, total 08/11/2022 216 (A)  <200 MG/DL Final    Triglyceride 08/11/2022 69  <150 MG/DL Final    Comment: The drugs N-acetylcysteine (NAC) and  Metamiszole have been found to cause falsely  low results in this chemical assay. Please  be sure to submit blood samples obtained  BEFORE administration of either of these  drugs to assure correct results. HDL Cholesterol 08/11/2022 127 (A)  40 - 60 MG/DL Final    LDL, calculated 08/11/2022 75.2  0 - 100 MG/DL Final    VLDL, calculated 08/11/2022 13.8  MG/DL Final    CHOL/HDL Ratio 08/11/2022 1.7  0 - 5.0   Final    Hemoglobin A1c 08/11/2022 5.5  4.2 - 5.6 % Final    Comment: (NOTE)  HbA1C Interpretive Ranges  <5.7              Normal  5.7 - 6.4         Consider Prediabetes  >6.5              Consider Diabetes      Est. average glucose 08/11/2022 111  mg/dL Final    Comment: (NOTE)  The eAG should be interpreted with patient characteristics in mind   since ethnicity, interindividual differences, red cell lifespan,   variation in rates of glycation, etc. may affect the validity of the   calculation.       IVSd 08/12/2022 0.9  0.6 - 1.0 cm Final    LVIDd 08/12/2022 3.7 (A)  4.2 - 5.9 cm Final    LVIDs 08/12/2022 2.8  cm Final    LVOT Diameter 08/12/2022 2.0  cm Final    LVPWd 08/12/2022 1.0  0.6 - 1.0 cm Final    LVOT Peak Gradient 08/12/2022 3  mmHg Final    LVOT Mean Gradient 08/12/2022 2  mmHg Final    LVOT SV 08/12/2022 54.0  ml Final    LVOT Peak Velocity 08/12/2022 0.9  m/s Final    LVOT VTI 08/12/2022 17.2  cm Final    LA Volume A/L 08/12/2022 42  mL Final    LA Volume 2C 08/12/2022 29  18 - 58 mL Final    LA Volume 4C 08/12/2022 43  18 - 58 mL Final    MV A Velocity 08/12/2022 0.71  m/s Final    MV E Wave Deceleration Time 08/12/2022 227.4  ms Final    MV E Velocity 08/12/2022 0.60  m/s Final    LV E' Lateral Velocity 08/12/2022 13  cm/s Final    LV E' Septal Velocity 08/12/2022 10  cm/s Final    TR Peak Gradient 08/12/2022 16  mmHg Final    TR Max Velocity 08/12/2022 1.97  m/s Final    Aortic Root 08/12/2022 3.9  cm Final    Fractional Shortening 2D 08/12/2022 24  28 - 44 % Final    LVIDd Index 08/12/2022 2.08  cm/m2 Final    LVIDs Index 08/12/2022 1.57  cm/m2 Final    LV RWT Ratio 08/12/2022 0.54   Final    LV Mass 2D 08/12/2022 104.6  88 - 224 g Final    LV Mass 2D Index 08/12/2022 58.7  49 - 115 g/m2 Final    MV E/A 08/12/2022 0.85   Final    E/E' Ratio (Averaged) 08/12/2022 5.31   Final    E/E' Lateral 08/12/2022 4.62   Final    E/E' Septal 08/12/2022 6.00   Final    LA Volume Index A/L 08/12/2022 24  16 - 34 mL/m2 Final    LVOT Stroke Volume Index 08/12/2022 30.3  mL/m2 Final    LVOT Area 08/12/2022 3.1  cm2 Final    LA Volume Index 2C 08/12/2022 16  16 - 34 mL/m2 Final    LA Volume Index 4C 08/12/2022 24  16 - 34 mL/m2 Final    Ao Root Index 08/12/2022 2.19  cm/m2 Final    Est. RA Pressure 08/12/2022 3  mmHg Final    RVSP 08/12/2022 19  mmHg Final    WBC 08/12/2022 5.8  4.6 - 13.2 K/uL Final    RBC 08/12/2022 4.28 (A)  4.35 - 5.65 M/uL Final    HGB 08/12/2022 13.8  13.0 - 16.0 g/dL Final    HCT 08/12/2022 41.0  36.0 - 48.0 % Final    MCV 08/12/2022 95.8  78.0 - 100.0 FL Final    MCH 08/12/2022 32.2  24.0 - 34.0 PG Final    MCHC 08/12/2022 33.7  31.0 - 37.0 g/dL Final    RDW 08/12/2022 12.9  11.6 - 14.5 % Final    PLATELET 73/54/5923 073  135 - 420 K/uL Final    MPV 08/12/2022 9.3  9.2 - 11.8 FL Final    NRBC 08/12/2022 0.0  0  WBC Final    ABSOLUTE NRBC 08/12/2022 0.00  0.00 - 0.01 K/uL Final    Sodium 08/12/2022 137  136 - 145 mmol/L Final    Potassium 08/12/2022 3.6  3.5 - 5.5 mmol/L Final    Chloride 08/12/2022 105  100 - 111 mmol/L Final    CO2 08/12/2022 26  21 - 32 mmol/L Final    Anion gap 08/12/2022 6  3.0 - 18 mmol/L Final    Glucose 08/12/2022 123 (A)  74 - 99 mg/dL Final    BUN 08/12/2022 12  7.0 - 18 MG/DL Final    Creatinine 08/12/2022 0.72  0.6 - 1.3 MG/DL Final    BUN/Creatinine ratio 08/12/2022 17  12 - 20   Final    GFR est AA 08/12/2022 >60  >60 ml/min/1.73m2 Final    GFR est non-AA 08/12/2022 >60  >60 ml/min/1.73m2 Final    Calcium 08/12/2022 8.7  8.5 - 10.1 MG/DL Final    Ventricular Rate 08/12/2022 73  BPM Final    Atrial Rate 08/12/2022 73  BPM Final    P-R Interval 08/12/2022 136  ms Final    QRS Duration 08/12/2022 86  ms Final    Q-T Interval 08/12/2022 410  ms Final    QTC Calculation (Bezet) 08/12/2022 451  ms Final    Calculated P Axis 08/12/2022 83  degrees Final    Calculated R Axis 08/12/2022 -13  degrees Final    Calculated T Axis 08/12/2022 36  degrees Final    Diagnosis 08/12/2022    Final                    Value:Normal sinus rhythm  Left atrial enlargement  RSR' or QR pattern in V1 suggests right ventricular conduction delay  Septal infarct (cited on or before 02-OCT-2020) possible  Abnormal ECG  When compared with ECG of 11-JAN-2022 11:31,  Questionable change in initial forces of Septal leads  Confirmed by Woody Delgado ([id number]) on 8/12/2022 10:23:03 AM      Troponin-High Sensitivity 08/12/2022 5  0 - 78 ng/L Final    Comment: A HS troponin value change of (+ or -) 50% or more below the 99th percentile, in a 1/2/3 hr interval represents a significant change. Clinical correcation is recommended. A HS troponin value change of (+ or -) 20% or above the 99th percentile, in a 1/2/3 hr interval represents a significant change. Clinical correlation is recommended. 99th Percentile:    Women:  0-54 ng/L                                                               Men:  0-78 ng/L      Magnesium 08/12/2022 1.7  1.6 - 2.6 mg/dL Final    Troponin-High Sensitivity 08/12/2022 5  0 - 78 ng/L Final    Comment: A HS troponin value change of (+ or -) 50% or more below the 99th percentile, in a 1/2/3 hr interval represents a significant change. Clinical correcation is recommended. A HS troponin value change of (+ or -) 20% or above the 99th percentile, in a 1/2/3 hr interval represents a significant change.  Clinical correlation is recommended. 99th Percentile:    Women:  0-54 ng/L                                                               Men:  0-78 ng/L      Glucose (POC) 08/12/2022 116 (A)  70 - 110 mg/dL Final    Comment: (NOTE)  The FDA has indicated that no capillary point of care blood glucose   monitoring systems are approved for use in \"critically ill\" patients,   however they have not defined this population. The College of   American Pathologists has recommended that these devices should not   be used in cases such as severe hypotension, dehydration, shock, and   hyperglycemic-hyperosmolar state, amongst others. Venous or arterial   collection is the recommended specimen for testing these patients. Sed rate, automated 08/12/2022 1  0 - 20 mm/hr Final    Antithrombin Ag 08/12/2022 89  72 - 124 % Final    Comment: (NOTE)  This test was developed and its performance characteristics  determined by [facility]. It has not been cleared or approved  by the Food and Drug Administration. Performed At: [address] [id number]  Kelsea Hughes MD [id number]      Act. Prt.C Resist. 08/12/2022 2.8  2.2 - 3.5 ratio Final    Comment: (NOTE)  The APCR result may be falsely increased (masking an abnormal, low  APCR result) in patients on direct Xa inhibitor (e.g., rivaroxaban,  apixaban, edoxaban) or a direct thrombin inhibitor (e.g.,  dabigatran) anticoagulant therapy due to assay interference by these  drugs. Performed At: [facility] [address] [id number]  Kelsea Hughes MD :[id number]      PTT-LA 08/12/2022 43.0  0.0 - 51.9 sec Final    dRVVT 08/12/2022 36.4  0.0 - 47.0 sec Final    Comment: (NOTE)  Performed At: [address] [id number]  Kelsea Hughes MD VJ:[id number]      Interpretation 08/12/2022 Comment:   Final    Comment: (NOTE)  No lupus anticoagulant was detected.   Performed At: [facility] [address] [place], [place] [id number]  Ofelia Kearney MD HP:[id number]      Plasminogen 08/12/2022 124  70 - 150 % Final    Comment: (NOTE)  Performed At: [address] [id number]  Ofelia Kearney MD VM:[id number]      Protein C Antigen 08/12/2022 82  60 - 150 % Final    Comment: (NOTE)  Performed At: [facility] & [address] [id number]  Ofelia Kearney MD JL:[id number]      Protein C-Functional 08/12/2022 107  73 - 180 % Final    Comment: (NOTE)  Performed At: [facility] & [address] [id number]  Ofelia Kearney MD OL:[id number]      Protein S, Total 08/12/2022 89  60 - 150 % Final    Comment: (NOTE)  This test was developed and its performance characteristics  determined by Gaby Gay. It has not been cleared or approved  by the Food and Drug Administration. Protein S, Free 08/12/2022 134  61 - 136 % Final    Comment: (NOTE)  Performed At: [facility] & [address] [id number]  Ofelia Kearney MD VO:[id number]      PT mixing study 08/12/2022        Final    Prothrombin time 08/12/2022 10.9  9.0 - 11.1 sec Final    INR 08/12/2022 1.0  0.9 - 1.1   Final    A single therapeutic range for Vit K antagonists may not be optimal for all indications - see June, 2008 issue of Chest, American College of Chest Physicians Evidence-Based Clinical Practice Guidelines, 8th Edition. PT 1:1 mix patient 08/12/2022 Mixing study not performed as it is uninterpretable when PT is normal or <= 5 seconds prolonged.   sec Final    aPTT mixing study 08/12/2022        Final    aPTT 08/12/2022 32.4 (A)  22.1 - 31.0 sec Final    In addition to factor deficiency, monitoring heparin therapy, etc., evaluation of a prolonged aPTT result should include consideration of preanalytic variables such as heparin flush contamination, specimen integrity issues, etc.    aPTT 1:1 mix patient 08/12/2022 Mixing study not performed as it is uninterpretable when PTT is normal or <= 5 seconds prolonged. sec Final    Thrombin time 08/12/2022 20.3  0.0 - 23.0 sec Final    Comment: (NOTE)  Performed At: [facility] & [address] [id number]  Maximino Marks MD CR:[id number]      Homocysteine, plasma 08/12/2022 10.2  3.7 - 13.9 umol/L Final    Cardiolipin Ab, IgG 08/12/2022 <9  0 - 14 GPL U/mL Final    Comment: (NOTE)                           Negative:              <15                           Indeterminate:     15 - 20                           Low-Med Positive: >20 - 80                           High Positive:         >80      Cardiolipin Ab, IgM 08/12/2022 <9  0 - 12 MPL U/mL Final    Comment: (NOTE)                           Negative:              <13                           Indeterminate:     13 - 20                           Low-Med Positive: >20 - 80                           High Positive:         >80      Cardiolipin Ab, IgA 08/12/2022 <9  0 - 11 APL U/mL Final    Comment: (NOTE)                           Negative:              <12                           Indeterminate:     12 - 20                           Low-Med Positive: >20 - 80                           High Positive:         >80  Performed At: [facility] & [address] [id number]  Maximino Marks MD QE:[id number]      Factor V Leiden 08/12/2022 Comment    Final    Comment: (NOTE)  Result: c.1601G>A (p.[id number]) - Not Detected  This result is not associated with an increased risk for venous  thromboembolism. See Additional Clinical Information and  Comments. Additional Clinical Information:  Venous thromboembolism is a multifactorial disease influenced by  genetic, environmental, and circumstantial risk factors. The  c.1601G>A (p. [id number]) variant in the F5 gene, commonly referred to  as Factor V Leiden, is a genetic risk factor for venous  thromboembolism.  Heterozygous carriers of this variant have a 6- to 8-  fold increased risk for venous thromboembolism. Individuals  homozygous for this variant (ie, with a copy of the variant on each  chromosome) have an approximately 80-fold increased risk for venous  thromboembolism. Individuals who carry both a c.*97G>A variant in the  F2 gene and Factor V Leiden have an approximately 20-fold increased  risk for venous thromboembolism. Risks are likely to be even higher  in more complex genotype combinations in                           volving the F2 c.*97G>A  variant and Factor V Leiden (PMID: 79054594). Additional risk factors  include but are not limited to: deficiency of protein C, protein S,  or antithrombin III, age, male sex, personal or family history of  deep vein thromboembolism, smoking, surgery, prolonged  immobilization, malignant neoplasm, tamoxifen treatment, raloxifene  treatment, oral contraceptive use, hormone replacement therapy, and  pregnancy. Management of thrombotic risk and thrombotic events should  follow established guidelines and fit the clinical circumstance. This  result cannot predict the occurrence or recurrence of a thrombotic  event. Comment:  Genetic counseling is recommended to discuss the potential clinical  implications of positive results, as well as recommendations for  testing family members. Genetic Coordinators are available for health care providers to  discuss results at [phone] ([phone]). Test Details:  Variant Analyzed: c.1601G>A (p. [id number]), referred to as Fact                           or V  Leiden  Methods/Limitations:  DNA analysis of the F5 gene (NM_000130.5) was performed by PCR  amplification followed by restriction enzyme analysis. The  diagnostic sensitivity is >99%. Results must be combined with  clinical information for the most accurate interpretation. Molecular-  based testing is highly accurate, but as in any laboratory test,  diagnostic errors may occur.  False positive or false negative results  may occur for reasons that include genetic variants, blood  transfusions, bone marrow transplantation, somatic or tissue-specific  mosaicism, mislabeled samples, or erroneous representation of family  relationships. This test was developed and its performance characteristics  determined by [facility] Archer. It has not been cleared or approved by the  Food and Drug Administration. References:  Eduardo Rainey; ACMG  Professional Practice and Guidelines Committee. Addendum: [address] consensus statement on fac                           tor V Leiden  mutation testing. Deborah Med. 2021 Mar 5. doi: 94.6977/C10657-117-  83063-a. PMID: 22906610. Daamris Guillory. Factor V Leiden Thrombophilia. 1999 May 14  [Updated 2018 Jan 4]. In: Vikki Daley, Willis HH, Castro RA, et al.,  editors. Natalia(R) [Internet]. [address]: [facility], [place] [address]; 7650-6939. Available from:  [facility]ir[facility] Aguilar, Rambo Ibanez, [facility];  [facility] Laboratory  Committee. Venous thromboembolism  laboratory testing (factor V Leiden and factor II c.*97G>A), 2018  update: a technical standard of the [address] (AC). Deborah Med. 2018 Dec;20(12):4323-9150.  doi: 88.4155/M77372-898-8757-D. Epub 2018 Oct 5. PMID: 02743841. Mitzi Rodriguez, PhD, Chan Herrmann, PhD  Kassandra Taveras, PhD, Bertha Pate, PhD, Viktoria Benton, PhD, Benjamin Snowden, PhD, Jocy Vásquez, PhD, Ro Alanis,                            PhD, [facility]  Performed At: [address] [id number]  Lyndsay Michael [facility] :[id number]      MTHFR, DNA Analysis 08/12/2022 Comment    Final    Comment: (NOTE)  Result:  c.665C>T (p. Mer561Htf), legacy name: C677T - Detected,  heterozygous c.1286A>C (p. Klf747Ujz), legacy name: [id number] - Not  Detected Interpretation:   This result is not associated with an increased risk for  hyperhomocysteinemia. See Additional Clinical Information and  Comments. Additional Clinical Information:  Hyperhomocysteinemia is multifactorial involving genetic, clinical,  and environmental risk factors. Reduced enzyme activity of  methylenetetrahydrofolate reductase (MTHFR) is a genetic risk  factor for hyperhomocysteinemia, particularly when serum folate  levels are low. There are two common variants in the MTHFR gene  that can decrease enzyme activity; c.665C>T (p. Tcy438Hyy), legacy  name [id number], and c.1286A>C (p. Hli442Mue), legacy name [id number]. These  variants do not independently increase risk of conditions related  to hyperhomocysteinemia in the absence of elevated homocysteine  levels. Measurement of total plasma homocysteine is recommended. Patients sh                           ould share their MTHFR genotype with physicians who are  making decisions regarding chemotherapy treatments that depend on  folate, such as methotrexate. Guidelines do not recommend genotyping of these two MTHFR variants  in the evaluation of venous thrombosis or obstetric risk due to  limited evidence of clinical utility (PMID: 44062343). Comments:  Genetic Coordinators are available for health care providers to  discuss results at [phone]). Test Details:  Variants Analyzed: c.665C>T (p. Ivz414Sbn), legacy name: C677T and  c.1286A>C (p. Rnf720Nzc), legacy name: [id number]  Methods/Limitations:  DNA analysis of the MTHFR gene was performed by PCR amplification  followed by restriction enzyme analysis. The diagnostic sensitivity  is >99%. Results must be combined with clinical information for the  most accurate interpretation. Molecular-based testing is highly  accurate, but as in any laboratory test, diagnostic errors may  occur.  False positive or false negative results may                            occur for  reasons that include genetic variants, blood transfusions, bone  marrow transplantation, somatic or tissue-specific mosaicism,  mislabeled samples, or erroneous representation of family  relationships. This test was developed and its performance characteristics  determined by [facility]. It has not been cleared or approved by the  Food and Drug Administration. References:  John Bolanos. AC Practice Guideline: lack of  evidence for MTHFR polymorphism testing. Deborah Med. 2013  Feb;15(2):153-6. doi: 10.1038/gim. 2012.165. Epub 2013 Fede 3. PMID:  87451902. [employer] of Obstetricians and Gynecologists' Committee on  Practice Bulletins-Obstetrics. ACOG Practice Bulletin No. 197: Inherited Thrombophilias in Pregnancy. Obstet Gynecol. 2018  Jul;132(1):e18-e34. doi: 10.1097/AOG. [id number]. Erratum in:  Obstet Gynecol. 2018 Oct;132(4):1069. PMID: 84975127.   Quentin Pate, PhD, Sukumar Soto, PhD  Kamryn Barragan, PhD, [facility]                           , Kierra Wiley, PhD, Shanice Lyons, PhD, Miriam Marcus, PhD, Yeimi Benson, PhD, [facility]  Performed At: [address] [id number]  Clarance China MDPhD CS:[id number]      WBC 08/13/2022 4.7  4.6 - 13.2 K/uL Final    RBC 08/13/2022 4.62  4.35 - 5.65 M/uL Final    HGB 08/13/2022 14.9  13.0 - 16.0 g/dL Final    HCT 08/13/2022 44.4  36.0 - 48.0 % Final    MCV 08/13/2022 96.1  78.0 - 100.0 FL Final    MCH 08/13/2022 32.3  24.0 - 34.0 PG Final    MCHC 08/13/2022 33.6  31.0 - 37.0 g/dL Final    RDW 08/13/2022 12.7  11.6 - 14.5 % Final    PLATELET 86/39/1083 576  135 - 420 K/uL Final    MPV 08/13/2022 9.6  9.2 - 11.8 FL Final    NRBC 08/13/2022 0.0  0  WBC Final    ABSOLUTE NRBC 08/13/2022 0.00  0.00 - 0.01 K/uL Final    NEUTROPHILS 08/13/2022 51  40 - 73 % Final    LYMPHOCYTES 08/13/2022 29  21 - 52 % Final    MONOCYTES 08/13/2022 16 (A)  3 - 10 % Final    EOSINOPHILS 08/13/2022 3  0 - 5 % Final    BASOPHILS 08/13/2022 2  0 - 2 % Final    IMMATURE GRANULOCYTES 08/13/2022 0  0.0 - 0.5 % Final    ABS. NEUTROPHILS 08/13/2022 2.4  1.8 - 8.0 K/UL Final    ABS. LYMPHOCYTES 08/13/2022 1.4  0.9 - 3.6 K/UL Final    ABS. MONOCYTES 08/13/2022 0.7  0.05 - 1.2 K/UL Final    ABS. EOSINOPHILS 08/13/2022 0.1  0.0 - 0.4 K/UL Final    ABS. BASOPHILS 08/13/2022 0.1  0.0 - 0.1 K/UL Final    ABS. IMM. GRANS. 08/13/2022 0.0  0.00 - 0.04 K/UL Final    DF 08/13/2022 AUTOMATED    Final    Sodium 08/13/2022 135 (A)  136 - 145 mmol/L Final    Potassium 08/13/2022 3.5  3.5 - 5.5 mmol/L Final    Chloride 08/13/2022 102  100 - 111 mmol/L Final    CO2 08/13/2022 23  21 - 32 mmol/L Final    Anion gap 08/13/2022 10  3.0 - 18 mmol/L Final    Glucose 08/13/2022 76  74 - 99 mg/dL Final    BUN 08/13/2022 11  7.0 - 18 MG/DL Final    Creatinine 08/13/2022 0.63  0.6 - 1.3 MG/DL Final    BUN/Creatinine ratio 08/13/2022 17  12 - 20   Final    GFR est AA 08/13/2022 >60  >60 ml/min/1.73m2 Final    GFR est non-AA 08/13/2022 >60  >60 ml/min/1.73m2 Final    Calcium 08/13/2022 9.1  8.5 - 10.1 MG/DL Final    Magnesium 08/13/2022 2.0  1.6 - 2.6 mg/dL Final    Prothrombin time 08/13/2022 13.8  11.5 - 15.2 sec Final    INR 08/13/2022 1.0  0.8 - 1.2   Final    Comment:            INR Therapeutic Ranges         (on stable oral anticoagulant):     INDICATION                INR  DVT/PE/Atrial Fib          2.0-3.0  MI/Mechanical Heart Valve  2.5-3.5      aPTT 08/13/2022 27.5  23.0 - 36.4 SEC Final    Fibrinogen 08/13/2022 264  210 - 451 mg/dL Final       ICD-10-CM    1. Cervical radiculopathy [M54.12 (ICD-10-CM)]  M54.12 DULoxetine (CYMBALTA) 60 MG extended release capsule     ibuprofen (ADVIL;MOTRIN) 600 MG tablet     [facility] C/Landry Farrar Final [facility], [facility]      2.  Ulnar neuropathy of both upper extremities  G56.23 DULoxetine (CYMBALTA) 60 MG extended release capsule           Patient has cervical radiculopathy on the left side mainly following the C7. Also has cervical based on his MRI. He has pain. He states gabapentin is not helping. We will start him on duloxetine 60 mg p.o. per day. I will add ibuprofen 600 mg p.o. 3 times p.o. as needed. We will refer him to the spine center. Due to the need to avoid pressure over the elbows. We will see him again in 3 months time.

## 2023-02-28 DIAGNOSIS — G56.23 ULNAR NEUROPATHY OF BOTH UPPER EXTREMITIES: ICD-10-CM

## 2023-02-28 DIAGNOSIS — M54.12 CERVICAL RADICULOPATHY: ICD-10-CM

## 2023-02-28 RX ORDER — DULOXETIN HYDROCHLORIDE 60 MG/1
60 CAPSULE, DELAYED RELEASE ORAL DAILY
Qty: 90 CAPSULE | Refills: 0 | Status: SHIPPED | OUTPATIENT
Start: 2023-02-28 | End: 2023-05-29

## 2023-03-28 ENCOUNTER — HOSPITAL ENCOUNTER (EMERGENCY)
Facility: HOSPITAL | Age: 53
Discharge: HOME | End: 2023-03-28
Attending: EMERGENCY MEDICINE | Admitting: EMERGENCY MEDICINE
Payer: COMMERCIAL

## 2023-03-28 VITALS
HEART RATE: 83 BPM | DIASTOLIC BLOOD PRESSURE: 105 MMHG | WEIGHT: 140 LBS | OXYGEN SATURATION: 100 % | SYSTOLIC BLOOD PRESSURE: 139 MMHG | TEMPERATURE: 97.4 F | BODY MASS INDEX: 21.22 KG/M2 | RESPIRATION RATE: 16 BRPM | HEIGHT: 68 IN

## 2023-03-28 DIAGNOSIS — M25.511 RIGHT SHOULDER PAIN, UNSPECIFIED CHRONICITY: Primary | ICD-10-CM

## 2023-03-28 DIAGNOSIS — M62.838 SPASM OF MUSCLE: ICD-10-CM

## 2023-03-28 PROCEDURE — 6360000002 HC RX W HCPCS: Performed by: EMERGENCY MEDICINE

## 2023-03-28 PROCEDURE — 6370000000 HC RX 637 (ALT 250 FOR IP): Performed by: EMERGENCY MEDICINE

## 2023-03-28 PROCEDURE — 96372 THER/PROPH/DIAG INJ SC/IM: CPT

## 2023-03-28 PROCEDURE — 99284 EMERGENCY DEPT VISIT MOD MDM: CPT

## 2023-03-28 RX ORDER — LIDOCAINE 50 MG/G
1 PATCH TOPICAL DAILY
Qty: 10 PATCH | Refills: 0 | Status: SHIPPED | OUTPATIENT
Start: 2023-03-28 | End: 2023-04-07

## 2023-03-28 RX ORDER — LIDOCAINE 4 G/G
1 PATCH TOPICAL
Status: DISCONTINUED | OUTPATIENT
Start: 2023-03-28 | End: 2023-03-28 | Stop reason: HOSPADM

## 2023-03-28 RX ORDER — TRAMADOL HYDROCHLORIDE 50 MG/1
50 TABLET ORAL EVERY 8 HOURS PRN
Qty: 10 TABLET | Refills: 0 | Status: SHIPPED | OUTPATIENT
Start: 2023-03-28 | End: 2023-03-31

## 2023-03-28 RX ORDER — KETOROLAC TROMETHAMINE 15 MG/ML
30 INJECTION, SOLUTION INTRAMUSCULAR; INTRAVENOUS ONCE
Status: COMPLETED | OUTPATIENT
Start: 2023-03-28 | End: 2023-03-28

## 2023-03-28 RX ADMIN — KETOROLAC TROMETHAMINE 30 MG: 15 INJECTION, SOLUTION INTRAMUSCULAR; INTRAVENOUS at 04:33

## 2023-03-28 ASSESSMENT — PAIN - FUNCTIONAL ASSESSMENT: PAIN_FUNCTIONAL_ASSESSMENT: 0-10

## 2023-03-28 ASSESSMENT — PAIN SCALES - GENERAL: PAINLEVEL_OUTOF10: 10

## 2023-03-28 NOTE — DISCHARGE INSTRUCTIONS
1.  Keep appointment with your neurosurgeon as scheduled. 2.  Take medications as prescribed. 3.  Take your antihypertensive medications as prescribed. 4.  Return to the emergency department for any worsening or concerning symptoms.

## 2023-03-28 NOTE — ED NOTES
Pt given both verbal and written dc instructions, verbalized understanding. All questions answered. Pt ambulatory with steady gait in no distress at time of discharge.      Doug Sanders RN  03/28/23 [id number]

## 2023-03-28 NOTE — ED PROVIDER NOTES
Emergency Department Encounter  [facility] EMERGENCY DEPT    Patient: Suresh Langston  MRN: [id number]  : 1970  Date of Evaluation: 3/28/2023  ED Provider: Jamison Harrison MD    Chief Complaint       Chief Complaint   Patient presents with    Shoulder Pain     GINA Langston is a 47 y.o. male who presents to the emergency department ***      ROS:     At least {NUMBER 1-10:[id number]} systems reviewed and otherwise acutely negative except as in the [address] Past History     Past Medical History:   Diagnosis Date    Asthma     Back pain     Elevated blood pressure reading     GERD (gastroesophageal reflux disease)     Left shoulder pain     Low TSH level     Low TSH level 2017    may have mild hyperthyroidism w/u per PCP I will rpt TSH and get freeT4    Primary hypertension 2022    Right inguinal hernia     TIA (transient ischemic attack) 2022     No past surgical history on file. Social History     Socioeconomic History    Marital status: Single   Tobacco Use    Smoking status: Every Day     Packs/day: 0.25     Types: Cigarettes    Smokeless tobacco: Never   Substance and Sexual Activity    Alcohol use: Yes     Alcohol/week: 55.0 standard drinks    Drug use: Yes     Types: Marijuana Virgie Hutchinson)       Medications/Allergies     Previous Medications    AMLODIPINE (NORVASC) 5 MG TABLET    Take 5 mg by mouth daily    ASPIRIN 81 MG CHEWABLE TABLET    Take 81 mg by mouth daily    ATORVASTATIN (LIPITOR) 40 MG TABLET    Take 40 mg by mouth    CLOPIDOGREL (PLAVIX) 75 MG TABLET    Take 75 mg by mouth daily    DULOXETINE (CYMBALTA) 60 MG EXTENDED RELEASE CAPSULE    Take 1 capsule by mouth daily    GABAPENTIN (NEURONTIN) 300 MG CAPSULE    Take 300 mg by mouth 2 times daily.     IBUPROFEN (ADVIL;MOTRIN) 600 MG TABLET    Take 1 tablet by mouth every 12 hours as needed for Pain    METHYLPREDNISOLONE (MEDROL DOSEPACK) 4 MG TABLET    Take 4 mg by mouth     No Known Allergies     Physical Exam       ED Triage Vitals

## 2023-05-10 ENCOUNTER — HOSPITAL ENCOUNTER (OUTPATIENT)
Facility: HOSPITAL | Age: 53
Setting detail: RECURRING SERIES
Discharge: HOME | End: 2023-05-13
Payer: COMMERCIAL

## 2023-05-10 PROCEDURE — 97162 PT EVAL MOD COMPLEX 30 MIN: CPT

## 2023-05-10 NOTE — PROGRESS NOTES
PHYSICAL / OCCUPATIONAL THERAPY - DAILY TREATMENT NOTE (updated )    Patient Name: Dmitri Wilson    Date: 5/10/2023    : 1970  Insurance: Payor: Serafin Garza / Plan: OPTIMA HMO / Product Type: *No Product type* /      Patient  verified Yes     Visit #   Current / Total 1 8   Time   In / Out 300 340   Pain   In / Out 4 3   Subjective Functional Status/Changes: See paper eval   Changes to:  Meds, Allergies, Med Hx, Sx Hx? If yes, update Summary List yes       TREATMENT AREA =  Pain in right shoulder [M25.511]  Pain in left shoulder [M25.512]    OBJECTIVE         40 min [x]Eval - untimed                        Therapeutic Procedures:   Tx Min Billable or 1:1 Min (if diff from [employer]) Procedure, Rationale, Specifics          Details if applicable:              Details if applicable:            Details if applicable:            Details if applicable:            Details if applicable:       [facility] Totals Reminder: bill using total billable min of TIMED therapeutic procedures (example: do not include dry needle or estim unattended, both untimed codes, in totals to left)  8-22 min = 1 unit; 23-37 min = 2 units; 38-52 min = 3 units; 53-67 min = 4 units; 68-82 min = 5 units   Total Total     [x]  Patient Education billed concurrently with other procedures   [x] Review HEP    [] Progressed/Changed HEP, detail:    [] Other detail:       Objective Information/Functional Measures/Assessment    See POC    Patient will continue to benefit from skilled PT / OT services to modify and progress therapeutic interventions, analyze and address functional mobility deficits, analyze and address ROM deficits, analyze and address strength deficits, analyze and address soft tissue restrictions, analyze and cue for proper movement patterns, analyze and modify for postural abnormalities, analyze and address imbalance/dizziness, and instruct in home and community integration to address functional deficits and attain remaining
order to improve his ability to lift at work    Eval = 4/5 bilaterally     Frequency / Duration: Patient would benefit from skilled PT 1-2 times per week for up to 8 treatments . Patient/ Caregiver education and instruction: Diagnosis, prognosis, self care, activity modification, and exercises [x]  Plan of care has been reviewed with SAÚL    FARNAZ PENDLETON, PT       5/10/2023       2:56 PM  ===================================================================  I certify that the above Therapy Services are being furnished while the patient is under my care. I agree with the treatment plan and certify that this therapy is necessary. Physician's Signature:_________________________   DATE:_________   TIME:________                           DELONTE Avila - *    ** Signature, Date and Time must be completed for valid certification **  Please sign and return to [facility] [address]  [address], [address] [phone] fax      Current Outpatient Medications   Medication Sig Dispense Refill    amLODIPine (NORVASC) 5 MG tablet Take 5 mg by mouth daily      atorvastatin (LIPITOR) 40 MG tablet Take 40 mg by mouth      clopidogrel (PLAVIX) 75 MG tablet Take 75 mg by mouth daily      gabapentin (NEURONTIN) 300 MG capsule Take 300 mg by mouth 2 times daily. methylPREDNISolone (MEDROL DOSEPACK) 4 MG tablet Take 4 mg by mouth      DULoxetine (CYMBALTA) 60 MG extended release capsule Take 1 capsule by mouth daily 90 capsule 0    aspirin 81 MG chewable tablet Take 81 mg by mouth daily      ibuprofen (ADVIL;MOTRIN) 600 MG tablet Take 1 tablet by mouth every 12 hours as needed for Pain 40 tablet 2     No current facility-administered medications for this encounter.

## 2023-05-25 ENCOUNTER — OFFICE VISIT (OUTPATIENT)
Age: 53
End: 2023-05-25
Payer: COMMERCIAL

## 2023-05-25 VITALS
TEMPERATURE: 97.6 F | RESPIRATION RATE: 20 BRPM | WEIGHT: 122 LBS | HEIGHT: 68 IN | HEART RATE: 87 BPM | DIASTOLIC BLOOD PRESSURE: 66 MMHG | BODY MASS INDEX: 18.49 KG/M2 | SYSTOLIC BLOOD PRESSURE: 94 MMHG | OXYGEN SATURATION: 95 %

## 2023-05-25 DIAGNOSIS — G95.9 CERVICAL MYELOPATHY (HCC): ICD-10-CM

## 2023-05-25 DIAGNOSIS — M54.12 CERVICAL RADICULOPATHY: Primary | ICD-10-CM

## 2023-05-25 PROCEDURE — 3078F DIAST BP <80 MM HG: CPT | Performed by: STUDENT IN AN ORGANIZED HEALTH CARE EDUCATION/TRAINING PROGRAM

## 2023-05-25 PROCEDURE — 99214 OFFICE O/P EST MOD 30 MIN: CPT | Performed by: STUDENT IN AN ORGANIZED HEALTH CARE EDUCATION/TRAINING PROGRAM

## 2023-05-25 PROCEDURE — 3074F SYST BP LT 130 MM HG: CPT | Performed by: STUDENT IN AN ORGANIZED HEALTH CARE EDUCATION/TRAINING PROGRAM

## 2023-05-25 NOTE — PROGRESS NOTES
are  making decisions regarding chemotherapy treatments that depend on  folate, such as methotrexate. Guidelines do not recommend genotyping of these two MTHFR variants  in the evaluation of venous thrombosis or obstetric risk due to  limited evidence of clinical utility (PMID: 13509960). Comments:  Genetic Coordinators are available for health care providers to  discuss results at [phone] Test Details:  Variants Analyzed: c.665C>T (p. [id number]), legacy name: C677T and  c.1286A>C (p. Gpv229Voq), legacy name: [id number]  Methods/Limitations:  DNA analysis of the MTHFR gene was performed by PCR amplification  followed by restriction enzyme analysis. The diagnostic sensitivity  is >99%. Results must be combined with clinical information for the  most accurate interpretation. Molecular-based testing is highly  accurate, but as in any laboratory test, diagnostic errors may  occur. False positive or false negative results may                            occur for  reasons that include genetic variants, blood transfusions, bone  marrow transplantation, somatic or tissue-specific mosaicism,  mislabeled samples, or erroneous representation of family  relationships. This test was developed and its performance characteristics  determined by [employer] [employer]. It has not been cleared or approved by the  Food and Drug Administration. References:  John Mohamud. ACMG Practice Guideline: lack of  evidence for MTHFR polymorphism testing. Deborah Med. 2013  Feb;15(2):153-6. doi: 10.1038/gim. 2012.165. Epub 2013 Efde 3. PMID:  11794434. [employer] of Obstetricians and Gynecologists' Committee on  Practice Bulletins-Obstetrics. ACOG Practice Bulletin No. 197: Inherited Thrombophilias in Pregnancy. Obstet Gynecol. 2018  Jul;132(1):e18-e34. doi: 10.1097/AOG. 1381502339055042. Erratum in:  Obstet Gynecol. 2018 Oct;132(4):1069. PMID: 92768124.   Mariana Cameron, PhD, Júnior Briseno, PhD  Maribeth Torres,

## 2023-06-01 ENCOUNTER — HOSPITAL ENCOUNTER (OUTPATIENT)
Facility: HOSPITAL | Age: 53
Setting detail: RECURRING SERIES
Discharge: HOME | End: 2023-06-04
Payer: COMMERCIAL

## 2023-06-01 PROCEDURE — 97530 THERAPEUTIC ACTIVITIES: CPT

## 2023-06-01 PROCEDURE — 97110 THERAPEUTIC EXERCISES: CPT

## 2023-06-01 PROCEDURE — 97112 NEUROMUSCULAR REEDUCATION: CPT

## 2023-06-04 ENCOUNTER — HOSPITAL ENCOUNTER (OUTPATIENT)
Facility: HOSPITAL | Age: 53
Discharge: HOME | End: 2023-06-07
Attending: STUDENT IN AN ORGANIZED HEALTH CARE EDUCATION/TRAINING PROGRAM
Payer: COMMERCIAL

## 2023-06-04 DIAGNOSIS — M54.12 CERVICAL RADICULOPATHY: ICD-10-CM

## 2023-06-04 DIAGNOSIS — G95.9 CERVICAL MYELOPATHY (HCC): ICD-10-CM

## 2023-06-04 PROCEDURE — 72141 MRI NECK SPINE W/O DYE: CPT

## 2023-06-05 ENCOUNTER — TELEPHONE (OUTPATIENT)
Age: 53
End: 2023-06-05

## 2023-06-05 ENCOUNTER — HOSPITAL ENCOUNTER (OUTPATIENT)
Facility: HOSPITAL | Age: 53
Setting detail: RECURRING SERIES
Discharge: HOME | End: 2023-06-08
Payer: COMMERCIAL

## 2023-06-05 PROCEDURE — 97535 SELF CARE MNGMENT TRAINING: CPT

## 2023-06-05 PROCEDURE — 97530 THERAPEUTIC ACTIVITIES: CPT

## 2023-06-05 PROCEDURE — 97110 THERAPEUTIC EXERCISES: CPT

## 2023-06-05 PROCEDURE — 97112 NEUROMUSCULAR REEDUCATION: CPT

## 2023-06-05 NOTE — PROGRESS NOTES
Measure NV     Long Term Goals: To be accomplished in 8 treatments  1. Pt will score at least 62 on FOTO in order to facilitate return to PLOF. Eval = 38              Assess at NV  2. Pt will demonstrate bilateral elbow extension to 4+/5 in order to perform work duties              Eval = bilaterally 3+/5              Reassess NV  3. Pt will demonstrate left wrist strength minimum 5-/5 in order to improve ability to perform heavy ADLs. Eval = flex/ext = 4/5              Reassess NV  4.    Pt will demonstrate bilateral rhomboid strength to 5/5 in order to improve his ability to lift at work               Eval = 4/5 bilaterally               Reassess NV    PLAN  Yes  Continue plan of care  []  Upgrade activities as tolerated  []  Discharge due to :  []  Other:    Dorinda Barber PTA, [facility]    6/5/2023    11:06 AM    Future Appointments   Date Time Provider Joesph Love   6/7/2023 10:50 AM Beverly Oseguera [facility]   6/12/2023 10:50 [facility]   6/14/2023 10:50 AM Maria E Selby PTA [facility]   6/20/2023 10:50 AM [facility] 1 [facility]   6/21/2023 10:50 AM Dorinda Barber PT [facility]   10/2/2023  3:40 PM Margarita Mathis MD [facility] AMB

## 2023-06-07 ENCOUNTER — HOSPITAL ENCOUNTER (OUTPATIENT)
Facility: HOSPITAL | Age: 53
Setting detail: RECURRING SERIES
Discharge: HOME | End: 2023-06-10
Payer: COMMERCIAL

## 2023-06-07 PROCEDURE — 97535 SELF CARE MNGMENT TRAINING: CPT

## 2023-06-07 PROCEDURE — 97530 THERAPEUTIC ACTIVITIES: CPT

## 2023-06-07 PROCEDURE — 97110 THERAPEUTIC EXERCISES: CPT

## 2023-06-07 NOTE — PROGRESS NOTES
Note/Recertification    Short Term Goals: To be accomplished in 2 weeks  1. Pt will compliance HEP in order to supplement PT treatment              Eval = established              MET  2. Pt will demonstrate left cervical AROM rotation to 55degrees in order to improve ability to drive              Eval = 48degrees              PN = MET, Left C/S at 60 deg. 3.   Pt will demonstrate right cervical AROM lateral flexion to 25 in order to improve ability to sleep through the night              Eval = 20degrees             PN = Not met. Progressing, 20 deg. With c/o tightness in [place]. Long Term Goals: To be accomplished in 8 treatments  1. Pt will score at least 62 on FOTO in order to facilitate return to [facility]. Eval = 38              PN = Progressing, 47  2. Pt will demonstrate bilateral elbow extension to 4+/5 in order to perform work duties              Eval = bilaterally 3+/5              PN = Partially met, Left elbow extension: 3+/5, Right Elbow Extension: 4+5.  3.   Pt will demonstrate left wrist strength minimum 5-/5 in order to improve ability to perform heavy ADLs. Eval = flex/ext = 4/5              PN = Progressing = 4/5  4. Pt will demonstrate bilateral rhomboid strength to 5/5 in order to improve his ability to lift at work               Eval = 4/5 bilaterally               PN= Progressing, 4+/5 bilaterally.         PLAN  Yes  Continue plan of care  []  Upgrade activities as tolerated  []  Discharge due to :  []  Other:    Zulema Mcbride, PT    6/7/2023    9:01 AM    Future Appointments   Date Time Provider Joesph Garciai   6/7/2023 10:50 AM Zulema Mcbride, PT [facility]   6/12/2023 10:50 AM Lexie Singh PT [facility]   6/14/2023 10:50 AM Sofi Lazo PTA [facility]   6/20/2023 10:50 AM [facility] PT [facility] 1 [facility]   6/21/2023 10:50 AM Lexie Singh, PT [facility]   10/2/2023  3:40 PM Margarita Reddy MD [facility]

## 2023-06-07 NOTE — PROGRESS NOTES
Partially met, Left elbow extension: 3+/5, Right Elbow Extension: 4+5.  5.   Pt will demonstrate left wrist strength minimum 5-/5 in order to improve ability to perform heavy ADLs. PN Status = Progressing = 4/5  6. Pt will demonstrate bilateral rhomboid strength to 5/5 in order to improve his ability to lift at work               PN Status= Progressing, 4+/5 bilaterally. RECOMMENDATIONS  Pt would benefit from continued skilled PT 1-2 times a week for an additional 8 visits. If you have any questions/comments please contact us directly at [phone]   Thank you for allowing us to assist in the care of your patient.   PTA Signature Ina Johnson     Therapist Signature: Ina Johnson Date: 6/7/2023   Reporting Period  5/10/23 - 6/7/23 Time: 9:04 AM

## 2023-06-14 ENCOUNTER — HOSPITAL ENCOUNTER (OUTPATIENT)
Facility: HOSPITAL | Age: 53
Setting detail: RECURRING SERIES
End: 2023-06-14
Payer: COMMERCIAL

## 2023-06-19 ENCOUNTER — APPOINTMENT (OUTPATIENT)
Facility: HOSPITAL | Age: 53
End: 2023-06-19
Payer: COMMERCIAL

## 2023-06-20 ENCOUNTER — HOSPITAL ENCOUNTER (OUTPATIENT)
Facility: HOSPITAL | Age: 53
Setting detail: RECURRING SERIES
Discharge: HOME | End: 2023-06-23
Payer: COMMERCIAL

## 2023-06-20 PROCEDURE — 97140 MANUAL THERAPY 1/> REGIONS: CPT

## 2023-06-20 PROCEDURE — 97530 THERAPEUTIC ACTIVITIES: CPT

## 2023-06-20 PROCEDURE — 97112 NEUROMUSCULAR REEDUCATION: CPT

## 2023-06-20 PROCEDURE — 97110 THERAPEUTIC EXERCISES: CPT

## 2023-06-20 NOTE — PROGRESS NOTES
PHYSICAL / OCCUPATIONAL THERAPY - DAILY TREATMENT NOTE (updated )    Patient Name: Fabrice Dates    Date: 2023    : 1970  Insurance: Payor: Nolberto Manzano / Plan: [employer] HMO / Product Type: *No Product type* /      Patient  verified Yes     Visit #   Current / Total 2 8   Time   In / Out 1050 1138   Pain   In / Out 2/10 0/10   Subjective Functional Status/Changes: Pt arrives with reports of pain in posterior forearm down to ring an finky finger. TREATMENT AREA =  Pain in right shoulder [M25.511]  Pain in left shoulder [M25.512]    OBJECTIVE       Therapeutic Procedures: Tx Min Billable or 1:1 Min (if diff from Tx Min) Procedure, Rationale, Specifics   10  28949 Therapeutic Exercise (timed):  increase ROM, strength, coordination, balance, and proprioception to improve patient's ability to progress to PLOF and address remaining functional goals. (see flow sheet as applicable)     Details if applicable:       10  25512 Neuromuscular Re-Education (timed):  improve balance, coordination, kinesthetic sense, posture, core stability and proprioception to improve patient's ability to develop conscious control of individual muscles and awareness of position of extremities in order to progress to PLOF and address remaining functional goals. (see flow sheet as applicable)     Details if applicable:     8  18865 Therapeutic Activity (timed):  use of dynamic activities replicating functional movements to increase ROM, strength, coordination, balance, and proprioception in order to improve patient's ability to progress to PLOF and address remaining functional goals.   (see flow sheet as applicable)     Details if applicable:     -  07569 Self Care/Home Management (timed):  improve patient knowledge and understanding of pain reducing techniques, positioning, posture/ergonomics, home safety, activity modification, diagnosis/prognosis, and physical therapy expectations, procedures and progression  to improve

## 2023-06-21 ENCOUNTER — HOSPITAL ENCOUNTER (OUTPATIENT)
Facility: HOSPITAL | Age: 53
Setting detail: RECURRING SERIES
Discharge: HOME | End: 2023-06-24
Payer: COMMERCIAL

## 2023-06-21 PROCEDURE — 97530 THERAPEUTIC ACTIVITIES: CPT

## 2023-06-21 PROCEDURE — 97112 NEUROMUSCULAR REEDUCATION: CPT

## 2023-06-21 PROCEDURE — 97110 THERAPEUTIC EXERCISES: CPT

## 2023-06-21 NOTE — PROGRESS NOTES
PHYSICAL / OCCUPATIONAL THERAPY - DAILY TREATMENT NOTE (updated )    Patient Name: Perez Montgomery    Date: 2023    : 1970  Insurance: Payor: Jose Eduardo Song / Plan: [employer] HMO / Product Type: *No Product type* /      Patient  verified Yes     Visit #   Current / Total 3 8   Time   In / Out 1053 1133   Pain   In / Out 0 0   Subjective Functional Status/Changes: Patient reports his forearm was sore yesterday after it was worked on, but he has no pain today. TREATMENT AREA =  Pain in right shoulder [M25.511]  Pain in left shoulder [M25.512]    OBJECTIVE    Therapeutic Procedures: Tx Min Billable or 1:1 Min (if diff from Tx Min) Procedure, Rationale, Specifics   8  22362 Therapeutic Exercise (timed):  increase ROM, strength, coordination, balance, and proprioception to improve patient's ability to progress to PLOF and address remaining functional goals. (see flow sheet as applicable)     Details if applicable:         20044 Neuromuscular Re-Education (timed):  improve balance, coordination, kinesthetic sense, posture, core stability and proprioception to improve patient's ability to develop conscious control of individual muscles and awareness of position of extremities in order to progress to PLOF and address remaining functional goals. (see flow sheet as applicable)     Details if applicable:  scapular re-ed   9  39179 Therapeutic Activity (timed):  use of dynamic activities replicating functional movements to increase ROM, strength, coordination, balance, and proprioception in order to improve patient's ability to progress to PLOF and address remaining functional goals.   (see flow sheet as applicable)     Details if applicable:  gripping and reaching activities   36  MC BC Totals Reminder: bill using total billable min of TIMED therapeutic procedures (example: do not include dry needle or estim unattended, both untimed codes, in totals to left)  8-22 min = 1 unit; 23-37 min = 2 units; 38-52 min = 3

## 2023-06-30 ENCOUNTER — HOSPITAL ENCOUNTER (OUTPATIENT)
Facility: HOSPITAL | Age: 53
Setting detail: RECURRING SERIES
End: 2023-06-30
Payer: COMMERCIAL

## 2023-07-25 ENCOUNTER — OFFICE VISIT (OUTPATIENT)
Age: 53
End: 2023-07-25
Payer: COMMERCIAL

## 2023-07-25 VITALS
HEIGHT: 68 IN | HEART RATE: 107 BPM | SYSTOLIC BLOOD PRESSURE: 118 MMHG | DIASTOLIC BLOOD PRESSURE: 80 MMHG | TEMPERATURE: 98.3 F | OXYGEN SATURATION: 98 % | BODY MASS INDEX: 18.82 KG/M2 | RESPIRATION RATE: 20 BRPM | WEIGHT: 124.2 LBS

## 2023-07-25 DIAGNOSIS — G95.9 CERVICAL MYELOPATHY (HCC): ICD-10-CM

## 2023-07-25 DIAGNOSIS — M54.12 CERVICAL RADICULOPATHY: Primary | ICD-10-CM

## 2023-07-25 PROCEDURE — 3079F DIAST BP 80-89 MM HG: CPT | Performed by: STUDENT IN AN ORGANIZED HEALTH CARE EDUCATION/TRAINING PROGRAM

## 2023-07-25 PROCEDURE — 99213 OFFICE O/P EST LOW 20 MIN: CPT | Performed by: STUDENT IN AN ORGANIZED HEALTH CARE EDUCATION/TRAINING PROGRAM

## 2023-07-25 PROCEDURE — 3074F SYST BP LT 130 MM HG: CPT | Performed by: STUDENT IN AN ORGANIZED HEALTH CARE EDUCATION/TRAINING PROGRAM

## 2023-07-25 NOTE — PROGRESS NOTES
stenosis. Patient has more symptoms over the left upper extremity. But he has weakness over the bilateral upper extremities. No significant lower extremity symptoms. Discussed the MRI findings with the patient and the need to follow with the spine surgery. We will refer him back to spine surgery. We will see him again in 4 months time. .      I spent 20  minutes with the patient in face-to-face consultation, with more than 50 % spent in counseling and coordination of care as described above.

## 2023-08-30 ENCOUNTER — TELEPHONE (OUTPATIENT)
Age: 53
End: 2023-08-30

## 2023-08-30 NOTE — TELEPHONE ENCOUNTER
Patient dropped off Disability forms for Dr. Kimberly Longo to fill out and sign.     Placed in providers folder for review

## 2023-09-06 ENCOUNTER — HOSPITAL ENCOUNTER (EMERGENCY)
Facility: HOSPITAL | Age: 53
Discharge: HOME | End: 2023-09-06
Payer: COMMERCIAL

## 2023-09-06 VITALS
TEMPERATURE: 98.1 F | RESPIRATION RATE: 17 BRPM | OXYGEN SATURATION: 100 % | SYSTOLIC BLOOD PRESSURE: 154 MMHG | HEART RATE: 99 BPM | DIASTOLIC BLOOD PRESSURE: 124 MMHG

## 2023-09-06 DIAGNOSIS — G72.9 MYOPATHY: Primary | ICD-10-CM

## 2023-09-06 PROCEDURE — 6370000000 HC RX 637 (ALT 250 FOR IP): Performed by: HEALTH CARE PROVIDER

## 2023-09-06 PROCEDURE — 99284 EMERGENCY DEPT VISIT MOD MDM: CPT

## 2023-09-06 PROCEDURE — 6360000002 HC RX W HCPCS: Performed by: HEALTH CARE PROVIDER

## 2023-09-06 PROCEDURE — 96372 THER/PROPH/DIAG INJ SC/IM: CPT

## 2023-09-06 RX ORDER — LIDOCAINE 4 G/G
1 PATCH TOPICAL
Status: DISCONTINUED | OUTPATIENT
Start: 2023-09-06 | End: 2023-09-06 | Stop reason: HOSPADM

## 2023-09-06 RX ORDER — LIDOCAINE 50 MG/G
1 PATCH TOPICAL DAILY
Qty: 30 PATCH | Refills: 0 | Status: SHIPPED | OUTPATIENT
Start: 2023-09-06

## 2023-09-06 RX ORDER — KETOROLAC TROMETHAMINE 15 MG/ML
15 INJECTION, SOLUTION INTRAMUSCULAR; INTRAVENOUS ONCE
Status: COMPLETED | OUTPATIENT
Start: 2023-09-06 | End: 2023-09-06

## 2023-09-06 RX ORDER — ACETAMINOPHEN 500 MG
1000 TABLET ORAL ONCE
Status: COMPLETED | OUTPATIENT
Start: 2023-09-06 | End: 2023-09-06

## 2023-09-06 RX ADMIN — ACETAMINOPHEN 1000 MG: 500 TABLET ORAL at 12:19

## 2023-09-06 RX ADMIN — KETOROLAC TROMETHAMINE 15 MG: 15 INJECTION, SOLUTION INTRAMUSCULAR; INTRAVENOUS at 12:18

## 2023-09-06 ASSESSMENT — PAIN DESCRIPTION - LOCATION: LOCATION: ARM;RECTUM

## 2023-09-06 ASSESSMENT — PAIN DESCRIPTION - ORIENTATION: ORIENTATION: LEFT

## 2023-09-06 ASSESSMENT — ENCOUNTER SYMPTOMS
VOMITING: 0
SHORTNESS OF BREATH: 0
DIARRHEA: 0
BACK PAIN: 0

## 2023-09-06 ASSESSMENT — PAIN SCALES - GENERAL
PAINLEVEL_OUTOF10: 7
PAINLEVEL_OUTOF10: 10

## 2023-09-06 ASSESSMENT — PAIN - FUNCTIONAL ASSESSMENT: PAIN_FUNCTIONAL_ASSESSMENT: 0-10

## 2023-09-06 NOTE — ED PROVIDER NOTES
MD  [id number] [facility]  [address]  [address] [place] [address]  [phone]      Establish care with spine surgery            Isaías Ledezma PA-C  09/06/23 2841

## 2023-09-06 NOTE — DISCHARGE INSTRUCTIONS
Helpful therapies for low back pain include heat pads, massage, topical pain relievers such as BenGay, and maintaining your mobility. Do not rest in bed until the pain goes away, you will have to ambulate to restore functional status to the back and only lie in bed if pain is intolerable or new symptoms develop. Take over the counter anti-inflammatory and pain medicine as needed during back strain. Return to ED if: new neurological deficit in arms, legs or saddle region (numb, tingling, weak), incontinence or urinary retention, abrupt increases in pain, or unresolved pain.

## 2023-09-06 NOTE — ED NOTES
Discharge medications reviewed with the patient and appropriate educational materials and side effects teaching were provided. Patient escorted to waiting room. Steady gait noted. Patient has no other questions or concerns at this time.       Rob Andres RN  09/06/23 3858

## 2023-09-06 NOTE — ED TRIAGE NOTES
Pt c/o L sided back pain and forearm pain. States he was out on short term disability and supposed to have back surgery, went back to work and pain flared up. Hx arthritis.

## 2023-09-06 NOTE — ED NOTES
Patient reports pain to his left upper and lower extremity. Patient reports that the pain is not new and states that he was recently out of work due to the pain. Patient reports that he returned to work on last Thursday. 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       Henry Hidalgo RN  09/06/23 9340

## 2023-10-02 ENCOUNTER — OFFICE VISIT (OUTPATIENT)
Age: 53
End: 2023-10-02
Payer: COMMERCIAL

## 2023-10-02 VITALS
BODY MASS INDEX: 19.82 KG/M2 | HEIGHT: 68 IN | TEMPERATURE: 98.5 F | WEIGHT: 130.8 LBS | RESPIRATION RATE: 20 BRPM | SYSTOLIC BLOOD PRESSURE: 104 MMHG | OXYGEN SATURATION: 97 % | HEART RATE: 97 BPM | DIASTOLIC BLOOD PRESSURE: 80 MMHG

## 2023-10-02 DIAGNOSIS — M54.12 CERVICAL RADICULOPATHY: Primary | ICD-10-CM

## 2023-10-02 DIAGNOSIS — G95.9 CERVICAL MYELOPATHY (HCC): ICD-10-CM

## 2023-10-02 PROCEDURE — 99213 OFFICE O/P EST LOW 20 MIN: CPT | Performed by: STUDENT IN AN ORGANIZED HEALTH CARE EDUCATION/TRAINING PROGRAM

## 2023-10-02 PROCEDURE — 3079F DIAST BP 80-89 MM HG: CPT | Performed by: STUDENT IN AN ORGANIZED HEALTH CARE EDUCATION/TRAINING PROGRAM

## 2023-10-02 PROCEDURE — 3074F SYST BP LT 130 MM HG: CPT | Performed by: STUDENT IN AN ORGANIZED HEALTH CARE EDUCATION/TRAINING PROGRAM

## 2023-10-02 NOTE — PROGRESS NOTES
Awake, alert, oriented x3, follows simple and complex commands, no neglect, no extinction. Speech and languge: fluent, coherent,  and comprehension intact  CN: VFF, EOMI, PERRLA, face sensation intact , no facial asymmetry noted, palate elevation symmetric bilat, SS+SCM 5/5 bilat, tongue midline  Motor: no pronator drift, tone normal throughout, strength 5/5 over the lower extremities: Upper extremities bilateral triceps 4/5, elbow flexors 5/5, right hand  5/5; left hand 4/5. Sensory: Decreased light touch and prick over the left upper extremity mainly over the ulnar nerve distribution. Coordination: FNF, HS accurate w/o dysmetria  DTR: 2+ throughout but 3+ over the left BR; negative Carrizales. Gait: normal.         C-spine MRI, 6/4/2023: IMPRESSION: .  Multilevel severe degenerative disc disease throughout the cervical spine. Long  segment high-grade spinal canal stenosis C3-4 through C7-T1.  -Chronic areas of myelomalacia.  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 Multilevel high-grade neural foraminal stenosis. Assessment:    ICD-10-CM    1. Cervical radiculopathy  M54.12       2. Cervical myelopathy (HCC)  G95.9         Today, he has lower neck pain which radiates to the left upper extremity. Also has sensory symptoms over the left upper extremity. Has weakness. Some difficulty standing for long time. Might feel unsteady and off balance. Scheduled for cervical fusion surgery on October 30 at [facility]. We will continue with the ibuprofen as needed; if pain gets worse, will consider neuropathic pain medications. We will see him on follow-up in 3 months time. Follow-up in 3 months time.       I spent 20  minutes with the patient in face-to-face consultation, with more than 10 min spent in counseling and

## 2023-10-24 RX ORDER — CYCLOBENZAPRINE HCL 10 MG
TABLET ORAL
COMMUNITY
Start: 2023-08-14

## 2023-10-27 ENCOUNTER — ANESTHESIA EVENT (OUTPATIENT)
Facility: HOSPITAL | Age: 53
End: 2023-10-27
Payer: COMMERCIAL

## 2023-10-27 NOTE — H&P
Pre-Admission History and Physical    Patient: Randi Cunningham   MRN: [id number]   SSN: [id number]   YOB: 1970   Age: 47 y.o. Sex: male     Patient scheduled for: cervical laminoplasty cervical three through thoracic one. Date of surgery: 10/30/23. Surgeon: Lucas Loaiza MD    HPI:  Randi Cunningham is a 47 y.o. male with worsening pain with pain, numbness and tingling in his left arm. He is also complaining of balance issues. MRI demonstrates global worsening of his cervical stenosis with left greater than right foraminal stenosis. Not convinced of signal change to the cord, but there is clear cord compression and foraminal stenosis. This patient has failed the presurgical conservative treatments  including physical therapy, spinal block injections and medications. Pain has impacted the patient's functional ability. He is being admitted for surgical intervention. Past Medical History:   Diagnosis Date    Asthma     Back pain     Elevated blood pressure reading     GERD (gastroesophageal reflux disease)     Left shoulder pain     Low TSH level     Low TSH level 2017    may have mild hyperthyroidism w/u per PCP I will rpt TSH and get freeT4    Primary hypertension 2022    Right inguinal hernia     TIA (transient ischemic attack) 2022     Social History     Socioeconomic History    Marital status: Single     Spouse name: None    Number of children: None    Years of education: None    Highest education level: None   Tobacco Use    Smoking status: Former     Packs/day: .25     Types: Cigarettes     Quit date: 9/15/2023     Years since quittin.1     Passive exposure: Past    Smokeless tobacco: Never   Substance and Sexual Activity    Alcohol use:  Yes     Alcohol/week: 5.0 standard drinks of alcohol     Types: 5 Cans of beer per week    Drug use: Yes     Types: Marijuana Gerhardt Salle)     Past Surgical History:   Procedure Laterality Date    COLONOSCOPY       Family History   Problem

## 2023-10-30 ENCOUNTER — APPOINTMENT (OUTPATIENT)
Facility: HOSPITAL | Age: 53
End: 2023-10-30
Attending: ORTHOPAEDIC SURGERY
Payer: COMMERCIAL

## 2023-10-30 ENCOUNTER — HOSPITAL ENCOUNTER (OUTPATIENT)
Facility: HOSPITAL | Age: 53
Setting detail: OBSERVATION
Discharge: HOME HEALTH | End: 2023-10-31
Attending: ORTHOPAEDIC SURGERY | Admitting: ORTHOPAEDIC SURGERY
Payer: COMMERCIAL

## 2023-10-30 ENCOUNTER — ANESTHESIA (OUTPATIENT)
Facility: HOSPITAL | Age: 53
End: 2023-10-30
Payer: COMMERCIAL

## 2023-10-30 DIAGNOSIS — G95.9 CERVICAL MYELOPATHY (HCC): Primary | ICD-10-CM

## 2023-10-30 LAB
AMPHET UR QL SCN: NEGATIVE
BARBITURATES UR QL SCN: NEGATIVE
BENZODIAZ UR QL: NEGATIVE
CANNABINOIDS UR QL SCN: POSITIVE
COCAINE UR QL SCN: NEGATIVE
Lab: ABNORMAL
METHADONE UR QL: ABNORMAL
OPIATES UR QL: NEGATIVE
PCP UR QL: NEGATIVE

## 2023-10-30 PROCEDURE — 6360000002 HC RX W HCPCS: Performed by: NURSE ANESTHETIST, CERTIFIED REGISTERED

## 2023-10-30 PROCEDURE — 6360000002 HC RX W HCPCS: Performed by: PHYSICIAN ASSISTANT

## 2023-10-30 PROCEDURE — 2720000010 HC SURG SUPPLY STERILE: Performed by: ORTHOPAEDIC SURGERY

## 2023-10-30 PROCEDURE — 2709999900 HC NON-CHARGEABLE SUPPLY: Performed by: ORTHOPAEDIC SURGERY

## 2023-10-30 PROCEDURE — C1729 CATH, DRAINAGE: HCPCS | Performed by: ORTHOPAEDIC SURGERY

## 2023-10-30 PROCEDURE — 2580000003 HC RX 258: Performed by: NURSE ANESTHETIST, CERTIFIED REGISTERED

## 2023-10-30 PROCEDURE — G0378 HOSPITAL OBSERVATION PER HR: HCPCS

## 2023-10-30 PROCEDURE — C1713 ANCHOR/SCREW BN/BN,TIS/BN: HCPCS | Performed by: ORTHOPAEDIC SURGERY

## 2023-10-30 PROCEDURE — 6370000000 HC RX 637 (ALT 250 FOR IP): Performed by: ORTHOPAEDIC SURGERY

## 2023-10-30 PROCEDURE — 3700000001 HC ADD 15 MINUTES (ANESTHESIA): Performed by: ORTHOPAEDIC SURGERY

## 2023-10-30 PROCEDURE — 7100000000 HC PACU RECOVERY - FIRST 15 MIN: Performed by: ORTHOPAEDIC SURGERY

## 2023-10-30 PROCEDURE — 97161 PT EVAL LOW COMPLEX 20 MIN: CPT

## 2023-10-30 PROCEDURE — 6370000000 HC RX 637 (ALT 250 FOR IP): Performed by: NURSE ANESTHETIST, CERTIFIED REGISTERED

## 2023-10-30 PROCEDURE — 80307 DRUG TEST PRSMV CHEM ANLYZR: CPT

## 2023-10-30 PROCEDURE — 3700000000 HC ANESTHESIA ATTENDED CARE: Performed by: ORTHOPAEDIC SURGERY

## 2023-10-30 PROCEDURE — 6360000002 HC RX W HCPCS: Performed by: ORTHOPAEDIC SURGERY

## 2023-10-30 PROCEDURE — 2580000003 HC RX 258: Performed by: PHYSICIAN ASSISTANT

## 2023-10-30 PROCEDURE — 97116 GAIT TRAINING THERAPY: CPT

## 2023-10-30 PROCEDURE — 2500000003 HC RX 250 WO HCPCS: Performed by: ORTHOPAEDIC SURGERY

## 2023-10-30 PROCEDURE — 2500000003 HC RX 250 WO HCPCS: Performed by: NURSE ANESTHETIST, CERTIFIED REGISTERED

## 2023-10-30 PROCEDURE — 2580000003 HC RX 258: Performed by: ORTHOPAEDIC SURGERY

## 2023-10-30 PROCEDURE — 7100000001 HC PACU RECOVERY - ADDTL 15 MIN: Performed by: ORTHOPAEDIC SURGERY

## 2023-10-30 PROCEDURE — 3600000002 HC SURGERY LEVEL 2 BASE: Performed by: ORTHOPAEDIC SURGERY

## 2023-10-30 PROCEDURE — A4216 STERILE WATER/SALINE, 10 ML: HCPCS | Performed by: ORTHOPAEDIC SURGERY

## 2023-10-30 PROCEDURE — 3600000012 HC SURGERY LEVEL 2 ADDTL 15MIN: Performed by: ORTHOPAEDIC SURGERY

## 2023-10-30 DEVICE — EXPANDABLE LAMINOPLASTY PLATE
Type: IMPLANTABLE DEVICE | Site: SPINE CERVICAL | Status: FUNCTIONAL
Brand: ESCALATE

## 2023-10-30 DEVICE — SELF DRILLING SCREW
Type: IMPLANTABLE DEVICE | Site: SPINE CERVICAL | Status: FUNCTIONAL
Brand: ESCALATE

## 2023-10-30 RX ORDER — OXYCODONE HYDROCHLORIDE 10 MG/1
10 TABLET ORAL EVERY 4 HOURS PRN
Status: DISCONTINUED | OUTPATIENT
Start: 2023-10-30 | End: 2023-10-31 | Stop reason: HOSPADM

## 2023-10-30 RX ORDER — OXYCODONE HYDROCHLORIDE 5 MG/1
5 TABLET ORAL EVERY 4 HOURS PRN
Status: DISCONTINUED | OUTPATIENT
Start: 2023-10-30 | End: 2023-10-31 | Stop reason: HOSPADM

## 2023-10-30 RX ORDER — HYDROMORPHONE HYDROCHLORIDE 2 MG/ML
0.25 INJECTION, SOLUTION INTRAMUSCULAR; INTRAVENOUS; SUBCUTANEOUS
Status: DISCONTINUED | OUTPATIENT
Start: 2023-10-30 | End: 2023-10-31 | Stop reason: HOSPADM

## 2023-10-30 RX ORDER — ONDANSETRON 2 MG/ML
4 INJECTION INTRAMUSCULAR; INTRAVENOUS
Status: DISCONTINUED | OUTPATIENT
Start: 2023-10-30 | End: 2023-10-30 | Stop reason: HOSPADM

## 2023-10-30 RX ORDER — AMLODIPINE BESYLATE 5 MG/1
5 TABLET ORAL DAILY
Status: DISCONTINUED | OUTPATIENT
Start: 2023-10-30 | End: 2023-10-31 | Stop reason: HOSPADM

## 2023-10-30 RX ORDER — SUCCINYLCHOLINE/SOD CL,ISO/PF 100 MG/5ML
SYRINGE (ML) INTRAVENOUS PRN
Status: DISCONTINUED | OUTPATIENT
Start: 2023-10-30 | End: 2023-10-30 | Stop reason: SDUPTHER

## 2023-10-30 RX ORDER — SODIUM CHLORIDE 0.9 % (FLUSH) 0.9 %
5-40 SYRINGE (ML) INJECTION PRN
Status: DISCONTINUED | OUTPATIENT
Start: 2023-10-30 | End: 2023-10-30 | Stop reason: HOSPADM

## 2023-10-30 RX ORDER — POLYETHYLENE GLYCOL 3350 17 G/17G
17 POWDER, FOR SOLUTION ORAL DAILY
Status: DISCONTINUED | OUTPATIENT
Start: 2023-10-30 | End: 2023-10-31 | Stop reason: HOSPADM

## 2023-10-30 RX ORDER — ACETAMINOPHEN 500 MG
1000 TABLET ORAL ONCE
Status: DISCONTINUED | OUTPATIENT
Start: 2023-10-30 | End: 2023-10-30 | Stop reason: HOSPADM

## 2023-10-30 RX ORDER — SODIUM CHLORIDE 450 MG/100ML
INJECTION, SOLUTION INTRAVENOUS CONTINUOUS
Status: DISCONTINUED | OUTPATIENT
Start: 2023-10-30 | End: 2023-10-31 | Stop reason: HOSPADM

## 2023-10-30 RX ORDER — CELECOXIB 100 MG/1
200 CAPSULE ORAL ONCE
Status: COMPLETED | OUTPATIENT
Start: 2023-10-30 | End: 2023-10-30

## 2023-10-30 RX ORDER — VANCOMYCIN HYDROCHLORIDE 1 G/20ML
INJECTION, POWDER, LYOPHILIZED, FOR SOLUTION INTRAVENOUS PRN
Status: DISCONTINUED | OUTPATIENT
Start: 2023-10-30 | End: 2023-10-30 | Stop reason: HOSPADM

## 2023-10-30 RX ORDER — BISACODYL 5 MG/1
5 TABLET, DELAYED RELEASE ORAL DAILY
Status: DISCONTINUED | OUTPATIENT
Start: 2023-10-30 | End: 2023-10-31 | Stop reason: HOSPADM

## 2023-10-30 RX ORDER — PROPOFOL 10 MG/ML
INJECTION, EMULSION INTRAVENOUS PRN
Status: DISCONTINUED | OUTPATIENT
Start: 2023-10-30 | End: 2023-10-30 | Stop reason: SDUPTHER

## 2023-10-30 RX ORDER — MIDAZOLAM HYDROCHLORIDE 1 MG/ML
INJECTION INTRAMUSCULAR; INTRAVENOUS PRN
Status: DISCONTINUED | OUTPATIENT
Start: 2023-10-30 | End: 2023-10-30 | Stop reason: SDUPTHER

## 2023-10-30 RX ORDER — FAMOTIDINE 20 MG/1
20 TABLET, FILM COATED ORAL ONCE
Status: COMPLETED | OUTPATIENT
Start: 2023-10-30 | End: 2023-10-30

## 2023-10-30 RX ORDER — DIPHENHYDRAMINE HCL 25 MG
25 CAPSULE ORAL EVERY 6 HOURS PRN
Status: DISCONTINUED | OUTPATIENT
Start: 2023-10-30 | End: 2023-10-31 | Stop reason: HOSPADM

## 2023-10-30 RX ORDER — HYDROMORPHONE HYDROCHLORIDE 2 MG/ML
0.5 INJECTION, SOLUTION INTRAMUSCULAR; INTRAVENOUS; SUBCUTANEOUS
Status: DISCONTINUED | OUTPATIENT
Start: 2023-10-30 | End: 2023-10-31 | Stop reason: HOSPADM

## 2023-10-30 RX ORDER — DIPHENHYDRAMINE HYDROCHLORIDE 50 MG/ML
12.5 INJECTION INTRAMUSCULAR; INTRAVENOUS
Status: DISCONTINUED | OUTPATIENT
Start: 2023-10-30 | End: 2023-10-30 | Stop reason: HOSPADM

## 2023-10-30 RX ORDER — BUPIVACAINE HYDROCHLORIDE 5 MG/ML
INJECTION, SOLUTION EPIDURAL; INTRACAUDAL PRN
Status: DISCONTINUED | OUTPATIENT
Start: 2023-10-30 | End: 2023-10-30 | Stop reason: HOSPADM

## 2023-10-30 RX ORDER — PREGABALIN 75 MG/1
75 CAPSULE ORAL ONCE
Status: COMPLETED | OUTPATIENT
Start: 2023-10-30 | End: 2023-10-30

## 2023-10-30 RX ORDER — DIPHENHYDRAMINE HYDROCHLORIDE 50 MG/ML
25 INJECTION INTRAMUSCULAR; INTRAVENOUS EVERY 6 HOURS PRN
Status: DISCONTINUED | OUTPATIENT
Start: 2023-10-30 | End: 2023-10-31 | Stop reason: HOSPADM

## 2023-10-30 RX ORDER — LIDOCAINE HYDROCHLORIDE 10 MG/ML
1 INJECTION, SOLUTION EPIDURAL; INFILTRATION; INTRACAUDAL; PERINEURAL
Status: DISCONTINUED | OUTPATIENT
Start: 2023-10-30 | End: 2023-10-30 | Stop reason: HOSPADM

## 2023-10-30 RX ORDER — FAMOTIDINE 20 MG/1
20 TABLET, FILM COATED ORAL 2 TIMES DAILY
Status: DISCONTINUED | OUTPATIENT
Start: 2023-10-30 | End: 2023-10-31 | Stop reason: HOSPADM

## 2023-10-30 RX ORDER — ACETAMINOPHEN 325 MG/1
650 TABLET ORAL EVERY 6 HOURS
Status: DISCONTINUED | OUTPATIENT
Start: 2023-10-30 | End: 2023-10-31 | Stop reason: HOSPADM

## 2023-10-30 RX ORDER — METAXALONE 800 MG/1
800 TABLET ORAL EVERY 8 HOURS PRN
Status: DISCONTINUED | OUTPATIENT
Start: 2023-10-30 | End: 2023-10-31 | Stop reason: HOSPADM

## 2023-10-30 RX ORDER — ONDANSETRON 2 MG/ML
INJECTION INTRAMUSCULAR; INTRAVENOUS PRN
Status: DISCONTINUED | OUTPATIENT
Start: 2023-10-30 | End: 2023-10-30 | Stop reason: SDUPTHER

## 2023-10-30 RX ORDER — TAMSULOSIN HYDROCHLORIDE 0.4 MG/1
0.4 CAPSULE ORAL ONCE
Status: COMPLETED | OUTPATIENT
Start: 2023-10-30 | End: 2023-10-30

## 2023-10-30 RX ORDER — ONDANSETRON 2 MG/ML
4 INJECTION INTRAMUSCULAR; INTRAVENOUS EVERY 6 HOURS PRN
Status: DISCONTINUED | OUTPATIENT
Start: 2023-10-30 | End: 2023-10-31 | Stop reason: HOSPADM

## 2023-10-30 RX ORDER — PREGABALIN 75 MG/1
75 CAPSULE ORAL ONCE
Status: DISCONTINUED | OUTPATIENT
Start: 2023-10-30 | End: 2023-10-30 | Stop reason: HOSPADM

## 2023-10-30 RX ORDER — DEXTROSE MONOHYDRATE 100 MG/ML
INJECTION, SOLUTION INTRAVENOUS CONTINUOUS PRN
Status: DISCONTINUED | OUTPATIENT
Start: 2023-10-30 | End: 2023-10-30 | Stop reason: HOSPADM

## 2023-10-30 RX ORDER — FENTANYL CITRATE 50 UG/ML
INJECTION, SOLUTION INTRAMUSCULAR; INTRAVENOUS PRN
Status: DISCONTINUED | OUTPATIENT
Start: 2023-10-30 | End: 2023-10-30 | Stop reason: SDUPTHER

## 2023-10-30 RX ORDER — ACETAMINOPHEN 500 MG
1000 TABLET ORAL ONCE
Status: COMPLETED | OUTPATIENT
Start: 2023-10-30 | End: 2023-10-30

## 2023-10-30 RX ORDER — ONDANSETRON 4 MG/1
4 TABLET, ORALLY DISINTEGRATING ORAL EVERY 8 HOURS PRN
Status: DISCONTINUED | OUTPATIENT
Start: 2023-10-30 | End: 2023-10-31 | Stop reason: HOSPADM

## 2023-10-30 RX ORDER — ROCURONIUM BROMIDE 10 MG/ML
INJECTION, SOLUTION INTRAVENOUS PRN
Status: DISCONTINUED | OUTPATIENT
Start: 2023-10-30 | End: 2023-10-30 | Stop reason: SDUPTHER

## 2023-10-30 RX ORDER — SODIUM CHLORIDE 0.9 % (FLUSH) 0.9 %
5-40 SYRINGE (ML) INJECTION EVERY 12 HOURS SCHEDULED
Status: DISCONTINUED | OUTPATIENT
Start: 2023-10-30 | End: 2023-10-31 | Stop reason: HOSPADM

## 2023-10-30 RX ORDER — LIDOCAINE HYDROCHLORIDE 20 MG/ML
INJECTION, SOLUTION EPIDURAL; INFILTRATION; INTRACAUDAL; PERINEURAL PRN
Status: DISCONTINUED | OUTPATIENT
Start: 2023-10-30 | End: 2023-10-30 | Stop reason: SDUPTHER

## 2023-10-30 RX ORDER — SODIUM CHLORIDE, SODIUM LACTATE, POTASSIUM CHLORIDE, CALCIUM CHLORIDE 600; 310; 30; 20 MG/100ML; MG/100ML; MG/100ML; MG/100ML
INJECTION, SOLUTION INTRAVENOUS CONTINUOUS
Status: DISCONTINUED | OUTPATIENT
Start: 2023-10-30 | End: 2023-10-30 | Stop reason: HOSPADM

## 2023-10-30 RX ORDER — FENTANYL CITRATE 50 UG/ML
50 INJECTION, SOLUTION INTRAMUSCULAR; INTRAVENOUS EVERY 5 MIN PRN
Status: DISCONTINUED | OUTPATIENT
Start: 2023-10-30 | End: 2023-10-30 | Stop reason: HOSPADM

## 2023-10-30 RX ADMIN — ONDANSETRON 4 MG: 2 INJECTION INTRAMUSCULAR; INTRAVENOUS at 09:28

## 2023-10-30 RX ADMIN — FAMOTIDINE 20 MG: 20 TABLET, FILM COATED ORAL at 06:32

## 2023-10-30 RX ADMIN — OXYCODONE HYDROCHLORIDE 10 MG: 10 TABLET ORAL at 22:28

## 2023-10-30 RX ADMIN — TAMSULOSIN HYDROCHLORIDE 0.4 MG: 0.4 CAPSULE ORAL at 06:32

## 2023-10-30 RX ADMIN — ACETAMINOPHEN 325MG 650 MG: 325 TABLET ORAL at 16:28

## 2023-10-30 RX ADMIN — PROPOFOL 30 MG: 10 INJECTION, EMULSION INTRAVENOUS at 09:32

## 2023-10-30 RX ADMIN — FENTANYL CITRATE 50 MCG: 50 INJECTION INTRAMUSCULAR; INTRAVENOUS at 07:38

## 2023-10-30 RX ADMIN — PREGABALIN 75 MG: 75 CAPSULE ORAL at 06:31

## 2023-10-30 RX ADMIN — VANCOMYCIN HYDROCHLORIDE 1000 MG: 1 INJECTION, POWDER, LYOPHILIZED, FOR SOLUTION INTRAVENOUS at 20:14

## 2023-10-30 RX ADMIN — SODIUM CHLORIDE: 4.5 INJECTION, SOLUTION INTRAVENOUS at 11:57

## 2023-10-30 RX ADMIN — WATER 2000 MG: 1 INJECTION, SOLUTION INTRAMUSCULAR; INTRAVENOUS; SUBCUTANEOUS at 07:52

## 2023-10-30 RX ADMIN — OXYCODONE HYDROCHLORIDE 10 MG: 10 TABLET ORAL at 11:57

## 2023-10-30 RX ADMIN — FAMOTIDINE 20 MG: 10 INJECTION, SOLUTION INTRAVENOUS at 20:14

## 2023-10-30 RX ADMIN — CELECOXIB 200 MG: 100 CAPSULE ORAL at 06:31

## 2023-10-30 RX ADMIN — FENTANYL CITRATE 50 MCG: 50 INJECTION INTRAMUSCULAR; INTRAVENOUS at 09:19

## 2023-10-30 RX ADMIN — ACETAMINOPHEN 325MG 650 MG: 325 TABLET ORAL at 11:57

## 2023-10-30 RX ADMIN — PROPOFOL 170 MG: 10 INJECTION, EMULSION INTRAVENOUS at 07:38

## 2023-10-30 RX ADMIN — PHENYLEPHRINE HYDROCHLORIDE 100 MCG: 10 INJECTION INTRAVENOUS at 09:09

## 2023-10-30 RX ADMIN — FENTANYL CITRATE 50 MCG: 50 INJECTION INTRAMUSCULAR; INTRAVENOUS at 09:32

## 2023-10-30 RX ADMIN — FENTANYL CITRATE 50 MCG: 50 INJECTION INTRAMUSCULAR; INTRAVENOUS at 09:52

## 2023-10-30 RX ADMIN — ROCURONIUM BROMIDE 15 MG: 10 INJECTION, SOLUTION INTRAVENOUS at 07:38

## 2023-10-30 RX ADMIN — ACETAMINOPHEN 325MG 650 MG: 325 TABLET ORAL at 22:25

## 2023-10-30 RX ADMIN — WATER 2000 MG: 1 INJECTION INTRAMUSCULAR; INTRAVENOUS; SUBCUTANEOUS at 16:28

## 2023-10-30 RX ADMIN — LIDOCAINE HYDROCHLORIDE 80 MG: 20 INJECTION, SOLUTION EPIDURAL; INFILTRATION; INTRACAUDAL; PERINEURAL at 07:38

## 2023-10-30 RX ADMIN — AMLODIPINE BESYLATE 5 MG: 5 TABLET ORAL at 11:58

## 2023-10-30 RX ADMIN — FENTANYL CITRATE 50 MCG: 50 INJECTION INTRAMUSCULAR; INTRAVENOUS at 08:32

## 2023-10-30 RX ADMIN — Medication 100 MG: at 07:38

## 2023-10-30 RX ADMIN — MIDAZOLAM 2 MG: 1 INJECTION, SOLUTION INTRAMUSCULAR; INTRAVENOUS at 07:34

## 2023-10-30 RX ADMIN — VANCOMYCIN HYDROCHLORIDE 1000 MG: 1 INJECTION, POWDER, LYOPHILIZED, FOR SOLUTION INTRAVENOUS at 08:02

## 2023-10-30 RX ADMIN — SODIUM CHLORIDE, PRESERVATIVE FREE 10 ML: 5 INJECTION INTRAVENOUS at 20:16

## 2023-10-30 RX ADMIN — BISACODYL 5 MG: 5 TABLET, COATED ORAL at 11:58

## 2023-10-30 RX ADMIN — FENTANYL CITRATE 50 MCG: 50 INJECTION INTRAMUSCULAR; INTRAVENOUS at 09:18

## 2023-10-30 RX ADMIN — SODIUM CHLORIDE, POTASSIUM CHLORIDE, SODIUM LACTATE AND CALCIUM CHLORIDE: 600; 310; 30; 20 INJECTION, SOLUTION INTRAVENOUS at 06:30

## 2023-10-30 RX ADMIN — TRANEXAMIC ACID 1000 MG: 100 INJECTION, SOLUTION INTRAVENOUS at 07:57

## 2023-10-30 RX ADMIN — TRANEXAMIC ACID 1000 MG: 100 INJECTION, SOLUTION INTRAVENOUS at 09:23

## 2023-10-30 RX ADMIN — ACETAMINOPHEN 1000 MG: 500 TABLET ORAL at 06:32

## 2023-10-30 ASSESSMENT — PAIN DESCRIPTION - LOCATION
LOCATION: NECK

## 2023-10-30 ASSESSMENT — PAIN SCALES - GENERAL
PAINLEVEL_OUTOF10: 8
PAINLEVEL_OUTOF10: 4
PAINLEVEL_OUTOF10: 3
PAINLEVEL_OUTOF10: 7
PAINLEVEL_OUTOF10: 3

## 2023-10-30 ASSESSMENT — PAIN DESCRIPTION - DESCRIPTORS
DESCRIPTORS: TINGLING;OTHER (COMMENT)
DESCRIPTORS: ACHING;THROBBING

## 2023-10-30 ASSESSMENT — PAIN DESCRIPTION - ORIENTATION
ORIENTATION: POSTERIOR

## 2023-10-30 ASSESSMENT — PAIN - FUNCTIONAL ASSESSMENT
PAIN_FUNCTIONAL_ASSESSMENT: ACTIVITIES ARE NOT PREVENTED
PAIN_FUNCTIONAL_ASSESSMENT: 0-10

## 2023-10-30 NOTE — CONSULTS
Consult noted for Home Health. Spoke to patient at bedside. Patient choice list provided. Patient chose [facility]. Referral queued to them.

## 2023-10-30 NOTE — OP NOTE
[address] [place] [address]  OPERATIVE REPORT    Name:  Darrion Munroe  MR#:   [id number]  :  1970  ACCOUNT #:  [de-identified]  DATE OF SERVICE:  10/30/2023    PREOPERATIVE DIAGNOSIS:  Cervical spondylotic myelopathy. POSTOPERATIVE DIAGNOSIS:  Cervical spondylotic myelopathy. PROCEDURES PERFORMED:  C3, C4, C5, C6, C7 laminoplasties; segmental fixation at C3, C4, C5, C6, C7; C2 laminectomy; C1 laminectomy. SURGEON:  Erin Rossi MD.    ASSISTANT:  BILL Pierre.    ANESTHESIA:  General endotracheal.    COMPLICATIONS:  0.    SPECIMENS REMOVED:  0.    IMPLANTS:  maile laminoplasty fixation plates. ESTIMATED BLOOD LOSS:  100. FINDINGS:  I was able to  decompress and resolve the ligamentum flavum stenosis at C7 and T1. We had a stable hinge and adequate fixation of the laminoplasty site with good expansion. We did not violate the facet joints. Hopefully, motion will still be present. DESCRIPTION OF PROCEDURE:  Following induction of general endotracheal anesthesia, the patient was gently placed in the prone position on a spinal frame with head in neutral position. The patient was prepped and draped in the usual fashion. A midline incision was made. Paramedian incisions were made in the cervicodorsal fascia and subperiosteal dissection done from C2 to T1.  C-arm image verified our surgical level. Care was taken to maintain facet capsule and any soft tissue attachments as possible to provide inherent stability. First, on the left side, a trough was made at the lamina-facet junction and it was completed with a 1 or 2 mm Kerrison. Attention was then turned to the right hand side. Again, subperiosteal dissection done, care taken to maintain the facet capsules. At the lamina-facet junction, a partial-thickness trough was done to allow hinging of the laminoplasty.   At C7-T1 and at C2-C3, laminectomy was done at the inferior aspect of the C2, superior aspect of C3, and superior aspect of T1

## 2023-10-30 NOTE — ANESTHESIA POSTPROCEDURE EVALUATION
Department of Anesthesiology  Postprocedure Note    Patient: Antonieta Gallardo  MRN: [id number]  YOB: 1970  Date of evaluation: 10/30/2023      Procedure Summary     Date: 10/30/23 Room / Location: [facility] MAIN 06 / [facility] MAIN OR    Anesthesia Start: 0957 Anesthesia Stop: 1000    Procedure: CERVICAL LAMINOPLASTY CERVICAL THREE-THORACIC ONE; C-ARMMilton Antolin SPINE] (Spine Cervical) Diagnosis:       Cervical spondylosis with myelopathy      (Cervical spondylosis with myelopathy [M47.12])    Surgeons: Connor Johnson MD Responsible Provider: Mae Martinez MD    Anesthesia Type: General ASA Status: 3          Anesthesia Type: General    Lachelle Phase I: Lachelle Score: 9    Lachelle Phase II:        Anesthesia Post Evaluation    Patient location during evaluation: PACU  Patient participation: complete - patient participated  Level of consciousness: sleepy but conscious  Pain score: 0  Airway patency: patent  Nausea & Vomiting: no nausea and no vomiting  Complications: no  Cardiovascular status: blood pressure returned to baseline  Respiratory status: acceptable  Hydration status: euvolemic  Pain management: adequate

## 2023-10-30 NOTE — BRIEF OP NOTE
Brief Postoperative Note      Patient: Jeanette Ventura  YOB: 1970  MRN: [id number]    Date of Procedure: 10/30/2023    Pre-Op Diagnosis Codes:     * Cervical spondylosis with myelopathy [M47.12]    Post-Op Diagnosis: Same       Procedure(s):  CERVICAL LAMINOPLASTY CERVICAL THREE-THORACIC ONE; C-ARM; Anish Evadale    Surgeon(s):  Baron Abdalla MD    Assistant:  Surgical Assistant: Luz Marina Castillo  Physician Assistant: Tanika Lopez PA-C    Anesthesia: General    Estimated Blood Loss (mL): less than 277     Complications: None    Specimens:   * No specimens in log *    Implants:  Implant Name Type Inv. Item Serial No.  Lot No. LRB No. Used Action   PLATE SPNL LAMINOPLASTY EXPANDABLE ESCALATE - [id number]  PLATE SPNL LAMINOPLASTY EXPANDABLE ESCALATE  [employer] Ribox[employer]-WD [id number] N/A 8 Implanted   SCREW SPNL L6MM DIA2MM SELF DRL ESCALATE - [id number]  SCREW SPNL L6MM DIA2MM SELF DRL ESCALATE  [employer] Riboxx-WD 1111 N/A 18 Implanted         Drains:   Urinary Catheter 10/30/23 2 Way; [place] (Active)       Findings: fair bone      Electronically signed by Baron Abdalla MD on 10/30/2023 at 9:27 AM

## 2023-10-30 NOTE — DISCHARGE INSTRUCTIONS
Dr. Erick Hdez Instructions: Cervical Surgery    DO NOT take any NSAIDS if you had Fusion Surgery. ACTIVITIES:  Change positions every hour while you are awake. Walking is the best way to rebuild strength. Wear your soft collar just for comfort if it is provided to you. You may remove if desired. Sleep with your head elevated for 2-3 nights after surgery to prevent swelling. Avoid strenuous activity, such as yard work, vacuuming, or lifting anything heavier than a gallon of milk. Avoid strenuous activities, such as vacuuming, and do not lift anything heavier than 1 gallon of milk (or about 5-8 pounds). Walk at a pace that avoids fatigue or severe pain. Do not try to walk several blocks the first day! Follow-up with Dr. Brenda Gambino will be 7-10 days from surgery. BATHING and INCISION CARE:  The incision may be tender or feel numb: this is normal.   Keep the incision clean and dry. You may shower 3 days after surgery. Cover the dressing with saran wrap before getting in the shower. The incision is closed with sutures under the skin and glue on top. Do not apply any lotions, ointments or oils on the incision. Do not remove the dressing. Your dressing will be changed at your first post op appointment. If it comes loose or is damaged, dirty or wet before this appointment, call your home health nurse (if you are being seen by a nurse at home) or the office to have the dressing changed. If you notice any excessive swelling, redness, or persistent drainage around the incision, notify the office immediately. CONSTIPATION:  Take a stool softener twice a day while you are taking a narcotic. If you have not had a bowel movement within 3 days of surgery, you will need to use a laxative or suppository that can be obtained over the counter at your local pharmacy     ICE  Use ice on your neck and shoulders to decrease pain and swelling. Do NOT use heat.     MEDICATIONS:  If you had

## 2023-10-30 NOTE — NURSE NAVIGATOR
Rounded on patient s/p CERVICAL LAMINOPLASTY CERVICAL THREE-THORACIC ONE with Dr. Dale Shukla, [facility] 10/30/2023. Patient observed to be alert and oriented x 3, sitting up in bed. He denies chest pain, shortness of breath, nausea or vomiting. He is rating his pain at 8/10 at this time, RN aware and will medicate. Hard cervical collar in place, rosa drain noted to suction. Patient has ramos in place. Dressing to neck observed to be clean, dry and intact. He endorses some numbness to his left hand , but states that it was present prior to surgery and somewhat improved. Patient reviewed spine surgery education book at the clinic level. Reviewed the use of incentive spirometry with patient. Patient encouraged to use ten times hourly while in hospital and to continue use upon discharge in the morning hours to keep lungs expanded and free from complications. Reviewed postoperative showering instructions. Patient reminded that collar is for comfort and that it may be removed for showering purposes. No tubs or submersion in water is allowed. He is to contact clinic with any dressing issues. Reminded patient of the importance of sleeping with head elevated, repositioning and icing to assist with swelling and pain. Encouraged ambulation hourly to prevent stiffness. Patient verbalized understanding of all information provided. He does have questions regarding home health after surgery as he states that his insurance expires today. Reassurance provided that all care provided to patient postoperatively from doctor Dale Shukla will be under the postop period and will be covered under the surgery. Unsure how home physical therapy is covered at this time. Will consult case management for this . Will continue to monitor. Chair set up provided for PT. Anticipate patient getting up shortly with PT once pain is better under control.

## 2023-10-30 NOTE — PERIOP NOTE
Patient Susanne Strickland has been informed that [facility] is not responsible for patient belongings per policy and the signed [facility] Patient Agreement document. Personal items should be sent home or checked in with security. Patient Susanne Strickland selected the following action:                            [x]  Send personal items home with a family member or friend                                                 []  Check in personal items with security, excluding clothing                            []  Maintain personal items at the bedside, against recommendation                                 by [facility]                                   ** If patient Zuleika Francis chooses to maintain personal items at the bedside,                                      Complete the patient belongings inventory in the EMR.

## 2023-10-30 NOTE — INTERVAL H&P NOTE
Update History & Physical    The patient's History and Physical of October 27, 2023 was reviewed with the patient and I examined the patient. There was no change. The surgical site was confirmed by the patient and me. Plan: The risks, benefits, expected outcome, and alternative to the recommended procedure have been discussed with the patient. Patient understands and wants to proceed with the procedure.      Electronically signed by Spike Mendosa MD on 10/30/2023 at 6:24 AM

## 2023-10-30 NOTE — PERIOP NOTE
TRANSFER - OUT REPORT:    Verbal report given to MEREDITH Monteiro on Patricia Flores  being transferred to 2 Surgical Room 2212 for routine post-op       Report consisted of patient's Situation, Background, Assessment and   Recommendations(SBAR). Information from the following report(s) Nurse Handoff Report, Adult Overview, Surgery Report, Intake/Output, and MAR was reviewed with the receiving nurse. Lines:   Peripheral IV 10/30/23 Posterior;Right Hand (Active)   Site Assessment Clean, dry & intact 10/30/23 0955   Line Status Infusing 10/30/23 0955   Line Care Connections checked and tightened;Ports disinfected 10/30/23 0955   Phlebitis Assessment No symptoms 10/30/23 0955   Infiltration Assessment 0 10/30/23 0955   Alcohol Cap Used No 10/30/23 0955   Dressing Status Clean, dry & intact 10/30/23 0955   Dressing Type Transparent;Securing device 10/30/23 3752        Opportunity for questions and clarification was provided.       Patient transported with:  [employer]

## 2023-10-31 ENCOUNTER — HOME HEALTH ADMISSION (OUTPATIENT)
Age: 53
End: 2023-10-31

## 2023-10-31 VITALS
DIASTOLIC BLOOD PRESSURE: 90 MMHG | HEART RATE: 75 BPM | TEMPERATURE: 98.2 F | RESPIRATION RATE: 18 BRPM | BODY MASS INDEX: 19.55 KG/M2 | WEIGHT: 129 LBS | OXYGEN SATURATION: 100 % | SYSTOLIC BLOOD PRESSURE: 157 MMHG | HEIGHT: 68 IN

## 2023-10-31 LAB
ANION GAP SERPL CALC-SCNC: 7 MMOL/L (ref 3–18)
BUN SERPL-MCNC: 7 MG/DL (ref 7–18)
BUN/CREAT SERPL: 9 (ref 12–20)
CALCIUM SERPL-MCNC: 9 MG/DL (ref 8.5–10.1)
CHLORIDE SERPL-SCNC: 104 MMOL/L (ref 100–111)
CO2 SERPL-SCNC: 27 MMOL/L (ref 21–32)
CREAT SERPL-MCNC: 0.76 MG/DL (ref 0.6–1.3)
ERYTHROCYTE [DISTWIDTH] IN BLOOD BY AUTOMATED COUNT: 12.3 % (ref 11.6–14.5)
GLUCOSE SERPL-MCNC: 117 MG/DL (ref 74–99)
HCT VFR BLD AUTO: 35.3 % (ref 36–48)
HGB BLD-MCNC: 11.6 G/DL (ref 13–16)
MCH RBC QN AUTO: 31.6 PG (ref 24–34)
MCHC RBC AUTO-ENTMCNC: 32.9 G/DL (ref 31–37)
MCV RBC AUTO: 96.2 FL (ref 78–100)
NRBC # BLD: 0 K/UL (ref 0–0.01)
NRBC BLD-RTO: 0 PER 100 WBC
PLATELET # BLD AUTO: 235 K/UL (ref 135–420)
PMV BLD AUTO: 10.3 FL (ref 9.2–11.8)
POTASSIUM SERPL-SCNC: 3.7 MMOL/L (ref 3.5–5.5)
RBC # BLD AUTO: 3.67 M/UL (ref 4.35–5.65)
SODIUM SERPL-SCNC: 138 MMOL/L (ref 136–145)
WBC # BLD AUTO: 9.8 K/UL (ref 4.6–13.2)

## 2023-10-31 PROCEDURE — 96374 THER/PROPH/DIAG INJ IV PUSH: CPT

## 2023-10-31 PROCEDURE — 85027 COMPLETE CBC AUTOMATED: CPT

## 2023-10-31 PROCEDURE — 97165 OT EVAL LOW COMPLEX 30 MIN: CPT

## 2023-10-31 PROCEDURE — 80048 BASIC METABOLIC PNL TOTAL CA: CPT

## 2023-10-31 PROCEDURE — G0378 HOSPITAL OBSERVATION PER HR: HCPCS

## 2023-10-31 PROCEDURE — 2580000003 HC RX 258: Performed by: ORTHOPAEDIC SURGERY

## 2023-10-31 PROCEDURE — 6370000000 HC RX 637 (ALT 250 FOR IP): Performed by: ORTHOPAEDIC SURGERY

## 2023-10-31 PROCEDURE — 36415 COLL VENOUS BLD VENIPUNCTURE: CPT

## 2023-10-31 PROCEDURE — 96361 HYDRATE IV INFUSION ADD-ON: CPT

## 2023-10-31 PROCEDURE — 6360000002 HC RX W HCPCS: Performed by: ORTHOPAEDIC SURGERY

## 2023-10-31 PROCEDURE — 2500000003 HC RX 250 WO HCPCS: Performed by: ORTHOPAEDIC SURGERY

## 2023-10-31 PROCEDURE — 97535 SELF CARE MNGMENT TRAINING: CPT

## 2023-10-31 RX ORDER — OXYCODONE HYDROCHLORIDE 5 MG/1
5 TABLET ORAL EVERY 6 HOURS PRN
Qty: 28 TABLET | Refills: 0 | Status: SHIPPED | OUTPATIENT
Start: 2023-10-31 | End: 2023-11-07

## 2023-10-31 RX ADMIN — OXYCODONE HYDROCHLORIDE 10 MG: 10 TABLET ORAL at 05:34

## 2023-10-31 RX ADMIN — POLYETHYLENE GLYCOL 3350 17 G: 17 POWDER, FOR SOLUTION ORAL at 08:12

## 2023-10-31 RX ADMIN — FAMOTIDINE 20 MG: 20 TABLET ORAL at 08:12

## 2023-10-31 RX ADMIN — AMLODIPINE BESYLATE 5 MG: 5 TABLET ORAL at 08:12

## 2023-10-31 RX ADMIN — SODIUM CHLORIDE: 4.5 INJECTION, SOLUTION INTRAVENOUS at 02:00

## 2023-10-31 RX ADMIN — WATER 2000 MG: 1 INJECTION INTRAMUSCULAR; INTRAVENOUS; SUBCUTANEOUS at 00:07

## 2023-10-31 RX ADMIN — OXYCODONE HYDROCHLORIDE 10 MG: 10 TABLET ORAL at 09:15

## 2023-10-31 RX ADMIN — ACETAMINOPHEN 325MG 650 MG: 325 TABLET ORAL at 04:17

## 2023-10-31 RX ADMIN — TRANEXAMIC ACID 1000 MG: 1 INJECTION, SOLUTION INTRAVENOUS at 08:12

## 2023-10-31 RX ADMIN — BISACODYL 5 MG: 5 TABLET, COATED ORAL at 08:12

## 2023-10-31 ASSESSMENT — PAIN DESCRIPTION - DESCRIPTORS
DESCRIPTORS: THROBBING;ACHING
DESCRIPTORS: DULL;THROBBING

## 2023-10-31 ASSESSMENT — PAIN - FUNCTIONAL ASSESSMENT
PAIN_FUNCTIONAL_ASSESSMENT: ACTIVITIES ARE NOT PREVENTED
PAIN_FUNCTIONAL_ASSESSMENT: ACTIVITIES ARE NOT PREVENTED

## 2023-10-31 ASSESSMENT — PAIN SCALES - GENERAL
PAINLEVEL_OUTOF10: 7
PAINLEVEL_OUTOF10: 3
PAINLEVEL_OUTOF10: 9
PAINLEVEL_OUTOF10: 3
PAINLEVEL_OUTOF10: 0
PAINLEVEL_OUTOF10: 3

## 2023-10-31 ASSESSMENT — PAIN DESCRIPTION - LOCATION
LOCATION: NECK;ARM
LOCATION: NECK

## 2023-10-31 ASSESSMENT — PAIN DESCRIPTION - ORIENTATION: ORIENTATION: POSTERIOR;LEFT

## 2023-10-31 NOTE — CARE COORDINATION
discharge to: (P) [address] for transportation at discharge: (P) Family    Financial    Payor: OPTIMA / Plan: OPTIMA HMO / Product Type: *No Product type* /     Does insurance require precert for SNF: Medicaid Pending. Potential assistance Purchasing Medications: (P) No  Meds-to-Beds request:        [facility] [address] [place] [address]  [address] [facility],[address] [address]  Phone: [phone] Fax: [phone]      Notes: Additional Case Management Notes: Patient reports insurance has lapsed and acknowledged na understanding that he will be charged for any home care services provided. Patient was provided with information on how to apply for [place] to aid with healthcare costs. Patient has no current income and may be eligible for Medicaid. Patient reports he has all DME required and will be transported home by friends/family. No additional CM needs identified at this time. The Plan for Transition of Care is related to the following treatment goals of Cervical spondylosis with myelopathy [M47.12]  Cervical myelopathy ([address]) [K29.7]    IF APPLICABLE: The Patient and/or patient representative Serenity Lindsey and his family were provided with a choice of provider and agrees with the discharge plan. [address] [facility]    The Patient and/or Patient Representative Agree with the Discharge Plan?   Yes    [address] [facility]  Case Management Department

## 2023-10-31 NOTE — HOME CARE
Received home health referral for [facility] for (PT per protocol). Discharge order noted for today. Spoke with patient in room. Patient expressed current [employer] insurance to  today 10/31/23. States, in process of applying for Medicaid. This writer explained first initial visit cost and all other other visit cost per each disciplines. Patient continues to agree to have home health services. Patient identifiers verified. Explained home health care services and routines. Demographics verified including insurance, phone and address confirmed. New address obtained and changed in chart. Patient has the following DME: none. Caregivers available:  verbalizes roommate available to assist.  Noted in referral instructions as secondary contact number.        Orders noted and arranged and sent to central intake and scheduling.        ---- Teressa Jaquez Liaison

## 2023-10-31 NOTE — PROGRESS NOTES
conducted a pre-surgery visit with Yenifer Reinoso, who is a 47 y. o.,male. The  provided the following Interventions:  Initiated a relationship of care and support to the patient who will have an extended stay following his surgery today for a few days. There is no advance directive on file. Offered prayer and assurance of continued prayers on patient's behalf. Plan:  Chaplains will continue to follow and will provide pastoral care on an as needed/requested basis.  recommends bedside caregivers page  on duty if patient shows signs of acute spiritual or emotional distress.    [facility]   Board Certified [facility]   [phone]
1800- Hutchins d/c'ed. Patient tolerated well. Patient educated on voiding and measurement. Patient verbalized understanding.
Discharge teaching completed at bedside with patient. Opportunity provide for clarifying questions. All answered to patient satisfaction. IV removed. ID removed and shredded. Patient discharged via wheelchair.
Vss  Neuro intact   Pain improved  50ccs in ANGELITO  Sitting up in chair  Mobilize  Pt ot
Vss afeb  Neuro intact  Doing well  Pt ot  Monitor drain  Dc rosa at noon  Dc ome  Fu 2 weeks
Low TSH level 04/27/2017    may have mild hyperthyroidism w/u per PCP I will rpt TSH and get freeT4    Primary hypertension 9/13/2022    Right inguinal hernia     TIA (transient ischemic attack) 8/13/2022     Past Surgical History:   Procedure Laterality Date    COLONOSCOPY         Home Situation:     Critical Behavior:  Orientation  Overall Orientation Status: Within Normal Limits  Orientation Level: Oriented X4  Cognition  Overall Cognitive Status: WNL    Strength:    Strength: Within functional limits        Range Of Motion:  AROM: Within functional limits       Functional Mobility:  Bed Mobility:     Bed Mobility Training  Bed Mobility Training: Yes  Supine to Sit: Stand-by assistance  Transfers:     Transfer Training  Transfer Training: Yes  Sit to Stand: Supervision  Stand to Sit: Supervision  Balance:     Balance  Sitting: Intact  Standing: Intact       Ambulation/Gait Training:    Gait  Overall Level of Assistance: Supervision  Distance (ft): 150 Feet      Pain:  Pain level pre-treatment: 6/10   Pain level post-treatment: 6/10   Pain Intervention(s): Medication (see MAR); Rest, Ice, Repositioning  Response to intervention: Nurse notified     Activity Tolerance:   Activity Tolerance: Patient tolerated evaluation without incident  Please refer to the flowsheet for vital signs taken during this treatment.     After treatment:   [x]         Patient left in no apparent distress sitting up in chair  []         Patient left in no apparent distress in bed  [x]         Call bell left within reach  [x]         Nursing notified  []         Caregiver present  []         Bed alarm activated  []         SCDs applied    COMMUNICATION/EDUCATION:   Patient Education  Education Given To: Patient  Education Provided: Role of Therapy;Plan of Care  Education Method: Verbal;Teach Back  Barriers to Learning: None  Education Outcome: Verbalized understanding    Thank you for this referral.  Lucille Tinoco, PT  Minutes: 32      Eval
arrangements for a responsible adult (18 years or older) to be with you for 24 hours after your surgery. 16. ONE VISITOR will be allowed in the waiting area during your surgery. Exceptions may be made for surgical admissions, per nursing unit guidelines      Special Instructions:      Bring a list of CURRENT medications. Follow instructions from the office regarding medications to take the morning of surgery. On day of surgery if you are running late, unable to make procedure time, or sick, please call the Pre-op department at [phone]    These surgical instructions were reviewed with David Andrews during the PAT phone call.
assistance  Transfers:   Transfer Training  Transfer Training: Yes  Sit to Stand: Supervision  Stand to Sit: Supervision  Toilet Transfer: Supervision    ADL Assessment:   Feeding: Modified independent   Grooming: Modified independent   UE Bathing: Supervision  LE Bathing: Stand by assistance  UE Dressing: Stand by assistance  LE Dressing: Stand by assistance  Toileting: Stand by assistance    ADL Intervention:  BR mobility for toileting Supervision  Education on adaptive techniques for UB/LB ADLs due to spinal precautions  Mod Ind for self-feeding  Supervision-SBA for UB/LB ADLs with adaptive strategies. Issued reacher and long handled sponge for LB ADLs and item retrieval.    Pain:  Pain level pre-treatment: not rated  Pain level post-treatment: not rated     Activity Tolerance:   Activity Tolerance: Patient Tolerated treatment well  Please refer to the flowsheet for vital signs taken during this treatment. After treatment:   [] Patient left in no apparent distress sitting up in chair  [x] Patient left in no apparent distress in bed  [x] Call bell left within reach  [x] Nursing notified  [] Caregiver present  [] Bed alarm activated    COMMUNICATION/EDUCATION:   Patient Education  Education Given To: Patient  Education Provided: Role of Therapy;Plan of Care;Transfer Training;Energy Conservation; Fall Prevention Strategies; ADL Adaptive Strategies;Precautions; Equipment  Education Method: Demonstration;Verbal;Teach Back  Barriers to Learning: None  Education Outcome: Verbalized understanding    Thank you for this referral.  Tom Arvizu OTR/L  Minutes: 24    Eval Complexity: Decision Making: Low Complexity

## 2023-10-31 NOTE — PLAN OF CARE
Problem: Safety - Adult  Goal: Free from fall injury  Outcome: Progressing     Problem: Pain  Goal: Verbalizes/displays adequate comfort level or baseline comfort level  Outcome: Progressing     Problem: Discharge Planning  Goal: Discharge to home or other facility with appropriate resources  Outcome: Progressing     Problem: Discharge Planning  Goal: Discharge to home or other facility with appropriate resources  Outcome: Progressing

## 2023-10-31 NOTE — NURSE NAVIGATOR
Rounded on patient s/p CERVICAL LAMINOPLASTY CERVICAL THREE-THORACIC ONE with Dr. Lm Leroy, [facility] 10/30/2023. Patient observed to be alert and oriented x 3, sitting up in bed. He denies chest pain, shortness of breath, nausea or vomiting. He states that pain has been well controlled throughout the night. He continues to wear his collar. Dressing to posterior neck observed to be clean, dry and intact. Luis drain has been removed. Patient continues to have numbness to left hand that was present prior to surgery. He states that he has been up ambulating to the restroom, voiding without difficulty. Hutchins has been removed. Reminded patient of the importance of ambulating and repositioning to assist with pain and stiffness. Encouraged continued use of incentive spirometry at home to keep lungs expanded and free from complication. Patient reminded that collar is for comfort and that it may be removed for showering purposes. No tubs or submersion in water is allowed. He is to contact clinic with any dressing issues. Encouraged patient to sleep with head elevated for the first week and to ice to assist with pain and swelling. Patient verbalized understanding of all information provided. Patient has been cleared safe for discharge by PT / OT. Home health order has been placed for home physical therapy. It has been explained by case management to patient that he may get a bill for this if he does not establish COBRA with his insurance . Patient verbalized understanding and will decide if he wants home health to come see him or not. He does understand that he is to call office with any problems with dressing. Will follow patient postoperatively.

## 2023-11-01 ENCOUNTER — TELEPHONE (OUTPATIENT)
Facility: HOSPITAL | Age: 53
End: 2023-11-01

## 2023-11-01 ENCOUNTER — HOME CARE VISIT (OUTPATIENT)
Age: 53
End: 2023-11-01

## 2023-11-01 VITALS
DIASTOLIC BLOOD PRESSURE: 75 MMHG | HEART RATE: 95 BPM | RESPIRATION RATE: 16 BRPM | OXYGEN SATURATION: 98 % | TEMPERATURE: 96.8 F | SYSTOLIC BLOOD PRESSURE: 120 MMHG

## 2023-11-01 PROCEDURE — G0151 HHCP-SERV OF PT,EA 15 MIN: HCPCS

## 2023-11-01 PROCEDURE — 400013 HH SOC

## 2023-11-01 ASSESSMENT — ENCOUNTER SYMPTOMS
CONSTIPATION: 1
DYSPNEA ACTIVITY LEVEL: AFTER AMBULATING MORE THAN 20 FT
PAIN LOCATION - PAIN QUALITY: DULL ACHING

## 2023-11-01 NOTE — HOME HEALTH
PMHx: H+P per hospital 10/30/23 Eamon Gutierresw is a 47 y.o. male with worsening pain with pain, numbness and tingling in his left arm. He is also complaining of balance issues. MRI demonstrates global worsening of his cervical stenosis with left greater than right foraminal stenosis. Not convinced of signal change to the cord, but there is clear cord compression and foraminal stenosis. This patient has failed the presurgical conservative treatments  including physical therapy, spinal block injections and medications. Pain has impacted the patient's functional ability. He is being admitted for surgical intervention. List of Comorbidities:   Asthma   Back pain   Elevated blood pressure reading   GERD (gastroesophageal reflux disease)   Left shoulder pain   Low TSH level  Low TSH level   04/27/2017   may have mild hyperthyroidism w/u per PCP I will rpt TSH and get freeT4   Primary hypertension   9/13/2022   Right inguinal hernia   TIA (transient ischemic attack)   Pt is S/P CERVICAL LAMINOPLASTY CERVICAL THREE-THORACIC ONE    SUBJECTIVE:Pt reported that he came home in his brothers car and did well getting in/out. Pt noted that he is wearing his cervial collar at all times  LIVING SITUATION: Pt lives in a mutlt level home with 4 steps to enter with with 2 wide HR. Pt sleep on the couch but primary bathroom is on the 2nd floor with a flight of steps and 1 HR. Pt has 2 roomates who are able to A with pt care needs transportation, medications ADSl,IADLs   REQUIRES CAREGIVER ASSISTANCE FOR: transportation ADLS,IADLS   PLOF: Pt was I with amb without A DEV . Pt was I with dressing and bathing   MEDICATIONS REVIEWED AND RECONCILED  Medications reconciled. All medications are available in the home this visit. The following education was provided regarding medications, medication interactions, and look alike medications. Medications are effective at this time. no severe interactions noted.  Pt educated to take oxycodone as

## 2023-11-01 NOTE — TELEPHONE ENCOUNTER
Call placed to patient, ID verified x 2. Patient is s/p CERVICAL LAMINOPLASTY CERVICAL THREE-THORACIC ONE with Dr. Tres Bird, [facility] 10/30/2023. He denies chest pain, shortness of breath, nausea, vomiting, fever or chills. He endorses continued numbness in left hand that was present prior to surgery, he denies any difficulty with bladder, he is passing gas. He denies any throat soreness or difficulty swallowing. He states that his pain is well controlled with his pain medication that he is taking as prescribed. He continues to wear his collar. He states that he is up ambulating and that home health was out today and states that his dressing looks fine and does not require changing. Overall he feels he is doing well. He has no questions or concerns at this time. He will follow up with Dr. Tres Bird in two weeks or sooner if needed.
wears glasses/Partially impaired: cannot see medication labels or newsprint, but can see obstacles in path, and the surrounding layout; can count fingers at arm's length

## 2023-11-07 ENCOUNTER — HOSPITAL ENCOUNTER (EMERGENCY)
Facility: HOSPITAL | Age: 53
Discharge: HOME | End: 2023-11-07
Attending: EMERGENCY MEDICINE

## 2023-11-07 ENCOUNTER — APPOINTMENT (OUTPATIENT)
Facility: HOSPITAL | Age: 53
End: 2023-11-07

## 2023-11-07 VITALS
WEIGHT: 129 LBS | BODY MASS INDEX: 19.55 KG/M2 | SYSTOLIC BLOOD PRESSURE: 130 MMHG | RESPIRATION RATE: 20 BRPM | DIASTOLIC BLOOD PRESSURE: 84 MMHG | HEART RATE: 101 BPM | TEMPERATURE: 98.6 F | OXYGEN SATURATION: 99 % | HEIGHT: 68 IN

## 2023-11-07 DIAGNOSIS — M54.12 CERVICAL RADICULOPATHY: ICD-10-CM

## 2023-11-07 DIAGNOSIS — G89.18 POSTOPERATIVE PAIN: Primary | ICD-10-CM

## 2023-11-07 LAB
ALBUMIN SERPL-MCNC: 4 G/DL (ref 3.4–5)
ALBUMIN/GLOB SERPL: 0.9 (ref 0.8–1.7)
ALP SERPL-CCNC: 71 U/L (ref 45–117)
ALT SERPL-CCNC: 19 U/L (ref 16–61)
ANION GAP SERPL CALC-SCNC: 7 MMOL/L (ref 3–18)
AST SERPL-CCNC: 14 U/L (ref 10–38)
BASOPHILS # BLD: 0.1 K/UL (ref 0–0.1)
BASOPHILS NFR BLD: 1 % (ref 0–2)
BILIRUB SERPL-MCNC: 0.7 MG/DL (ref 0.2–1)
BUN SERPL-MCNC: 11 MG/DL (ref 7–18)
BUN/CREAT SERPL: 14 (ref 12–20)
CALCIUM SERPL-MCNC: 10.3 MG/DL (ref 8.5–10.1)
CHLORIDE SERPL-SCNC: 104 MMOL/L (ref 100–111)
CO2 SERPL-SCNC: 26 MMOL/L (ref 21–32)
CREAT SERPL-MCNC: 0.76 MG/DL (ref 0.6–1.3)
CRP SERPL-MCNC: <0.3 MG/DL (ref 0–0.3)
DIFFERENTIAL METHOD BLD: ABNORMAL
EOSINOPHIL # BLD: 0.3 K/UL (ref 0–0.4)
EOSINOPHIL NFR BLD: 4 % (ref 0–5)
ERYTHROCYTE [DISTWIDTH] IN BLOOD BY AUTOMATED COUNT: 11.9 % (ref 11.6–14.5)
ERYTHROCYTE [SEDIMENTATION RATE] IN BLOOD: 44 MM/HR (ref 0–20)
GLOBULIN SER CALC-MCNC: 4.5 G/DL (ref 2–4)
GLUCOSE SERPL-MCNC: 104 MG/DL (ref 74–99)
HCT VFR BLD AUTO: 43 % (ref 36–48)
HGB BLD-MCNC: 13.8 G/DL (ref 13–16)
IMM GRANULOCYTES # BLD AUTO: 0.1 K/UL (ref 0–0.04)
IMM GRANULOCYTES NFR BLD AUTO: 1 % (ref 0–0.5)
LYMPHOCYTES # BLD: 1.6 K/UL (ref 0.9–3.6)
LYMPHOCYTES NFR BLD: 23 % (ref 21–52)
MCH RBC QN AUTO: 31.6 PG (ref 24–34)
MCHC RBC AUTO-ENTMCNC: 32.1 G/DL (ref 31–37)
MCV RBC AUTO: 98.4 FL (ref 78–100)
MONOCYTES # BLD: 0.9 K/UL (ref 0.05–1.2)
MONOCYTES NFR BLD: 12 % (ref 3–10)
NEUTS SEG # BLD: 4.1 K/UL (ref 1.8–8)
NEUTS SEG NFR BLD: 59 % (ref 40–73)
NRBC # BLD: 0 K/UL (ref 0–0.01)
NRBC BLD-RTO: 0 PER 100 WBC
PLATELET # BLD AUTO: 462 K/UL (ref 135–420)
PMV BLD AUTO: 9 FL (ref 9.2–11.8)
POTASSIUM SERPL-SCNC: 3.8 MMOL/L (ref 3.5–5.5)
PROT SERPL-MCNC: 8.5 G/DL (ref 6.4–8.2)
RBC # BLD AUTO: 4.37 M/UL (ref 4.35–5.65)
SODIUM SERPL-SCNC: 137 MMOL/L (ref 136–145)
WBC # BLD AUTO: 7 K/UL (ref 4.6–13.2)

## 2023-11-07 PROCEDURE — 96375 TX/PRO/DX INJ NEW DRUG ADDON: CPT

## 2023-11-07 PROCEDURE — 99285 EMERGENCY DEPT VISIT HI MDM: CPT

## 2023-11-07 PROCEDURE — A9577 INJ MULTIHANCE: HCPCS | Performed by: ORTHOPAEDIC SURGERY

## 2023-11-07 PROCEDURE — 85652 RBC SED RATE AUTOMATED: CPT

## 2023-11-07 PROCEDURE — 86140 C-REACTIVE PROTEIN: CPT

## 2023-11-07 PROCEDURE — 72125 CT NECK SPINE W/O DYE: CPT

## 2023-11-07 PROCEDURE — 96374 THER/PROPH/DIAG INJ IV PUSH: CPT

## 2023-11-07 PROCEDURE — 85025 COMPLETE CBC W/AUTO DIFF WBC: CPT

## 2023-11-07 PROCEDURE — 72156 MRI NECK SPINE W/O & W/DYE: CPT

## 2023-11-07 PROCEDURE — 6360000002 HC RX W HCPCS

## 2023-11-07 PROCEDURE — 80053 COMPREHEN METABOLIC PANEL: CPT

## 2023-11-07 PROCEDURE — 6360000004 HC RX CONTRAST MEDICATION: Performed by: ORTHOPAEDIC SURGERY

## 2023-11-07 RX ORDER — DEXAMETHASONE SODIUM PHOSPHATE 4 MG/ML
10 INJECTION, SOLUTION INTRA-ARTICULAR; INTRALESIONAL; INTRAMUSCULAR; INTRAVENOUS; SOFT TISSUE
Status: COMPLETED | OUTPATIENT
Start: 2023-11-07 | End: 2023-11-07

## 2023-11-07 RX ADMIN — DEXAMETHASONE SODIUM PHOSPHATE 10 MG: 4 INJECTION INTRA-ARTICULAR; INTRALESIONAL; INTRAMUSCULAR; INTRAVENOUS; SOFT TISSUE at 14:50

## 2023-11-07 RX ADMIN — GADOBENATE DIMEGLUMINE 10 ML: 529 INJECTION, SOLUTION INTRAVENOUS at 16:03

## 2023-11-07 ASSESSMENT — ENCOUNTER SYMPTOMS
DIARRHEA: 0
ABDOMINAL PAIN: 0
NAUSEA: 0
CHEST TIGHTNESS: 0
CONSTIPATION: 1
SHORTNESS OF BREATH: 0

## 2023-11-07 ASSESSMENT — PAIN - FUNCTIONAL ASSESSMENT
PAIN_FUNCTIONAL_ASSESSMENT: NONE - DENIES PAIN
PAIN_FUNCTIONAL_ASSESSMENT: 0-10

## 2023-11-07 ASSESSMENT — LIFESTYLE VARIABLES
HOW OFTEN DO YOU HAVE A DRINK CONTAINING ALCOHOL: 2-3 TIMES A WEEK
HOW MANY STANDARD DRINKS CONTAINING ALCOHOL DO YOU HAVE ON A TYPICAL DAY: 1 OR 2

## 2023-11-07 ASSESSMENT — PAIN DESCRIPTION - LOCATION: LOCATION: BACK

## 2023-11-07 ASSESSMENT — PAIN SCALES - GENERAL: PAINLEVEL_OUTOF10: 10

## 2023-11-07 NOTE — CONSULTS
Patient undergoing MRI cervical spine  Came to ER with weakness in his left upper extremity, initially had improvement following C3-7 laminoplasty  WBC and CRP normal, sed rate 44  Afebrile  Tachy  Pending results CT and MRI c spine   Full consult to follow

## 2023-11-07 NOTE — ED TRIAGE NOTES
Pt c/o worsening  left arm numbness since neck surgery October 30, 2023 from a pinched nerve, per pt his left leg is starting to get numb as well. Pt also reported having fever and something draining on his surgery site today.

## 2023-11-07 NOTE — DISCHARGE INSTRUCTIONS
Continue current medications. Please follow your postoperative instructions regarding activities. Follow-up with Dr. Ted Alonzo in the coming week for reassessment.   Return for any acute concerns

## 2023-11-07 NOTE — ED PROVIDER NOTES
EMERGENCY DEPARTMENT HISTORY AND PHYSICAL EXAM      Patient: Emma Arellano Age: 47 y.o. Sex: male    YOB: 1970 Admit Date: 11/7/2023 PCP: DELONTE Grace NP   MRN: [id number]  CSN: [id number]     Room: 09/ Time Dictated: 2:01 PM        Chief Complaint   Chief Complaint   Patient presents with    Numbness    s/p neck surgery    wound drainage       History of Present Illness   Emma Arellano is a 47 y.o. male with past medical history including cervical myelopathy status post C7-C3 laminoplasty and fixation who presents to the ED with the chief complaint of left arm numbness and weakness. Patient had a recent operation significant for C3-C7 laminoplasty as well as fixation, and C1-C2 laminectomy. This operation was performed 10/30/2023. After operation patient initially felt improvement in the left arm weakness and numbness, but upon the second day began to notice an increase in weakness and numbness above his baseline which has steadily increased until he came in today. Prior to operation had numbness of left fourth and fifth finger, now has new numbness in his first second third as well as pain. Patient reports that he feels his entire arm is weaker than before operation. No fever, nausea, vomiting, body aches. Does note that he has only had 1 bowel movement since going home from his operation, normally he has 3 bowel movements per day. Of note patient was sent home with oxycodone for pain control. Vitals on arrival notable for blood pressure of 120/95, tachycardia into the 120s. Review of Systems   Review of Systems   Constitutional:  Negative for appetite change, chills, fatigue and fever. Respiratory:  Negative for chest tightness and shortness of breath. Cardiovascular:  Negative for chest pain and leg swelling. Gastrointestinal:  Positive for constipation. Negative for abdominal pain, diarrhea and nausea. Genitourinary:  Negative for difficulty urinating.

## 2023-11-07 NOTE — ED NOTES
Rn to bedside to medicate and complete MRI screening. Pt placed on monitoring. Pt has no other needs at this time. Awt screening.       Cherry [place], [place]  11/07/23 9090

## 2023-11-07 NOTE — ED TRIAGE NOTES
Pt reports has had numbness to L arm and L fingers since Nov 1st, the day after his surgery, while he was walking. Has numbness in L toes now too.    10/10 L arm pain, 8/10 neck pain

## 2023-11-07 NOTE — ED NOTES
Patient back from MRI, up ambulating to restroom unassisted, connected back to monitor.  No complaints at this time      Argelia Gonzalez  11/07/23 6405

## 2023-11-07 NOTE — ED NOTES
Pt discharged. DC instructions reviewed. Pt verbalized understanding. No further questions or concerns. Pt has follow up with surgeon for pain med review.       Drake Parnell RN  11/07/23 7077

## 2023-11-21 ENCOUNTER — HOME CARE VISIT (OUTPATIENT)
Age: 53
End: 2023-11-21

## 2023-11-28 ENCOUNTER — HOSPITAL ENCOUNTER (OUTPATIENT)
Facility: HOSPITAL | Age: 53
Discharge: HOME | End: 2023-12-01
Payer: MEDICAID

## 2023-11-28 DIAGNOSIS — M43.22 CERVICAL VERTEBRAL FUSION: ICD-10-CM

## 2023-11-28 PROCEDURE — 72050 X-RAY EXAM NECK SPINE 4/5VWS: CPT

## 2023-12-05 ENCOUNTER — TELEPHONE (OUTPATIENT)
Age: 53
End: 2023-12-05

## 2023-12-05 NOTE — TELEPHONE ENCOUNTER
Patient called requesting refill on amLODIPine (NORVASC) 5 MG tablet ,  atorvastatin (LIPITOR) 40 MG tablet,ibuprofen

## 2023-12-06 NOTE — TELEPHONE ENCOUNTER
Contacted PCP [facility] office. Patient is still seen with them. They will contact patient to refill medications not handled by this clinic.

## 2023-12-22 ENCOUNTER — HOSPITAL ENCOUNTER (OUTPATIENT)
Facility: HOSPITAL | Age: 53
Setting detail: RECURRING SERIES
Discharge: HOME | End: 2023-12-25
Payer: COMMERCIAL

## 2023-12-22 PROCEDURE — 97161 PT EVAL LOW COMPLEX 20 MIN: CPT

## 2023-12-22 NOTE — PROGRESS NOTES
[facility] - [address] [place] [address], [address] [facility]  [phone] fax    Plan of Care / Statement of Necessity for Physical Therapy Services     Patient Name: Eamon Roth : 1970   Medical   Diagnosis: Neck pain [M54.2]  H/O neck surgery [Z98.890] Treatment Diagnosis: M54.2  NECK PAIN      Onset Date: DOS 10/30/2023 Payor :  Payor: Carolyn / Plan: Carolyn / Product Type: *No Product type* /    Referral Source: Waylon Mondragon [facility]): 2023   Prior Hospitalization: See medical history Provider #: [id number]   Prior Level of Function: Independent with ADLs, functional, and daily tasks with pain and weakness in the left UE. Comorbidities: Asthma, HTN, hx LBP, hx TIA      Assessment / key information:    Pt is a 47year old male who presents to therapy today s/p C3-C7 laminoplasty/fixation and C1-C2 laminectomy DOS 10/30/2023 for neck pain and left UE numbness/weakness. Pt reports overall improvements in his symptoms but has continued numbness and weakness in the left UE into the hand. He states that he has a spot on the right side of his lower neck region that \"sticks out\" and can swell/cause pain. Pt demonstrated decreased AROM, decreased strength, muscle tightness, and impaired posture. There is an area on the right lower neck region (around C6-7) that is \"sticking out\" that his firm to the touch and will monitor this when in the clinic. Pt would benefit from physical therapy to improve the above impairments to help the pt return to performing ADLs, functional and daily activities. URGENT: Patient was evaluated for a post operative condition and early physical and/or occupational therapy intervention is critical to positive outcomes. Insurance authorization should be expedited to prevent delay in treatment.       Evaluation Complexity:  History:  HIGH Complexity :3+ comorbidities /

## 2023-12-22 NOTE — PROGRESS NOTES
PHYSICAL / OCCUPATIONAL THERAPY - DAILY TREATMENT NOTE (updated )    Patient Name: Ryan Hernandez    Date: 2023    : 1970  Insurance: Payor: Carolyn / Plan: Carolyn / Product Type: *No Product type* /      Patient  verified Yes     Visit #   Current / Total 1 10   Time   In / Out 11:31 12:10   Pain   In / Out 7 0   Subjective Functional Status/Changes: See POC   Changes to:  Meds, Allergies, Med Hx, Sx Hx? If yes, update Summary List no       TREATMENT AREA =  Neck pain [M54.2]  H/O neck surgery [Z98.890]    OBJECTIVE    15 min   Eval - untimed                      Therapeutic Procedures: Tx Min Billable or 1:1 Min (if diff from Tx Min) Procedure, Rationale, Specifics   12 0 49118 Therapeutic Exercise (timed):  increase ROM, strength, coordination, balance, and proprioception to improve patient's ability to progress to PLOF and address remaining functional goals. (see flow sheet as applicable)     Details if applicable:  HEP instruction and demonstration     12 0 78652 Self Care/Home Management (timed):  improve patient knowledge and understanding of pain reducing techniques, positioning, posture/ergonomics, home safety, activity modification, diagnosis/prognosis, and physical therapy expectations, procedures and progression  to improve patient's ability to progress to PLOF and address remaining functional goals.   (see flow sheet as applicable)     Details if applicable:  pt education on relevant anatomy/physiology with discussion about the MRI and Xray findings   24 0 MC BC Totals Reminder: bill using total billable min of TIMED therapeutic procedures (example: do not include dry needle or estim unattended, both untimed codes, in totals to left)  8-22 min = 1 unit; 23-37 min = 2 units; 38-52 min = 3 units; 53-67 min = 4 units; 68-82 min = 5 units   Total Total     TOTAL TREATMENT TIME:        39     [x]  Patient Education billed concurrently with other

## 2024-01-04 ENCOUNTER — HOSPITAL ENCOUNTER (OUTPATIENT)
Facility: HOSPITAL | Age: 54
Setting detail: RECURRING SERIES
Discharge: HOME | End: 2024-01-07
Payer: COMMERCIAL

## 2024-01-04 PROCEDURE — 97112 NEUROMUSCULAR REEDUCATION: CPT

## 2024-01-04 PROCEDURE — 97530 THERAPEUTIC ACTIVITIES: CPT

## 2024-01-04 PROCEDURE — 97110 THERAPEUTIC EXERCISES: CPT

## 2024-01-04 NOTE — PROGRESS NOTES
therapeutic procedures (example: do not include dry needle or estim unattended, both untimed codes, in totals to left)  8-22 min = 1 unit; 23-37 min = 2 units; 38-52 min = 3 units; 53-67 min = 4 units; 68-82 min = 5 units   Total Total     TOTAL TREATMENT TIME:       39     [x]  Patient Education billed concurrently with other procedures   [x] Review HEP    [] Progressed/Changed HEP, detail:    [] Other detail:       Objective Information/Functional Measures/Assessment  Pt continues to report \"knot\" in c/s paraspinals. PT palpated and notes firm mass in c/s paraspinals, pt educated to consult physician.  Pt tolerated session well, progress NV.  Pt notes \"tightness\" in LUE with median nn glides, highlighting neural tension.  Added median nn glides to address pt complaints of distal numbness in L hand.  Pt unable to perform TB ext due to complaints of weakness.  Pt unable to perform gravity resisted elbow extension, modified tricep ext in s/l.    Patient will continue to benefit from skilled PT / OT services to modify and progress therapeutic interventions, analyze and address functional mobility deficits, analyze and address ROM deficits, analyze and address strength deficits, analyze and address soft tissue restrictions, analyze and cue for proper movement patterns, analyze and modify for postural abnormalities, and instruct in home and community integration to address functional deficits and attain remaining goals.    Progress toward goals / Updated goals:  []  See Progress Note/Recertification    1st follow up, assess goals NV    PLAN  Yes  Continue plan of care  []  Upgrade activities as tolerated  []  Discharge due to :  []  Other:    Reji Marques, PT    1/4/2024    7:49 AM    Future Appointments   Date Time Provider Department Center   1/4/2024  8:50 AM [facility] PT [facility] 1 [facility]   1/5/2024 10:10 AM [facility] PT [facility] 1 [facility]   1/15/2024  9:30 AM [facility]

## 2024-01-04 NOTE — PROGRESS NOTES
PHYSICAL / OCCUPATIONAL THERAPY - DAILY TREATMENT NOTE (updated )    Patient Name: Cameron Scott    Date: 2024    : 1970  [facility]: [facility] / Plan: [employer] BETTER [facility] [place] / Product Type: *No Product type* /      Patient  verified Yes     Visit #   Current / Total 3 10   Time   In / Out 10:08 10:47   Pain   In / Out 2 0   Subjective Functional Status/Changes: Pt notes he contacted his physician and has scheduled a follow up to discuss his \"knots\" in his chest and c/s paraspinals for 24.   Changes to:  Meds, Allergies, Med Hx, Sx Hx?  If yes, update Summary List no       TREATMENT AREA =  Neck pain [M54.2]  H/O neck surgery [Z98.890]    OBJECTIVE  Therapeutic Procedures:  Tx Min Billable or 1:1 Min (if diff from Tx Min) Procedure, Rationale, Specifics   18 18 17554 Therapeutic Exercise (timed):  increase ROM, strength, coordination, balance, and proprioception to improve patient's ability to progress to PLOF and address remaining functional goals. (see flow sheet as applicable)     Details if applicable:       10 10 21630 Neuromuscular Re-Education (timed):  improve balance, coordination, kinesthetic sense, posture, core stability and proprioception to improve patient's ability to develop conscious control of individual muscles and awareness of position of extremities in order to progress to PLOF and address remaining functional goals. (see flow sheet as applicable)     11 11 02595 Therapeutic Activity (timed):  use of dynamic activities replicating functional movements to increase ROM, strength, coordination, balance, and proprioception in order to improve patient's ability to progress to PLOF and address remaining functional goals.  (see flow sheet as applicable)     39 39 [facility] Totals Reminder: bill using total billable min of TIMED therapeutic procedures (example: do not include dry needle or estim unattended, both untimed codes, in totals to left)  8-22 min = 1

## 2024-01-05 ENCOUNTER — HOSPITAL ENCOUNTER (OUTPATIENT)
Facility: HOSPITAL | Age: 54
Setting detail: RECURRING SERIES
Discharge: HOME | End: 2024-01-08
Payer: COMMERCIAL

## 2024-01-05 PROCEDURE — 97112 NEUROMUSCULAR REEDUCATION: CPT

## 2024-01-05 PROCEDURE — 97530 THERAPEUTIC ACTIVITIES: CPT

## 2024-01-05 PROCEDURE — 97110 THERAPEUTIC EXERCISES: CPT

## 2024-01-10 ENCOUNTER — HOSPITAL ENCOUNTER (OUTPATIENT)
Facility: HOSPITAL | Age: 54
Setting detail: RECURRING SERIES
Discharge: HOME | End: 2024-01-13
Payer: COMMERCIAL

## 2024-01-10 PROCEDURE — 97530 THERAPEUTIC ACTIVITIES: CPT

## 2024-01-10 PROCEDURE — 97112 NEUROMUSCULAR REEDUCATION: CPT

## 2024-01-10 PROCEDURE — 97535 SELF CARE MNGMENT TRAINING: CPT

## 2024-01-10 PROCEDURE — 97110 THERAPEUTIC EXERCISES: CPT

## 2024-01-10 NOTE — PROGRESS NOTES
PHYSICAL / OCCUPATIONAL THERAPY - DAILY TREATMENT NOTE    Patient Name: Cameron Scott    Date: 1/10/2024    : 1970  Insurance: Payor: [employer] [facility] [place] / Plan: AET BETTER [facility] [place] / Product Type: *No Product type* /      Patient  verified Yes     Visit #   Current / Total 4 10   Time   In / Out 1210 1248   Pain   In / Out 2 0   Subjective Functional Status/Changes: Pt reports that he is feeling okay today, noted that he still has issues raising his arm up due to continued weakness.      TREATMENT AREA =  Neck pain [M54.2]  H/O neck surgery [Z98.890]    OBJECTIVE         Therapeutic Procedures:    Tx Min Billable or 1:1 Min (if diff from Tx Min) Procedure, Rationale, Specifics   10 10 06247 Therapeutic Exercise (timed):  increase ROM, strength, coordination, balance, and proprioception to improve patient's ability to progress to PLOF and address remaining functional goals. (see flow sheet as applicable)     Details if applicable:       10 10 22694 Neuromuscular Re-Education (timed):  improve balance, coordination, kinesthetic sense, posture, core stability and proprioception to improve patient's ability to develop conscious control of individual muscles and awareness of position of extremities in order to progress to PLOF and address remaining functional goals. (see flow sheet as applicable)     Details if applicable:     10 10 07725 Therapeutic Activity (timed):  use of dynamic activities replicating functional movements to increase ROM, strength, coordination, balance, and proprioception in order to improve patient's ability to progress to PLOF and address remaining functional goals.  (see flow sheet as applicable)     Details if applicable:      45596 Self Care/Home Management (timed):  improve patient knowledge and understanding of pain reducing techniques, positioning, posture/ergonomics, home safety, activity modification, diagnosis/prognosis, and physical therapy expectations,

## 2024-01-15 ENCOUNTER — OFFICE VISIT (OUTPATIENT)
Age: 54
End: 2024-01-15
Payer: COMMERCIAL

## 2024-01-15 VITALS
HEART RATE: 106 BPM | SYSTOLIC BLOOD PRESSURE: 122 MMHG | DIASTOLIC BLOOD PRESSURE: 86 MMHG | HEIGHT: 68 IN | OXYGEN SATURATION: 98 % | RESPIRATION RATE: 20 BRPM | BODY MASS INDEX: 20 KG/M2 | TEMPERATURE: 99.3 F | WEIGHT: 132 LBS

## 2024-01-15 DIAGNOSIS — G54.0 BRACHIAL PLEXOPATHY: Primary | ICD-10-CM

## 2024-01-15 PROCEDURE — 99215 OFFICE O/P EST HI 40 MIN: CPT | Performed by: PSYCHIATRY & NEUROLOGY

## 2024-01-15 PROCEDURE — 99215 OFFICE O/P EST HI 40 MIN: CPT

## 2024-01-15 PROCEDURE — 3079F DIAST BP 80-89 MM HG: CPT | Performed by: PSYCHIATRY & NEUROLOGY

## 2024-01-15 PROCEDURE — 3074F SYST BP LT 130 MM HG: CPT | Performed by: PSYCHIATRY & NEUROLOGY

## 2024-01-15 NOTE — PROGRESS NOTES
[facility]  [address], [place] [address]  Office:  [phone]  Fax: [phone]  Chief Complaint   Patient presents with    Follow-up       Mr Scott h/o asthma, GERD, hypertension was admitted to [facility] on August 11, 2022 for numbness and weakness at left upper and lower limb. MRI of the brain did not show any acute stroke.  MRI of the cervical spine showed degenerative changes with facet hypertrophy causing central canal stenosis and cord compression; multilevel foraminal stenosis also seen.  Had a CT angiography which was unremarkable.  Transthoracic echo showed a positive bubble study [grade 1; with provocation]; otherwise no thrombus and has a normal ejection fraction.  Patient was discharged with aspirin and Plavix to be taken for 21 days    He was seen by spine surgery. There was also neck pain, radiating to left upper limb, with occasional urinary incontinence.     C-spine MRI, 6/4/2023: IMPRESSION: .  Multilevel severe degenerative disc disease throughout the cervical spine. Long  segment high-grade spinal canal stenosis C3-4 through C7-T1.  -Chronic areas of myelomalacia.  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  Multilevel high-grade neural foraminal stenosis.    Today of 1/15/2024, patient stated that he had neck surgery @ 10/31/2023. Before surgery,  he had severe neck pain with weakness and numbness at left entire upper limb. After surgery, He does not have neck pain anymore, but he still has left upper limb weakness and numbness. He was unable to lift hie left arm due to weakness. He is in physical therapy in this month.        Physical Examination:  /86 (Site: Left Upper Arm, Position: Sitting, Cuff Size: Medium Adult)   Pulse (!) 106   Temp 99.3 °F (37.4 °C) (Oral)

## 2024-01-16 ENCOUNTER — HOSPITAL ENCOUNTER (OUTPATIENT)
Facility: HOSPITAL | Age: 54
Setting detail: RECURRING SERIES
Discharge: HOME | End: 2024-01-19
Payer: COMMERCIAL

## 2024-01-16 PROCEDURE — 97110 THERAPEUTIC EXERCISES: CPT

## 2024-01-16 PROCEDURE — 97530 THERAPEUTIC ACTIVITIES: CPT

## 2024-01-16 PROCEDURE — 97112 NEUROMUSCULAR REEDUCATION: CPT

## 2024-01-16 NOTE — PROGRESS NOTES
PHYSICAL / OCCUPATIONAL THERAPY - DAILY TREATMENT NOTE    Patient Name: Cameron Scott    Date: 2024    : 1970  Insurance: Payor: [employer] [facility] [place] / Plan: [employer] BETTER [facility] [place] / Product Type: *No Product type* /      Patient  verified Yes     Visit #   Current / Total 5 10   Time   In / Out 1215 1253   Pain   In / Out 0 0   Subjective Functional Status/Changes: Patient reporting his main pain is when his fingers are tingling in his left hand     TREATMENT AREA =  Neck pain [M54.2]  H/O neck surgery [Z98.890]    OBJECTIVE         Therapeutic Procedures:    Tx Min Billable or 1:1 Min (if diff from Tx Min) Procedure, Rationale, Specifics   10 10 66989 Therapeutic Exercise (timed):  increase ROM, strength, coordination, balance, and proprioception to improve patient's ability to progress to PLOF and address remaining functional goals. (see flow sheet as applicable)     Details if applicable:       10 10 49081 Neuromuscular Re-Education (timed):  improve balance, coordination, kinesthetic sense, posture, core stability and proprioception to improve patient's ability to develop conscious control of individual muscles and awareness of position of extremities in order to progress to PLOF and address remaining functional goals. (see flow sheet as applicable)     Details if applicable:     10 10 50780 Therapeutic Activity (timed):  use of dynamic activities replicating functional movements to increase ROM, strength, coordination, balance, and proprioception in order to improve patient's ability to progress to PLOF and address remaining functional goals.  (see flow sheet as applicable)     Details if applicable:     38 38 [facility] Totals Reminder: bill using total billable min of TIMED therapeutic procedures (example: do not include dry needle or estim unattended, both untimed codes, in totals to left)  8-22 min = 1 unit; 23-37 min = 2 units; 38-52 min = 3 units; 53-67 min = 4 units; 68-82 min = 5

## 2024-01-19 ENCOUNTER — HOSPITAL ENCOUNTER (OUTPATIENT)
Facility: HOSPITAL | Age: 54
Setting detail: RECURRING SERIES
Discharge: HOME | End: 2024-01-22
Payer: COMMERCIAL

## 2024-01-19 PROCEDURE — 97535 SELF CARE MNGMENT TRAINING: CPT

## 2024-01-19 PROCEDURE — 97110 THERAPEUTIC EXERCISES: CPT

## 2024-01-19 PROCEDURE — 97112 NEUROMUSCULAR REEDUCATION: CPT

## 2024-01-19 PROCEDURE — 97530 THERAPEUTIC ACTIVITIES: CPT

## 2024-01-19 NOTE — PROGRESS NOTES
[facility]  [address]  [place] [address]  [phone] fax    PROGRESS NOTE  Patient Name: Cameron Scott : 1970   Treatment/Medical Diagnosis: Neck pain [M54.2]  H/O neck surgery [Z98.890]   Referral Source: Haley Padilla PA-C     Date of Initial Visit: 2023 Attended Visits: 6 Missed Visits: 0     SUMMARY OF TREATMENT    Mr. Scott reports 3-4%% improvement since beginning PT. Pt is making progressing with his strength, but still has quite a bit of weakness in his left tricep. Continues to have limitations with cervical ROM, but improvements with flexion noted. Reports continuing to have radicular sx consistently into his left hand/fingers. Reports continuing to have pain with pressure applied to spot on right side of C6/C7 region. States still has difficulty with donning/doffing clothes. We will continue with PT to address his remaining functional deficits.    CURRENT STATUS  Short Term Goals: To be accomplished in 2 treatments   Pt will report compliance and independence to HEP to help the pt manage their pain and symptoms.  Current: Pt notes compliance to HEP, states he performs \"everyday\"  PROGRESSING; reports compliance, will update as progresses  Long Term Goals: To be accomplished in 10 treatments  Pt will increase FOTO score to 40 points to improve ability to perform ADLs.  Status at last note/certification: 21 points  PROGRESSING; 30    2.  Pt will increase MMT left shoulder IR/ER to 4/5, wrist flex/ulnar deviation to 4/5, elbow EXT to 4/5 to improve ability to reach with less pain.  Status at last note/certification:  MMT:                                                                            Left (1-5)   Wrist flexion 3+/5   Wrsit ulnar deviation 3+/5   Shoulder External Rotators 4-/5 with pain   Shoulder Internal Rotators 3+/5   Elbow Extension 3+/5 with pain      PROGRESSING; shoulder IR/ER 4+/5 without pain, Elbow 
and address remaining functional goals.  (see flow sheet as applicable)     Details if applicable:     40  [facility] Totals Reminder: bill using total billable min of TIMED therapeutic procedures (example: do not include dry needle or estim unattended, both untimed codes, in totals to left)  8-22 min = 1 unit; 23-37 min = 2 units; 38-52 min = 3 units; 53-67 min = 4 units; 68-82 min = 5 units   Total Total     [x]  Patient Education billed concurrently with other procedures   [x] Review HEP    [] Progressed/Changed HEP, detail:    [] Other detail:       Objective Information/Functional Measures/Assessment    FOTO 30    Left  Strength 50 lbs on average    AROM    Cervical Flexion  55 deg   Cervical Extension  20 deg     AROM Right Left   Cervical Lateral Flexion  13 deg  20 deg   Cervical Rotation  40 deg  47 deg       MMT Right   Shoulder IR  4+/5   Shoulder ER  4+/5     MMT  Right   Elbow Extension  3+/5 without pain     MMT Right   Wrist Flexion  4+/5   Wrist Ulnar Deviation  4/5       Mr. Scott reports 3-4%% improvement since beginning PT. Pt is making progressing with his strength, but still has quite a bit of weakness in his left tricep. Continues to have limitations with cervical ROM, but improvements with flexion noted. Reports continuing to have radicular sx consistently into his left hand/fingers. Reports continuing to have pain with pressure applied to spot on right side of C6/C7 region. States still has difficulty with donning/doffing clothes. We will continue with PT to address his remaining functional deficits.      Patient will continue to benefit from skilled PT / OT services to modify and progress therapeutic interventions, analyze and address functional mobility deficits, analyze and address ROM deficits, analyze and address strength deficits, analyze and address soft tissue restrictions, analyze and cue for proper movement patterns, analyze and modify for postural abnormalities, analyze and address

## 2024-01-23 ENCOUNTER — TELEPHONE (OUTPATIENT)
Age: 54
End: 2024-01-23

## 2024-01-23 ENCOUNTER — APPOINTMENT (OUTPATIENT)
Facility: HOSPITAL | Age: 54
End: 2024-01-23
Payer: COMMERCIAL

## 2024-01-23 NOTE — TELEPHONE ENCOUNTER
Auth dept called stating patient need Peer to peer for MRI approval   EVICOR [phone] ( phone # )  Case # [id number]

## 2024-01-25 ENCOUNTER — HOSPITAL ENCOUNTER (OUTPATIENT)
Facility: HOSPITAL | Age: 54
Setting detail: RECURRING SERIES
Discharge: HOME | End: 2024-01-28
Payer: COMMERCIAL

## 2024-01-25 PROCEDURE — 97110 THERAPEUTIC EXERCISES: CPT

## 2024-01-25 PROCEDURE — 97530 THERAPEUTIC ACTIVITIES: CPT

## 2024-01-25 PROCEDURE — 97535 SELF CARE MNGMENT TRAINING: CPT

## 2024-01-25 PROCEDURE — 97112 NEUROMUSCULAR REEDUCATION: CPT

## 2024-01-25 NOTE — PROGRESS NOTES
PHYSICAL / OCCUPATIONAL THERAPY - DAILY TREATMENT NOTE    Patient Name: Cameron Scott    Date: 2024    : 1970  Insurance: Payor: [employer] [facility] [place] / Plan: AET BETTER [facility] [place] / Product Type: *No Product type* /      Patient  verified Yes     Visit #   Current / Total 1 10   Time   In / Out 1210 1250   Pain   In / Out 0 0   Subjective Functional Status/Changes: Pt states that he has no pain in his shoulder.      TREATMENT AREA =  Neck pain [M54.2]  H/O neck surgery [Z98.890]    OBJECTIVE         Therapeutic Procedures:    Tx Min Billable or 1:1 Min (if diff from Tx Min) Procedure, Rationale, Specifics   10 10 72028 Therapeutic Exercise (timed):  increase ROM, strength, coordination, balance, and proprioception to improve patient's ability to progress to PLOF and address remaining functional goals. (see flow sheet as applicable)     Details if applicable:       10 10 54902 Neuromuscular Re-Education (timed):  improve balance, coordination, kinesthetic sense, posture, core stability and proprioception to improve patient's ability to develop conscious control of individual muscles and awareness of position of extremities in order to progress to PLOF and address remaining functional goals. (see flow sheet as applicable)     Details if applicable:     10 10 96253 Therapeutic Activity (timed):  use of dynamic activities replicating functional movements to increase ROM, strength, coordination, balance, and proprioception in order to improve patient's ability to progress to PLOF and address remaining functional goals.  (see flow sheet as applicable)     Details if applicable:     10 10 91284 Self Care/Home Management (timed):  improve patient knowledge and understanding of pain reducing techniques, positioning, posture/ergonomics, home safety, activity modification, diagnosis/prognosis, and physical therapy expectations, procedures and progression  to improve patient's ability to progress to PLOF

## 2024-01-26 ENCOUNTER — HOSPITAL ENCOUNTER (OUTPATIENT)
Facility: HOSPITAL | Age: 54
Setting detail: RECURRING SERIES
Discharge: HOME | End: 2024-01-29
Payer: COMMERCIAL

## 2024-01-26 PROCEDURE — 97110 THERAPEUTIC EXERCISES: CPT

## 2024-01-26 PROCEDURE — 97112 NEUROMUSCULAR REEDUCATION: CPT

## 2024-01-26 PROCEDURE — 97530 THERAPEUTIC ACTIVITIES: CPT

## 2024-01-26 PROCEDURE — 97535 SELF CARE MNGMENT TRAINING: CPT

## 2024-01-26 NOTE — PROGRESS NOTES
PHYSICAL / OCCUPATIONAL THERAPY - DAILY TREATMENT NOTE    Patient Name: Cameron Scott    Date: 2024    : 1970  Insurance: Payor: [employer]ARETT [facility] [place] / Plan: AET BETTER [facility] [place] / Product Type: *No Product type* /      Patient  verified Yes     Visit #   Current / Total 2 10   Time   In / Out 808 851   Pain   In / Out 0 0   Subjective Functional Status/Changes: \"I don't have any pain, just tingling in my hand.\"     TREATMENT AREA =  Neck pain [M54.2]  H/O neck surgery [Z98.890]    OBJECTIVE         Therapeutic Procedures:    Tx Min Billable or 1:1 Min (if diff from Tx Min) Procedure, Rationale, Specifics   10  53144 Therapeutic Exercise (timed):  increase ROM, strength, coordination, balance, and proprioception to improve patient's ability to progress to PLOF and address remaining functional goals. (see flow sheet as applicable)     Details if applicable:       15  86413 Neuromuscular Re-Education (timed):  improve balance, coordination, kinesthetic sense, posture, core stability and proprioception to improve patient's ability to develop conscious control of individual muscles and awareness of position of extremities in order to progress to PLOF and address remaining functional goals. (see flow sheet as applicable)     Details if applicable:     10  98455 Therapeutic Activity (timed):  use of dynamic activities replicating functional movements to increase ROM, strength, coordination, balance, and proprioception in order to improve patient's ability to progress to PLOF and address remaining functional goals.  (see flow sheet as applicable)     Details if applicable:     8  10609 Self Care/Home Management (timed):  improve patient knowledge and understanding of pain reducing techniques, positioning, posture/ergonomics, home safety, activity modification, diagnosis/prognosis, and physical therapy expectations, procedures and progression  to improve patient's ability to progress to PLOF and

## 2024-01-29 ENCOUNTER — TELEPHONE (OUTPATIENT)
Age: 54
End: 2024-01-29

## 2024-01-29 ENCOUNTER — APPOINTMENT (OUTPATIENT)
Facility: HOSPITAL | Age: 54
End: 2024-01-29
Payer: COMMERCIAL

## 2024-01-29 ENCOUNTER — OFFICE VISIT (OUTPATIENT)
Age: 54
End: 2024-01-29
Payer: COMMERCIAL

## 2024-01-29 ENCOUNTER — HOSPITAL ENCOUNTER (OUTPATIENT)
Facility: HOSPITAL | Age: 54
Setting detail: RECURRING SERIES
Discharge: HOME | End: 2024-02-01
Payer: COMMERCIAL

## 2024-01-29 VITALS
BODY MASS INDEX: 21.37 KG/M2 | TEMPERATURE: 98.9 F | DIASTOLIC BLOOD PRESSURE: 88 MMHG | SYSTOLIC BLOOD PRESSURE: 128 MMHG | WEIGHT: 141 LBS | HEIGHT: 68 IN | OXYGEN SATURATION: 99 % | HEART RATE: 86 BPM

## 2024-01-29 DIAGNOSIS — K40.90 LEFT INGUINAL HERNIA: ICD-10-CM

## 2024-01-29 DIAGNOSIS — K40.90 RIGHT INGUINAL HERNIA: Primary | ICD-10-CM

## 2024-01-29 PROCEDURE — 97530 THERAPEUTIC ACTIVITIES: CPT

## 2024-01-29 PROCEDURE — 99214 OFFICE O/P EST MOD 30 MIN: CPT | Performed by: SURGERY

## 2024-01-29 PROCEDURE — 97110 THERAPEUTIC EXERCISES: CPT

## 2024-01-29 PROCEDURE — 97112 NEUROMUSCULAR REEDUCATION: CPT

## 2024-01-29 PROCEDURE — 97535 SELF CARE MNGMENT TRAINING: CPT

## 2024-01-29 PROCEDURE — 3074F SYST BP LT 130 MM HG: CPT | Performed by: SURGERY

## 2024-01-29 PROCEDURE — 3079F DIAST BP 80-89 MM HG: CPT | Performed by: SURGERY

## 2024-01-29 NOTE — PROGRESS NOTES
PHYSICAL / OCCUPATIONAL THERAPY - DAILY TREATMENT NOTE    Patient Name: Cameron Scott    Date: 2024    : 1970  Insurance: Payor: [employer] [facility] [place] / Plan: AET BETTER [facility] [place] / Product Type: *No Product type* /      Patient  verified Yes     Visit #   Current / Total 3 10   Time   In / Out 250 326   Pain   In / Out 3 0   Subjective Functional Status/Changes: \"I have a little pain.\"     TREATMENT AREA =  Neck pain [M54.2]  H/O neck surgery [Z98.890]    OBJECTIVE         Therapeutic Procedures:    Tx Min Billable or 1:1 Min (if diff from Tx Min) Procedure, Rationale, Specifics   8  51193 Therapeutic Exercise (timed):  increase ROM, strength, coordination, balance, and proprioception to improve patient's ability to progress to PLOF and address remaining functional goals. (see flow sheet as applicable)     Details if applicable:       10  98205 Neuromuscular Re-Education (timed):  improve balance, coordination, kinesthetic sense, posture, core stability and proprioception to improve patient's ability to develop conscious control of individual muscles and awareness of position of extremities in order to progress to PLOF and address remaining functional goals. (see flow sheet as applicable)     Details if applicable:     10  39867 Therapeutic Activity (timed):  use of dynamic activities replicating functional movements to increase ROM, strength, coordination, balance, and proprioception in order to improve patient's ability to progress to PLOF and address remaining functional goals.  (see flow sheet as applicable)     Details if applicable:     8  92936 Self Care/Home Management (timed):  improve patient knowledge and understanding of pain reducing techniques, positioning, posture/ergonomics, home safety, activity modification, diagnosis/prognosis, and physical therapy expectations, procedures and progression  to improve patient's ability to progress to PLOF and address remaining functional

## 2024-01-29 NOTE — PROGRESS NOTES
General Surgery Consult      Cameron Scott  Admit date: (Not on file)    MRN: [id number]     : 1970     Age: 53 y.o.        Attending Physician: Robyn Woodson MD, [facility]      History of Present Illness:     Cameron Scott is a 53 y.o. male who was referred to me for evaluation of symptomatic right inguinal hernia and a symptomatic left inguinal hernia.  The patient stated that he did a lot of heavy lifting in the past and he had a neck surgery few months ago.  He stated that has been noticing a bulge that has been there for about 3 years on the right side that is getting bigger and now it is very large but still reducible.  He is also been told that he has a left inguinal hernia and he noticed now a small bulge on the left side but his weight is smaller compared to the right.  He denies any previous abdominal surgeries.    Patient Active Problem List    Diagnosis Date Noted    Cervical myelopathy (HCC) 10/30/2023    Primary hypertension 2022    PFO (patent foramen ovale) 2022    TIA (transient ischemic attack) 2022    Left-sided weakness 2022    Left sided numbness 2022    Cervical radiculopathy 2022    Low TSH level 2017    Tobacco abuse counseling 2017    Nasal congestion 2017    Chest pain 2017    Alcohol abuse 2017    Abnormal stress test 03/15/2017     Past Medical History:   Diagnosis Date    Asthma     Back pain     Elevated blood pressure reading     GERD (gastroesophageal reflux disease)     Left shoulder pain     Low TSH level     Low TSH level 2017    may have mild hyperthyroidism w/u per PCP I will rpt TSH and get freeT4    Primary hypertension 2022    Right inguinal hernia     TIA (transient ischemic attack) 2022      Past Surgical History:   Procedure Laterality Date    COLONOSCOPY      SPINE SURGERY N/A 10/30/2023    CERVICAL LAMINOPLASTY CERVICAL THREE-THORACIC ONE; C-ARM; [MARK SPINE] performed by Concepcion

## 2024-01-29 NOTE — TELEPHONE ENCOUNTER
Pt. Called stating that he is going to have to cancel his MRI for 01/31 due to them not accepting his insurance.

## 2024-01-29 NOTE — PROGRESS NOTES
Cameron Scott is a 53 y.o. male (: 1970) presenting to address:    Chief Complaint   Patient presents with    New Patient     Right inguinal hernia/Referred by Dr. Ronald Davila  with [facility] medical group       Medication list and allergies have been reviewed with Cameron Scott and updated as of today's date.     I have gone over all Medical, Surgical and Social History with Cameron Tyler and updated/added the information accordingly.

## 2024-01-30 ENCOUNTER — TELEPHONE (OUTPATIENT)
Age: 54
End: 2024-01-30

## 2024-01-30 NOTE — TELEPHONE ENCOUNTER
Spoke to Mr. Scott to schedule surgery (robotic bilateral inguinal hernia repair with placement of mesh) with Dr. Woodson.  Surgery scheduled for Wednesday, February 7, 2024 at [facility].

## 2024-01-31 ENCOUNTER — APPOINTMENT (OUTPATIENT)
Facility: HOSPITAL | Age: 54
End: 2024-01-31
Payer: COMMERCIAL

## 2024-01-31 NOTE — PERIOP NOTE
Instructions for your surgery at [facility]      Today's Date:  1/31/2024      Patient's Name:  Cameron Scott           Surgery Date:  2/7/2024              Please enter the main entrance of the hospital and check-in at the  located in the lobby. Once checked in at the , you will take the elevators to the second floor, and report to the waiting room on the left. The room will say Procedure Registration.    Do NOT eat or drink anything, including candy, gum, or ice chips after midnight prior to your surgery, unless you have specific instructions from your surgeon or anesthesia provider to do so.  Brush your teeth before coming to the hospital. You may swish with water, but do not swallow.  No smoking/Vaping/E-Cigarettes 24 hours prior to the day of surgery.  No alcohol 24 hours prior to the day of surgery.  No recreational drugs for one week prior to the day of surgery.  Bring Photo ID, Insurance information, and Co-pay if required on day of surgery.  Bring in pertinent legal documents, such as, Medical Power of , DNR, Advance Directive, etc.  Leave all valuables, including money/purse, at home.  Remove all jewelry, including ALL body piercings, nail polish, acrylic nails, and makeup (including mascara); no lotions, powders, deodorant, or perfume/cologne/after shave on the skin.  Follow instruction for Hibiclens washes and CHG wipes from surgeon's office.   Glasses and dentures may be worn to the hospital. They must be removed prior to surgery. Please bring case/container for glasses or dentures.   Contact lenses should not be worn on day of surgery.   Call your doctor's office if symptoms of a cold or illness develop within 24-48 hours prior to your surgery.  Call your doctor's office if you have any questions concerning insurance or co-pays.  15. AN ADULT (relative or friend 18 years or older) MUST DRIVE YOU HOME AFTER YOUR SURGERY.  16. Please make arrangements

## 2024-02-01 ENCOUNTER — HOSPITAL ENCOUNTER (OUTPATIENT)
Facility: HOSPITAL | Age: 54
Setting detail: RECURRING SERIES
Discharge: HOME | End: 2024-02-04
Payer: COMMERCIAL

## 2024-02-01 PROCEDURE — 97112 NEUROMUSCULAR REEDUCATION: CPT

## 2024-02-01 PROCEDURE — 97535 SELF CARE MNGMENT TRAINING: CPT

## 2024-02-01 PROCEDURE — 97530 THERAPEUTIC ACTIVITIES: CPT

## 2024-02-01 PROCEDURE — 97110 THERAPEUTIC EXERCISES: CPT

## 2024-02-05 ENCOUNTER — HOSPITAL ENCOUNTER (OUTPATIENT)
Facility: HOSPITAL | Age: 54
Setting detail: RECURRING SERIES
Discharge: HOME | End: 2024-02-08
Payer: COMMERCIAL

## 2024-02-05 PROCEDURE — 97530 THERAPEUTIC ACTIVITIES: CPT

## 2024-02-05 PROCEDURE — 97110 THERAPEUTIC EXERCISES: CPT

## 2024-02-05 PROCEDURE — 97535 SELF CARE MNGMENT TRAINING: CPT

## 2024-02-05 PROCEDURE — 97112 NEUROMUSCULAR REEDUCATION: CPT

## 2024-02-05 NOTE — PROGRESS NOTES
PHYSICAL / OCCUPATIONAL THERAPY - DAILY TREATMENT NOTE    Patient Name: Cameron Scott    Date: 2024    : 1970  Insurance: Payor: [facility] [place] / Plan: AET BETTER [facility] [place] / Product Type: *No Product type* /      Patient  verified Yes     Visit #   Current / Total 5 10   Time   In / Out 930 1011   Pain   In / Out 0 0   Subjective Functional Status/Changes: \"Still have the tingling, numbness and weakness.\"     TREATMENT AREA =  Neck pain [M54.2]  H/O neck surgery [Z98.890]    OBJECTIVE         Therapeutic Procedures:    Tx Min Billable or 1:1 Min (if diff from Tx Min) Procedure, Rationale, Specifics   11  46048 Therapeutic Exercise (timed):  increase ROM, strength, coordination, balance, and proprioception to improve patient's ability to progress to PLOF and address remaining functional goals. (see flow sheet as applicable)     Details if applicable:       10  11993 Neuromuscular Re-Education (timed):  improve balance, coordination, kinesthetic sense, posture, core stability and proprioception to improve patient's ability to develop conscious control of individual muscles and awareness of position of extremities in order to progress to PLOF and address remaining functional goals. (see flow sheet as applicable)     Details if applicable:     10  72364 Therapeutic Activity (timed):  use of dynamic activities replicating functional movements to increase ROM, strength, coordination, balance, and proprioception in order to improve patient's ability to progress to PLOF and address remaining functional goals.  (see flow sheet as applicable)     Details if applicable:     10  17996 Self Care/Home Management (timed):  improve patient knowledge and understanding of pain reducing techniques, positioning, posture/ergonomics, home safety, activity modification, diagnosis/prognosis, and physical therapy expectations, procedures and progression  to improve patient's ability to progress to PLOF and

## 2024-02-06 ENCOUNTER — ANESTHESIA EVENT (OUTPATIENT)
Facility: HOSPITAL | Age: 54
End: 2024-02-06
Payer: COMMERCIAL

## 2024-02-07 ENCOUNTER — HOSPITAL ENCOUNTER (OUTPATIENT)
Facility: HOSPITAL | Age: 54
Setting detail: OUTPATIENT SURGERY
Discharge: HOME | End: 2024-02-07
Attending: SURGERY | Admitting: SURGERY
Payer: COMMERCIAL

## 2024-02-07 ENCOUNTER — ANESTHESIA (OUTPATIENT)
Facility: HOSPITAL | Age: 54
End: 2024-02-07
Payer: COMMERCIAL

## 2024-02-07 VITALS
WEIGHT: 136.2 LBS | SYSTOLIC BLOOD PRESSURE: 130 MMHG | RESPIRATION RATE: 18 BRPM | OXYGEN SATURATION: 100 % | HEIGHT: 68 IN | HEART RATE: 57 BPM | BODY MASS INDEX: 20.64 KG/M2 | TEMPERATURE: 97.9 F | DIASTOLIC BLOOD PRESSURE: 88 MMHG

## 2024-02-07 DIAGNOSIS — Z98.890 S/P HERNIA REPAIR: Primary | ICD-10-CM

## 2024-02-07 DIAGNOSIS — Z87.19 S/P HERNIA REPAIR: Primary | ICD-10-CM

## 2024-02-07 LAB
AMPHET UR QL SCN: NEGATIVE
BARBITURATES UR QL SCN: NEGATIVE
BENZODIAZ UR QL: NEGATIVE
CANNABINOIDS UR QL SCN: POSITIVE
COCAINE UR QL SCN: NEGATIVE
Lab: ABNORMAL
METHADONE UR QL: NEGATIVE
OPIATES UR QL: NEGATIVE
PCP UR QL: NEGATIVE

## 2024-02-07 PROCEDURE — 6370000000 HC RX 637 (ALT 250 FOR IP): Performed by: NURSE ANESTHETIST, CERTIFIED REGISTERED

## 2024-02-07 PROCEDURE — 2500000003 HC RX 250 WO HCPCS: Performed by: NURSE ANESTHETIST, CERTIFIED REGISTERED

## 2024-02-07 PROCEDURE — 2709999900 HC NON-CHARGEABLE SUPPLY: Performed by: SURGERY

## 2024-02-07 PROCEDURE — 3600000009 HC SURGERY ROBOT BASE: Performed by: SURGERY

## 2024-02-07 PROCEDURE — 80307 DRUG TEST PRSMV CHEM ANLYZR: CPT

## 2024-02-07 PROCEDURE — 7100000001 HC PACU RECOVERY - ADDTL 15 MIN: Performed by: SURGERY

## 2024-02-07 PROCEDURE — 2580000003 HC RX 258: Performed by: SURGERY

## 2024-02-07 PROCEDURE — 6360000002 HC RX W HCPCS: Performed by: NURSE ANESTHETIST, CERTIFIED REGISTERED

## 2024-02-07 PROCEDURE — 2500000003 HC RX 250 WO HCPCS: Performed by: SURGERY

## 2024-02-07 PROCEDURE — 7100000010 HC PHASE II RECOVERY - FIRST 15 MIN: Performed by: SURGERY

## 2024-02-07 PROCEDURE — 3700000001 HC ADD 15 MINUTES (ANESTHESIA): Performed by: SURGERY

## 2024-02-07 PROCEDURE — 3700000000 HC ANESTHESIA ATTENDED CARE: Performed by: SURGERY

## 2024-02-07 PROCEDURE — 7100000000 HC PACU RECOVERY - FIRST 15 MIN: Performed by: SURGERY

## 2024-02-07 PROCEDURE — 6360000002 HC RX W HCPCS: Performed by: SURGERY

## 2024-02-07 PROCEDURE — 2580000003 HC RX 258: Performed by: NURSE ANESTHETIST, CERTIFIED REGISTERED

## 2024-02-07 PROCEDURE — 7100000011 HC PHASE II RECOVERY - ADDTL 15 MIN: Performed by: SURGERY

## 2024-02-07 PROCEDURE — A4217 STERILE WATER/SALINE, 500 ML: HCPCS | Performed by: SURGERY

## 2024-02-07 PROCEDURE — S2900 ROBOTIC SURGICAL SYSTEM: HCPCS | Performed by: SURGERY

## 2024-02-07 PROCEDURE — C1781 MESH (IMPLANTABLE): HCPCS | Performed by: SURGERY

## 2024-02-07 PROCEDURE — 3600000019 HC SURGERY ROBOT ADDTL 15MIN: Performed by: SURGERY

## 2024-02-07 DEVICE — MESH CS LEFT LARGE 10CM X 16CM: Type: IMPLANTABLE DEVICE | Site: INGUINAL | Status: FUNCTIONAL

## 2024-02-07 DEVICE — MESH CS RIGHT LARGE 10CM X 16CM: Type: IMPLANTABLE DEVICE | Site: INGUINAL | Status: FUNCTIONAL

## 2024-02-07 RX ORDER — ONDANSETRON 2 MG/ML
4 INJECTION INTRAMUSCULAR; INTRAVENOUS
Status: DISCONTINUED | OUTPATIENT
Start: 2024-02-07 | End: 2024-02-07 | Stop reason: HOSPADM

## 2024-02-07 RX ORDER — OXYCODONE HYDROCHLORIDE 5 MG/1
5 TABLET ORAL
Status: COMPLETED | OUTPATIENT
Start: 2024-02-07 | End: 2024-02-07

## 2024-02-07 RX ORDER — FAMOTIDINE 20 MG/1
20 TABLET, FILM COATED ORAL ONCE
Status: COMPLETED | OUTPATIENT
Start: 2024-02-07 | End: 2024-02-07

## 2024-02-07 RX ORDER — DEXTROSE MONOHYDRATE 100 MG/ML
INJECTION, SOLUTION INTRAVENOUS CONTINUOUS PRN
Status: DISCONTINUED | OUTPATIENT
Start: 2024-02-07 | End: 2024-02-07 | Stop reason: HOSPADM

## 2024-02-07 RX ORDER — PROPOFOL 10 MG/ML
INJECTION, EMULSION INTRAVENOUS PRN
Status: DISCONTINUED | OUTPATIENT
Start: 2024-02-07 | End: 2024-02-07 | Stop reason: SDUPTHER

## 2024-02-07 RX ORDER — ACETAMINOPHEN 500 MG
1000 TABLET ORAL ONCE
Status: COMPLETED | OUTPATIENT
Start: 2024-02-07 | End: 2024-02-07

## 2024-02-07 RX ORDER — LABETALOL HYDROCHLORIDE 5 MG/ML
5 INJECTION, SOLUTION INTRAVENOUS
Status: DISCONTINUED | OUTPATIENT
Start: 2024-02-07 | End: 2024-02-07 | Stop reason: HOSPADM

## 2024-02-07 RX ORDER — SUCCINYLCHOLINE/SOD CL,ISO/PF 100 MG/5ML
SYRINGE (ML) INTRAVENOUS PRN
Status: DISCONTINUED | OUTPATIENT
Start: 2024-02-07 | End: 2024-02-07 | Stop reason: SDUPTHER

## 2024-02-07 RX ORDER — KETOROLAC TROMETHAMINE 15 MG/ML
INJECTION, SOLUTION INTRAMUSCULAR; INTRAVENOUS PRN
Status: DISCONTINUED | OUTPATIENT
Start: 2024-02-07 | End: 2024-02-07 | Stop reason: SDUPTHER

## 2024-02-07 RX ORDER — SODIUM CHLORIDE, SODIUM LACTATE, POTASSIUM CHLORIDE, CALCIUM CHLORIDE 600; 310; 30; 20 MG/100ML; MG/100ML; MG/100ML; MG/100ML
INJECTION, SOLUTION INTRAVENOUS CONTINUOUS
Status: DISCONTINUED | OUTPATIENT
Start: 2024-02-07 | End: 2024-02-07 | Stop reason: HOSPADM

## 2024-02-07 RX ORDER — MIDAZOLAM HYDROCHLORIDE 1 MG/ML
INJECTION INTRAMUSCULAR; INTRAVENOUS PRN
Status: DISCONTINUED | OUTPATIENT
Start: 2024-02-07 | End: 2024-02-07 | Stop reason: SDUPTHER

## 2024-02-07 RX ORDER — FENTANYL CITRATE 50 UG/ML
INJECTION, SOLUTION INTRAMUSCULAR; INTRAVENOUS PRN
Status: DISCONTINUED | OUTPATIENT
Start: 2024-02-07 | End: 2024-02-07 | Stop reason: SDUPTHER

## 2024-02-07 RX ORDER — LORAZEPAM 2 MG/ML
0.5 INJECTION INTRAMUSCULAR
Status: DISCONTINUED | OUTPATIENT
Start: 2024-02-07 | End: 2024-02-07 | Stop reason: HOSPADM

## 2024-02-07 RX ORDER — FENTANYL CITRATE 50 UG/ML
25 INJECTION, SOLUTION INTRAMUSCULAR; INTRAVENOUS EVERY 5 MIN PRN
Status: DISCONTINUED | OUTPATIENT
Start: 2024-02-07 | End: 2024-02-07 | Stop reason: HOSPADM

## 2024-02-07 RX ORDER — ONDANSETRON 2 MG/ML
INJECTION INTRAMUSCULAR; INTRAVENOUS PRN
Status: DISCONTINUED | OUTPATIENT
Start: 2024-02-07 | End: 2024-02-07 | Stop reason: SDUPTHER

## 2024-02-07 RX ORDER — LIDOCAINE HYDROCHLORIDE 20 MG/ML
INJECTION, SOLUTION EPIDURAL; INFILTRATION; INTRACAUDAL; PERINEURAL PRN
Status: DISCONTINUED | OUTPATIENT
Start: 2024-02-07 | End: 2024-02-07 | Stop reason: SDUPTHER

## 2024-02-07 RX ORDER — LIDOCAINE HYDROCHLORIDE 10 MG/ML
1 INJECTION, SOLUTION EPIDURAL; INFILTRATION; INTRACAUDAL; PERINEURAL
Status: DISCONTINUED | OUTPATIENT
Start: 2024-02-07 | End: 2024-02-07 | Stop reason: HOSPADM

## 2024-02-07 RX ORDER — ROCURONIUM BROMIDE 10 MG/ML
INJECTION, SOLUTION INTRAVENOUS PRN
Status: DISCONTINUED | OUTPATIENT
Start: 2024-02-07 | End: 2024-02-07 | Stop reason: SDUPTHER

## 2024-02-07 RX ORDER — SODIUM CHLORIDE 0.9 % (FLUSH) 0.9 %
5-40 SYRINGE (ML) INJECTION PRN
Status: DISCONTINUED | OUTPATIENT
Start: 2024-02-07 | End: 2024-02-07 | Stop reason: HOSPADM

## 2024-02-07 RX ORDER — DIPHENHYDRAMINE HYDROCHLORIDE 50 MG/ML
12.5 INJECTION INTRAMUSCULAR; INTRAVENOUS
Status: DISCONTINUED | OUTPATIENT
Start: 2024-02-07 | End: 2024-02-07 | Stop reason: HOSPADM

## 2024-02-07 RX ORDER — DEXAMETHASONE SODIUM PHOSPHATE 4 MG/ML
INJECTION, SOLUTION INTRA-ARTICULAR; INTRALESIONAL; INTRAMUSCULAR; INTRAVENOUS; SOFT TISSUE PRN
Status: DISCONTINUED | OUTPATIENT
Start: 2024-02-07 | End: 2024-02-07 | Stop reason: SDUPTHER

## 2024-02-07 RX ORDER — HALOPERIDOL 5 MG/ML
1 INJECTION INTRAMUSCULAR
Status: COMPLETED | OUTPATIENT
Start: 2024-02-07 | End: 2024-02-07

## 2024-02-07 RX ORDER — OXYCODONE HYDROCHLORIDE AND ACETAMINOPHEN 5; 325 MG/1; MG/1
1 TABLET ORAL EVERY 6 HOURS PRN
Qty: 12 TABLET | Refills: 0 | Status: SHIPPED | OUTPATIENT
Start: 2024-02-07 | End: 2024-02-10

## 2024-02-07 RX ORDER — NEOSTIGMINE METHYLSULFATE 1 MG/ML
INJECTION, SOLUTION INTRAVENOUS PRN
Status: DISCONTINUED | OUTPATIENT
Start: 2024-02-07 | End: 2024-02-07 | Stop reason: SDUPTHER

## 2024-02-07 RX ORDER — GLYCOPYRROLATE 0.2 MG/ML
INJECTION INTRAMUSCULAR; INTRAVENOUS PRN
Status: DISCONTINUED | OUTPATIENT
Start: 2024-02-07 | End: 2024-02-07 | Stop reason: SDUPTHER

## 2024-02-07 RX ADMIN — ROCURONIUM BROMIDE 20 MG: 10 INJECTION, SOLUTION INTRAVENOUS at 12:30

## 2024-02-07 RX ADMIN — HALOPERIDOL LACTATE 1 MG: 5 INJECTION, SOLUTION INTRAMUSCULAR at 13:20

## 2024-02-07 RX ADMIN — WATER 2000 MG: 1 INJECTION, SOLUTION INTRAMUSCULAR; INTRAVENOUS; SUBCUTANEOUS at 12:20

## 2024-02-07 RX ADMIN — Medication 100 MG: at 12:24

## 2024-02-07 RX ADMIN — KETOROLAC TROMETHAMINE 30 MG: 15 INJECTION, SOLUTION INTRAMUSCULAR; INTRAVENOUS at 12:50

## 2024-02-07 RX ADMIN — OXYCODONE HYDROCHLORIDE 5 MG: 5 TABLET ORAL at 15:35

## 2024-02-07 RX ADMIN — DEXAMETHASONE SODIUM PHOSPHATE 8 MG: 4 INJECTION INTRA-ARTICULAR; INTRALESIONAL; INTRAMUSCULAR; INTRAVENOUS; SOFT TISSUE at 12:24

## 2024-02-07 RX ADMIN — MIDAZOLAM 2 MG: 1 INJECTION, SOLUTION INTRAMUSCULAR; INTRAVENOUS at 12:20

## 2024-02-07 RX ADMIN — ROCURONIUM BROMIDE 10 MG: 10 INJECTION, SOLUTION INTRAVENOUS at 12:24

## 2024-02-07 RX ADMIN — NEOSTIGMINE METHYLSULFATE 4 MG: 1 INJECTION INTRAVENOUS at 12:57

## 2024-02-07 RX ADMIN — GLYCOPYRROLATE 0.6 MG: 0.2 INJECTION INTRAMUSCULAR; INTRAVENOUS at 12:57

## 2024-02-07 RX ADMIN — PROPOFOL 150 MG: 10 INJECTION, EMULSION INTRAVENOUS at 12:24

## 2024-02-07 RX ADMIN — SUGAMMADEX 100 MG: 100 INJECTION, SOLUTION INTRAVENOUS at 13:08

## 2024-02-07 RX ADMIN — GLYCOPYRROLATE 0.2 MG: 0.2 INJECTION INTRAMUSCULAR; INTRAVENOUS at 12:20

## 2024-02-07 RX ADMIN — ONDANSETRON 4 MG: 2 INJECTION INTRAMUSCULAR; INTRAVENOUS at 12:20

## 2024-02-07 RX ADMIN — SODIUM CHLORIDE, POTASSIUM CHLORIDE, SODIUM LACTATE AND CALCIUM CHLORIDE: 600; 310; 30; 20 INJECTION, SOLUTION INTRAVENOUS at 10:24

## 2024-02-07 RX ADMIN — ACETAMINOPHEN 1000 MG: 500 TABLET ORAL at 10:11

## 2024-02-07 RX ADMIN — FAMOTIDINE 20 MG: 20 TABLET ORAL at 10:11

## 2024-02-07 RX ADMIN — FENTANYL CITRATE 100 MCG: 50 INJECTION INTRAMUSCULAR; INTRAVENOUS at 12:24

## 2024-02-07 RX ADMIN — LIDOCAINE HYDROCHLORIDE 100 MG: 20 INJECTION, SOLUTION EPIDURAL; INFILTRATION; INTRACAUDAL; PERINEURAL at 12:24

## 2024-02-07 ASSESSMENT — PAIN - FUNCTIONAL ASSESSMENT
PAIN_FUNCTIONAL_ASSESSMENT: 0-10
PAIN_FUNCTIONAL_ASSESSMENT: NONE - DENIES PAIN

## 2024-02-07 ASSESSMENT — PAIN DESCRIPTION - LOCATION: LOCATION: ABDOMEN

## 2024-02-07 ASSESSMENT — PAIN DESCRIPTION - DESCRIPTORS: DESCRIPTORS: SHARP;SORE

## 2024-02-07 ASSESSMENT — PAIN SCALES - GENERAL: PAINLEVEL_OUTOF10: 7

## 2024-02-07 NOTE — OP NOTE
large Bard 3D MID mesh in the preperitoneal space and we placed a left-sided large Bard 3D MID mesh in the left side in the preperitoneal space and they overlapped in the middle.  We used a 2-0 V-Loc suture on the right side to completely close the peritoneum to cover the mesh and we used another 2-0 V-Loc suture on the left side to close the peritoneum to completely cover the mesh as well.  Hemostasis was secured.  The needles were taken out.  The abdomen was desufflated and the skin incisions were closed with 4-0 Monocryl and glue.        CASSY LILLY MD      [id number]  D:  02/07/2024 12:58  T:  02/07/2024 18:56  JOB #:  [id number]

## 2024-02-07 NOTE — PROGRESS NOTES
Update History & Physical    The patient's History and Physical was reviewed with the patient and I examined the patient. There was no change. The surgical site was confirmed by the patient and me.       Plan: The risks, benefits, expected outcome, and alternative to the recommended procedure have been discussed with the patient. Patient understands and wants to proceed with the procedure. Will proceed with robotic bilateral inguinal hernias repair with placement of meshes.     Electronically signed by Robyn Woodson MD on 2/7/2024 at 10:21 AM

## 2024-02-07 NOTE — DISCHARGE INSTRUCTIONS
Discharge Instructions Following Surgery    Patient: Cameron Scott MRN: [id number]  SSN: [id number]    YOB: 1970  Age: 53 y.o.  Sex: male      Activity  As tolerated, walking encourage, stairs are okay.  Avoid strenuous activities - no lifting anything heavier than 15 pounds till seen in the clinic.  You may shower at home after 24 hours.  For the first 24 hours: do not Drive, Drink alcoholic beverages, or make important decisions.  Be aware of dizziness following anesthesia and while taking pain medication.    Diet and Medications  Regular diet after nausea from the anesthetic has passed.  Take pain medication as directed by your doctor.  Call your doctor if pain is NOT relieved by medication.    Wound and Dressing Care  There is glue on the wounds. No need for any dressing care.   Apply ice packs to the area of the surgery for the first 1 to 2 days  Apply warm compresses after 2 days for pain relieve if needed    Call your doctor if  Excessive bleeding that does not stop after holding pressure over the area.  Temperature of 101 degrees F or above.  Redness,excessive swelling or bruising, and/or green or yellow, smelly discharge from incision.  If nausea and vomiting continues.    Follow-Up    Call the office of Dr. Robyn Woodson at [phone] to make your follow-up appointment.    Dr. Woodson's cell phone number is [phone]. Please call me if you have any concerns or questions.

## 2024-02-07 NOTE — BRIEF OP NOTE
Brief Postoperative Note      Patient: Cameron Scott  YOB: 1970  MRN: [id number]    Date of Procedure: 2/7/2024    Pre-Op Diagnosis Codes:     * Right inguinal hernia [K40.90]     * Left inguinal hernia [K40.90]    Post-Op Diagnosis: Same       Procedure(s):  ROBOTIC BILATERAL INGUINAL HERNIA REPAIR WITH PLACEMENT OF MESH    Surgeon(s):  Robyn Woodson MD    Assistant:  Surgical Assistant: Jay Santos    Anesthesia: General    Estimated Blood Loss (mL): Minimal    Complications: None    Specimens:   * No specimens in log *    Implants:  Implant Name Type Inv. Item Serial No.  Lot No. LRB No. Used Action   MESH CS LEFT LARGE 10CM X 16CM - [id number]  MESH CS LEFT LARGE 10CM X 16CM  BARD [employer]p [employer]-WD [id number] Left 1 Implanted   MESH CS RIGHT LARGE 10CM X 16CM - [id number]  MESH CS RIGHT LARGE 10CM X 16CM  [employer]-WD [id number] Right 1 Implanted         Drains:   [REMOVED] Closed/Suction Drain Posterior Neck Bulb (Removed)       [REMOVED] Urinary Catheter 10/30/23 2 Way;[place] (Removed)       Findings: Bilateral inguinal hernias.       Electronically signed by Robyn Woodson MD on 2/7/2024 at 12:57 PM

## 2024-02-07 NOTE — ANESTHESIA PRE PROCEDURE
Department of Anesthesiology  Preprocedure Note       Name:  Cameron Scott   Age:  53 y.o.  :  1970                                          MRN:  [id number]         Date:  2024      Surgeon: Surgeon(s):  Robyn Woodson MD    Procedure: Procedure(s):  ROBOTIC BILATERAL INGUINAL HERNIA REPAIR WITH PLACEMENT OF MESH    Medications prior to admission:   Prior to Admission medications    Medication Sig Start Date End Date Taking? Authorizing Provider   oxyCODONE-acetaminophen (PERCOCET) 5-325 MG per tablet Take 1 tablet by mouth every 6 hours as needed for Pain for up to 3 days. Intended supply: 3 days. Take lowest dose possible to manage pain Max Daily Amount: 4 tablets 2/7/24 2/10/24 Yes Robyn Woodson MD   cyclobenzaprine (FLEXERIL) 10 MG tablet TAKE ONE TABLET BY MOUTH TWICE DAILY AS NEEDED FOR MUSCLE SPASM OR TIGHTNESS  Patient not taking: Reported on 2024   Provider, MD Cristiana   lidocaine (LIDODERM) 5 % Place 1 patch onto the skin daily 12 hours on, 12 hours off.  Patient not taking: Reported on 2024   Anmol Ruiz PA-C   amLODIPine (NORVASC) 5 MG tablet Take 1 tablet by mouth daily 22   Automatic Reconciliation, Ar   atorvastatin (LIPITOR) 40 MG tablet Take 1 tablet by mouth 22   Automatic Reconciliation, Ar   aspirin 81 MG chewable tablet Take 1 tablet by mouth daily 22   Provider, MD Cristiana   ibuprofen (ADVIL;MOTRIN) 600 MG tablet Take 1 tablet by mouth every 12 hours as needed for Pain  Patient not taking: Reported on 2024   Margarita Vazquez MD       Current medications:    Current Facility-Administered Medications   Medication Dose Route Frequency Provider Last Rate Last Admin    ceFAZolin (ANCEF) 2,000 mg in sterile water 20 mL IV syringe  2,000 mg IntraVENous Once Robyn Woodson MD        lidocaine PF 1 % injection 1 mL  1 mL IntraDERmal Once PRN Brein, Carolina, APRN - CRNA        lactated ringers IV soln infusion

## 2024-02-08 ENCOUNTER — APPOINTMENT (OUTPATIENT)
Facility: HOSPITAL | Age: 54
End: 2024-02-08
Payer: COMMERCIAL

## 2024-02-12 ENCOUNTER — APPOINTMENT (OUTPATIENT)
Facility: HOSPITAL | Age: 54
End: 2024-02-12
Payer: COMMERCIAL

## 2024-02-13 ENCOUNTER — TELEPHONE (OUTPATIENT)
Age: 54
End: 2024-02-13

## 2024-02-13 NOTE — TELEPHONE ENCOUNTER
Spoke to Veronica turpin [facility] who was able to review letter in Mr. Scott chart and states they'll adjust his PT to start after March 6, 2024.

## 2024-02-13 NOTE — TELEPHONE ENCOUNTER
Spoke to Mr. Scott whom states [facility] hasn't received the letter he requested yesterday.  I told Mr. Scott I'll contact [facility] [employer] re: letter is accessible in epic and was faxed yesterday.

## 2024-02-15 ENCOUNTER — APPOINTMENT (OUTPATIENT)
Facility: HOSPITAL | Age: 54
End: 2024-02-15
Payer: COMMERCIAL

## 2024-02-19 ENCOUNTER — APPOINTMENT (OUTPATIENT)
Facility: HOSPITAL | Age: 54
End: 2024-02-19
Payer: COMMERCIAL

## 2024-02-19 ENCOUNTER — OFFICE VISIT (OUTPATIENT)
Age: 54
End: 2024-02-19

## 2024-02-19 VITALS
WEIGHT: 136 LBS | TEMPERATURE: 98 F | BODY MASS INDEX: 20.61 KG/M2 | HEART RATE: 94 BPM | DIASTOLIC BLOOD PRESSURE: 69 MMHG | HEIGHT: 68 IN | SYSTOLIC BLOOD PRESSURE: 105 MMHG | OXYGEN SATURATION: 98 %

## 2024-02-19 DIAGNOSIS — Z98.890 S/P HERNIA REPAIR: Primary | ICD-10-CM

## 2024-02-19 DIAGNOSIS — Z87.19 S/P HERNIA REPAIR: Primary | ICD-10-CM

## 2024-02-19 PROCEDURE — 99024 POSTOP FOLLOW-UP VISIT: CPT | Performed by: SURGERY

## 2024-02-19 NOTE — PROGRESS NOTES
Cameron Scott is a 53 y.o. male (: 1970) presenting to address:    Chief Complaint   Patient presents with    Post-Op Check     Repair of bilateral inguinal hernias with bilateral large bard 3D mid meshes 24       Medication list and allergies have been reviewed with Cameron Scott and updated as of today's date.     I have gone over all Medical, Surgical and Social History with Cameron Scott and updated/added the information accordingly.       1. Have you been to the ER, Urgent Care or Hospitalized since your last visit? No          2. Have you followed up with your PCP or any other Physicians since your procedure/ last office visit?   No

## 2024-03-06 ENCOUNTER — HOSPITAL ENCOUNTER (OUTPATIENT)
Facility: HOSPITAL | Age: 54
Setting detail: RECURRING SERIES
Discharge: HOME | End: 2024-03-09
Payer: COMMERCIAL

## 2024-03-06 PROCEDURE — 97535 SELF CARE MNGMENT TRAINING: CPT

## 2024-03-06 PROCEDURE — 97530 THERAPEUTIC ACTIVITIES: CPT

## 2024-03-06 PROCEDURE — 97112 NEUROMUSCULAR REEDUCATION: CPT

## 2024-03-06 PROCEDURE — 97110 THERAPEUTIC EXERCISES: CPT

## 2024-03-06 NOTE — PROGRESS NOTES
[facility] [address]  [address]  [place] [address]  [phone] fax    PROGRESS NOTE  Patient Name: Cameron Scott : 1970   Treatment/Medical Diagnosis: Neck pain [M54.2]  H/O neck surgery [Z98.890]   Referral Source: Haley Padilla PA-C     Date of Initial Visit: 24 Attended Visits: 12 Missed Visits: 0     SUMMARY OF TREATMENT  Pt has attended skilled physical therapy for 12 visits to address Neck pain [M54.2]  H/O neck surgery [Z98.890] and has made a slight regression in progress thus far with therapy measures.  Objectively, Pt demonstrates some loss of UE strength, .  Pt's functional gains include patient reporting he can not think of any currently.  Pt's functional deficits include snapping/ pain with turning, raised saúl protrusion at C6-C7 level that is painful, trouble sleeping/ cannot sleep on back, sends numbness to left hand when laying on right side or on back,.  Pt rates pain as 10/10 at worst and 3/10 at best.  Pt reports a self-reported improvement rating of 8% since start of care.  Pt would continue to benefit from skilled therapy services to improve ROM of both patients shoulder and cervical.     CURRENT STATUS   Pt will report compliance and independence to HEP to help the pt manage their pain and symptoms.  PN Status: reports compliance, will update as progresses             Reports daily compliance [Date assessed: 2024]             Current: Patient reporting he has not been doing his HEP as per his hernia surgeons orders (3/6/24)     Pt will increase FOTO score to 40 points to improve ability to perform ADLs.  PN Status; 30  Current: 36/100 points (3/6/24)     3.  Pt will increase MMT left shoulder IR/ER to 4/5, wrist flex/ulnar deviation to 4/5, elbow EXT to 4/5 to improve ability to reach with less pain.  PN Status: shoulder IR/ER 4+/5 without pain, Elbow Extension 3+/5 without pain, Wrist flexion 4+/5, wrist ulnar

## 2024-03-06 NOTE — PROGRESS NOTES
PHYSICAL / OCCUPATIONAL THERAPY - DAILY TREATMENT NOTE    Patient Name: Cameron Scott    Date: 3/6/2024    : 1970  Insurance: Payor: [facility] [facility] [place] / Plan: AET BETTER [facility] [place] / Product Type: *No Product type* /      Patient  verified Yes     Visit #   Current / Total 6 10   Time   In / Out 928 1009   Pain   In / Out 4 4   Subjective Functional Status/Changes: \"Still have the tingling, numbness and weakness.\"     TREATMENT AREA =  Neck pain [M54.2]  H/O neck surgery [Z98.890]    OBJECTIVE         Therapeutic Procedures:    Tx Min Billable or 1:1 Min (if diff from Tx Min) Procedure, Rationale, Specifics   9  00890 Therapeutic Exercise (timed):  increase ROM, strength, coordination, balance, and proprioception to improve patient's ability to progress to PLOF and address remaining functional goals. (see flow sheet as applicable)     Details if applicable:       10  14952 Neuromuscular Re-Education (timed):  improve balance, coordination, kinesthetic sense, posture, core stability and proprioception to improve patient's ability to develop conscious control of individual muscles and awareness of position of extremities in order to progress to PLOF and address remaining functional goals. (see flow sheet as applicable)     Details if applicable:     12  14262 Therapeutic Activity (timed):  use of dynamic activities replicating functional movements to increase ROM, strength, coordination, balance, and proprioception in order to improve patient's ability to progress to PLOF and address remaining functional goals.  (see flow sheet as applicable)     Details if applicable:     10  53665 Self Care/Home Management (timed):  improve patient knowledge and understanding of pain reducing techniques, positioning, posture/ergonomics, home safety, activity modification, diagnosis/prognosis, and physical therapy expectations, procedures and progression  to improve patient's ability to progress to PLOF and

## 2024-03-08 ENCOUNTER — HOSPITAL ENCOUNTER (OUTPATIENT)
Facility: HOSPITAL | Age: 54
Setting detail: RECURRING SERIES
Discharge: HOME | End: 2024-03-11
Payer: COMMERCIAL

## 2024-03-08 PROCEDURE — 97530 THERAPEUTIC ACTIVITIES: CPT

## 2024-03-08 PROCEDURE — 97112 NEUROMUSCULAR REEDUCATION: CPT

## 2024-03-08 PROCEDURE — 97535 SELF CARE MNGMENT TRAINING: CPT

## 2024-03-08 PROCEDURE — 97110 THERAPEUTIC EXERCISES: CPT

## 2024-03-08 NOTE — PROGRESS NOTES
(3/6/24)             Left shoulder ER= 3+/5 p!             Wrist flex= 4-/5             Wrist ulnar deviation= 3+/5              Elbow EXT= 3+/5                  4.  Pt will increase AROM c/s flex to 50 degs, EXT to 35 degs, B rotation to WNL to improve ability to tolerate daily tasks.  Status at Last PN: (3/6/24)  Flexion =45 deg  Ext = 22 deg p!  Right rotation= 38 deg  Left rotation= 42 deg     5.  Pt will improve left  strength to at least 55lbs (average of 3 trials) to improve performance of household activities.  Status at Last PN: (3/6/24) 49 lbs on average (3/6/24)     6. Patient to improve left shoulder flexion ROM to 150 degrees or greater without reporting increased pain to improve patient's ability to reach overhead to perform dressing and ADLs  Status at Last PN: (3/6/24) Standing Left shoulder Flex: 121 p!    Next PN/ RC due 04/04/2024  Auth due (visit number/ date) auth pending    PLAN  - Continue Plan of Care    Roshan Norton PTA, [facility]    3/8/2024    9:28 AM    Future Appointments   Date Time Provider Department Center   3/8/2024  9:30 AM Roshan Norton PT [facility]   3/11/2024  9:30 AM Adenike East PTA [facility]   3/14/2024  8:50 AM Roshan Norton PT [facility]   3/20/2024  9:30 AM Adenike East PTA [facility]   4/18/2024  8:20 AM Henrry Hu MD [facility]

## 2024-03-11 ENCOUNTER — HOSPITAL ENCOUNTER (OUTPATIENT)
Facility: HOSPITAL | Age: 54
Setting detail: RECURRING SERIES
Discharge: HOME | End: 2024-03-14
Payer: COMMERCIAL

## 2024-03-11 PROCEDURE — 97112 NEUROMUSCULAR REEDUCATION: CPT

## 2024-03-11 PROCEDURE — 97530 THERAPEUTIC ACTIVITIES: CPT

## 2024-03-11 PROCEDURE — 97535 SELF CARE MNGMENT TRAINING: CPT

## 2024-03-11 PROCEDURE — 97110 THERAPEUTIC EXERCISES: CPT

## 2024-03-11 NOTE — PROGRESS NOTES
PHYSICAL / OCCUPATIONAL THERAPY - DAILY TREATMENT NOTE    Patient Name: Cameron Scott    Date: 3/11/2024    : 1970  Insurance: Payor: [employer] [facility] [place] / Plan: AET BETTER [facility] [place] / Product Type: *No Product type* /      Patient  verified Yes     Visit #   Current / Total 2 10   Time   In / Out 931 1011   Pain   In / Out 3 3   Subjective Functional Status/Changes: Patient reports he's having some pain in his neck this morning as well as numbness and tingling in his left hand.     TREATMENT AREA =  Neck pain [M54.2]  H/O neck surgery [Z98.890]    OBJECTIVE      Therapeutic Procedures:    Tx Min Billable or 1:1 Min (if diff from Tx Min) Procedure, Rationale, Specifics   8  14755 Therapeutic Exercise (timed):  increase ROM, strength, coordination, balance, and proprioception to improve patient's ability to progress to PLOF and address remaining functional goals. (see flow sheet as applicable)     Details if applicable:       12  53985 Neuromuscular Re-Education (timed):  improve balance, coordination, kinesthetic sense, posture, core stability and proprioception to improve patient's ability to develop conscious control of individual muscles and awareness of position of extremities in order to progress to PLOF and address remaining functional goals. (see flow sheet as applicable)     Details if applicable:  scapular/cervical re-ed   12  82357 Therapeutic Activity (timed):  use of dynamic activities replicating functional movements to increase ROM, strength, coordination, balance, and proprioception in order to improve patient's ability to progress to PLOF and address remaining functional goals.  (see flow sheet as applicable)     Details if applicable:  UE WB, pushing/pulling/reaching   8  95274 Self Care/Home Management (timed):  improve patient knowledge and understanding of pain reducing techniques  to improve patient's ability to progress to PLOF and address remaining functional goals.  (see

## 2024-03-13 ENCOUNTER — APPOINTMENT (OUTPATIENT)
Facility: HOSPITAL | Age: 54
End: 2024-03-13
Payer: COMMERCIAL

## 2024-03-13 ENCOUNTER — HOSPITAL ENCOUNTER (EMERGENCY)
Facility: HOSPITAL | Age: 54
Discharge: HOME | End: 2024-03-13
Attending: EMERGENCY MEDICINE
Payer: COMMERCIAL

## 2024-03-13 VITALS
HEART RATE: 75 BPM | BODY MASS INDEX: 21.22 KG/M2 | WEIGHT: 140 LBS | SYSTOLIC BLOOD PRESSURE: 128 MMHG | OXYGEN SATURATION: 100 % | TEMPERATURE: 98.2 F | HEIGHT: 68 IN | DIASTOLIC BLOOD PRESSURE: 77 MMHG | RESPIRATION RATE: 19 BRPM

## 2024-03-13 DIAGNOSIS — R07.89 COSTOCHONDRAL CHEST PAIN: Primary | ICD-10-CM

## 2024-03-13 LAB
ALBUMIN SERPL-MCNC: 4.3 G/DL (ref 3.4–5)
ALBUMIN/GLOB SERPL: 1.3 (ref 0.8–1.7)
ALP SERPL-CCNC: 79 U/L (ref 45–117)
ALT SERPL-CCNC: 29 U/L (ref 16–61)
ANION GAP SERPL CALC-SCNC: 12 MMOL/L (ref 3–18)
AST SERPL-CCNC: 28 U/L (ref 10–38)
BASOPHILS # BLD: 0.1 K/UL (ref 0–0.1)
BASOPHILS NFR BLD: 1 % (ref 0–2)
BILIRUB SERPL-MCNC: 1.4 MG/DL (ref 0.2–1)
BUN SERPL-MCNC: 9 MG/DL (ref 7–18)
BUN/CREAT SERPL: 12 (ref 12–20)
CALCIUM SERPL-MCNC: 9.6 MG/DL (ref 8.5–10.1)
CHLORIDE SERPL-SCNC: 108 MMOL/L (ref 100–111)
CO2 SERPL-SCNC: 23 MMOL/L (ref 21–32)
CREAT SERPL-MCNC: 0.74 MG/DL (ref 0.6–1.3)
DIFFERENTIAL METHOD BLD: ABNORMAL
EOSINOPHIL # BLD: 0.1 K/UL (ref 0–0.4)
EOSINOPHIL NFR BLD: 2 % (ref 0–5)
ERYTHROCYTE [DISTWIDTH] IN BLOOD BY AUTOMATED COUNT: 13.2 % (ref 11.6–14.5)
GLOBULIN SER CALC-MCNC: 3.3 G/DL (ref 2–4)
GLUCOSE SERPL-MCNC: 98 MG/DL (ref 74–99)
HCT VFR BLD AUTO: 43.8 % (ref 36–48)
HGB BLD-MCNC: 14.3 G/DL (ref 13–16)
IMM GRANULOCYTES # BLD AUTO: 0 K/UL (ref 0–0.04)
IMM GRANULOCYTES NFR BLD AUTO: 0 % (ref 0–0.5)
LYMPHOCYTES # BLD: 1.7 K/UL (ref 0.9–3.6)
LYMPHOCYTES NFR BLD: 30 % (ref 21–52)
MCH RBC QN AUTO: 30.9 PG (ref 24–34)
MCHC RBC AUTO-ENTMCNC: 32.6 G/DL (ref 31–37)
MCV RBC AUTO: 94.6 FL (ref 78–100)
MONOCYTES # BLD: 0.6 K/UL (ref 0.05–1.2)
MONOCYTES NFR BLD: 11 % (ref 3–10)
NEUTS SEG # BLD: 3.2 K/UL (ref 1.8–8)
NEUTS SEG NFR BLD: 56 % (ref 40–73)
NRBC # BLD: 0 K/UL (ref 0–0.01)
NRBC BLD-RTO: 0 PER 100 WBC
NT PRO BNP: 68 PG/ML (ref 0–900)
PLATELET # BLD AUTO: 249 K/UL (ref 135–420)
PMV BLD AUTO: 10.2 FL (ref 9.2–11.8)
POTASSIUM SERPL-SCNC: 4.1 MMOL/L (ref 3.5–5.5)
PROT SERPL-MCNC: 7.6 G/DL (ref 6.4–8.2)
RBC # BLD AUTO: 4.63 M/UL (ref 4.35–5.65)
SODIUM SERPL-SCNC: 143 MMOL/L (ref 136–145)
TROPONIN I SERPL HS-MCNC: 5 NG/L (ref 0–78)
TROPONIN I SERPL HS-MCNC: 6 NG/L (ref 0–78)
WBC # BLD AUTO: 5.7 K/UL (ref 4.6–13.2)

## 2024-03-13 PROCEDURE — 6360000004 HC RX CONTRAST MEDICATION

## 2024-03-13 PROCEDURE — 99285 EMERGENCY DEPT VISIT HI MDM: CPT

## 2024-03-13 PROCEDURE — 80053 COMPREHEN METABOLIC PANEL: CPT

## 2024-03-13 PROCEDURE — 84484 ASSAY OF TROPONIN QUANT: CPT

## 2024-03-13 PROCEDURE — 71045 X-RAY EXAM CHEST 1 VIEW: CPT

## 2024-03-13 PROCEDURE — 83880 ASSAY OF NATRIURETIC PEPTIDE: CPT

## 2024-03-13 PROCEDURE — 93005 ELECTROCARDIOGRAM TRACING: CPT | Performed by: EMERGENCY MEDICINE

## 2024-03-13 PROCEDURE — 85025 COMPLETE CBC W/AUTO DIFF WBC: CPT

## 2024-03-13 PROCEDURE — 71275 CT ANGIOGRAPHY CHEST: CPT

## 2024-03-13 RX ADMIN — IOPAMIDOL 80 ML: 755 INJECTION, SOLUTION INTRAVENOUS at 11:36

## 2024-03-13 ASSESSMENT — PAIN DESCRIPTION - LOCATION: LOCATION: CHEST

## 2024-03-13 ASSESSMENT — ENCOUNTER SYMPTOMS
GASTROINTESTINAL NEGATIVE: 1
SHORTNESS OF BREATH: 1
CHEST TIGHTNESS: 1

## 2024-03-13 ASSESSMENT — PAIN SCALES - GENERAL
PAINLEVEL_OUTOF10: 4
PAINLEVEL_OUTOF10: 0

## 2024-03-13 ASSESSMENT — PAIN - FUNCTIONAL ASSESSMENT
PAIN_FUNCTIONAL_ASSESSMENT: NONE - DENIES PAIN
PAIN_FUNCTIONAL_ASSESSMENT: 0-10

## 2024-03-13 ASSESSMENT — HEART SCORE: ECG: 0

## 2024-03-13 NOTE — ED PROVIDER NOTES
1012 -- 86 -- -- 100 %   03/13/24 1011 -- 73 -- -- 99 %   03/13/24 1010 -- 73 -- -- 100 %   03/13/24 1009 -- 68 -- -- 100 %   03/13/24 1008 -- 72 -- -- 100 %   03/13/24 1007 -- 86 -- -- 100 %   03/13/24 1006 -- 79 -- -- 100 %   03/13/24 0943 98.2 °F (36.8 °C) 90 18 (!) 147/105 98 %       Vital Signs-Reviewed the patient's vital signs.    Pulse Oximetry Analysis, Cardiac Monitor, 12 lead ekg:    My interpretation of the EKG shows NSR, ST elevation noted in V3 but no contiguous ST elevations or reciprocal depressions. No T wave abnormalities.   Interpreted by the EP.      Records Reviewed: Nursing notes reviewed (Time of Review: 12:16 PM)      Diagnosis     Disposition: DISPOSITION Discharge - Pending Orders Complete 03/13/2024 12:10:51 PM       DISCHARGE MEDICATIONS:  Current Discharge Medication List             Details   cyclobenzaprine (FLEXERIL) 10 MG tablet TAKE ONE TABLET BY MOUTH TWICE DAILY AS NEEDED FOR MUSCLE SPASM OR TIGHTNESS      lidocaine (LIDODERM) 5 % Place 1 patch onto the skin daily 12 hours on, 12 hours off.  Qty: 30 patch, Refills: 0      amLODIPine (NORVASC) 5 MG tablet Take 1 tablet by mouth daily      atorvastatin (LIPITOR) 40 MG tablet Take 1 tablet by mouth      aspirin 81 MG chewable tablet Take 1 tablet by mouth daily      ibuprofen (ADVIL;MOTRIN) 600 MG tablet Take 1 tablet by mouth every 12 hours as needed for Pain  Qty: 40 tablet, Refills: 2    Associated Diagnoses: Cervical radiculopathy             DISCONTINUED MEDICATIONS:  Current Discharge Medication List          PATIENT REFERRED TO:  Follow Up with:  Frieda Ruiz APRN - NP  [id number] [address] [phone]    In 3 days  As needed    _______________________________    Clinical Impression:   1. Costochondral chest pain         Felix Beltran MD using Dragon dictation      _______________________________

## 2024-03-13 NOTE — ED TRIAGE NOTES
Client reports having  sharp intermittens cp over past 2 weeks. Worse yesterday, unable to sleep. Client has cardiologist due to having heart defect. Denies N/V or diaphoresis. Pain 4/10.

## 2024-03-13 NOTE — ANESTHESIA POSTPROCEDURE EVALUATION
Department of Anesthesiology  Postprocedure Note    Patient: Cameron Scott  MRN: [id number]  YOB: 1970  Date of evaluation: 2/7/2024    Procedure Summary       Date: 02/07/24 Room / Location: [facility] MAIN 05 / [facility] MAIN OR    Anesthesia Start: 1220 Anesthesia Stop: 1314    Procedure: ROBOTIC BILATERAL INGUINAL HERNIA REPAIR WITH PLACEMENT OF MESH (Bilateral: Abdomen) Diagnosis:       Right inguinal hernia      Left inguinal hernia      (Right inguinal hernia [K40.90])      (Left inguinal hernia [K40.90])    Surgeons: Robyn Woodson MD Responsible Provider: Jaden Lopez MD    Anesthesia Type: general ASA Status: 3            Anesthesia Type: No value filed.    Lachelle Phase I: Lachelle Score: 10    Lachelle Phase II:      Anesthesia Post Evaluation    Patient location during evaluation: PACU  Patient participation: complete - patient participated  Level of consciousness: awake and alert  Airway patency: patent  Nausea & Vomiting: no nausea  Cardiovascular status: blood pressure returned to baseline  Respiratory status: acceptable  Hydration status: euvolemic  Pain management: adequate    No notable events documented.   Patient requesting return call.

## 2024-03-14 ENCOUNTER — HOSPITAL ENCOUNTER (OUTPATIENT)
Facility: HOSPITAL | Age: 54
Setting detail: RECURRING SERIES
Discharge: HOME | End: 2024-03-17
Payer: COMMERCIAL

## 2024-03-14 LAB
EKG ATRIAL RATE: 82 BPM
EKG DIAGNOSIS: NORMAL
EKG P AXIS: 74 DEGREES
EKG P-R INTERVAL: 130 MS
EKG Q-T INTERVAL: 380 MS
EKG QRS DURATION: 94 MS
EKG QTC CALCULATION (BAZETT): 443 MS
EKG R AXIS: -16 DEGREES
EKG T AXIS: 78 DEGREES
EKG VENTRICULAR RATE: 82 BPM

## 2024-03-14 PROCEDURE — 97535 SELF CARE MNGMENT TRAINING: CPT

## 2024-03-14 PROCEDURE — 97530 THERAPEUTIC ACTIVITIES: CPT

## 2024-03-14 PROCEDURE — 97112 NEUROMUSCULAR REEDUCATION: CPT

## 2024-03-14 PROCEDURE — 97110 THERAPEUTIC EXERCISES: CPT

## 2024-03-14 PROCEDURE — 93010 ELECTROCARDIOGRAM REPORT: CPT | Performed by: INTERNAL MEDICINE

## 2024-03-14 NOTE — PROGRESS NOTES
PHYSICAL / OCCUPATIONAL THERAPY - DAILY TREATMENT NOTE    Patient Name: Cameron Scott    Date: 3/14/2024    : 1970  Insurance: Payor: [employer] BETTER [facility] [place] / Plan: AET BETTER [facility] [place] / Product Type: *No Product type* /      Patient  verified Yes     Visit #   Current / Total 3 10   Time   In / Out 850 930   Pain   In / Out 2 0   Subjective Functional Status/Changes: \"My neck has started hurting lately.\"     TREATMENT AREA =  Neck pain [M54.2]  H/O neck surgery [Z98.890]    OBJECTIVE         Therapeutic Procedures:    Tx Min Billable or 1:1 Min (if diff from Tx Min) Procedure, Rationale, Specifics   10  63032 Therapeutic Exercise (timed):  increase ROM, strength, coordination, balance, and proprioception to improve patient's ability to progress to PLOF and address remaining functional goals. (see flow sheet as applicable)     Details if applicable:       10  89369 Neuromuscular Re-Education (timed):  improve balance, coordination, kinesthetic sense, posture, core stability and proprioception to improve patient's ability to develop conscious control of individual muscles and awareness of position of extremities in order to progress to PLOF and address remaining functional goals. (see flow sheet as applicable)     Details if applicable:     10  55651 Therapeutic Activity (timed):  use of dynamic activities replicating functional movements to increase ROM, strength, coordination, balance, and proprioception in order to improve patient's ability to progress to PLOF and address remaining functional goals.  (see flow sheet as applicable)     Details if applicable:     10  05325 Self Care/Home Management (timed):  improve patient knowledge and understanding of pain reducing techniques, positioning, posture/ergonomics, home safety, activity modification, diagnosis/prognosis, and physical therapy expectations, procedures and progression  to improve patient's ability to progress to PLOF and address

## 2024-03-20 ENCOUNTER — HOSPITAL ENCOUNTER (OUTPATIENT)
Facility: HOSPITAL | Age: 54
Setting detail: RECURRING SERIES
Discharge: HOME | End: 2024-03-23
Payer: COMMERCIAL

## 2024-03-20 PROCEDURE — 97110 THERAPEUTIC EXERCISES: CPT

## 2024-03-20 PROCEDURE — 97112 NEUROMUSCULAR REEDUCATION: CPT

## 2024-03-20 PROCEDURE — 97535 SELF CARE MNGMENT TRAINING: CPT

## 2024-03-20 PROCEDURE — 97530 THERAPEUTIC ACTIVITIES: CPT

## 2024-03-20 NOTE — PROGRESS NOTES
PHYSICAL / OCCUPATIONAL THERAPY - DAILY TREATMENT NOTE    Patient Name: Cameron Scott    Date: 3/20/2024    : 1970  Insurance: Payor: [employer]AN[employer] [facility] [facility] / Plan: [facility] [facility] [place] / Product Type: *No Product type* /      Patient  verified Yes     Visit #   Current / Total 4 10   Time   In / Out 928 1010   Pain   In / Out 0 0   Subjective Functional Status/Changes: Pt reports he has an appt with ortho at the end of this month. He doesn't have any pain at the moment, but reports numbness and tingling in his left hand.     TREATMENT AREA =  Neck pain [M54.2]  H/O neck surgery [Z98.890]    OBJECTIVE    Therapeutic Procedures:    Tx Min Billable or 1:1 Min (if diff from Tx Min) Procedure, Rationale, Specifics   10  07975 Therapeutic Exercise (timed):  increase ROM, strength, coordination, balance, and proprioception to improve patient's ability to progress to PLOF and address remaining functional goals. (see flow sheet as applicable)     Details if applicable:       12  54984 Neuromuscular Re-Education (timed):  improve balance, coordination, kinesthetic sense, posture, core stability and proprioception to improve patient's ability to develop conscious control of individual muscles and awareness of position of extremities in order to progress to PLOF and address remaining functional goals. (see flow sheet as applicable)     Details if applicable:  scapular/cervical re-ed   10  42036 Therapeutic Activity (timed):  use of dynamic activities replicating functional movements to increase ROM, strength, coordination, balance, and proprioception in order to improve patient's ability to progress to PLOF and address remaining functional goals.  (see flow sheet as applicable)     Details if applicable:  functional reach/push/pull   10  29656 Self Care/Home Management (timed):  improve patient knowledge and understanding of pain reducing techniques and HEP progression  to improve patient's ability to

## 2024-03-27 ENCOUNTER — HOSPITAL ENCOUNTER (OUTPATIENT)
Facility: HOSPITAL | Age: 54
Setting detail: RECURRING SERIES
Discharge: HOME | End: 2024-03-30
Payer: COMMERCIAL

## 2024-03-27 PROCEDURE — 97530 THERAPEUTIC ACTIVITIES: CPT

## 2024-03-27 PROCEDURE — 97535 SELF CARE MNGMENT TRAINING: CPT

## 2024-03-27 PROCEDURE — 97112 NEUROMUSCULAR REEDUCATION: CPT

## 2024-03-27 PROCEDURE — 97110 THERAPEUTIC EXERCISES: CPT

## 2024-03-27 NOTE — PROGRESS NOTES
36/100 points (3/6/24)   Assess at 30 day magdy [Date assessed: 2024]     3.  Pt will increase MMT left shoulder ER to 4/5, wrist flex/ulnar deviation to 4/5, elbow EXT to 4/5 to improve ability to reach with less pain.             Status at Last PN: (3/6/24)             Left shoulder ER= 3+/5 p!             Wrist flex= 4-/5             Wrist ulnar deviation= 3+/5              Elbow EXT= 3+/5             Assess NV      4.  Pt will increase AROM c/s flex to 50 degs, EXT to 35 degs, B rotation to WNL to improve ability to tolerate daily tasks.  Status at Last PN: (3/6/24)  Flexion =45 deg  Ext = 22 deg p!  Right rotation= 38 deg  Left rotation= 42 deg  No change to note [Date assessed: 2024]     5.  Pt will improve left  strength to at least 55lbs (average of 3 trials) to improve performance of household activities.  Status at Last PN: (3/6/24) 49 lbs on average (3/6/24)  No change: 49 lbs average of three trials     6. Patient to improve left shoulder flexion ROM to 150 degrees or greater without reporting increased pain to improve patient's ability to reach overhead to perform dressing and ADLs  Status at Last PN: (3/6/24) Standing Left shoulder Flex: 121 p!  Progressin deg in standing without pain       Next PN/ RC due 2024  Auth due (visit number/ date) 9 more by 24    PLAN  - Continue Plan of Care    DELFINO Dueñas    3/27/2024    11:01 AM    Future Appointments   Date Time Provider Department Center   3/28/2024 11:00 AM Kaye Gaytan MD [facility] BS AMB   2024 10:10 AM Ping Castle, PT [facility]   4/3/2024 10:10 AM [facility]   2024 10:10 AM [facility]   4/10/2024 10:50 AM [facility] Norton[facility]   4/15/2024 10:50 AM Roshan Norton, PT [facility]   2024 10:50 AM Jimmie Keller, PT [facility]   2024  8:20 AM Henrry Hu MD [facility] BS AMB   2024 10:50 AM Roshan Norton, PT [facility]

## 2024-03-28 ENCOUNTER — OFFICE VISIT (OUTPATIENT)
Age: 54
End: 2024-03-28
Payer: COMMERCIAL

## 2024-03-28 VITALS
DIASTOLIC BLOOD PRESSURE: 80 MMHG | HEART RATE: 104 BPM | TEMPERATURE: 98.4 F | SYSTOLIC BLOOD PRESSURE: 113 MMHG | OXYGEN SATURATION: 99 % | BODY MASS INDEX: 20.16 KG/M2 | HEIGHT: 68 IN | WEIGHT: 133 LBS

## 2024-03-28 DIAGNOSIS — M62.830 LUMBAR PARASPINAL MUSCLE SPASM: ICD-10-CM

## 2024-03-28 DIAGNOSIS — M25.512 CHRONIC LEFT SHOULDER PAIN: ICD-10-CM

## 2024-03-28 DIAGNOSIS — R22.1 MASS IN NECK: Primary | ICD-10-CM

## 2024-03-28 DIAGNOSIS — M54.6 PAIN IN THORACIC SPINE: ICD-10-CM

## 2024-03-28 DIAGNOSIS — G89.29 CHRONIC LEFT SHOULDER PAIN: ICD-10-CM

## 2024-03-28 PROCEDURE — 72110 X-RAY EXAM L-2 SPINE 4/>VWS: CPT | Performed by: PHYSICAL MEDICINE & REHABILITATION

## 2024-03-28 PROCEDURE — 99214 OFFICE O/P EST MOD 30 MIN: CPT | Performed by: PHYSICAL MEDICINE & REHABILITATION

## 2024-03-28 PROCEDURE — 72070 X-RAY EXAM THORAC SPINE 2VWS: CPT | Performed by: PHYSICAL MEDICINE & REHABILITATION

## 2024-03-28 PROCEDURE — 3074F SYST BP LT 130 MM HG: CPT | Performed by: PHYSICAL MEDICINE & REHABILITATION

## 2024-03-28 PROCEDURE — 3079F DIAST BP 80-89 MM HG: CPT | Performed by: PHYSICAL MEDICINE & REHABILITATION

## 2024-03-28 RX ORDER — LIDOCAINE 50 MG/G
1 PATCH TOPICAL DAILY
Qty: 30 PATCH | Refills: 2 | Status: SHIPPED | OUTPATIENT
Start: 2024-03-28

## 2024-03-28 RX ORDER — GABAPENTIN 600 MG/1
600 TABLET ORAL 2 TIMES DAILY
COMMUNITY
Start: 2024-03-18

## 2024-03-28 NOTE — PROGRESS NOTES
Cameron Scott presents today for   Chief Complaint   Patient presents with    Neck Pain    Back Pain     Mid and lower         Is someone accompanying this pt? no    Is the patient using any DME equipment during OV? no      Coordination of Care:  1. Have you been to the ER, urgent care clinic since your last visit? Yes, pt went to the ER in March for chest pain, non cardiac  Hospitalized since your last visit? Yes, pt had a hernia repair done in [place] done as an out pt.     2. Have you seen or consulted any other health care providers outside of the [facility] since your last visit? Yes, physical therapy, general surgery, neurology Include any pap smears or colon screening. no

## 2024-03-28 NOTE — PROGRESS NOTES
[facility]  [address], [address]  [place] [address]  Phone: [phone]  Fax: [phone]        Cameron Scott  : 1970  PCP: Frieda Ruiz APRN - NP    NEW PATIENT EVALUATION      ASSESSMENT AND PLAN    Cameron was seen today for neck pain and back pain.    Diagnoses and all orders for this visit:    Mass in neck  -     US HEAD NECK SOFT TISSUE THYROID; Future    Lumbar paraspinal muscle spasm  -     [82104] LS Spine 4V    Pain in thoracic spine  -     [02319] T Spine 2V  -     lidocaine (LIDODERM) 5 %; Place 1 patch onto the skin daily 12 hours on, 12 hours off.    Chronic left shoulder pain  -     [facility] - Mason Kendall MD, Orthopedic Surgery(General/Arthroscopic/Total Joint), [place] ([facility])         Cameron Scott is a 53 y.o. male 6-month status post C3-T1 laminoplasty for severe stenosis with myelomalacia.  He has significant left upper extremity atrophy.  Advised patient that I do not expect 100% improvement.  He will need to give this 1 year.  Additionally he does have a firm nodule right subcutaneous region, it is tender.  No erythema.  Does not seem to be attached to his scar.  Will order ultrasound for further evaluation.  Continue Ibuprofen PRN for pain. Take w/ food.  Rf Lidoderm patches PRN for pain. 12 hours on, 12 hours off.  Start Flexeril 10 mg BID for pain/spasm, ordered by another physician.. Cautioned regarding somnolence.  Order US for evaluation of firm tender right post cervical nodule, 6 months post laminoplasty C3-T1.   Refer to Dr. Kendall for evaluation of chronic left shoulder pain.      Follow-up and Dispositions    Return for US f/u, fu MRI/CT/EDx.            HISTORY OF PRESENT ILLNESS    Cameron Scott is seen today in consultation for neck pain radiating into his left arm. Notes will be sent to PCP Frieda Ruiz APRN - NP.     Patient describes persistent neck pain radiating into his left arm s/p laminoplasty

## 2024-04-01 ENCOUNTER — HOSPITAL ENCOUNTER (OUTPATIENT)
Facility: HOSPITAL | Age: 54
Setting detail: RECURRING SERIES
Discharge: HOME | End: 2024-04-04
Payer: COMMERCIAL

## 2024-04-01 PROCEDURE — 97535 SELF CARE MNGMENT TRAINING: CPT

## 2024-04-01 PROCEDURE — 97112 NEUROMUSCULAR REEDUCATION: CPT

## 2024-04-01 PROCEDURE — 97110 THERAPEUTIC EXERCISES: CPT

## 2024-04-01 PROCEDURE — 97530 THERAPEUTIC ACTIVITIES: CPT

## 2024-04-01 NOTE — PROGRESS NOTES
manage their pain and symptoms.             Status at Last PN: (3/6/24) Patient reporting he has not been doing his HEP as per his hernia surgeons orders (3/6/24)              Reports continued compliance [Date assessed: 2024]     2. Pt will increase FOTO score to 40 points to improve ability to perform ADLs.  Status at Last PN: (3/6/24): 36/100 points (3/6/24)   Assess at 30 day magdy [Date assessed: 2024]     3.  Pt will increase MMT left shoulder ER to 4/5, wrist flex/ulnar deviation to 4/5, elbow EXT to 4/5 to improve ability to reach with less pain.             Status at Last PN: (3/6/24)             Left shoulder ER= 3+/5 p!             Wrist flex= 4-/5             Wrist ulnar deviation= 3+/5              Elbow EXT= 3+/5             Assess NV     Current Status: (24)             Left shoulder ER= 3+5              Wrist flex= 4+/5             Wrist ulnar deviation= 4-/5              Elbow EXT= 3+/5 p!    4.  Pt will increase AROM c/s flex to 50 degs, EXT to 35 degs, B rotation to WNL to improve ability to tolerate daily tasks.  Status at Last PN: (3/6/24)  Flexion =45 deg  Ext = 22 deg p!  Right rotation= 38 deg  Left rotation= 42 deg  No change to note [Date assessed: 2024]     5.  Pt will improve left  strength to at least 55lbs (average of 3 trials) to improve performance of household activities.  Status at Last PN: (3/6/24) 49 lbs on average (3/6/24)  No change: 49 lbs average of three trials     6. Patient to improve left shoulder flexion ROM to 150 degrees or greater without reporting increased pain to improve patient's ability to reach overhead to perform dressing and ADLs  Status at Last PN: (3/6/24) Standing Left shoulder Flex: 121 p!  Progressin deg in standing without pain       Next PN/ RC due 2024  Auth due (visit number/ date) 7 more by 24    PLAN  - Continue Plan of Care    Ping Castle, PT    2024    10:20 AM    Future Appointments   Date Time

## 2024-04-03 ENCOUNTER — HOSPITAL ENCOUNTER (OUTPATIENT)
Facility: HOSPITAL | Age: 54
Setting detail: RECURRING SERIES
Discharge: HOME | End: 2024-04-06
Payer: COMMERCIAL

## 2024-04-03 PROCEDURE — 97110 THERAPEUTIC EXERCISES: CPT

## 2024-04-03 PROCEDURE — 97535 SELF CARE MNGMENT TRAINING: CPT

## 2024-04-03 PROCEDURE — 97112 NEUROMUSCULAR REEDUCATION: CPT

## 2024-04-03 PROCEDURE — 97530 THERAPEUTIC ACTIVITIES: CPT

## 2024-04-03 NOTE — PROGRESS NOTES
[facility] [address]  [address]  [place] [address]  [phone] fax    PROGRESS NOTE  Patient Name: Cameron Scott : 1970   Treatment/Medical Diagnosis: Neck pain [M54.2]  H/O neck surgery [Z98.890]   Referral Source: Haley Padilla PA-C     Date of Initial Visit: 24 Attended Visits: 19 Missed Visits: 0     SUMMARY OF TREATMENT  Mr. Scott presents for his 19th visit including IE with report of 70% improvement. He reports increased C/S mobility and improved ease of OH reaching, making putting groceries away and reaching directly behind him much easier. He has MET or is progressing toward all of his current functional goals; however, he continues to report intermittent radicular pain and limited scapular flexion and LIGIA AROM. He exhibits reduced tricep and shoulder extension when gripping and is unable to reach to the top cabinet shelf pain-free. Mr. Scott has upcoming imaging appointments to address a bony protrusion at the C6-C7 level as well as possible left RTC tearing. These issues may possibly be contributing to his more limited strength gains in clinic. He would continue to benefit from skilled PT intervention 2x/week for 4 weeks to continue to increase his functional scapular strength for improved ease of ADL completion.       CURRENT STATUS   Pt will report compliance and independence to HEP to help the pt manage their pain and symptoms.             Status at Last PN: (3/6/24) Patient reporting he has not been doing his HEP as per his hernia surgeons orders (3/6/24)              Current: progressing, Reports continued compliance \"majority of the time\"      2. Pt will increase FOTO score to 40 points to improve ability to perform ADLs.  Status at Last PN: (3/6/24): 36/100 points (3/6/24)   Current: MET, 64 points      3.  Pt will increase MMT left shoulder ER to 4/5, wrist flex/ulnar deviation to 4/5, elbow EXT to 4/5 to improve

## 2024-04-03 NOTE — PROGRESS NOTES
PHYSICAL / OCCUPATIONAL THERAPY - DAILY TREATMENT NOTE    Patient Name: Cameron Scott    Date: 4/3/2024    : 1970  Ins[facility] [place] / Plan: [employer]TER [facility] [place] / Product Type: *No Product type* /      Patient  verified YES   Visit #   Current / Total 6 10   Time   In / Out 1007 1050   Pain   In / Out 3 0   Subjective Functional Status/Changes: Functional Gains: improved ease of reaching behind him, OH reaching, putting groceries away , improving C/S mobility  Functional Deficits: reaching to scratch his back, brushing hair , radicular sx with certain positions, lacks scapular extension/tricep extension when combined with gripping  % improvement: 70%  Pain   Average: 3/10       Best: 010     Worst: 3-4/10  Patient Goal: \"to be able to reach or lift things straight above me\"  Patient reports that he has an appointment for more imaging of his neck mass tomorrow and has another appointment set up 2/2 a possible left shoulder RTC tear.      TREATMENT AREA =  Neck pain [M54.2]  H/O neck surgery [Z98.890]    OBJECTIVE         Therapeutic Procedures:    Tx Min Billable or 1:1 Min (if diff from Tx Min) Procedure, Rationale, Specifics   10 10 28590 Therapeutic Exercise (timed):  increase ROM, strength, coordination, balance, and proprioception to improve patient's ability to progress to PLOF and address remaining functional goals. (see flow sheet as applicable)     Details if applicable:        93294 Therapeutic Activity (timed):  use of dynamic activities replicating functional movements to increase ROM, strength, coordination, balance, and proprioception in order to improve patient's ability to progress to PLOF and address remaining functional goals.  (see flow sheet as applicable)     Details if applicable:  foto, functional assessments    8 8 32384 Neuromuscular Re-Education (timed):  improve balance, coordination, kinesthetic sense, posture, core stability and

## 2024-04-08 ENCOUNTER — HOSPITAL ENCOUNTER (OUTPATIENT)
Facility: HOSPITAL | Age: 54
Setting detail: RECURRING SERIES
End: 2024-04-08
Payer: COMMERCIAL

## 2024-04-09 ENCOUNTER — HOSPITAL ENCOUNTER (OUTPATIENT)
Facility: HOSPITAL | Age: 54
Setting detail: RECURRING SERIES
Discharge: HOME | End: 2024-04-12
Payer: COMMERCIAL

## 2024-04-09 PROCEDURE — 97530 THERAPEUTIC ACTIVITIES: CPT

## 2024-04-09 PROCEDURE — 97110 THERAPEUTIC EXERCISES: CPT

## 2024-04-09 PROCEDURE — 97112 NEUROMUSCULAR REEDUCATION: CPT

## 2024-04-09 PROCEDURE — 97535 SELF CARE MNGMENT TRAINING: CPT

## 2024-04-09 NOTE — PROGRESS NOTES
PHYSICAL / OCCUPATIONAL THERAPY - DAILY TREATMENT NOTE    Patient Name: Cameron Scott    Date: 2024    : 1970  Insurance: Payor: [employer] [facility] [place] / Plan: [employer] [facility] [place] / Product Type: *No Product type* /      Patient  verified Yes     Visit #   Current / Total 1 8   Time   In / Out 930 1010   Pain   In / Out 0 0   Subjective Functional Status/Changes: Pt reports he's not having any pain right now, just numbness in his left hand.     TREATMENT AREA =  Neck pain [M54.2]  H/O neck surgery [Z98.890]    OBJECTIVE    Therapeutic Procedures:    Tx Min Billable or 1:1 Min (if diff from Tx Min) Procedure, Rationale, Specifics   8  44329 Therapeutic Exercise (timed):  increase ROM, strength, coordination, balance, and proprioception to improve patient's ability to progress to PLOF and address remaining functional goals. (see flow sheet as applicable)     Details if applicable:       12  13819 Neuromuscular Re-Education (timed):  improve balance, coordination, kinesthetic sense, posture, core stability and proprioception to improve patient's ability to develop conscious control of individual muscles and awareness of position of extremities in order to progress to PLOF and address remaining functional goals. (see flow sheet as applicable)     Details if applicable:  scapular/tricep re-ed   12  65092 Therapeutic Activity (timed):  use of dynamic activities replicating functional movements to increase ROM, strength, coordination, balance, and proprioception in order to improve patient's ability to progress to PLOF and address remaining functional goals.  (see flow sheet as applicable)     Details if applicable:  functional reach/push/pull   8  09184 Self Care/Home Management (timed):  improve patient knowledge and understanding of positioning, posture/ergonomics, activity modification, diagnosis/prognosis, and physical therapy expectations, procedures and progression  to improve patient's

## 2024-04-10 ENCOUNTER — HOSPITAL ENCOUNTER (OUTPATIENT)
Facility: HOSPITAL | Age: 54
Setting detail: RECURRING SERIES
Discharge: HOME | End: 2024-04-13
Payer: COMMERCIAL

## 2024-04-10 PROCEDURE — 97112 NEUROMUSCULAR REEDUCATION: CPT

## 2024-04-10 PROCEDURE — 97530 THERAPEUTIC ACTIVITIES: CPT

## 2024-04-10 PROCEDURE — 97110 THERAPEUTIC EXERCISES: CPT

## 2024-04-10 PROCEDURE — 97535 SELF CARE MNGMENT TRAINING: CPT

## 2024-04-10 NOTE — PROGRESS NOTES
PHYSICAL / OCCUPATIONAL THERAPY - DAILY TREATMENT NOTE    Patient Name: Cameron Scott    Date: 4/10/2024    : 1970  Insurance: Payor: [employer]AN[employer]ANES [employer] [facility] [place] / Plan: [employer] [facility] [place] / Product Type: *No Product type* /      Patient  verified Yes     Visit #   Current / Total 2 10   Time   In / Out 1050 1129   Pain   In / Out 0 0   Subjective Functional Status/Changes: Patient reports feeling pretty good today with no pain but some numbness and tingling in th left hand. Reports having a doctors appointment today regarding the lump on his neck.     TREATMENT AREA =  Neck pain [M54.2]  H/O neck surgery [Z98.890]    OBJECTIVE      Therapeutic Procedures:    Tx Min Billable or 1:1 Min (if diff from Tx Min) Procedure, Rationale, Specifics   8  66081 Therapeutic Exercise (timed):  increase ROM, strength, coordination, balance, and proprioception to improve patient's ability to progress to PLOF and address remaining functional goals. (see flow sheet as applicable)     Details if applicable:       13  22059 Neuromuscular Re-Education (timed):  improve balance, coordination, kinesthetic sense, posture, core stability and proprioception to improve patient's ability to develop conscious control of individual muscles and awareness of position of extremities in order to progress to PLOF and address remaining functional goals. (see flow sheet as applicable)     Details if applicable:     10  03883 Therapeutic Activity (timed):  use of dynamic activities replicating functional movements to increase ROM, strength, coordination, balance, and proprioception in order to improve patient's ability to progress to PLOF and address remaining functional goals.  (see flow sheet as applicable)     Details if applicable:     8  70588 Self Care/Home Management (timed):  improve patient knowledge and understanding of positioning, posture/ergonomics, and physical therapy expectations, procedures and progression  to improve

## 2024-04-11 ENCOUNTER — HOSPITAL ENCOUNTER (OUTPATIENT)
Facility: HOSPITAL | Age: 54
Discharge: HOME | End: 2024-04-11
Attending: PHYSICAL MEDICINE & REHABILITATION
Payer: COMMERCIAL

## 2024-04-11 DIAGNOSIS — R22.1 MASS IN NECK: ICD-10-CM

## 2024-04-11 PROCEDURE — 76536 US EXAM OF HEAD AND NECK: CPT

## 2024-04-15 ENCOUNTER — HOSPITAL ENCOUNTER (OUTPATIENT)
Facility: HOSPITAL | Age: 54
Setting detail: RECURRING SERIES
Discharge: HOME | End: 2024-04-18
Payer: COMMERCIAL

## 2024-04-15 ENCOUNTER — TELEPHONE (OUTPATIENT)
Age: 54
End: 2024-04-15

## 2024-04-15 PROCEDURE — 97112 NEUROMUSCULAR REEDUCATION: CPT

## 2024-04-15 PROCEDURE — 97530 THERAPEUTIC ACTIVITIES: CPT

## 2024-04-15 PROCEDURE — 97110 THERAPEUTIC EXERCISES: CPT

## 2024-04-15 PROCEDURE — 97535 SELF CARE MNGMENT TRAINING: CPT

## 2024-04-15 NOTE — PROGRESS NOTES
progression  to improve patient's ability to progress to PLOF and address remaining functional goals.  (see flow sheet as applicable)     Details if applicable:     38  [facility] Totals Reminder: bill using total billable min of TIMED therapeutic procedures (example: do not include dry needle or estim unattended, both untimed codes, in totals to left)  8-22 min = 1 unit; 23-37 min = 2 units; 38-52 min = 3 units; 53-67 min = 4 units; 68-82 min = 5 units   Total Total     [x]  Patient Education billed concurrently with other procedures   [x] Review HEP    [] Progressed/Changed HEP, detail:    [] Other detail:       Objective Information/Functional Measures/Assessment    Patient reports some continued muscle soreness from last visit, therefore, did not progress exercises this visit and held on elevated tricep dips. Added cone reaching today as patient continues to have difficulty with reaching onto shelves (LTG #7). Was able to complete 15 times on top shelf with patient reporting no increase pain just challenging. Decrease in radicular symptoms after lat pull downs. Progress patient as tolerated.     Patient will continue to benefit from skilled PT / OT services to modify and progress therapeutic interventions, analyze and address functional mobility deficits, analyze and address ROM deficits, analyze and address strength deficits, analyze and address soft tissue restrictions, analyze and cue for proper movement patterns, analyze and modify for postural abnormalities, analyze and address imbalance/dizziness, and instruct in home and community integration to address functional deficits and attain remaining goals.    Progress toward goals / Updated goals:  []  See Progress Note/Recertification    New Goals to be achieved in __4 weeks__    1. Pt will report compliance and independence with D/C HEP to help the pt manage their pain and symptoms.             PN:  Reports continued compliance \"majority of the time\" of most

## 2024-04-15 NOTE — TELEPHONE ENCOUNTER
Patient had an ultrasound done last Thursday and is concerned about the results he saw in [facility].  He was offered first available appointment to review the results but that date is in June and he didn't want to wait that long for results.  Are we able to offer him a sooner appointment?  Please advise patient at [phone].

## 2024-04-16 NOTE — TELEPHONE ENCOUNTER
Spoke with patient.  We went over his ultrasound findings.  Also reviewed neurology note from Dr. Espinoza from January 2024 with the patient.  There was some concern about brachial plexopathy.  EMG was recommended.  Possible follow-up at [facility] or [facility] for neuromuscular disease.    Patient reports that he has not had an EMG.  He did not know about the neuromuscular referral.  He is going to follow-up with Dr. Espinoza, neurologist.    Overall his left arm strength is slowly improving.  I advised the patient that he needs to give this 12 to 18 months.    Reassured patient that there is no evidence of malignancy on ultrasound.

## 2024-04-17 ENCOUNTER — HOSPITAL ENCOUNTER (OUTPATIENT)
Facility: HOSPITAL | Age: 54
Setting detail: RECURRING SERIES
Discharge: HOME | End: 2024-04-20
Payer: COMMERCIAL

## 2024-04-17 PROCEDURE — 97112 NEUROMUSCULAR REEDUCATION: CPT

## 2024-04-17 PROCEDURE — 97530 THERAPEUTIC ACTIVITIES: CPT

## 2024-04-17 PROCEDURE — 97535 SELF CARE MNGMENT TRAINING: CPT

## 2024-04-17 PROCEDURE — 97110 THERAPEUTIC EXERCISES: CPT

## 2024-04-17 NOTE — PROGRESS NOTES
PHYSICAL / OCCUPATIONAL THERAPY - DAILY TREATMENT NOTE    Patient Name: Cameron Scott    Date: 2024    : 1970  Insurance: Payor: [employer] [facility] [place] / Plan: [employer]TER [facility] [place] / Product Type: *No Product type* /      Patient  verified Yes     Visit #   Current / Total 4 10   Time   In / Out 1050 1128   Pain   In / Out 0 0   Subjective Functional Status/Changes: Patient reports feeling pretty good today with less numbness/tingling in (L) hand.     TREATMENT AREA =  Neck pain [M54.2]  H/O neck surgery [Z98.890]    OBJECTIVE        Therapeutic Procedures:    Tx Min Billable or 1:1 Min (if diff from Tx Min) Procedure, Rationale, Specifics   9  43323 Therapeutic Exercise (timed):  increase ROM, strength, coordination, balance, and proprioception to improve patient's ability to progress to PLOF and address remaining functional goals. (see flow sheet as applicable)     Details if applicable:       11  72234 Neuromuscular Re-Education (timed):  improve balance, coordination, kinesthetic sense, posture, core stability and proprioception to improve patient's ability to develop conscious control of individual muscles and awareness of position of extremities in order to progress to PLOF and address remaining functional goals. (see flow sheet as applicable)     Details if applicable:     10  84423 Therapeutic Activity (timed):  use of dynamic activities replicating functional movements to increase ROM, strength, coordination, balance, and proprioception in order to improve patient's ability to progress to PLOF and address remaining functional goals.  (see flow sheet as applicable)     Details if applicable:     8  17213 Self Care/Home Management (timed):  improve patient knowledge and understanding of posture/ergonomics and physical therapy expectations, procedures and progression  to improve patient's ability to progress to PLOF and address remaining functional goals.  (see flow sheet as applicable)

## 2024-04-18 ENCOUNTER — OFFICE VISIT (OUTPATIENT)
Age: 54
End: 2024-04-18
Payer: COMMERCIAL

## 2024-04-18 VITALS
HEIGHT: 68 IN | OXYGEN SATURATION: 98 % | HEART RATE: 98 BPM | WEIGHT: 138 LBS | SYSTOLIC BLOOD PRESSURE: 116 MMHG | DIASTOLIC BLOOD PRESSURE: 82 MMHG | BODY MASS INDEX: 20.92 KG/M2

## 2024-04-18 DIAGNOSIS — R07.9 CHEST PAIN, UNSPECIFIED TYPE: ICD-10-CM

## 2024-04-18 DIAGNOSIS — R94.31 ABNORMAL EKG: Primary | ICD-10-CM

## 2024-04-18 DIAGNOSIS — I10 ESSENTIAL (PRIMARY) HYPERTENSION: ICD-10-CM

## 2024-04-18 DIAGNOSIS — Q21.12 PFO (PATENT FORAMEN OVALE): ICD-10-CM

## 2024-04-18 PROBLEM — R09.81 NASAL CONGESTION: Status: ACTIVE | Noted: 2017-03-16

## 2024-04-18 PROBLEM — M54.50 LOW BACK PAIN: Status: ACTIVE | Noted: 2020-10-01

## 2024-04-18 PROBLEM — R79.89 LOW TSH LEVEL: Status: ACTIVE | Noted: 2017-04-27

## 2024-04-18 PROBLEM — F10.10 ALCOHOL ABUSE: Status: ACTIVE | Noted: 2017-03-16

## 2024-04-18 PROBLEM — R94.39 ABNORMAL STRESS TEST: Status: ACTIVE | Noted: 2017-03-15

## 2024-04-18 PROBLEM — J45.909 ASTHMA: Status: ACTIVE | Noted: 2020-10-01

## 2024-04-18 PROCEDURE — 3074F SYST BP LT 130 MM HG: CPT | Performed by: INTERNAL MEDICINE

## 2024-04-18 PROCEDURE — 3079F DIAST BP 80-89 MM HG: CPT | Performed by: INTERNAL MEDICINE

## 2024-04-18 PROCEDURE — 93000 ELECTROCARDIOGRAM COMPLETE: CPT | Performed by: INTERNAL MEDICINE

## 2024-04-18 PROCEDURE — 99214 OFFICE O/P EST MOD 30 MIN: CPT | Performed by: INTERNAL MEDICINE

## 2024-04-18 ASSESSMENT — PATIENT HEALTH QUESTIONNAIRE - PHQ9
SUM OF ALL RESPONSES TO PHQ QUESTIONS 1-9: 0
SUM OF ALL RESPONSES TO PHQ QUESTIONS 1-9: 0
SUM OF ALL RESPONSES TO PHQ9 QUESTIONS 1 & 2: 0
1. LITTLE INTEREST OR PLEASURE IN DOING THINGS: NOT AT ALL
2. FEELING DOWN, DEPRESSED OR HOPELESS: NOT AT ALL
SUM OF ALL RESPONSES TO PHQ QUESTIONS 1-9: 0
SUM OF ALL RESPONSES TO PHQ QUESTIONS 1-9: 0

## 2024-04-18 ASSESSMENT — ANXIETY QUESTIONNAIRES
3. WORRYING TOO MUCH ABOUT DIFFERENT THINGS: NOT AT ALL
2. NOT BEING ABLE TO STOP OR CONTROL WORRYING: NOT AT ALL
GAD7 TOTAL SCORE: 0
4. TROUBLE RELAXING: NOT AT ALL
6. BECOMING EASILY ANNOYED OR IRRITABLE: NOT AT ALL
5. BEING SO RESTLESS THAT IT IS HARD TO SIT STILL: NOT AT ALL
7. FEELING AFRAID AS IF SOMETHING AWFUL MIGHT HAPPEN: NOT AT ALL
1. FEELING NERVOUS, ANXIOUS, OR ON EDGE: NOT AT ALL

## 2024-04-18 ASSESSMENT — ENCOUNTER SYMPTOMS
SHORTNESS OF BREATH: 0
VOMITING: 0
COUGH: 0
ABDOMINAL DISTENTION: 0
NAUSEA: 0
SORE THROAT: 0
ABDOMINAL PAIN: 0

## 2024-04-18 NOTE — PROGRESS NOTES
Cameron Scott presents today for   Chief Complaint   Patient presents with    Follow-up       Cameron Scott preferred language for health care discussion is english/other.    Is someone accompanying this pt? no    Is the patient using any DME equipment during OV? no    Depression Screening:  Depression: Not at risk (4/18/2024)    PHQ-2     PHQ-2 Score: 0        Learning Assessment:  Who is the primary learner? Patient    What is the preferred language for health care of the primary learner? ENGLISH    How does the primary learner prefer to learn new concepts? DEMONSTRATION    Answered By patient    Relationship to Learner SELF           Pt currently taking Anticoagulant therapy? no    Pt currently taking Antiplatelet therapy ? Aspirin 81 mg daily      Coordination of Care:  1. Have you been to the ER, urgent care clinic since your last visit? Hospitalized since your last visit? no    2. Have you seen or consulted any other health care providers outside of the [facility] since your last visit? Include any pap smears or colon screening. no    
This was an incidental finding on a CT scan in March 2024.  I would recommend reevaluating with an echocardiogram next year.    Primary hypertension.  Patient blood pressure appears to be well-controlled with amlodipine 5 mg daily.  He can continue this regimen.    Dyslipidemia.  Patient has been managed with atorvastatin 40 mg daily.  This is followed by his PCP.    Follow-up in 1 year, sooner if needed.        Henrry Hu MD

## 2024-04-22 ENCOUNTER — HOSPITAL ENCOUNTER (OUTPATIENT)
Facility: HOSPITAL | Age: 54
Setting detail: RECURRING SERIES
Discharge: HOME | End: 2024-04-25
Payer: COMMERCIAL

## 2024-04-22 PROCEDURE — 97112 NEUROMUSCULAR REEDUCATION: CPT

## 2024-04-22 PROCEDURE — 97110 THERAPEUTIC EXERCISES: CPT

## 2024-04-22 PROCEDURE — 97530 THERAPEUTIC ACTIVITIES: CPT

## 2024-04-22 PROCEDURE — 97535 SELF CARE MNGMENT TRAINING: CPT

## 2024-04-22 NOTE — PROGRESS NOTES
PHYSICAL / OCCUPATIONAL THERAPY - DAILY TREATMENT NOTE    Patient Name: Cameron Scott    Date: 2024    : 1970  Insurance: Payor: [facility] [place] / Plan: T BETTER [facility] [place] / Product Type: *No Product type* /      Patient  verified Yes     Visit #   Current / Total 5 10   Time   In / Out 1050 1132   Pain   In / Out 0 0   Subjective Functional Status/Changes: \"No pain.\"     TREATMENT AREA =  Neck pain [M54.2]  H/O neck surgery [Z98.890]    OBJECTIVE         Therapeutic Procedures:    Tx Min Billable or 1:1 Min (if diff from Tx Min) Procedure, Rationale, Specifics   10  71559 Therapeutic Exercise (timed):  increase ROM, strength, coordination, balance, and proprioception to improve patient's ability to progress to PLOF and address remaining functional goals. (see flow sheet as applicable)     Details if applicable:       12  65949 Neuromuscular Re-Education (timed):  improve balance, coordination, kinesthetic sense, posture, core stability and proprioception to improve patient's ability to develop conscious control of individual muscles and awareness of position of extremities in order to progress to PLOF and address remaining functional goals. (see flow sheet as applicable)     Details if applicable:     10  43872 Therapeutic Activity (timed):  use of dynamic activities replicating functional movements to increase ROM, strength, coordination, balance, and proprioception in order to improve patient's ability to progress to PLOF and address remaining functional goals.  (see flow sheet as applicable)     Details if applicable:     10  54395 Self Care/Home Management (timed):  improve patient knowledge and understanding of pain reducing techniques, positioning, posture/ergonomics, home safety, activity modification, diagnosis/prognosis, and physical therapy expectations, procedures and progression  to improve patient's ability to progress to PLOF and address remaining functional goals.

## 2024-04-23 ENCOUNTER — OFFICE VISIT (OUTPATIENT)
Age: 54
End: 2024-04-23
Payer: COMMERCIAL

## 2024-04-23 VITALS — BODY MASS INDEX: 20.92 KG/M2 | WEIGHT: 138 LBS | HEIGHT: 68 IN

## 2024-04-23 DIAGNOSIS — M75.102 TEAR OF LEFT ROTATOR CUFF, UNSPECIFIED TEAR EXTENT, UNSPECIFIED WHETHER TRAUMATIC: ICD-10-CM

## 2024-04-23 DIAGNOSIS — M25.512 LEFT SHOULDER PAIN, UNSPECIFIED CHRONICITY: Primary | ICD-10-CM

## 2024-04-23 PROCEDURE — 99214 OFFICE O/P EST MOD 30 MIN: CPT | Performed by: ORTHOPAEDIC SURGERY

## 2024-04-23 PROCEDURE — 73030 X-RAY EXAM OF SHOULDER: CPT | Performed by: ORTHOPAEDIC SURGERY

## 2024-04-23 NOTE — PROGRESS NOTES
Cameron Scott  1970   Chief Complaint   Patient presents with    Shoulder Pain     left        HISTORY OF PRESENT ILLNESS  Cameron Scott is a 54 y.o. male who presents today for evaluation of left shoulder pain. Patient is referred by Kaye Gaytan MD. Office note will be sent to referring provider. Pain is a 3/10. Pain has been present since October 2023. The patient indicates that he had surgery in the end of October, which is when he believes the onset of his pain occurred. The patient describes pain with lifting, reaching, and overhead activities. The patient endorses night pain. The patient also expresses feeling pain when attempting to reach behind his back. The patient has failed conservative treatments including medications and physician directed exercise programs.     Has tried following treatments: Injections:No; Brace:No; Therapy:Yes; Cane/Crutch:No      No Known Allergies     Past Medical History:   Diagnosis Date    Asthma     Back pain     Elevated blood pressure reading     GERD (gastroesophageal reflux disease)     Left shoulder pain     Low TSH level     Low TSH level 04/27/2017    may have mild hyperthyroidism w/u per PCP I will rpt TSH and get freeT4    Primary hypertension 9/13/2022    Right inguinal hernia     TIA (transient ischemic attack) 8/13/2022      Social History       Tobacco History       Smoking Status  Some Days Smoking Tobacco Type  Cigarettes      Passive Exposure  Current      Smokeless Tobacco Use  Never              Alcohol History       Alcohol Use Status  Yes Drinks/Week  5 Cans of beer per week Amount  5.0 standard drinks of alcohol/wk Comment  social              Drug Use       Drug Use Status  Yes Types  Marijuana (Weed)              Sexual Activity       Sexually Active  Not Asked                   Past Surgical History:   Procedure Laterality Date    COLONOSCOPY      HERNIA REPAIR Bilateral 2/7/2024    ROBOTIC BILATERAL INGUINAL HERNIA REPAIR WITH

## 2024-04-24 ENCOUNTER — TELEPHONE (OUTPATIENT)
Age: 54
End: 2024-04-24

## 2024-04-24 ENCOUNTER — APPOINTMENT (OUTPATIENT)
Facility: HOSPITAL | Age: 54
End: 2024-04-24
Payer: COMMERCIAL

## 2024-04-24 DIAGNOSIS — M54.12 RADICULOPATHY, CERVICAL REGION: Primary | ICD-10-CM

## 2024-04-24 DIAGNOSIS — M79.642 PAIN OF LEFT HAND: ICD-10-CM

## 2024-04-24 NOTE — TELEPHONE ENCOUNTER
Patient is a requesting a referral to a hand specialists because after his 10/2023 neck surgery his left hand has more tingling, numbness, burning, and fell like needles are sticking his hands.    Dr. Javier preformed the neck surgery 10/2023, but will no longer accept the patient's insurance    Patient also wants a cortisone injection for left hand    Patient tel [phone]

## 2024-04-24 NOTE — TELEPHONE ENCOUNTER
Per Dr Gaytan's note:   6-month status post C3-T1 laminoplasty for severe stenosis with myelomalacia.  He has significant left upper extremity atrophy.  Advised patient that I do not expect full return of strength as he had progressive weakness over 1 year prior to decompression.  He will need to give this 12-18 months.     Do you think he needs to see Dr. Pritchard?

## 2024-04-29 ENCOUNTER — HOSPITAL ENCOUNTER (OUTPATIENT)
Facility: HOSPITAL | Age: 54
Setting detail: RECURRING SERIES
Discharge: HOME | End: 2024-05-02
Payer: COMMERCIAL

## 2024-04-29 PROCEDURE — 97530 THERAPEUTIC ACTIVITIES: CPT

## 2024-04-29 PROCEDURE — 97535 SELF CARE MNGMENT TRAINING: CPT

## 2024-04-29 PROCEDURE — 97110 THERAPEUTIC EXERCISES: CPT

## 2024-04-29 NOTE — PROGRESS NOTES
[facility] [address]  [address]  [place] [address]  [phone] fax  PROGRESS NOTE  Patient Name: Cameron Scott : 1970   Treatment/Medical Diagnosis: Neck pain [M54.2]  H/O neck surgery [Z98.890]   Referral Source:  Payor Haley Padilla PA-C  Payor: [facility] / Plan: AET BETTER [facility] / Product Type: *No Product type* /      Date of Initial Visit: 2023 Attended Visits: 25 Missed Visits: 0     SUMMARY OF TREATMENT  Pt attended 25 visits consistently and made steady progress with skilled physical therapy services.  At time of last visit, Pt reported the following:  Functional Gains - \"everything except the bicep strength\"; Functional Deficits - getting dressed. Combing hair with (L) side, reaching into cabinets, putting arm behind the back, lifting arm directly overhead, lifting objects over 10lbs.; and 90% improvement since start of care.  Pt has met or is progressing towards all goals except cervical flexion ROM and is compliant with comprehensive HEP.  Pt would benefit from continued skilled therapy to improve UE strength needed to perform ADLs such as bathing, dressing, and combing hair with increased ease and independence.     CURRENT STATUS  1. Pt will report compliance and independence with D/C HEP to help the pt manage their pain and symptoms.             PN:  Reports continued compliance \"majority of the time\" of most recent HEP; D/C HEP not yet issued              PN Status:  (24) Verbally states compliance everyday - MET     2. Pt will increase FOTO score to 70 points to improve ability to perform ADLs.  PN: 64 points   PN Status:  (24): 57 points - MET     3.  Pt will increase MMT left shoulder ER to 4/5, wrist flex/ulnar deviation to 4/5, elbow EXT to 4/5 to improve ability to reach with less pain.            PN:                         Left shoulder ER= 3+5                         Wrist flex=

## 2024-04-29 NOTE — PROGRESS NOTES
PHYSICAL / OCCUPATIONAL THERAPY - DAILY TREATMENT NOTE    Patient Name: Cameron Scott    Date: 2024    : 1970  Insurance: Payor: [employer] BETTER [facility] [place] / Plan: [employer] BETTER [facility] [place] / Product Type: *No Product type* /      Patient  verified Yes     Visit #   Current / Total 6 10   Time   In / Out 1052 1131   Pain   In / Out 0 0   Subjective Functional Status/Changes: Patient reports feeling good today with no pain but continues to have numbness and tingling in the left hand.     TREATMENT AREA =  Neck pain [M54.2]  H/O neck surgery [Z98.890]    OBJECTIVE        Therapeutic Procedures:    Tx Min Billable or 1:1 Min (if diff from Tx Min) Procedure, Rationale, Specifics   10  46185 Therapeutic Exercise (timed):  increase ROM, strength, coordination, balance, and proprioception to improve patient's ability to progress to PLOF and address remaining functional goals. (see flow sheet as applicable)     Details if applicable:       21  11019 Therapeutic Activity (timed):  use of dynamic activities replicating functional movements to increase ROM, strength, coordination, balance, and proprioception in order to improve patient's ability to progress to PLOF and address remaining functional goals.  (see flow sheet as applicable)     Details if applicable:     8  43605 Self Care/Home Management (timed):  improve patient knowledge and understanding of positioning, posture/ergonomics, and physical therapy expectations, procedures and progression  to improve patient's ability to progress to PLOF and address remaining functional goals.  (see flow sheet as applicable)     Details if applicable:     39  [facility] Totals Reminder: bill using total billable min of TIMED therapeutic procedures (example: do not include dry needle or estim unattended, both untimed codes, in totals to left)  8-22 min = 1 unit; 23-37 min = 2 units; 38-52 min = 3 units; 53-67 min = 4 units; 68-82 min = 5 units   Total Total     [x]  Patient

## 2024-04-30 NOTE — TELEPHONE ENCOUNTER
Called patient identified by two verifiers. Explained    Order for EMG LUE for paresthesias/weakness.  Referral to Dr. Pritchard (he will want the EMG).    Verbalized understanding thanked for the call

## 2024-05-01 ENCOUNTER — PROCEDURE VISIT (OUTPATIENT)
Age: 54
End: 2024-05-01
Payer: COMMERCIAL

## 2024-05-01 ENCOUNTER — TELEPHONE (OUTPATIENT)
Age: 54
End: 2024-05-01

## 2024-05-01 VITALS
RESPIRATION RATE: 16 BRPM | DIASTOLIC BLOOD PRESSURE: 89 MMHG | WEIGHT: 135.2 LBS | BODY MASS INDEX: 20.49 KG/M2 | HEART RATE: 100 BPM | TEMPERATURE: 98.3 F | HEIGHT: 68 IN | SYSTOLIC BLOOD PRESSURE: 122 MMHG

## 2024-05-01 DIAGNOSIS — M54.12 RADICULOPATHY, CERVICAL REGION: ICD-10-CM

## 2024-05-01 PROCEDURE — 95909 NRV CNDJ TST 5-6 STUDIES: CPT | Performed by: PHYSICAL MEDICINE & REHABILITATION

## 2024-05-01 PROCEDURE — 95886 MUSC TEST DONE W/N TEST COMP: CPT | Performed by: PHYSICAL MEDICINE & REHABILITATION

## 2024-05-01 NOTE — PROGRESS NOTES
[facility]  [address], [address]  [place] [address]  Phone: [phone]  Fax: [phone]    Cameron Scott  : 1970  PCP: Frieda Ruiz APRN - NP  2024    ELECTROMYOGRAPHY AND NERVE CONDUCTION STUDIES    Cameron Scott was referred by Dr. Gaytan for electrodiagnostic evaluation of numbness, tingling, weakness of left arm    NCV & EMG Findings:  All nerve conduction studies (as indicated in the following tables) were within normal limits.    INTERPRETATION  This is an abnormal electrodiagnostic examination. These findings may be consistent with:  Left C7 radiculopathy(chronic, ongoing/unresolved)    There are no electrodiagnostic findings consistent with cervical radiculopathy, brachial plexopathy, myopathy, or any other mononeuropathy.        CLINICAL INTERPRETATION  His electrodiagnostic findings consistent with C7 radiculopathy appear consistent with his mainly tricep weakness. His ulnar mononeuropathy appears resolved.     Brief exam reveals bilateral sampson's sign.       HISTORY OF PRESENT ILLNESS  Cameron Perez is a 54 y.o. male.    Pt presents today with LUE EMG evaluation for numbness, tingling, weakness of left hand. Pt is s/p C3-T1 laminoplasty (10/23 Concepcion).    PAST MEDICAL HISTORY   Past Medical History:   Diagnosis Date    Asthma     Back pain     Elevated blood pressure reading     GERD (gastroesophageal reflux disease)     Left shoulder pain     Low TSH level     Low TSH level 2017    may have mild hyperthyroidism w/u per PCP I will rpt TSH and get freeT4    Primary hypertension 2022    Right inguinal hernia     TIA (transient ischemic attack) 2022       Past Surgical History:   Procedure Laterality Date    COLONOSCOPY      HERNIA REPAIR Bilateral 2024    ROBOTIC BILATERAL INGUINAL HERNIA REPAIR WITH PLACEMENT OF MESH performed by Robyn Woodson MD at [facility] MAIN OR    SPINE SURGERY N/A 10/30/2023    CERVICAL

## 2024-05-01 NOTE — TELEPHONE ENCOUNTER
[facility] Dept called to inform us the patient's MRI was denied, patient has not completed 6 weeks of PT or other treatment.     Contact [employer] for Peer to Peer  Tel.  [phone]    Case # [id number]

## 2024-05-02 NOTE — TELEPHONE ENCOUNTER
Patient called to follow up on the status of the peer to peer. His MRI is scheduled for 5/3 at 3pm and he is wondering if he should keep it or reschedule for sometime next week in order for the peer to peer to get done.    Please review and advise patient, [phone]

## 2024-05-02 NOTE — TELEPHONE ENCOUNTER
[facility] requires an apt. If he has not done 6 weeks of formal PT or a physician directed exercise program it will still be denied.     If he has done this please schedule a peer to peer for me to do tomorrow btwn 1 and 2

## 2024-05-03 NOTE — TELEPHONE ENCOUNTER
Patient is currently doing PT for neck after surgery, but not specifically for the shoulder. Patient is happy to do therapy for the shoulder if the MRI is not approved.    Called [facility] to attempt to make an appointment, spoke with Heidy THEODORE who stated due to the physical therapy already being completed on the neck, in which documented that they had worked on the shoulder, Heidy stated she did not need a provider peer to peer as the patient has been undergoing physical therapy already.    Auth # [id number] April 29-June 28 2024.    Called Mr Scott and informed of this as well as gave central scheduling's phone number with instructions to get himself scheduled for MRI review.

## 2024-05-06 ENCOUNTER — OFFICE VISIT (OUTPATIENT)
Age: 54
End: 2024-05-06
Payer: COMMERCIAL

## 2024-05-06 VITALS — HEIGHT: 68 IN | BODY MASS INDEX: 20.56 KG/M2

## 2024-05-06 DIAGNOSIS — G56.02 LEFT CARPAL TUNNEL SYNDROME: ICD-10-CM

## 2024-05-06 DIAGNOSIS — M54.12 RADICULOPATHY, CERVICAL REGION: Primary | ICD-10-CM

## 2024-05-06 PROCEDURE — 20526 THER INJECTION CARP TUNNEL: CPT | Performed by: ORTHOPAEDIC SURGERY

## 2024-05-06 PROCEDURE — 99214 OFFICE O/P EST MOD 30 MIN: CPT | Performed by: ORTHOPAEDIC SURGERY

## 2024-05-06 RX ORDER — LIDOCAINE HYDROCHLORIDE 10 MG/ML
0.5 INJECTION, SOLUTION INFILTRATION; PERINEURAL ONCE
Status: COMPLETED | OUTPATIENT
Start: 2024-05-06 | End: 2024-05-06

## 2024-05-06 RX ADMIN — LIDOCAINE HYDROCHLORIDE 0.5 ML: 10 INJECTION, SOLUTION INFILTRATION; PERINEURAL at 13:52

## 2024-05-06 NOTE — PROGRESS NOTES
Cameron Scott is a 54 y.o. male right handed.  Worker's Compensation and legal considerations: none    Chief Complaint   Patient presents with    Hand Pain     Left hand pain     Pain Score:   7    HPI: Patient presents today with a history of left hand numbness and tingling.  Last year he had a cervical spine surgery for a left ring and little finger numbness symptoms.  He reports that since then he has had increasing numbness and tingling specifically in the thumb index and middle fingers.  He has recently had an EMG on the left side consistent with cervical pathology.    Date of onset: Indeterminate  Injury: No  Prior Treatment:  No    ROS: Review of Systems - General ROS: negative except HPI    Past Medical History:   Diagnosis Date    Abnormal /NCV LUE 05/2024    Ongoing Left C7 radiculopathy    Asthma     Back pain     Elevated blood pressure reading     GERD (gastroesophageal reflux disease)     Left shoulder pain     Low TSH level     Low TSH level 04/27/2017    may have mild hyperthyroidism w/u per PCP I will rpt TSH and get freeT4    Primary hypertension 9/13/2022    Right inguinal hernia     TIA (transient ischemic attack) 8/13/2022       Past Surgical History:   Procedure Laterality Date    COLONOSCOPY      HERNIA REPAIR Bilateral 2/7/2024    ROBOTIC BILATERAL INGUINAL HERNIA REPAIR WITH PLACEMENT OF MESH performed by Robyn Woodson MD at [facility] MAIN OR    SPINE SURGERY N/A 10/30/2023    CERVICAL LAMINOPLASTY CERVICAL THREE-THORACIC ONE; C-ARM; [MARK SPINE] performed by Arjun Javier MD at [facility] MAIN OR        Current Outpatient Medications   Medication Sig Dispense Refill    gabapentin (NEURONTIN) 600 MG tablet Take 1 tablet by mouth 2 times daily.      lidocaine (LIDODERM) 5 % Place 1 patch onto the skin daily 12 hours on, 12 hours off. 30 patch 2    cyclobenzaprine (FLEXERIL) 10 MG tablet Take 1 tablet by mouth 2 times daily as needed      amLODIPine (NORVASC) 5 MG tablet Take 1 tablet by mouth

## 2024-05-08 ENCOUNTER — HOSPITAL ENCOUNTER (OUTPATIENT)
Facility: HOSPITAL | Age: 54
Setting detail: RECURRING SERIES
Discharge: HOME | End: 2024-05-11
Payer: COMMERCIAL

## 2024-05-08 PROCEDURE — 97112 NEUROMUSCULAR REEDUCATION: CPT

## 2024-05-08 PROCEDURE — 97110 THERAPEUTIC EXERCISES: CPT

## 2024-05-08 PROCEDURE — 97530 THERAPEUTIC ACTIVITIES: CPT

## 2024-05-08 PROCEDURE — 97535 SELF CARE MNGMENT TRAINING: CPT

## 2024-05-08 NOTE — PROGRESS NOTES
PHYSICAL / OCCUPATIONAL THERAPY - DAILY TREATMENT NOTE    Patient Name: Cameron Scott    Date: 2024    : 1970  [facility]: [facility] / Plan: [facility] [employer] [facility] [place] / Product Type: *No Product type* /      Patient  verified Yes     Visit #   Current / Total 1 8   Time   In / Out 1010 1057   Pain   In / Out 2-3 neck 0   Subjective Functional Status/Changes: Per MD report re: EMG and NN conduction studies: \"This is an abnormal electrodiagnostic examination. These findings may be consistent with:  Left C7 radiculopathy(chronic, ongoing/unresolved)\"    Per MD report on 24: \"Although the patient has a significant history of cervical spine pathology as well as surgery as well as electrodiagnostic findings consistent with this, there is a 10 to 15% chance the patient may have a left carpal tunnel syndrome that is negative on EMG\"  Note also states that a carpal tunnel injection was administered and \" symptom relief if any will likely be incomplete given the complicated history \"    Patient reports some improved comfort post injection. His MRI is scheduled for 24.      TREATMENT AREA =  Neck pain [M54.2]  H/O neck surgery [Z98.890]    OBJECTIVE         Therapeutic Procedures:    Tx Min Billable or 1:1 Min (if diff from Tx Min) Procedure, Rationale, Specifics   9 9 29390 Therapeutic Exercise (timed):  increase ROM, strength, coordination, balance, and proprioception to improve patient's ability to progress to PLOF and address remaining functional goals. (see flow sheet as applicable)     Details if applicable:       15 15 82707 Neuromuscular Re-Education (timed):  improve balance, coordination, kinesthetic sense, posture, core stability and proprioception to improve patient's ability to develop conscious control of individual muscles and awareness of position of extremities in order to progress to PLOF and address remaining functional goals. (see flow sheet as applicable)

## 2024-05-13 ENCOUNTER — HOSPITAL ENCOUNTER (OUTPATIENT)
Facility: HOSPITAL | Age: 54
Discharge: HOME | End: 2024-05-16
Attending: ORTHOPAEDIC SURGERY
Payer: COMMERCIAL

## 2024-05-13 DIAGNOSIS — M75.102 TEAR OF LEFT ROTATOR CUFF, UNSPECIFIED TEAR EXTENT, UNSPECIFIED WHETHER TRAUMATIC: ICD-10-CM

## 2024-05-13 DIAGNOSIS — M25.512 LEFT SHOULDER PAIN, UNSPECIFIED CHRONICITY: ICD-10-CM

## 2024-05-13 PROCEDURE — 73221 MRI JOINT UPR EXTREM W/O DYE: CPT

## 2024-05-17 ENCOUNTER — HOSPITAL ENCOUNTER (OUTPATIENT)
Facility: HOSPITAL | Age: 54
Setting detail: RECURRING SERIES
Discharge: HOME | End: 2024-05-20
Payer: COMMERCIAL

## 2024-05-17 PROCEDURE — 97110 THERAPEUTIC EXERCISES: CPT

## 2024-05-17 PROCEDURE — 97535 SELF CARE MNGMENT TRAINING: CPT

## 2024-05-17 PROCEDURE — 97112 NEUROMUSCULAR REEDUCATION: CPT

## 2024-05-17 PROCEDURE — 97530 THERAPEUTIC ACTIVITIES: CPT

## 2024-05-17 NOTE — PROGRESS NOTES
improve patient's ability to progress to PLOF and address remaining functional goals.  (see flow sheet as applicable)     Details if applicable:  pt ed   40  [facility] Totals Reminder: bill using total billable min of TIMED therapeutic procedures (example: do not include dry needle or estim unattended, both untimed codes, in totals to left)  8-22 min = 1 unit; 23-37 min = 2 units; 38-52 min = 3 units; 53-67 min = 4 units; 68-82 min = 5 units   Total Total     [x]  Patient Education billed concurrently with other procedures   [x] Review HEP    [] Progressed/Changed HEP, detail:    [] Other detail:       Objective Information/Functional Measures/Assessment    Patient reports no significant relief with recent injection for carpal tunnel and states he had an MRI on his shoulder a few days ago. He continues to make strides with strengthening despite continued tricep weakness. He is making progress with AROM elbow extension but has difficulty using tricep as a stabilizer. Able to progress many of the exercises with good tolerance and patient reports adequate challenge with introduction of TB chest press.    Patient will continue to benefit from skilled PT / OT services to modify and progress therapeutic interventions, analyze and address functional mobility deficits, analyze and address ROM deficits, analyze and address strength deficits, analyze and address soft tissue restrictions, analyze and cue for proper movement patterns, analyze and modify for postural abnormalities, analyze and address imbalance/dizziness, and instruct in home and community integration to address functional deficits and attain remaining goals.    Progress toward goals / Updated goals:  []  See Progress Note/Recertification    1. Pt will increase MMT left shoulder ER to 4/5, wrist flex/ulnar deviation to 4/5, elbow EXT to 4/5 to improve ability to reach with less pain.             PN Status:  (4/29/24)  - PROGRESSING                         Left

## 2024-05-24 ENCOUNTER — HOSPITAL ENCOUNTER (OUTPATIENT)
Facility: HOSPITAL | Age: 54
Setting detail: RECURRING SERIES
Discharge: HOME | End: 2024-05-27
Payer: COMMERCIAL

## 2024-05-24 PROCEDURE — 97112 NEUROMUSCULAR REEDUCATION: CPT

## 2024-05-24 PROCEDURE — 97535 SELF CARE MNGMENT TRAINING: CPT

## 2024-05-24 PROCEDURE — 97530 THERAPEUTIC ACTIVITIES: CPT

## 2024-05-24 PROCEDURE — 97110 THERAPEUTIC EXERCISES: CPT

## 2024-05-24 NOTE — PROGRESS NOTES
functional goals.  (see flow sheet as applicable)     Details if applicable:  pt ed   40  [facility] Totals Reminder: bill using total billable min of TIMED therapeutic procedures (example: do not include dry needle or estim unattended, both untimed codes, in totals to left)  8-22 min = 1 unit; 23-37 min = 2 units; 38-52 min = 3 units; 53-67 min = 4 units; 68-82 min = 5 units   Total Total     [x]  Patient Education billed concurrently with other procedures   [x] Review HEP    [] Progressed/Changed HEP, detail:    [] Other detail:       Objective Information/Functional Measures/Assessment    Patient reports he has a RTC tear and will need surgery- he will find out when at his appt next week. He continues to progress well with scapular strengthening though demonstrates elbow flexion compensation at end range resisted ER secondary to tricep weakness. He needed some cuing throughout session to reduce UT hiking. He is due for reassessment at [place].    Patient will continue to benefit from skilled PT / OT services to modify and progress therapeutic interventions, analyze and address functional mobility deficits, analyze and address ROM deficits, analyze and address strength deficits, analyze and address soft tissue restrictions, analyze and cue for proper movement patterns, analyze and modify for postural abnormalities, analyze and address imbalance/dizziness, and instruct in home and community integration to address functional deficits and attain remaining goals.    Progress toward goals / Updated goals:  []  See Progress Note/Recertification    1. Pt will increase MMT left shoulder ER to 4/5, wrist flex/ulnar deviation to 4/5, elbow EXT to 4/5 to improve ability to reach with less pain.             PN Status:  (4/29/24)  - PROGRESSING             Left shoulder ER = 5/5             Wrist flex = 4/5 - cramping              Wrist ulnar deviation = 4-             Elbow EXT = 3+/5   Assess NV  2.  Pt will increase AROM c/s flex to

## 2024-05-28 ENCOUNTER — HOSPITAL ENCOUNTER (OUTPATIENT)
Facility: HOSPITAL | Age: 54
Setting detail: RECURRING SERIES
Discharge: HOME | End: 2024-05-31
Payer: COMMERCIAL

## 2024-05-28 ENCOUNTER — OFFICE VISIT (OUTPATIENT)
Age: 54
End: 2024-05-28
Payer: COMMERCIAL

## 2024-05-28 VITALS — BODY MASS INDEX: 20.37 KG/M2 | HEIGHT: 68 IN

## 2024-05-28 DIAGNOSIS — M19.019 AC JOINT ARTHROPATHY: ICD-10-CM

## 2024-05-28 DIAGNOSIS — M75.102 TEAR OF LEFT SUPRASPINATUS TENDON: ICD-10-CM

## 2024-05-28 DIAGNOSIS — S43.432A DEGENERATIVE SUPERIOR LABRAL ANTERIOR-TO-POSTERIOR (SLAP) TEAR OF LEFT SHOULDER: Primary | ICD-10-CM

## 2024-05-28 PROCEDURE — 97110 THERAPEUTIC EXERCISES: CPT

## 2024-05-28 PROCEDURE — 97112 NEUROMUSCULAR REEDUCATION: CPT

## 2024-05-28 PROCEDURE — 97535 SELF CARE MNGMENT TRAINING: CPT

## 2024-05-28 PROCEDURE — 99215 OFFICE O/P EST HI 40 MIN: CPT | Performed by: ORTHOPAEDIC SURGERY

## 2024-05-28 PROCEDURE — 97530 THERAPEUTIC ACTIVITIES: CPT

## 2024-05-28 NOTE — PROGRESS NOTES
[facility] [address]  [address]  [place] [address]  [phone] fax  PROGRESS NOTE  Patient Name: Cameron Scott : 1970   Treatment/Medical Diagnosis: Neck pain [M54.2]  H/O neck surgery [Z98.890]   Referral Source:  Payor Haley Padilla PA-C  Payor: [employer] [facility] / Product Type: *No Product type* /      Date of Initial Visit: 23 Attended Visits: 29 Missed Visits: 0     SUMMARY OF TREATMENT:  Pt attending 29 visits since his SOC on 23 post cervical spine sx, 10/30/23.  Pt reporting 70% overall improvement since then, his max pain is as high as 9-10/10 with out specific cause, it is sharp and remitting - remains to have reports of radicular symptoms down L UE, into hand and all digits.  He demonstrates slow progress since his last visit and is scheduled for an upcoming appointment to address RTC tear of L UE later today.  Anticipate discharge of current case with anticipated RTC repair sx,  continue with current POC until then.  He demonstrates an increased functional strength during top shelf reach with weight - able to complete lifting heavier weight to top shelf.        CURRENT STATUS:  Progress toward goals / Updated goals:  []  See Progress Note/Recertification  1. Pt will increase MMT left shoulder ER to 4/5, wrist flex/ulnar deviation to 4/5, elbow EXT to 4/5 to improve ability to reach with less pain.             PN Status:  (24)  - PROGRESSING             Left shoulder ER = 5/5             Wrist flex = 4/5 - cramping              Wrist ulnar deviation = 4-             Elbow EXT = 3+/5             PN status: (24) no change since last PN    2.  Pt will increase AROM c/s flex to 50 degs, B rotation to WNL to improve ability to tolerate daily tasks.  PN Status: (24) - NOT MET for Flexion - PROGRESSING for Rotation   Flexion: 26  Right Rot: 68  Left Rot: 63   PN status:

## 2024-05-28 NOTE — PATIENT INSTRUCTIONS
Dr. English Pre-operative Instructions:      I understand I am to STOP taking these Medications 5 days prior to surgery: Aspirin, Aspirin containing medications, Non-Steroidal Anti-Inflammatory medications (such as Advil, Aleve, Motrin, Ibuprofen), Meloxicam, Naproxen, Diclofenac, Etodolac, and Celebrex.    I am to STOP TAKING MY BLOOD THINNER MEDICATIONS (Coumadin, Plavix, Eliquis, Xarelto, Heparin or others) AS DIRECTED BY MY PHYSICIAN:. IT IS OK TO RESUME THESE THE DAY AFTER SURGERY OR AS DIRECTED BY DR. ENGLISH.    I AM TO NOT TAKE MY WEEKLY INJECTABLE FOR ONE WEEK PRIOR TO SURGERY : BYDUREON, MOOLIVERIO, OZEMPIC/FRANCISCA, TRULICITY - Resume your normal schedule after surgery    Patients taking Adylyxin, Byetta, Rybelsus, Victoza, or Saxenda daily - Do NOT take the medication the day of your procedure. Resume your normal dose the day after surgery.    I understand that if I am taking daily medications for high blood pressure and or diabetes I can take them the morning of surgery with a small sip of water. Please hold any diruetics/water pills the am of surgery. I will consult my prescribing physician or call [facility] with specific questions.   RESUME ANY HELD MEDICATIONS IMMEDIATELY AFTER SURGERY UNLESS INSTRUCTED DIFERRENTLY.    I also understand that:    I will have any necessary pre-operative lab work done as directed prior to surgery.    I am to report important observations or changes that may occur prior to surgery. If I have any changes in my physical condition, such as a rash, a fever, sore throat, abscess, ulcers, nausea, vomiting, or diarrhea.   I am to call the office and I am to consult my primary care physician to assess and treat the problem.    I am not to eat or drink anything after midnight the night before my surgery.    I am not to drink alcoholic beverages 24 hours prior to surgery.    I am not to smoke at least 24 hours prior to surgery.    I am able and will shower or bathe with an anti-

## 2024-05-28 NOTE — PROGRESS NOTES
PHYSICAL / OCCUPATIONAL THERAPY - DAILY TREATMENT NOTE    Patient Name: Cameron Scott    Date: 2024    : 1970  Insurance: Payor: [employer] [facility] [place] / Plan: [employer]TER [facility] [place] / Product Type: *No Product type* /      Patient  verified Yes     Visit #   Current / Total 4 8   Time   In / Out 9:32 10:11   Pain   In / Out 3 0   Subjective Functional Status/Changes: Feeling okay this morning     TREATMENT AREA =  Neck pain [M54.2]  H/O neck surgery [Z98.890]     OBJECTIVE    Therapeutic Procedures:    Tx Min Billable or 1:1 Min (if diff from Tx Min) Procedure, Rationale, Specifics   8  64863 Therapeutic Exercise (timed):  increase ROM, strength, coordination, balance, and proprioception to improve patient's ability to progress to PLOF and address remaining functional goals. (see flow sheet as applicable)     Details if applicable:    Reassessment for PN   12  01645 Neuromuscular Re-Education (timed):  improve balance, coordination, kinesthetic sense, posture, core stability and proprioception to improve patient's ability to develop conscious control of individual muscles and awareness of position of extremities in order to progress to PLOF and address remaining functional goals. (see flow sheet as applicable)     Details if applicable:  scapular re-ed   11  18267 Therapeutic Activity (timed):  use of dynamic activities replicating functional movements to increase ROM, strength, coordination, balance, and proprioception in order to improve patient's ability to progress to PLOF and address remaining functional goals.  (see flow sheet as applicable)     Details if applicable:  functional reach/push/pull  Reassessment for PN   8  84831 Self Care/Home Management (timed):  improve patient knowledge and understanding of diagnosis/prognosis, physical therapy expectations, procedures and progression, and HEP progression  to improve patient's ability to progress to PLOF and address remaining functional

## 2024-05-28 NOTE — PROGRESS NOTES
adenopathy with ill-defined high-grade partial thickness tear as described, without significant fatty atrophy. Other rotator cuff tendons appear largely intact.  2.  Extensive patchy labral degeneration/tearing as described.  3.  Mild AC joint degenerative changes and acromion morphology which may cause subacromial impingement. Trace subacromial-subdeltoid bursal edema.  4.  Posterior glenoid high-grade cartilage thinning.  5.  See additional details above.    XR of the left shoulder with 3 views obtained in office dated 04/23/2024 reviewed and read by Dr. English: No acute abnormalities    IMPRESSION:      ICD-10-CM    1. Degenerative superior labral anterior-to-posterior (SLAP) tear of left shoulder  S43.432A       2. Tear of left supraspinatus tendon  M75.102       3. AC joint arthropathy  M19.019            PLAN:   1. Patient presented with left shoulder pain due to an MRI-indicated partial supraspinatus tendon tear. I discussed the risks and benefits and potential adverse outcomes of both operative vs non operative treatment of left shoulder partial-thickness rotator cuff tear with the patient and patient wishes to proceed with left shoulder arthroscopic rotator cuff repair.      Risks of operative intervention include but not limited to bleeding, infection, deep vein thrombosis, pulmonary embolism, death, limb length discrepancy, reflexive sympathetic dystrophy, fat embolism syndrome,damage to blood vessels and nerves, malunion, non-union, delayed union, failure of hardware, post traumatic arthritis, stroke, heart attack, and death.      Patient understands that infection may arise and may require numerous surgeries. The patient was counseled at length about the risks of tyron Covid-19 during their perioperative period and any recovery window from their procedure.  The patient was made aware that tyron Covid-19  may worsen their prognosis for recovering from their procedure and lend to a higher

## 2024-05-29 RX ORDER — ACETAMINOPHEN 325 MG/1
1000 TABLET ORAL ONCE
OUTPATIENT
Start: 2024-05-29 | End: 2024-05-29

## 2024-05-29 RX ORDER — CELECOXIB 100 MG/1
200 CAPSULE ORAL ONCE
OUTPATIENT
Start: 2024-05-29 | End: 2024-05-29

## 2024-05-29 RX ORDER — GABAPENTIN 100 MG/1
300 CAPSULE ORAL ONCE
OUTPATIENT
Start: 2024-05-29 | End: 2024-05-29

## 2024-05-29 NOTE — H&P
bruising.  Musculoskeletal: Negative  for arthralgias, back pain or neck pain.  Neurological: Negative for dizziness, seizures or syncopal episodes. Denies headaches.   Endocrine: Denies excessive thirst.  No heat/cold intolerance.  Psychiatric: Negative for depression or insomnia.    PHYSICAL EXAMINATION:     VITALS: Ht 1.727 m (5' 8\")   BMI 20.37 kg/m²   GEN:  Well developed, well nourished 54 y.o. male in no acute distress.   HEENT: Normocephalic and atraumatic.Eyes: Conjunctivae and EOM are normal.Pupils are equal, round, and reactive to light. External ear normal appearance, external nose normal appearing. Mouth/Throat: Oropharynx is clear and moist, able to handle oral secretions w/out difficulty, airway patent  NECK: Supple. Normal ROM, No lymphadenopathy. Trachea is midline. No bruising, swelling or deformity  RESP: Clear to auscultation bilaterally. No wheezes, rales, rhonchi. Normal effort and breath sounds. No respiratory distress  CARDIO: Normal rate, regular rhythm and normal heart sounds. No MGR.  ABDOMEN: Soft, non-tender, non-distended, normoactive bowel sounds in all four quadrants. There is no tenderness. There is no rebound and no guarding.   BACK: No CVA or spinal tenderness  BREAST:  Deferred  PELVIC:    Deferred   RECTAL:  Deferred   :           Deferred  MUSCULOSKELETAL:    MUSCULOSKELETAL:  Examination Left shoulder   Skin Intact   AC joint tenderness -   Biceps tenderness -   Forward flexion/Elevation    Active abduction    Glenohumeral abduction 80   External rotation ROM 45   Internal rotation ROM 45   Apprehension -   Suzanne’s Relocation -   Jerk -   Load and Shift -   O’briens -   Speeds -   Impingement sign +   Supraspinatus/Empty Can +   External Rotation Strength -, 5/5   Lift Off/Belly Press -, 5/5   Neurovascular Intact        IMAGING:   MRI of the left shoulder obtained by [facility] dated 5/13/24 reviewed and read by Dr. English:   1.  Supraspinatus insertion advanced

## 2024-05-30 ENCOUNTER — HOSPITAL ENCOUNTER (OUTPATIENT)
Facility: HOSPITAL | Age: 54
Setting detail: RECURRING SERIES
End: 2024-05-30
Payer: COMMERCIAL

## 2024-05-30 ENCOUNTER — TELEPHONE (OUTPATIENT)
Age: 54
End: 2024-05-30

## 2024-05-30 DIAGNOSIS — Z01.818 PRE-OPERATIVE CLEARANCE: Primary | ICD-10-CM

## 2024-05-30 PROCEDURE — 97110 THERAPEUTIC EXERCISES: CPT

## 2024-05-30 PROCEDURE — 97535 SELF CARE MNGMENT TRAINING: CPT

## 2024-05-30 PROCEDURE — 97530 THERAPEUTIC ACTIVITIES: CPT

## 2024-05-30 NOTE — PROGRESS NOTES
[facility] [address]  [address]  [place] [address]  [phone] fax    DISCHARGE SUMMARY  Patient Name: Cameron Scott : 1970   Treatment/Medical Diagnosis: Neck pain [M54.2]  H/O neck surgery [Z98.890]   Referral Source: Haley Padilla PA-C     Date of Initial Visit: 23 Attended Visits: 30 Missed Visits: 0     SUMMARY OF TREATMENT  Pt reporting 70% improvement since starting therapy, reports that home chores and most activities at his home have improved. Regarding deficits, still having instances where pain in neck will cause his knees to buckle and still having pain go down his left. He recently had a progress note done 2 days ago and demonstrates no change since then. We will discharge his case at this time due to patient having surgery int he coming weeks for his shoulder.     CURRENT STATUS      1. Pt will increase MMT left shoulder ER to 4/5, wrist flex/ulnar deviation to 4/5, elbow EXT to 4/5 to improve ability to reach with less pain.             Left shoulder ER = 5/5             Wrist flex = 4/5 - cramping              Wrist ulnar deviation = 4-             Elbow EXT = 3+/5             PN status: (24) no progress since last PN              DC Status: no change since last PN  2.  Pt will increase AROM c/s flex to 50 degs, B rotation to WNL to improve ability to tolerate daily tasks.   Flexion: 26  Right Rot: 65  Left Rot: 58   PN status: (24) slight regression from last PN   DC Status: no change since last PN  3. Patient will be able to reach top shelf in cabinet 10x 5# for improved ease of AdL completion (Updated Goal)              PN status: (24) completes x10 top shelf in cabinet reach with 2#               DC Status: no change since last PN  Functional Gains: Home-chores have gotten better, ease and self dressing, motion is better than when he first started.  Functional Deficits: States that he still has instances  where pain will sometimes cause his knees to collapse; Still can sometimes send pain down his left arm. Rotation causes \"knot\" to pop with pain. He still demonstrates a hard \"bony\" like protrusion just proximal to C5-C6 spinous process with pain upon hard palpation.  % improvement: 70%  Pain   Average: 2-3/10                  Best: 2/10                Worst: 9/10  Patient Goal: \"To get his neck right\"      RECOMMENDATIONS  Pt to be discharged from skilled therapy services due to upcoming surgery, once patient is ready to return to skilled therapy services again; we would require an updated referral from a physician.       If you have any questions/comments please contact us directly at [phone].   Thank you for allowing us to assist in the care of your patient.    To ensure we are able to process the patient’s encounter and avoid risk of your patient receiving a bill for our services, please sign and return this discharge summary by 6/29/24. (30days following DC date)    Therapist Signature: Jimmie Keller PT Date: 5/30/24   Reporting Period: 5/28/24 - 5/30/24 Time: 2:41 PM   NOTE TO PHYSICIAN:  Your patient's insurance requires this discharge note be signed and returned.  PLEASE COMPLETE THE ORDERS BELOW AND RETURN TO:  [facility] at [phone]    ___ I have read the above report and request that my patient be discharged from therapy.     Physician Signature:        Date:       Time:                                        Haley Padilla PA-C

## 2024-05-30 NOTE — TELEPHONE ENCOUNTER
PULMONARY & CRITICAL CARE DAILY NOTE    Patient: Tim Montoya    : 1928 Attending: Eduardo Weber MD   89 year old male        Reason for Follow Up:  Recent hypoxemia and critical care needs    Interval history: Patient sitting up in bed. Confused. Urine culture growing proteus. Weaned off of oxygen.     Assessment/Plan:  Acute hypoxic respiratory failure.   --Suspect secondary to atelectasis, wean to room air  --CXR with elevated left hemidiaphragm.   --encourage IS, upright time, cough/DB  Hypotension secondary to hypovolemia   Lactic acidosis  UTI, proteus  --Improved with fluid resuscitation.    --ID consulted. Continue cefepime x 7-10 days  Acute renal failure.  --Improved with hydration.   Weakness  --PT/OT    Discussed with or notes reviewed:  RN, Patient and MD    ACTIVE PROBLEM LIST     Patient Active Problem List   Diagnosis   • Severe sepsis (CMS/HCC)   • Urinary tract infection without hematuria   • Dehydration   • Acute renal failure (CMS/HCC)   • Type 2 diabetes mellitus, without long-term current use of insulin (CMS/HCC)   • Benign prostatic hyperplasia without lower urinary tract symptoms   • Primary osteoarthritis involving multiple joints   • Essential hypertension   • Open wound of lesser toe of left foot without damage to nail   • Pressure injury of sacral region, unstageable (CMS/HCC)        ALLERGIES & MEDICATIONS     ALLERGIES:   Allergen Reactions   • [place] PRURITUS       Scheduled Medications:  • ceFEPIme (MAXIPIME) extended interval IVPB  1,000 mg Intravenous 2 times per day   • heparin (porcine)  5,000 Units Subcutaneous 3 times per day   • insulin lispro   Subcutaneous TID AC   • ceFEPime (MAXIPIME) IVPB for Most Indications  1,000 mg Intravenous Once   • aspirin  81 mg Oral Daily   • calcium carbonate-vitamin D  1 tablet Oral Daily   • desipramine  25 mg Oral Nightly   • docusate sodium-sennosides  2 tablet Oral Q Evening   • ezetimibe  10 mg Oral Daily   • gabapentin   Verbal order and read back per Henrry Hu MD:    Please let the patient know that his nuclear stress test was unremarkable and low risk.     I spoke with him and let him know that the stress test came back within normal limits.   300 mg Oral TID   • pantoprazole  40 mg Oral QAM AC       Infusions:  • dextrose 5 % infusion     • sodium chloride 0.9% infusion     • sodium chloride 0.9% infusion     • sodium chloride 0.9% infusion 100 mL/hr at 18 1009     HISTORY     Past Medical History:   Diagnosis Date   • Anemia    • Arthritis     no strength to lower extremities due to nerves \"being crushed\"   • BPH (benign prostatic hyperplasia)    • Constipation    • Coronary artery disease    • Diabetes mellitus (CMS/HCC)    • Essential (primary) hypertension    • [place] (hard of hearing)    • Impaired mobility    • Macular degeneration    • Other and unspecified hyperlipidemia    • Pneumonia    • PONV (postoperative nausea and vomiting)     severe PONV after 1st TKR   • Spinal stenosis    • Urinary retention     doyle cath in place   • Urinary tract infection       Past Surgical History:   Procedure Laterality Date   • Joint replacement      bilateral total knee replacement      Social History     Tobacco Use   • Smoking status: Former Smoker     Packs/day: 0.30     Years: 40.00     Pack years: 12.00     Types: Cigarettes     Last attempt to quit: 1988     Years since quittin.4   • Smokeless tobacco: Former User   Substance Use Topics   • Alcohol use: No     Alcohol/week: 0.0 oz     Comment: none while in nursing home   • Drug use: No     Occupational history:  Review of patient's social economics indicates:  No social economics status on file    Family History   Problem Relation Age of Onset   • Diabetes Mother    • Diabetes Father      REVIEW OF SYSTEMS     Confused. Unable to fully obtain.    PHYSICAL EXAM     Visit Vitals  /77 (BP Location: [facility], Patient Position: Semi-Kwon's)   Pulse 68   Temp 97.5 °F (36.4 °C) (Oral)   Resp 18   Ht 5' 9\" (1.753 m)   Wt 65.6 kg   SpO2 96%   BMI 21.36 kg/m²     GENERAL: alert, is in no apparent distress  SKIN: normal color, no skin rashes, LE and scaral wounds (see RN doc)  HEAD: normocephalic,  dressing stapled to head   EYES: pupils are equal, sclera and conjunctiva are normal  MOUTH/THROAT: oropharynx appears normal, oral mucosa is unremarkable and gums and teeth appear normal  NECK: neck is supple, trachea midline, no crepitus, no stridor  CHEST: respiratory effort is not labored, equal expansion bilaterally. No pursed lip breathing.  LUNGS: lungs are clear to auscultation bilaterally with normal inspiratory/expiratory sounds, no rales, no rhonchi and no wheezes  HEART: normal rate and rhythm, S1 and S2 normal, no S3 or S4, no murmurs and no extra heart sounds  ABDOMEN: abdomen is soft, normal active bowel sounds, nontender  NEUROLOGIC: Awake, alert. Moves all four extremities, confusion.  Answers yes and no questions and follows simple commands.   EXTREMITIES:  no edema. Wound to left foot second digit with eschar.  (see RN doc)  PSYCHIATRIC: Calm, cooperative    OBJECTIVE DATA     Laboratory Results:  Recent Labs     11/25/18  1536 11/26/18  0519   SODIUM 141 141   POTASSIUM 4.8 4.2   CHLORIDE 102 106   CO2 30 28   BUN 69* 53*   CREATININE 2.08* 1.33*   GLUCOSE 263* 155*   WBC 12.3* 11.1*   HGB 10.7* 10.3*   * 508*     ABG  No results found for: APH, APO2, ASAT, FIO2, AHCO3, APCO2    Imaging:  CXR 11/25/18 reviewed:  1.  No acute findings.  2.  Redemonstrated left hemidiaphragm elevation.    Rhythm: SR  Vital First Value Last Value   Weight Weight: 68.4 kg 65.6 kg   Height N/A 5' 9\" (175.3 cm)   BMI N/A 21.4     Weight over the past 48 Hours:  Patient Vitals for the past 48 hrs:   Weight   11/25/18 1523 68.4 kg   11/25/18 1801 64.5 kg   11/26/18 0505 66.1 kg   11/26/18 1451 65.9 kg   11/27/18 0615 65.6 kg     DAMIAN Alston      Pulmonary/Critical Care Attending Addendum:  Patient seen and examined with the above NP. I agree with the assessment and plan.  Pertinent adjustments were made to the note.  Upon my exam, the patient is in bed, currently off oxygen support, not labored  appearing, lungs clear with clear cough and no rhonchi. Labs, vitals and imaging were reviewed. Stable off oxygen support, continue pulm hygiene efforts with upright positioning, I.S use while sedentary in bed. Antibiotics of cefepime for UTI and wound care to feet ongoing. Plan as per above.  Fran Tejeda, DO

## 2024-05-30 NOTE — PROGRESS NOTES
PHYSICAL / OCCUPATIONAL THERAPY - DAILY TREATMENT NOTE    Patient Name: Cameron Scott    Date: 2024    : 1970  Insurance: Payor: [employer] [facility] [place] / Plan: [employer] [facility]TER [facility] [place] / Product Type: *No Product type* /      Patient  verified Yes     Visit #   Current / Total 5 8   Time   In / Out 210 238   Pain   In / Out 2 0   Subjective Functional Status/Changes: Pt states that he would like the DC today as he is getting surgery soon.      TREATMENT AREA =  Neck pain [M54.2]  H/O neck surgery [Z98.890]     OBJECTIVE         Therapeutic Procedures:    Tx Min Billable or 1:1 Min (if diff from Tx Min) Procedure, Rationale, Specifics   10 10 74585 Therapeutic Activity (timed):  use of dynamic activities replicating functional movements to increase ROM, strength, coordination, balance, and proprioception in order to improve patient's ability to progress to PLOF and address remaining functional goals.  (see flow sheet as applicable)     Details if applicable:  FOTO, goals, discharge assessment     10 10 52284 Therapeutic Exercise (timed):  increase ROM, strength, coordination, balance, and proprioception to improve patient's ability to progress to PLOF and address remaining functional goals. (see flow sheet as applicable)     Details if applicable:  periscapular training   8 8 14569 Self Care/Home Management (timed):  improve patient knowledge and understanding of pain reducing techniques, positioning, posture/ergonomics, home safety, activity modification, diagnosis/prognosis, and physical therapy expectations, procedures and progression  to improve patient's ability to progress to PLOF and address remaining functional goals.  (see flow sheet as applicable)     Details if applicable:  Education on compliance with HEP and anatomy review for pt education   28 28 [facility] Totals Reminder: bill using total billable min of TIMED therapeutic procedures (example: do not include dry needle or estim

## 2024-05-30 NOTE — TELEPHONE ENCOUNTER
----- Message from Briseida Banegas MA sent at 5/30/2024  8:24 AM EDT -----    ----- Message -----  From: Henrry Hu MD  Sent: 5/28/2024   2:52 PM EDT  To: Briseida Banegas MA    Please let the patient know that his nuclear stress test was unremarkable and low risk.  ----- Message -----  From: Briseida Banegas MA  Sent: 5/28/2024   2:16 PM EDT  To: Henrry Hu MD    Per your last note\"  Chest pain.  Both typical and atypical features for angina.  He does have nonspecific T wave changes on his EKG, so I have recommended an exercise nuclear stress test for further restratification.

## 2024-05-31 ENCOUNTER — HOSPITAL ENCOUNTER (OUTPATIENT)
Facility: HOSPITAL | Age: 54
Setting detail: RECURRING SERIES
End: 2024-05-31
Payer: COMMERCIAL

## 2024-05-31 DIAGNOSIS — S43.432A DEGENERATIVE SUPERIOR LABRAL ANTERIOR-TO-POSTERIOR (SLAP) TEAR OF LEFT SHOULDER: Primary | ICD-10-CM

## 2024-06-03 ENCOUNTER — TELEPHONE (OUTPATIENT)
Age: 54
End: 2024-06-03

## 2024-06-03 ENCOUNTER — APPOINTMENT (OUTPATIENT)
Facility: HOSPITAL | Age: 54
End: 2024-06-03
Payer: COMMERCIAL

## 2024-06-03 DIAGNOSIS — M75.102 TEAR OF LEFT ROTATOR CUFF, UNSPECIFIED TEAR EXTENT, UNSPECIFIED WHETHER TRAUMATIC: Primary | ICD-10-CM

## 2024-06-03 RX ORDER — OXYCODONE HYDROCHLORIDE 5 MG/1
5 TABLET ORAL EVERY 4 HOURS PRN
Qty: 40 TABLET | Refills: 0 | Status: SHIPPED | OUTPATIENT
Start: 2024-06-03 | End: 2024-06-06

## 2024-06-03 NOTE — DISCHARGE INSTRUCTIONS
Dr. English’s Shoulder Arthroscopy  Postoperative Information    How to manage your pain after your Surgery:  After your surgery it is expected that you will have some pain.  In efforts to reduce the amounts of side effects from pain medications we would like you to follow the below medication regimen after surgery:  Take 1000mg of Tylenol by mouth every 8 hours x 5 days. This is 4 tabs of regular strength Tylenol or 2 tabs of Extra Strength Tylenol. Start this as soon as you get home from surgery.  Cont with your normal gabapentin dose  We would like you to take a NSAID medication for the first 3 days following surgery.  In efforts to avoid additional prescription costs we recommend one of the following over the counter medications.     600mg of Ibuprofen every 8 hours x 3 days    OR   500mg of Naproxen (Aleve) every 12 hours x 3 days  If you have a prescription for Meloxicam or Celebrex already you may resume this as previously prescribed instead of the Over the Counter Medications.  DO NOT TAKE ANY OF THESE IF YOU HAVE CHRONIC RENAL FAILURE (KIDNEY DISEASE), ARE TAKING A BLOOD THINNER, HAVE A HISTORY OF A GASTRIC BLEED OR ARE ALLERGIC TO ASPIRIN. IT IS OK TO TAKE IF YOU HAVE HISTORY OF GASTRIC BYPASS SURGERY.    Then ONLY IF YOUR PAIN IS UNCONTROLLED you may take your Narcotic Pain medication as prescribed. THIS IS THE PRESCRIPTION THAT WAS GIVEN TO YOU IN THE OFFICE. Many patients choose to take one with dinner the evening of surgery before anesthesia wears off to stay ahead of the pain. After this dose you only take as needed    You should place an ice bag over your shoulder to help with pain and reduce swelling.    A soft bandage was placed on your shoulder. You may take the bandage off one day after surgery. You may have a clear bandage on your incisions that looks like tape. This is surgical superglue. Leave these on unless you are noticing redness or itching.  Band-Aids should be used for the first 4-5 days

## 2024-06-03 NOTE — TELEPHONE ENCOUNTER
Cardiac Clearance : Dr. Hernández, 06-14-24, Left Shoulder Arthroscopic Rotator Cuff Repair. - Patient is on ASA 81 mg.  Will discuss with Dr. Hu

## 2024-06-05 ENCOUNTER — APPOINTMENT (OUTPATIENT)
Facility: HOSPITAL | Age: 54
End: 2024-06-05
Payer: COMMERCIAL

## 2024-06-06 NOTE — PERIOP NOTE
Instructions for your surgery at [facility]      Today's Date:  6/6/2024      Patient's Name:  Cameron Scott           Surgery Date:  6/14/24              Please enter the main entrance of the hospital and check-in at the  located in the lobby. Once checked in at the , you will take the elevators to the second floor, and report to the waiting room on the left. The room will say Procedure Registration.    Do NOT eat or drink anything, including candy, gum, or ice chips after midnight prior to your surgery, unless you have specific instructions from your surgeon or anesthesia provider to do so.  Brush your teeth before coming to the hospital. You may swish with water, but do not swallow.  No smoking/Vaping/E-Cigarettes 24 hours prior to the day of surgery.  No alcohol 24 hours prior to the day of surgery.  No recreational drugs for one week prior to the day of surgery.  Bring Photo ID, Insurance information, and Co-pay if required on day of surgery.  Bring in pertinent legal documents, such as, Medical Power of , DNR, Advance Directive, etc.  Leave all valuables, including money/purse, at home.  Remove all jewelry, including ALL body piercings, nail polish, acrylic nails, and makeup (including mascara); no lotions, powders, deodorant, or perfume/cologne/after shave on the skin.  Follow instruction for Hibiclens washes and CHG wipes from surgeon's office.   Glasses and dentures may be worn to the hospital. They must be removed prior to surgery. Please bring case/container for glasses or dentures.   Contact lenses should not be worn on day of surgery.   Call your doctor's office if symptoms of a cold or illness develop within 24-48 hours prior to your surgery.  Call your doctor's office if you have any questions concerning insurance or co-pays.  15. AN ADULT (relative or friend 18 years or older) MUST DRIVE YOU HOME AFTER YOUR SURGERY.  16. Please make arrangements

## 2024-06-07 ENCOUNTER — HOSPITAL ENCOUNTER (OUTPATIENT)
Facility: HOSPITAL | Age: 54
End: 2024-06-07
Payer: COMMERCIAL

## 2024-06-07 DIAGNOSIS — Z01.818 PRE-OPERATIVE CLEARANCE: ICD-10-CM

## 2024-06-07 LAB
ERYTHROCYTE [DISTWIDTH] IN BLOOD BY AUTOMATED COUNT: 13.5 % (ref 11.6–14.5)
HCT VFR BLD AUTO: 44 % (ref 36–48)
HGB BLD-MCNC: 14.5 G/DL (ref 13–16)
MCH RBC QN AUTO: 30.8 PG (ref 24–34)
MCHC RBC AUTO-ENTMCNC: 33 G/DL (ref 31–37)
MCV RBC AUTO: 93.4 FL (ref 78–100)
NRBC # BLD: 0 K/UL (ref 0–0.01)
NRBC BLD-RTO: 0 PER 100 WBC
PLATELET # BLD AUTO: 271 K/UL (ref 135–420)
PMV BLD AUTO: 10.6 FL (ref 9.2–11.8)
RBC # BLD AUTO: 4.71 M/UL (ref 4.35–5.65)
WBC # BLD AUTO: 5.3 K/UL (ref 4.6–13.2)

## 2024-06-07 PROCEDURE — 36415 COLL VENOUS BLD VENIPUNCTURE: CPT

## 2024-06-07 PROCEDURE — 85027 COMPLETE CBC AUTOMATED: CPT

## 2024-06-10 ENCOUNTER — HOSPITAL ENCOUNTER (OUTPATIENT)
Facility: HOSPITAL | Age: 54
Discharge: HOME | End: 2024-06-13
Payer: COMMERCIAL

## 2024-06-10 DIAGNOSIS — Z01.818 PRE-OPERATIVE CLEARANCE: Primary | ICD-10-CM

## 2024-06-10 LAB
ANION GAP SERPL CALC-SCNC: 4 MMOL/L (ref 3–18)
BUN SERPL-MCNC: 11 MG/DL (ref 7–18)
BUN/CREAT SERPL: 13 (ref 12–20)
CALCIUM SERPL-MCNC: 9.5 MG/DL (ref 8.5–10.1)
CHLORIDE SERPL-SCNC: 104 MMOL/L (ref 100–111)
CO2 SERPL-SCNC: 29 MMOL/L (ref 21–32)
CREAT SERPL-MCNC: 0.82 MG/DL (ref 0.6–1.3)
GLUCOSE SERPL-MCNC: 86 MG/DL (ref 74–99)
POTASSIUM SERPL-SCNC: 4.5 MMOL/L (ref 3.5–5.5)
SODIUM SERPL-SCNC: 137 MMOL/L (ref 136–145)

## 2024-06-10 PROCEDURE — 36415 COLL VENOUS BLD VENIPUNCTURE: CPT

## 2024-06-10 PROCEDURE — 80048 BASIC METABOLIC PNL TOTAL CA: CPT

## 2024-06-13 ENCOUNTER — ANESTHESIA EVENT (OUTPATIENT)
Facility: HOSPITAL | Age: 54
End: 2024-06-13
Payer: COMMERCIAL

## 2024-06-14 ENCOUNTER — HOSPITAL ENCOUNTER (OUTPATIENT)
Facility: HOSPITAL | Age: 54
Setting detail: OUTPATIENT SURGERY
Discharge: HOME | End: 2024-06-14
Attending: ORTHOPAEDIC SURGERY | Admitting: ORTHOPAEDIC SURGERY
Payer: COMMERCIAL

## 2024-06-14 ENCOUNTER — ANESTHESIA (OUTPATIENT)
Facility: HOSPITAL | Age: 54
End: 2024-06-14
Payer: COMMERCIAL

## 2024-06-14 VITALS
OXYGEN SATURATION: 97 % | BODY MASS INDEX: 20.43 KG/M2 | SYSTOLIC BLOOD PRESSURE: 143 MMHG | HEART RATE: 82 BPM | HEIGHT: 68 IN | DIASTOLIC BLOOD PRESSURE: 95 MMHG | RESPIRATION RATE: 17 BRPM | WEIGHT: 134.8 LBS | TEMPERATURE: 97.7 F

## 2024-06-14 DIAGNOSIS — M75.122 NONTRAUMATIC COMPLETE TEAR OF LEFT ROTATOR CUFF: Primary | ICD-10-CM

## 2024-06-14 PROCEDURE — 7100000011 HC PHASE II RECOVERY - ADDTL 15 MIN: Performed by: ORTHOPAEDIC SURGERY

## 2024-06-14 PROCEDURE — 64415 NJX AA&/STRD BRCH PLXS IMG: CPT | Performed by: ANESTHESIOLOGY

## 2024-06-14 PROCEDURE — 3600000012 HC SURGERY LEVEL 2 ADDTL 15MIN: Performed by: ORTHOPAEDIC SURGERY

## 2024-06-14 PROCEDURE — C1713 ANCHOR/SCREW BN/BN,TIS/BN: HCPCS | Performed by: ORTHOPAEDIC SURGERY

## 2024-06-14 PROCEDURE — 2580000003 HC RX 258: Performed by: ORTHOPAEDIC SURGERY

## 2024-06-14 PROCEDURE — 6360000002 HC RX W HCPCS: Performed by: NURSE ANESTHETIST, CERTIFIED REGISTERED

## 2024-06-14 PROCEDURE — 3700000001 HC ADD 15 MINUTES (ANESTHESIA): Performed by: ORTHOPAEDIC SURGERY

## 2024-06-14 PROCEDURE — 7100000010 HC PHASE II RECOVERY - FIRST 15 MIN: Performed by: ORTHOPAEDIC SURGERY

## 2024-06-14 PROCEDURE — L3650 SO 8 ABD RESTRAINT PRE OTS: HCPCS | Performed by: ORTHOPAEDIC SURGERY

## 2024-06-14 PROCEDURE — 2580000003 HC RX 258: Performed by: NURSE ANESTHETIST, CERTIFIED REGISTERED

## 2024-06-14 PROCEDURE — 80307 DRUG TEST PRSMV CHEM ANLYZR: CPT

## 2024-06-14 PROCEDURE — 3600000002 HC SURGERY LEVEL 2 BASE: Performed by: ORTHOPAEDIC SURGERY

## 2024-06-14 PROCEDURE — 6360000002 HC RX W HCPCS: Performed by: ANESTHESIOLOGY

## 2024-06-14 PROCEDURE — 29826 SHO ARTHRS SRG DECOMPRESSION: CPT | Performed by: ORTHOPAEDIC SURGERY

## 2024-06-14 PROCEDURE — 6360000002 HC RX W HCPCS: Performed by: ORTHOPAEDIC SURGERY

## 2024-06-14 PROCEDURE — 29827 SHO ARTHRS SRG RT8TR CUF RPR: CPT | Performed by: ORTHOPAEDIC SURGERY

## 2024-06-14 PROCEDURE — 3700000000 HC ANESTHESIA ATTENDED CARE: Performed by: ORTHOPAEDIC SURGERY

## 2024-06-14 PROCEDURE — 6370000000 HC RX 637 (ALT 250 FOR IP): Performed by: PHYSICIAN ASSISTANT

## 2024-06-14 PROCEDURE — 2720000010 HC SURG SUPPLY STERILE: Performed by: ORTHOPAEDIC SURGERY

## 2024-06-14 PROCEDURE — 7100000001 HC PACU RECOVERY - ADDTL 15 MIN: Performed by: ORTHOPAEDIC SURGERY

## 2024-06-14 PROCEDURE — 2580000003 HC RX 258: Performed by: PHYSICIAN ASSISTANT

## 2024-06-14 PROCEDURE — 6370000000 HC RX 637 (ALT 250 FOR IP): Performed by: NURSE ANESTHETIST, CERTIFIED REGISTERED

## 2024-06-14 PROCEDURE — 2500000003 HC RX 250 WO HCPCS: Performed by: NURSE ANESTHETIST, CERTIFIED REGISTERED

## 2024-06-14 PROCEDURE — 6360000002 HC RX W HCPCS: Performed by: PHYSICIAN ASSISTANT

## 2024-06-14 PROCEDURE — 2709999900 HC NON-CHARGEABLE SUPPLY: Performed by: ORTHOPAEDIC SURGERY

## 2024-06-14 PROCEDURE — 7100000000 HC PACU RECOVERY - FIRST 15 MIN: Performed by: ORTHOPAEDIC SURGERY

## 2024-06-14 RX ORDER — ROPIVACAINE HYDROCHLORIDE 5 MG/ML
30 INJECTION, SOLUTION EPIDURAL; INFILTRATION; PERINEURAL ONCE
Status: COMPLETED | OUTPATIENT
Start: 2024-06-14 | End: 2024-06-14

## 2024-06-14 RX ORDER — GABAPENTIN 300 MG/1
300 CAPSULE ORAL ONCE
Status: COMPLETED | OUTPATIENT
Start: 2024-06-14 | End: 2024-06-14

## 2024-06-14 RX ORDER — SODIUM CHLORIDE, SODIUM LACTATE, POTASSIUM CHLORIDE, CALCIUM CHLORIDE 600; 310; 30; 20 MG/100ML; MG/100ML; MG/100ML; MG/100ML
INJECTION, SOLUTION INTRAVENOUS CONTINUOUS PRN
Status: DISCONTINUED | OUTPATIENT
Start: 2024-06-14 | End: 2024-06-14 | Stop reason: SDUPTHER

## 2024-06-14 RX ORDER — ACETAMINOPHEN 325 MG/1
1000 TABLET ORAL ONCE
Status: COMPLETED | OUTPATIENT
Start: 2024-06-14 | End: 2024-06-14

## 2024-06-14 RX ORDER — ONDANSETRON 2 MG/ML
INJECTION INTRAMUSCULAR; INTRAVENOUS PRN
Status: DISCONTINUED | OUTPATIENT
Start: 2024-06-14 | End: 2024-06-14 | Stop reason: SDUPTHER

## 2024-06-14 RX ORDER — ROPIVACAINE HYDROCHLORIDE 5 MG/ML
INJECTION, SOLUTION EPIDURAL; INFILTRATION; PERINEURAL
Status: COMPLETED | OUTPATIENT
Start: 2024-06-14 | End: 2024-06-14

## 2024-06-14 RX ORDER — OXYCODONE HYDROCHLORIDE AND ACETAMINOPHEN 5; 325 MG/1; MG/1
1 TABLET ORAL EVERY 6 HOURS PRN
Qty: 28 TABLET | Refills: 0 | Status: SHIPPED | OUTPATIENT
Start: 2024-06-14 | End: 2024-06-21

## 2024-06-14 RX ORDER — FENTANYL CITRATE 50 UG/ML
100 INJECTION, SOLUTION INTRAMUSCULAR; INTRAVENOUS ONCE
Status: COMPLETED | OUTPATIENT
Start: 2024-06-14 | End: 2024-06-14

## 2024-06-14 RX ORDER — NALOXONE HYDROCHLORIDE 0.4 MG/ML
INJECTION, SOLUTION INTRAMUSCULAR; INTRAVENOUS; SUBCUTANEOUS PRN
Status: DISCONTINUED | OUTPATIENT
Start: 2024-06-14 | End: 2024-06-14 | Stop reason: HOSPADM

## 2024-06-14 RX ORDER — ONDANSETRON 2 MG/ML
4 INJECTION INTRAMUSCULAR; INTRAVENOUS
Status: DISCONTINUED | OUTPATIENT
Start: 2024-06-14 | End: 2024-06-14 | Stop reason: HOSPADM

## 2024-06-14 RX ORDER — FENTANYL CITRATE 50 UG/ML
INJECTION, SOLUTION INTRAMUSCULAR; INTRAVENOUS PRN
Status: DISCONTINUED | OUTPATIENT
Start: 2024-06-14 | End: 2024-06-14 | Stop reason: SDUPTHER

## 2024-06-14 RX ORDER — MIDAZOLAM HYDROCHLORIDE 2 MG/2ML
2 INJECTION, SOLUTION INTRAMUSCULAR; INTRAVENOUS ONCE
Status: COMPLETED | OUTPATIENT
Start: 2024-06-14 | End: 2024-06-14

## 2024-06-14 RX ORDER — FAMOTIDINE 20 MG/1
20 TABLET, FILM COATED ORAL ONCE
Status: COMPLETED | OUTPATIENT
Start: 2024-06-14 | End: 2024-06-14

## 2024-06-14 RX ORDER — LIDOCAINE HYDROCHLORIDE 10 MG/ML
1 INJECTION, SOLUTION EPIDURAL; INFILTRATION; INTRACAUDAL; PERINEURAL
Status: DISCONTINUED | OUTPATIENT
Start: 2024-06-14 | End: 2024-06-14 | Stop reason: HOSPADM

## 2024-06-14 RX ORDER — CELECOXIB 100 MG/1
200 CAPSULE ORAL ONCE
Status: COMPLETED | OUTPATIENT
Start: 2024-06-14 | End: 2024-06-14

## 2024-06-14 RX ORDER — LIDOCAINE HYDROCHLORIDE 20 MG/ML
INJECTION, SOLUTION EPIDURAL; INFILTRATION; INTRACAUDAL; PERINEURAL PRN
Status: DISCONTINUED | OUTPATIENT
Start: 2024-06-14 | End: 2024-06-14 | Stop reason: SDUPTHER

## 2024-06-14 RX ORDER — SODIUM CHLORIDE, SODIUM LACTATE, POTASSIUM CHLORIDE, CALCIUM CHLORIDE 600; 310; 30; 20 MG/100ML; MG/100ML; MG/100ML; MG/100ML
INJECTION, SOLUTION INTRAVENOUS CONTINUOUS
Status: DISCONTINUED | OUTPATIENT
Start: 2024-06-14 | End: 2024-06-14 | Stop reason: HOSPADM

## 2024-06-14 RX ORDER — PROPOFOL 10 MG/ML
INJECTION, EMULSION INTRAVENOUS PRN
Status: DISCONTINUED | OUTPATIENT
Start: 2024-06-14 | End: 2024-06-14 | Stop reason: SDUPTHER

## 2024-06-14 RX ORDER — SUCCINYLCHOLINE/SOD CL,ISO/PF 100 MG/5ML
SYRINGE (ML) INTRAVENOUS PRN
Status: DISCONTINUED | OUTPATIENT
Start: 2024-06-14 | End: 2024-06-14 | Stop reason: SDUPTHER

## 2024-06-14 RX ORDER — DIPHENHYDRAMINE HYDROCHLORIDE 50 MG/ML
12.5 INJECTION INTRAMUSCULAR; INTRAVENOUS
Status: DISCONTINUED | OUTPATIENT
Start: 2024-06-14 | End: 2024-06-14 | Stop reason: HOSPADM

## 2024-06-14 RX ORDER — ROCURONIUM BROMIDE 10 MG/ML
INJECTION, SOLUTION INTRAVENOUS PRN
Status: DISCONTINUED | OUTPATIENT
Start: 2024-06-14 | End: 2024-06-14 | Stop reason: SDUPTHER

## 2024-06-14 RX ORDER — FENTANYL CITRATE 50 UG/ML
25 INJECTION, SOLUTION INTRAMUSCULAR; INTRAVENOUS EVERY 5 MIN PRN
Status: DISCONTINUED | OUTPATIENT
Start: 2024-06-14 | End: 2024-06-14 | Stop reason: HOSPADM

## 2024-06-14 RX ADMIN — ROPIVACAINE HYDROCHLORIDE 30 ML: 5 INJECTION EPIDURAL; INFILTRATION; PERINEURAL at 07:20

## 2024-06-14 RX ADMIN — ACETAMINOPHEN 325MG 975 MG: 325 TABLET ORAL at 06:53

## 2024-06-14 RX ADMIN — ROCURONIUM BROMIDE 5 MG: 10 INJECTION, SOLUTION INTRAVENOUS at 07:38

## 2024-06-14 RX ADMIN — LIDOCAINE HYDROCHLORIDE 80 MG: 20 INJECTION, SOLUTION EPIDURAL; INFILTRATION; INTRACAUDAL; PERINEURAL at 07:37

## 2024-06-14 RX ADMIN — ROPIVACAINE HYDROCHLORIDE 20 ML: 5 INJECTION, SOLUTION EPIDURAL; INFILTRATION; PERINEURAL at 07:18

## 2024-06-14 RX ADMIN — FENTANYL CITRATE 50 MCG: 50 INJECTION INTRAMUSCULAR; INTRAVENOUS at 07:36

## 2024-06-14 RX ADMIN — PROPOFOL 180 MG: 10 INJECTION, EMULSION INTRAVENOUS at 07:37

## 2024-06-14 RX ADMIN — GABAPENTIN 300 MG: 300 CAPSULE ORAL at 06:52

## 2024-06-14 RX ADMIN — CELECOXIB 200 MG: 100 CAPSULE ORAL at 06:53

## 2024-06-14 RX ADMIN — FENTANYL CITRATE 100 MCG: 50 INJECTION, SOLUTION INTRAMUSCULAR; INTRAVENOUS at 07:18

## 2024-06-14 RX ADMIN — MIDAZOLAM 2 MG: 1 INJECTION INTRAMUSCULAR; INTRAVENOUS at 07:18

## 2024-06-14 RX ADMIN — Medication 100 MG: at 07:40

## 2024-06-14 RX ADMIN — PROPOFOL 50 MG: 10 INJECTION, EMULSION INTRAVENOUS at 09:25

## 2024-06-14 RX ADMIN — SODIUM CHLORIDE, SODIUM LACTATE, POTASSIUM CHLORIDE, AND CALCIUM CHLORIDE: 600; 310; 30; 20 INJECTION, SOLUTION INTRAVENOUS at 07:33

## 2024-06-14 RX ADMIN — FAMOTIDINE 20 MG: 20 TABLET ORAL at 06:52

## 2024-06-14 RX ADMIN — ONDANSETRON 4 MG: 2 INJECTION INTRAMUSCULAR; INTRAVENOUS at 08:14

## 2024-06-14 RX ADMIN — WATER 2000 MG: 1 INJECTION, SOLUTION INTRAMUSCULAR; INTRAVENOUS; SUBCUTANEOUS at 07:46

## 2024-06-14 RX ADMIN — SODIUM CHLORIDE, POTASSIUM CHLORIDE, SODIUM LACTATE AND CALCIUM CHLORIDE: 600; 310; 30; 20 INJECTION, SOLUTION INTRAVENOUS at 06:50

## 2024-06-14 ASSESSMENT — LIFESTYLE VARIABLES: SMOKING_STATUS: 1

## 2024-06-14 ASSESSMENT — PAIN SCALES - GENERAL
PAINLEVEL_OUTOF10: 0

## 2024-06-14 ASSESSMENT — PAIN - FUNCTIONAL ASSESSMENT: PAIN_FUNCTIONAL_ASSESSMENT: 0-10

## 2024-06-14 NOTE — ANESTHESIA PROCEDURE NOTES
Peripheral Block    Patient location during procedure: holding area  Reason for block: post-op pain management and at surgeon's request  Start time: 6/14/2024 7:18 AM  End time: 6/14/2024 7:25 AM  Staffing  Performed: anesthesiologist   Anesthesiologist: Gavino Black Jr., MD  Performed by: Gavino Black Jr., MD  Authorized by: Gavino Black Jr., MD    Preanesthetic Checklist  Completed: patient identified, IV checked, site marked, risks and benefits discussed, surgical/procedural consents, equipment checked, pre-op evaluation, timeout performed, anesthesia consent given, oxygen available, monitors applied/VS acknowledged, fire risk safety assessment completed and verbalized and blood product R/B/A discussed and consented  Peripheral Block   Patient position: supine  Prep: ChloraPrep  Provider prep: sterile gloves and mask  Patient monitoring: cardiac monitor, continuous pulse ox, frequent blood pressure checks, IV access, oxygen and responsive to questions  Block type: Brachial plexus  Interscalene  Laterality: left  Injection technique: single-shot  Guidance: nerve stimulator and ultrasound guided  Local infiltration: lidocaine  Infiltration strength: 1 %  Local infiltration: lidocaine  Dose: 1 mL    Needle   Needle type: insulated echogenic nerve stimulator needle   Needle gauge: 22 G  Needle localization: nerve stimulator, ultrasound guidance and anatomical landmarks  Test dose: negative  Needle length: 8 cm  Assessment   Injection assessment: negative aspiration for heme, no paresthesia on injection, local visualized surrounding nerve on ultrasound and no intravascular symptoms  Paresthesia pain: none  Slow fractionated injection: yes  Hemodynamics: stable  Outcomes: uncomplicated    Medications Administered  ropivacaine (NAROPIN) injection 0.5% - Perineural   20 mL - 6/14/2024 7:18:00 AM

## 2024-06-14 NOTE — ANESTHESIA PRE PROCEDURE
per week     Comment: social                                Ready to quit: Not Answered  Counseling given: Not Answered      Vital Signs (Current):   Vitals:    06/06/24 1353 06/14/24 0632 06/14/24 0643   BP:   123/84   Pulse:   73   Resp:   14   Temp:   97.6 °F (36.4 °C)   TempSrc:   Oral   SpO2:   100%   Weight: 60.8 kg (134 lb) 61.1 kg (134 lb 12.8 oz) 61.1 kg (134 lb 12.8 oz)   Height: 1.727 m (5' 8\")                                                BP Readings from Last 3 Encounters:   06/14/24 123/84   05/24/24 130/80   05/22/24 126/74       NPO Status: Time of last liquid consumption: 0420                        Time of last solid consumption: 2340                        Date of last liquid consumption: 06/14/24                        Date of last solid food consumption: 06/13/24    BMI:   Wt Readings from Last 3 Encounters:   06/14/24 61.1 kg (134 lb 12.8 oz)   05/24/24 60.8 kg (134 lb)   05/22/24 60.8 kg (134 lb)     Body mass index is 20.5 kg/m².    CBC:   Lab Results   Component Value Date/Time    WBC 5.3 06/07/2024 11:07 AM    RBC 4.71 06/07/2024 11:07 AM    HGB 14.5 06/07/2024 11:07 AM    HCT 44.0 06/07/2024 11:07 AM    MCV 93.4 06/07/2024 11:07 AM    RDW 13.5 06/07/2024 11:07 AM     06/07/2024 11:07 AM       CMP:   Lab Results   Component Value Date/Time     06/10/2024 11:08 AM    K 4.5 06/10/2024 11:08 AM     06/10/2024 11:08 AM    CO2 29 06/10/2024 11:08 AM    BUN 11 06/10/2024 11:08 AM    CREATININE 0.82 06/10/2024 11:08 AM    GFRAA >60 09/15/2022 12:15 PM    AGRATIO 1.2 09/15/2022 12:15 PM    LABGLOM >90 06/10/2024 11:08 AM    LABGLOM >60 03/13/2024 10:00 AM    GLUCOSE 86 06/10/2024 11:08 AM    CALCIUM 9.5 06/10/2024 11:08 AM    BILITOT 1.4 03/13/2024 10:00 AM    ALKPHOS 79 03/13/2024 10:00 AM    ALKPHOS 59 09/15/2022 12:15 PM    AST 28 03/13/2024 10:00 AM    ALT 29 03/13/2024 10:00 AM       POC Tests: No results for input(s): \"POCGLU\", \"POCNA\", \"POCK\", \"POCCL\", \"POCBUN\",

## 2024-06-14 NOTE — BRIEF OP NOTE
Brief Postoperative Note      Patient: Cameron Scott  YOB: 1970  MRN: [id number]    Date of Procedure: 6/14/2024    Pre-Op Diagnosis Codes:     * Degenerative superior labral anterior-to-posterior (SLAP) tear of left shoulder [S43.432A]     * Tear of left supraspinatus tendon [M75.102]     * AC joint arthropathy [M19.019]    Post-Op Diagnosis:  Rotator cuff tear left shoulder       Procedure(s):  LEFT SHOULDER ARTHROSCOPIC ROTATOR CUFF REPAIR; [[employer]]  Arthroscopic acromioplasty  Surgeon(s):  Rashad Hernández MD    Assistant:  Surgical Assistant: Eusebio Dietz    Anesthesia: General    Estimated Blood Loss (mL): Minimal    Complications: None    Specimens:   * No specimens in log *    Implants:  * No implants in log *      Drains:   [REMOVED] Closed/Suction Drain Posterior Neck Bulb (Removed)       [REMOVED] Urinary Catheter 10/30/23 2 Way;Hutchins (Removed)       Findings:  Infection Present At Time Of Surgery (PATOS) (choose all levels that have infection present):  No infection present  Other Findings: 2 cm supraspinatus tear    Electronically signed by Rashad Hernández MD on 6/14/2024 at 9:18 AM

## 2024-06-14 NOTE — ANESTHESIA POSTPROCEDURE EVALUATION
Department of Anesthesiology  Postprocedure Note    Patient: Cameron Scott  MRN: [id number]  YOB: 1970  Date of evaluation: 6/14/2024    Procedure Summary       Date: 06/14/24 Room / Location: [facility] MAIN 03 / [facility] MAIN OR    Anesthesia Start: 0733 Anesthesia Stop: 0936    Procedure: LEFT SHOULDER ARTHROSCOPIC ROTATOR CUFF REPAIR; [[employer]] (Left: Shoulder) Diagnosis:       Degenerative superior labral anterior-to-posterior (SLAP) tear of left shoulder      Tear of left supraspinatus tendon      AC joint arthropathy      (Degenerative superior labral anterior-to-posterior (SLAP) tear of left shoulder [S43.432A])      (Tear of left supraspinatus tendon [M75.102])      (AC joint arthropathy [M19.019])    Surgeons: Rashad Hernández MD Responsible Provider: Gavino Black Jr., MD    Anesthesia Type: General, Regional ASA Status: 3            Anesthesia Type: General, Regional    Lachelle Phase I: Lachelle Score: 10    Lachelle Phase II: Lachelle Score: 10    Anesthesia Post Evaluation    Patient location during evaluation: bedside  Airway patency: patent  Cardiovascular status: hemodynamically stable  Respiratory status: acceptable  Hydration status: stable  Pain management: adequate    No notable events documented.

## 2024-06-14 NOTE — H&P
Update History & Physical    The patient's History and Physical was reviewed with the patient and I examined the patient. There was no change. The surgical site was confirmed by the patient and me.       Plan: The risks, benefits, expected outcome, and alternative to the recommended procedure have been discussed with the patient. Patient understands and wants to proceed with the procedure.     Electronically signed by Rashad Hernández MD on 6/14/2024 at 7:17 AM

## 2024-06-14 NOTE — OP NOTE
Operative Note      Patient: Cameron Scott  YOB: 1970  MRN: [id number]    Date of Procedure: 6/14/2024    Pre-Op Diagnosis Codes:     * Degenerative superior labral anterior-to-posterior (SLAP) tear of left shoulder [S43.432A]     * Tear of left supraspinatus tendon [M75.102]     * AC joint arthropathy [M19.019]    Post-Op Diagnosis:  Rotator cuff tear left shoulder       Procedure(s):  LEFT SHOULDER ARTHROSCOPIC ROTATOR CUFF REPAIR; [[employer]]  Arthroscopic acromioplasty  Surgeon(s):  Rashad Hernández MD    Assistant:   Surgical Assistant: Eusebio Dietz    Anesthesia: General    Estimated Blood Loss (mL): Minimal    Complications: None    Specimens:   * No specimens in log *    Implants:  * No implants in log *      Drains:   [REMOVED] Closed/Suction Drain Posterior Neck Bulb (Removed)       [REMOVED] Urinary Catheter 10/30/23 2 Way;[place]ins (Removed)       Findings:  Infection Present At Time Of Surgery (PATOS) (choose all levels that have infection present):  No infection present  Other Findings: 2 cm supraspinatus tear    Detailed Description of Procedure:   Patient was taken to the operating room Doser and general trach anesthesia with anesthesia staff.  Placed in a standard arthroscopy steven lateral decubitus position with the left arm prepped with ChloraPrep solution draped free sterile field.  A posterior portal was used arthroscopy portal lateral portals were portal with portals made with 11 blade followed by blunt trocars.  Once the arthroscope was in place and shoulder was inspected the some fraying in the labrum but no quynh tears and a no significant changes of the biceps tendon or biceps anchor.  Subscapularis was intact there was a 2 cm tear of the supraspinatus footprint.  Attention was then placed subacromial space where very prominent undersurface of the acromion prominent bursa was debrided using a shaver and a prominent undersurface of the acromion was seen

## 2024-06-17 ENCOUNTER — OFFICE VISIT (OUTPATIENT)
Age: 54
End: 2024-06-17

## 2024-06-17 VITALS — HEIGHT: 68 IN | WEIGHT: 130 LBS | BODY MASS INDEX: 19.7 KG/M2

## 2024-06-17 DIAGNOSIS — Z98.890 STATUS POST LEFT ROTATOR CUFF REPAIR: Primary | ICD-10-CM

## 2024-06-17 PROCEDURE — 99024 POSTOP FOLLOW-UP VISIT: CPT | Performed by: ORTHOPAEDIC SURGERY

## 2024-06-17 NOTE — PROGRESS NOTES
Cameron Scott  1970   Chief Complaint   Patient presents with    Post-Op Check     LEFT SHOULDER ARTHROSCOPIC ROTATOR CUFF REPAIR             HISTORY OF PRESENT ILLNESS  Cameron Scott is a 54 y.o. male who presents today for reevaluation of left shoulder s/p left shoulder arthroscopic rotator cuff repair on 6/14/24. Pain is a 2/10.    Patient denies any fever, chills, chest pain, shortness of breath or calf pain. The remainder of the review of systems is negative. There are no new illness or injuries other than that mentioned above to report since last seen in the office. No changes in medications, allergies, social or family history.      PHYSICAL EXAM:   Ht 1.727 m (5' 8\")   Wt 59 kg (130 lb)   BMI 19.77 kg/m²   The patient is a well-developed, well-nourished male   in no acute distress.  The patient is alert and oriented times three.  The patient is alert and oriented times three. Mood and affect are normal.  LYMPHATIC: lymph nodes are not enlarged and are within normal limits  SKIN: normal in color and non tender to palpation. There are no bruises or abrasions noted.   NEUROLOGICAL: Motor sensory exam is within normal limits. Reflexes are equal bilaterally. There is normal sensation to pinprick and light touch  MUSCULOSKELETAL:  Wounds are clean and dry the sutures were removed and range of motion is 40 degrees    PROCEDURE: None    IMAGING: MRI of the left shoulder obtained by [facility] dated 5/13/24 reviewed and read by Dr. English:   1.  Supraspinatus insertion advanced adenopathy with ill-defined high-grade partial thickness tear as described, without significant fatty atrophy. Other rotator cuff tendons appear largely intact.  2.  Extensive patchy labral degeneration/tearing as described.  3.  Mild AC joint degenerative changes and acromion morphology which may cause subacromial impingement. Trace subacromial-subdeltoid bursal edema.  4.  Posterior glenoid high-grade cartilage thinning.  5.  See

## 2024-06-18 ENCOUNTER — APPOINTMENT (OUTPATIENT)
Facility: HOSPITAL | Age: 54
End: 2024-06-18
Payer: COMMERCIAL

## 2024-06-19 ENCOUNTER — HOSPITAL ENCOUNTER (OUTPATIENT)
Facility: HOSPITAL | Age: 54
Setting detail: RECURRING SERIES
Discharge: HOME | End: 2024-06-22
Payer: COMMERCIAL

## 2024-06-19 PROCEDURE — 97161 PT EVAL LOW COMPLEX 20 MIN: CPT

## 2024-06-19 NOTE — PROGRESS NOTES
[facility] [address]  [address]  [place] [address]  [phone] fax    Plan of Care / Statement of Necessity for Physical Therapy Services     Patient Name: Cameron Scott : 1970   Medical   Diagnosis: Pain in left shoulder [M25.512]  S/P left rotator cuff repair [Z98.890] Treatment Diagnosis: M25.512  LEFT SHOULDER PAIN      Onset Date: 24 (DOS) Payor :  Payor: JANNETJANES [facility] / Plan: [facility] / Product Type: *No Product type* /    Referral Source: Rashad Hernández,* Start of Care (SOC): 2024   Prior Hospitalization: See medical history Provider #: [id number]   Prior Level of Function: H/o neck and shoulder pain; independent with all functional mobility and ADLs/IADLs    Comorbidities:  Asthma, HTN, High cholesterol, h/o inguinal hernia repair, TIA 2022, arthritis neck and back      Assessment / key information:  Patient is a pleasant 54 year old male with c/o L shoulder pain, 5 day s/p L RCR and arthroscopic acromioplasty. DOS 24. Patient ambulates in clinic space with L shoulder sling donned. Patient presents with L shoulder pain, limited PROM L shoulder in all planes, decreased flexibility, increased swelling of ant L shoulder, and impaired functional mobility, which affects his QOL. AROM and MMT deferred 2/2 MD protocol. Patient provided with HEP printout and performs initial exercises with moderate vc/tc and no adverse effect. Patient education on anatomy of present condition, symptom modulation, activity modification, HEP and importance of compliance, role of PT, and POC. Patient education on current protocol, no AROM of L shoulder, continued use of L sling -- pt verbalized understanding. Patient will benefit from skilled PT to address the above deficits and improve functional mobility to PLOF so that he can return to ADLs, self care, work duties, and recreational activities without pain or

## 2024-06-19 NOTE — PROGRESS NOTES
PT DAILY TREATMENT NOTE/SHOULDER EVAL     Patient Name: Cameron Scott    Date: 2024    : 1970  Insurance: Payor: JANNETJANES [facility] [place] / Plan: AET BETTER [facility] [place] / Product Type: *No Product type* /      Patient  verified yes     Visit #   Current / Total 1 10   Time   In / Out 0253 0330   Pain   In / Out 0/10 110   Subjective Functional Status/Changes: Patient arrives to PT IE s/p L RCR.        Treatment Area: Pain in left shoulder [M25.512]  S/P left rotator cuff repair [Z98.890]    SUBJECTIVE  Any medication changes, allergies to medications, adverse drug reactions, diagnosis change, or new procedure performed?: [x] No    [] Yes (see summary sheet for update)    Subjective Info:  HPI: Patient 5 days s/p L RCR; DOS 24. Has not been in too much pain. Sling donned.   Current Pain: 0/10 Best Pain: 0/10 Worst Pain: 3-4/10  Constant/Intermittent: Intermittent  Progression since onset: Improving   Pain Location/Description: At incision sites in L shoulder  Current Symptoms: Sharp pain if he moves it  Aggravating/Relieving Factors: Aggravating: movement of L shoulder (on accident)   Relieving: ibuprofen, rest   RAJAN: S/p RCR  Previous Treatment: H/o PT prior to surgery   Self Care: Independent, but difficulty with donning socks and shirt   Activity/Participation Limitations: no AROM per MD protocol   PLOF: H/o neck and shoulder pain; independent with all functional mobility and ADLs/IADLs  Home-Environment: Lives with a friend   Past Med Hx/Surgical Hx: Asthma, HTN, High cholesterol, h/o inguinal hernia repair, TIA 2022, arthritis neck and back   Allergies: Denies  Work/hobbies: Not currently working   Motivation: Good   Goals: \"Good recovery\"     OBJECTIVE/EXAMINATION    Modalities Rationale:     decrease edema, decrease inflammation, decrease pain, increase tissue extensibility, and increase muscle contraction/control to improve patient's ability to progress to PLOF and address

## 2024-06-20 ENCOUNTER — HOSPITAL ENCOUNTER (OUTPATIENT)
Facility: HOSPITAL | Age: 54
Setting detail: RECURRING SERIES
Discharge: HOME | End: 2024-06-23
Payer: COMMERCIAL

## 2024-06-20 PROCEDURE — 97110 THERAPEUTIC EXERCISES: CPT

## 2024-06-20 PROCEDURE — 97140 MANUAL THERAPY 1/> REGIONS: CPT

## 2024-06-20 PROCEDURE — 97535 SELF CARE MNGMENT TRAINING: CPT

## 2024-06-20 PROCEDURE — 97112 NEUROMUSCULAR REEDUCATION: CPT

## 2024-06-20 NOTE — PROGRESS NOTES
Roshan Norton, PT [facility]   7/9/2024  3:30 PM Rashad Hernández MD [facility] BS AMB   7/10/2024 11:30 AM Henrietta Goddard, PT [facility]   7/12/2024  9:30 AM Henrietta Goddard, PT [facility]   7/15/2024  9:30 AM Roshan Norton, PT [facility]   7/17/2024 10:10 AM Roshan Norton, PT [facility]   7/19/2024  9:30 AM Roshan Norton, PT [facility]   7/22/2024 10:10 AM Tasha Mehta, PT [facility]   7/24/2024 10:10 AM Tasha Mehta, PT [facility]   7/26/2024  9:30 AM Tasha Mehta, PT [facility]   7/29/2024 10:10 AM Roshan Norton, PT [facility]   7/31/2024 10:10 AM Roshan Norton, PT [facility]   8/5/2024  1:15 PM Dejan Pritchard DO [facility] AMB   4/14/2025  8:40 AM Henrry Hu MD [facility] BS AMB

## 2024-06-24 ENCOUNTER — HOSPITAL ENCOUNTER (OUTPATIENT)
Facility: HOSPITAL | Age: 54
Setting detail: RECURRING SERIES
Discharge: HOME | End: 2024-06-27
Payer: COMMERCIAL

## 2024-06-24 PROCEDURE — 97140 MANUAL THERAPY 1/> REGIONS: CPT

## 2024-06-24 PROCEDURE — 97110 THERAPEUTIC EXERCISES: CPT

## 2024-06-24 PROCEDURE — 97112 NEUROMUSCULAR REEDUCATION: CPT

## 2024-06-24 NOTE — PROGRESS NOTES
PHYSICAL / OCCUPATIONAL THERAPY - DAILY TREATMENT NOTE    Patient Name: Cameron Scott    Date: 2024    : 1970  Insurance: Payor: GARETT BETTER [facility] [place] / Plan: [employer]ET BETTER [facility] [place] / Product Type: *No Product type* /      Patient  verified Yes     Visit #   Current / Total 3 10   Time   In / Out 1213 1240   Pain   In / Out 0 0 \"tingling\"   Subjective Functional Status/Changes: Pain is controlled, only occurring at night.      TREATMENT AREA =  Pain in left shoulder [M25.512]  S/P left rotator cuff repair [Z98.890]     OBJECTIVE         Therapeutic Procedures:    Tx Min Billable or 1:1 Min (if diff from Tx Min) Procedure, Rationale, Specifics   8 8 08980 Therapeutic Exercise (timed):  increase ROM, strength, coordination, balance, and proprioception to improve patient's ability to progress to PLOF and address remaining functional goals. (see flow sheet as applicable)     Details if applicable:        04987 Neuromuscular Re-Education (timed):  improve balance, coordination, kinesthetic sense, posture, core stability and proprioception to improve patient's ability to develop conscious control of individual muscles and awareness of position of extremities in order to progress to PLOF and address remaining functional goals. (see flow sheet as applicable)     Details if applicable:      86567 Therapeutic Activity (timed):  use of dynamic activities replicating functional movements to increase ROM, strength, coordination, balance, and proprioception in order to improve patient's ability to progress to PLOF and address remaining functional goals.  (see flow sheet as applicable)     Details if applicable:  PROM scapula and GH into flexion/scaption   27 27 [facility] Totals Reminder: bill using total billable min of TIMED therapeutic procedures (example: do not include dry needle or estim unattended, both untimed codes, in totals to left)  8-22 min = 1 unit; 23-37 min = 2 units; 38-52 min = 3 units;

## 2024-06-26 ENCOUNTER — APPOINTMENT (OUTPATIENT)
Facility: HOSPITAL | Age: 54
End: 2024-06-26
Payer: COMMERCIAL

## 2024-06-27 ENCOUNTER — HOSPITAL ENCOUNTER (OUTPATIENT)
Facility: HOSPITAL | Age: 54
Setting detail: RECURRING SERIES
Discharge: HOME | End: 2024-06-30
Payer: COMMERCIAL

## 2024-06-27 PROCEDURE — 97535 SELF CARE MNGMENT TRAINING: CPT

## 2024-06-27 PROCEDURE — 97530 THERAPEUTIC ACTIVITIES: CPT

## 2024-06-27 PROCEDURE — 97110 THERAPEUTIC EXERCISES: CPT

## 2024-06-27 NOTE — PROGRESS NOTES
[facility]   7/17/2024 10:10 AM Roshan Norton, PT [facility]   7/19/2024  9:30 AM Roshan Norton, PT [facility]   7/22/2024 10:10 AM Tasha Mehta, PT [facility]   7/24/2024 10:10 AM Tasha Mehta, PT [facility]   7/26/2024  9:30 AM Tasha Mehta, PT [facility]   7/29/2024 10:10 AM Roshan Norton, PT [facility]   7/31/2024 10:10 AM Roshan Norton, PT [facility]   8/5/2024  1:15 PM Dejan Pritchard DO [facility] AMB   4/14/2025  8:40 AM Henrry Hu MD [facility] BS AMB

## 2024-07-01 ENCOUNTER — HOSPITAL ENCOUNTER (OUTPATIENT)
Facility: HOSPITAL | Age: 54
Setting detail: RECURRING SERIES
End: 2024-07-01
Payer: COMMERCIAL

## 2024-07-02 ENCOUNTER — HOSPITAL ENCOUNTER (OUTPATIENT)
Facility: HOSPITAL | Age: 54
Setting detail: RECURRING SERIES
Discharge: HOME | End: 2024-07-05
Payer: COMMERCIAL

## 2024-07-02 PROCEDURE — 97535 SELF CARE MNGMENT TRAINING: CPT

## 2024-07-02 PROCEDURE — 97110 THERAPEUTIC EXERCISES: CPT

## 2024-07-02 PROCEDURE — 97112 NEUROMUSCULAR REEDUCATION: CPT

## 2024-07-02 PROCEDURE — 97530 THERAPEUTIC ACTIVITIES: CPT

## 2024-07-02 NOTE — PROGRESS NOTES
ability to perform unrestricted ADLs/IADLs at home & community.              Eval: 52%   Assess at 30 day magdy     Next PN/ RC due 07/18/2024  Auth due (visit number/ date) pending    PLAN  - Continue Plan of Care    CHONG Taylor, [facility]    7/2/2024    9:19 AM    Future Appointments   Date Time Provider Department Center   7/2/2024 10:10 AM Claudia Campoverde, PTA [facility]   7/3/2024 11:30 AM Aparna Hwang, PTA [facility]   7/5/2024  8:50 AM Tasha Rodriguez, PTA [facility]   7/8/2024 10:10 AM Roshan Norton, PT [facility]   7/9/2024  3:30 PM Rashad Hernández MD [facility] BS AMB   7/10/2024 11:30 AM Henrietta Goddard, PT [facility]   7/12/2024  9:30 AM Henrietta Goddard, PT [facility]   7/15/2024  9:30 AM Roshan Norton, PT [facility]   7/17/2024 10:10 AM Roshan Norton, PT [facility]   7/19/2024  9:30 AM Roshan Norton, PT [facility]   7/22/2024 10:10 AM Tasha Mehta, PT [facility]   7/24/2024 10:10 AM Tasha Mehta, PT [facility]   7/26/2024  9:30 AM Tasha Mehta, PT [facility]   7/29/2024 10:10 AM Roshan Norton, PT [facility]   7/31/2024 10:10 AM Roshan Norton, PT [facility]   8/5/2024  1:15 PM Dejan Pritchard DO [facility] AMB   4/14/2025  8:40 AM Henrry Hu MD [facility] BS AMB

## 2024-07-03 ENCOUNTER — HOSPITAL ENCOUNTER (OUTPATIENT)
Facility: HOSPITAL | Age: 54
Setting detail: RECURRING SERIES
Discharge: HOME | End: 2024-07-06
Payer: COMMERCIAL

## 2024-07-03 PROCEDURE — 97110 THERAPEUTIC EXERCISES: CPT

## 2024-07-03 PROCEDURE — 97530 THERAPEUTIC ACTIVITIES: CPT

## 2024-07-03 PROCEDURE — 97535 SELF CARE MNGMENT TRAINING: CPT

## 2024-07-03 PROCEDURE — 97112 NEUROMUSCULAR REEDUCATION: CPT

## 2024-07-03 NOTE — PROGRESS NOTES
PHYSICAL / OCCUPATIONAL THERAPY - DAILY TREATMENT NOTE    Patient Name: Cameron Scott    Date: 7/3/2024    : 1970  Insurance: Payor: [facility] / Plan: [facility] [place] / Product Type: *No Product type* /      Patient  verified Yes     Visit #   Current / Total 6 10   Time   In / Out 1130 1210   Pain   In / Out 0 0   Subjective Functional Status/Changes: Patient denied pain upon arrival but noted continued 3/10 max pain waking him up approximately every 2-3 hours while trying to sleep at night.     TREATMENT AREA =  Pain in left shoulder [M25.512]  S/P left rotator cuff repair [Z98.890]     OBJECTIVE         Therapeutic Procedures:    Tx Min Billable or 1:1 Min (if diff from Tx Min) Procedure, Rationale, Specifics   12 12 76391 Therapeutic Exercise (timed):  increase ROM, strength, coordination, balance, and proprioception to improve patient's ability to progress to PLOF and address remaining functional goals. (see flow sheet as applicable)     Details if applicable:  See flow sheet as applicable      10 10 51697 Neuromuscular Re-Education (timed):  improve balance, coordination, kinesthetic sense, posture, core stability and proprioception to improve patient's ability to develop conscious control of individual muscles and awareness of position of extremities in order to progress to PLOF and address remaining functional goals. (see flow sheet as applicable)     Details if applicable:  See flow sheet as applicable    10 10 78395 Therapeutic Activity (timed):  use of dynamic activities replicating functional movements to increase ROM, strength, coordination, balance, and proprioception in order to improve patient's ability to progress to PLOF and address remaining functional goals.  (see flow sheet as applicable)     Details if applicable:  See flow sheet as applicable    8 8 44375 Self Care/Home Management (timed):  improve patient knowledge and understanding of pain reducing

## 2024-07-05 ENCOUNTER — HOSPITAL ENCOUNTER (OUTPATIENT)
Facility: HOSPITAL | Age: 54
Setting detail: RECURRING SERIES
Discharge: HOME | End: 2024-07-08
Payer: COMMERCIAL

## 2024-07-05 PROCEDURE — 97112 NEUROMUSCULAR REEDUCATION: CPT

## 2024-07-05 PROCEDURE — 97110 THERAPEUTIC EXERCISES: CPT

## 2024-07-05 PROCEDURE — 97530 THERAPEUTIC ACTIVITIES: CPT

## 2024-07-05 PROCEDURE — 97535 SELF CARE MNGMENT TRAINING: CPT

## 2024-07-05 NOTE — PROGRESS NOTES
PHYSICAL / OCCUPATIONAL THERAPY - DAILY TREATMENT NOTE    Patient Name: Cameron Scott    Date: 2024    : 1970  Insurance: Payor: [employer] [facility] / Plan: [employer] / Product Type: *No Product type* /      Patient  verified Yes     Visit #   Current / Total 7 10   Time   In / Out 8:53 9:30   Pain   In / Out 0 0   Subjective Functional Status/Changes: \"Just some numbness in my hand\"     TREATMENT AREA =  Pain in left shoulder [M25.512]  S/P left rotator cuff repair [Z98.890]     OBJECTIVE     Therapeutic Procedures:    Tx Min Billable or 1:1 Min (if diff from Tx Min) Procedure, Rationale, Specifics   11  96576 Therapeutic Exercise (timed):  increase ROM, strength, coordination, balance, and proprioception to improve patient's ability to progress to PLOF and address remaining functional goals. (see flow sheet as applicable)     Details if applicable:  PROM to ST and GHJ     8  33225 Neuromuscular Re-Education (timed):  improve balance, coordination, kinesthetic sense, posture, core stability and proprioception to improve patient's ability to develop conscious control of individual muscles and awareness of position of extremities in order to progress to PLOF and address remaining functional goals. (see flow sheet as applicable)     Details if applicable:     10  18456 Therapeutic Activity (timed):  use of dynamic activities replicating functional movements to increase ROM, strength, coordination, balance, and proprioception in order to improve patient's ability to progress to PLOF and address remaining functional goals.  (see flow sheet as applicable)     Details if applicable:     8  86404 Self Care/Home Management (timed):  improve patient knowledge and understanding of pain reducing techniques, positioning, posture/ergonomics, and activity modification  to improve patient's ability to progress to PLOF and address remaining functional goals.  (see flow sheet as applicable)

## 2024-07-08 ENCOUNTER — HOSPITAL ENCOUNTER (OUTPATIENT)
Facility: HOSPITAL | Age: 54
Setting detail: RECURRING SERIES
Discharge: HOME | End: 2024-07-11
Payer: COMMERCIAL

## 2024-07-08 PROCEDURE — 97530 THERAPEUTIC ACTIVITIES: CPT

## 2024-07-08 PROCEDURE — 97110 THERAPEUTIC EXERCISES: CPT

## 2024-07-08 PROCEDURE — 97535 SELF CARE MNGMENT TRAINING: CPT

## 2024-07-08 NOTE — PROGRESS NOTES
[x]  Patient Education billed concurrently with other procedures   [x] Review HEP    [] Progressed/Changed HEP, detail:    [] Other detail:       Objective Information/Functional Measures/Assessment    Pt continues to make great progress with his PROM as he is at ~140 deg passive flexion. He continues to adhere to wearing his sling, but does need occasional cues to avoid actively using his shoulder. Pt to follow up with MD tomorrow. Will progress per protocol and MD orders.     Patient will continue to benefit from skilled PT / OT services to modify and progress therapeutic interventions, analyze and address functional mobility deficits, analyze and address ROM deficits, analyze and address strength deficits, analyze and address soft tissue restrictions, analyze and cue for proper movement patterns, analyze and modify for postural abnormalities, analyze and address imbalance/dizziness, and instruct in home and community integration to address functional deficits and attain remaining goals.    Progress toward goals / Updated goals:  []  See Progress Note/Recertification    Short Term Goals: To be accomplished in 2 weeks  Pt will be able to report a </= 2/10 pain rating at worst to improve patient's ability to tolerate prolonged functional activities at home.               Eval: 3-4/10               Reports 3/10 at worst currently, probably 2/2 less frequent use of pain meds [Date assessed: 7/3/24]     Pt will be independent with HEP to facilitate carry-over of functional gains made in PT at home & community.               Eval: HEP established, printout provided               Reports daily compliance with initial HEP [Date assessed:7/2/2024]     Long Term Goals: To be accomplished in 10 treatments  Patient will be able to improve PROM in L shoulder flex and abd/scap to at least 120 deg to improve patient's ability to reach in overhead cabinets and don shirts at home.               Eval: 45 deg flex, 50 deg abd

## 2024-07-09 ENCOUNTER — OFFICE VISIT (OUTPATIENT)
Age: 54
End: 2024-07-09

## 2024-07-09 VITALS — WEIGHT: 130 LBS | BODY MASS INDEX: 19.7 KG/M2 | HEIGHT: 68 IN

## 2024-07-09 DIAGNOSIS — M75.102 TEAR OF LEFT SUPRASPINATUS TENDON: ICD-10-CM

## 2024-07-09 DIAGNOSIS — S43.432A DEGENERATIVE SUPERIOR LABRAL ANTERIOR-TO-POSTERIOR (SLAP) TEAR OF LEFT SHOULDER: ICD-10-CM

## 2024-07-09 DIAGNOSIS — Z98.890 STATUS POST LEFT ROTATOR CUFF REPAIR: Primary | ICD-10-CM

## 2024-07-09 RX ORDER — TRAMADOL HYDROCHLORIDE 50 MG/1
50 TABLET ORAL EVERY 6 HOURS PRN
Qty: 28 TABLET | Refills: 0 | Status: SHIPPED | OUTPATIENT
Start: 2024-07-09 | End: 2024-07-16

## 2024-07-09 NOTE — PROGRESS NOTES
Cameron Scott  1970   Chief Complaint   Patient presents with    Post-Op Check     LEFT SHOULDER ARTHROSCOPIC ROTATOR CUFF REPAIR         HISTORY OF PRESENT ILLNESS  Cameron Scott is a 54 y.o. male who presents today for reevaluation of left shoulder s/p left shoulder arthroscopic rotator cuff repair on 6/14/24. Pain is a 2/10. Pt notes increased pain at night and when he first wakes up in the morning. He is currently in PT.     Patient denies any fever, chills, chest pain, shortness of breath or calf pain. The remainder of the review of systems is negative. There are no new illness or injuries other than that mentioned above to report since last seen in the office. No changes in medications, allergies, social or family history.      PHYSICAL EXAM:   Ht 1.727 m (5' 8\")   Wt 59 kg (130 lb)   BMI 19.77 kg/m²   The patient is a well-developed, well-nourished male   in no acute distress.  The patient is alert and oriented times three.  The patient is alert and oriented times three. Mood and affect are normal.  LYMPHATIC: lymph nodes are not enlarged and are within normal limits  SKIN: normal in color and non tender to palpation. There are no bruises or abrasions noted.   NEUROLOGICAL: Motor sensory exam is within normal limits. Reflexes are equal bilaterally. There is normal sensation to pinprick and light touch  MUSCULOSKELETAL: Wounds are healed glenohumeral abduction 45 degrees with mild discomfort  PROCEDURE: none    IMAGING: MRI of the left shoulder obtained by [facility] dated 5/13/24 reviewed and read by Dr. English:   1.  Supraspinatus insertion advanced adenopathy with ill-defined high-grade partial thickness tear as described, without significant fatty atrophy. Other rotator cuff tendons appear largely intact.  2.  Extensive patchy labral degeneration/tearing as described.  3.  Mild AC joint degenerative changes and acromion morphology which may cause subacromial impingement. Trace subacromial-subdeltoid

## 2024-07-12 ENCOUNTER — HOSPITAL ENCOUNTER (OUTPATIENT)
Facility: HOSPITAL | Age: 54
Setting detail: RECURRING SERIES
Discharge: HOME | End: 2024-07-15
Payer: COMMERCIAL

## 2024-07-12 PROCEDURE — 97110 THERAPEUTIC EXERCISES: CPT

## 2024-07-12 PROCEDURE — 97535 SELF CARE MNGMENT TRAINING: CPT

## 2024-07-12 PROCEDURE — 97530 THERAPEUTIC ACTIVITIES: CPT

## 2024-07-12 NOTE — PROGRESS NOTES
unattended, both untimed codes, in totals to left)  8-22 min = 1 unit; 23-37 min = 2 units; 38-52 min = 3 units; 53-67 min = 4 units; 68-82 min = 5 units   Total Total     [x]  Patient Education billed concurrently with other procedures   [x] Review HEP    [] Progressed/Changed HEP, detail:    [] Other detail:       Objective Information/Functional Measures/Assessment    Patient participated in today's session without adverse effect. Demonstrates/verbalizes understanding of current protocol; remains compliant with sling use. Vc during table slides to prevent active shoulder movement; added scaption table slides with good return.   Due for PN NV at 10th visit    Patient will continue to benefit from skilled PT / OT services to modify and progress therapeutic interventions, analyze and address functional mobility deficits, analyze and address ROM deficits, analyze and address strength deficits, analyze and address soft tissue restrictions, analyze and cue for proper movement patterns, analyze and modify for postural abnormalities, analyze and address imbalance/dizziness, and instruct in home and community integration to address functional deficits and attain remaining goals.    Progress toward goals / Updated goals:  []  See Progress Note/Recertification    Short Term Goals: To be accomplished in 2 weeks  Pt will be able to report a </= 2/10 pain rating at worst to improve patient's ability to tolerate prolonged functional activities at home.               Eval: 3-4/10               Reports 3/10 at worst currently, probably 2/2 less frequent use of pain meds [Date assessed: 7/3/24]   Assess NV     Pt will be independent with HEP to facilitate carry-over of functional gains made in PT at home & community.               Eval: HEP established, printout provided               Reports daily compliance with initial HEP [Date assessed:7/2/2024, 07/12/24]   Assess NV     Long Term Goals: To be accomplished in 10

## 2024-07-13 NOTE — PROCEDURES
Consult Note    Patient: Efrain Mayorga               Sex: male          DOA: 2024         YOB: 1954      Age:  70 y.o.        LOS:  LOS: 1 day              HPI:     Efrain Mayorga is a 70 y.o. male who has been seen for lesions on his left arm.  Patient states that this came on relatively suddenly.  He states that it is not tender, but that it is very itchy.  In addition the patient is complaining of mid epigastric pain that radiates directly through to the back.  He was attributing this to rib pain although he denies any thoracic trauma.  He is very anxious to go home and take care of his wife who is also ill.    Past Medical History:   Diagnosis Date    Arthritis     Bladder cancer (HCC)     Fibromyalgia     GERD (gastroesophageal reflux disease)     Heart attack (HCC) 2017    Hematuria, gross     Hypertension        Past Surgical History:   Procedure Laterality Date    CT BIOPSY RENAL  3/14/2024    CT BIOPSY RENAL 3/14/2024 [facility] RAD CT    HERNIA REPAIR Right     inguinal    LEFT HEART PERCUTANEOUS  2017    ERNESTO CORONARY ARTERIOGRAPH  2017    TRANSURETHRAL RESEC BLADDER NECK         No family history on file.    Social History     Socioeconomic History    Marital status:    Tobacco Use    Smoking status: Former     Current packs/day: 0.00     Types: Cigarettes     Quit date: 10/18/2022     Years since quittin.7    Smokeless tobacco: Never   Substance and Sexual Activity    Alcohol use: No    Drug use: No     Social Determinants of Health     Food Insecurity: No Food Insecurity (2024)    Hunger Vital Sign     Worried About Running Out of Food in the Last Year: Never true     Ran Out of Food in the Last Year: Never true   Transportation Needs: No Transportation Needs (2024)    PRAPARE - Transportation     Lack of Transportation (Medical): No     Lack of Transportation (Non-Medical): No   Housing Stability: Low Risk  (2024)    Housing Stability Vital  [address] STRESS    Name:  Deepa Saleh  MR#:  [id number]  :  1970  Account #:  [de-identified]  Date of Adm:  2017  Date of Service:      PROCEDURE: Exercise nuclear stress test.    INDICATIONS: Chest pain. BASELINE ELECTROCARDIOGRAM: Sinus bradycardia with sinus  arrhythmia and nonspecific repolarization abnormality. PROTOCOL: The patient exercised according to Suresh protocol for 12  minutes 28 seconds achieving a work level of 14.2 METS, resting heart  rate of 58 beats per minute, increased to 151 beats per minute, which  is 86% of maximum age-predicted heart rate. Target heart rate was  achieved. Test was stopped due to fatigue; 10 mCi of sestamibi was  injected before rest and 33 mL of sestamibi was injected before using  a left antecubital IV. Gated processing was performed. ELECTROCARDIOGRAM FINDINGS: No changes beyond baseline. No arrhythmias. NUCLEAR FINDINGS: Left ventricular systolic function is low-  normal calculated at 51%. No evidence of regional wall motion  abnormalities. There is transient ischemic dilatation calculated at 1.28. Resting blood pressure 130/80 mmHg, increased to 153/90 mmHg. There is a small fixed apical defect. There is a mild amount of inferior  attenuation which improves slightly during stress suggesting artifact. No convincing evidence for focal ischemia or previous infarct. IMPRESSION:  1. Abnormal exercise nuclear stress test, primarily due to transient  ischemic dilatation of 1.28. 3-vessel coronary artery disease cannot be  excluded; however, the patient had excellent functional capacity  without chest pain. 2. Low-normal left ventricular systolic function calculated at 51%. No  regional wall motion abnormalities. 3. Small fixed apical defect. 4. Small amount of attenuation along the inferior wall, which improves  slightly during stress suggesting artifact.   5. No convincing evidence for focal ischemia or previous infarct. 6. Excellent functional capacity with the patient exercising over 12  minutes without chest pain and no EKG changes.       Tamara Bush M.D.    Margarita Thomas / Sophia Jacob  D:  02/14/2017   11:30  T:  02/14/2017   12:05  Job #:  [id number]   Temp 97.7 °F (36.5 °C) (Oral)   Resp 18   Ht 1.676 m (5' 6\")   Wt 60.3 kg (133 lb)   SpO2 100%   BMI 21.47 kg/m²       Physical Exam:  Gentleman conversant and cooperative with examination  His left upper extremity has several bullae with what appears to be underlying pus.  These lesions are not tender.  There are no nodules associated.  His abdominal exam shows tenderness in the midepigastrium and in the right upper quadrant with no Garcia sign.    Labs Reviewed:  His white blood cell count yesterday was elevated to 14,000, with a slightly diminished hemoglobin at 12.      Assessment/Plan     Principal Problem:    Cellulitis  Active Problems:    Hyperlipidemia    Chest pain    Arteriosclerosis of coronary artery    Malignant neoplasm of urinary bladder (HCC)    Anemia    Prostate cancer (HCC)    History of urostomy    Focal segmental glomerulosclerosis    Lung nodule  Resolved Problems:    * No resolved hospital problems. *    Pustules on the left arm that are nontender.  I will plan on unroofing and culturing the lesions, but I suspect that these are not infectious in etiology.    Electronically signed by Bud Ralph MD on 7/13/24 at 11:25 AM EDT

## 2024-07-15 ENCOUNTER — HOSPITAL ENCOUNTER (OUTPATIENT)
Facility: HOSPITAL | Age: 54
Setting detail: RECURRING SERIES
Discharge: HOME | End: 2024-07-18
Payer: COMMERCIAL

## 2024-07-15 PROCEDURE — 97535 SELF CARE MNGMENT TRAINING: CPT

## 2024-07-15 PROCEDURE — 97112 NEUROMUSCULAR REEDUCATION: CPT

## 2024-07-15 PROCEDURE — 97530 THERAPEUTIC ACTIVITIES: CPT

## 2024-07-15 PROCEDURE — 97110 THERAPEUTIC EXERCISES: CPT

## 2024-07-15 NOTE — PROGRESS NOTES
PHYSICAL / OCCUPATIONAL THERAPY - DAILY TREATMENT NOTE    Patient Name: Cameron Scott    Date: 7/15/2024    : 1970  Insurance: Payor: [employer] [facility] [place] / Plan: [facility]ET BETTER [facility] / Product Type: *No Product type* /      Patient  verified Yes     Visit #   Current / Total 10 10   Time   In / Out 924 1001   Pain   In / Out 0 0   Subjective Functional Status/Changes: \"No pain.\"     TREATMENT AREA =  Pain in left shoulder [M25.512]  S/P left rotator cuff repair [Z98.890]     OBJECTIVE         Therapeutic Procedures:    Tx Min Billable or 1:1 Min (if diff from Tx Min) Procedure, Rationale, Specifics   10  09554 Therapeutic Exercise (timed):  increase ROM, strength, coordination, balance, and proprioception to improve patient's ability to progress to PLOF and address remaining functional goals. (see flow sheet as applicable)     Details if applicable:       9  25452 Neuromuscular Re-Education (timed):  improve balance, coordination, kinesthetic sense, posture, core stability and proprioception to improve patient's ability to develop conscious control of individual muscles and awareness of position of extremities in order to progress to PLOF and address remaining functional goals. (see flow sheet as applicable)     Details if applicable:     10  38909 Therapeutic Activity (timed):  use of dynamic activities replicating functional movements to increase ROM, strength, coordination, balance, and proprioception in order to improve patient's ability to progress to PLOF and address remaining functional goals.  (see flow sheet as applicable)     Details if applicable:     8  18426 Self Care/Home Management (timed):  improve patient knowledge and understanding of pain reducing techniques, positioning, posture/ergonomics, home safety, activity modification, diagnosis/prognosis, and physical therapy expectations, procedures and progression  to improve patient's ability to progress to PLOF and address 
progress to achieving above stated clinically significant goals.  Continue per initial Plan of Care.    If you have any questions/comments please contact us directly.  Thank you for allowing us to assist in the care of your patient.    Roshan Norton PTA, [facility]       7/15/2024       8:41 AM

## 2024-07-17 ENCOUNTER — HOSPITAL ENCOUNTER (OUTPATIENT)
Facility: HOSPITAL | Age: 54
Setting detail: RECURRING SERIES
Discharge: HOME | End: 2024-07-20
Payer: COMMERCIAL

## 2024-07-17 PROCEDURE — 97112 NEUROMUSCULAR REEDUCATION: CPT

## 2024-07-17 PROCEDURE — 97535 SELF CARE MNGMENT TRAINING: CPT

## 2024-07-17 PROCEDURE — 97110 THERAPEUTIC EXERCISES: CPT

## 2024-07-17 PROCEDURE — 97530 THERAPEUTIC ACTIVITIES: CPT

## 2024-07-17 NOTE — PROGRESS NOTES
5. Pt will improve functional L shoulder IR to at least  L1-2 to improve their ability to reach behind their back for ADLs/IADLs.               PN Status: not cleared     6. Pt will be able to improve flex AROM in L shoulder to at least 90 deg/shoulder height to improve their ability to reach overhead cabinets and perform household chores.               PN Status: not cleared     7. Pt will report compliance with home HEP at end of care to enhance carry over of fuctional gains made with skilled therapy services in order to improve quality of life.               PN Status: reports daily compliance, will update as he progresses with protocol     8. Pt will improve Quick dash score to </= 33% to demonstrate improvement in patient's ability to perform unrestricted ADLs/IADLs at home & community.              PN Status: 97%    Next PN/ RC due 08/13/2024  Auth due (visit number/ date) 13 remaining by 08/30/2024    PLAN  - Continue Plan of Care    Roshan Norton PTA, [facility]    7/17/2024    10:01 AM    Future Appointments   Date Time Provider Department Center   7/17/2024 10:10 AM [facility]   7/19/2024  9:30 AM Roshan Norton, PT [facility]   7/22/2024 10:10 AM [facility]   7/24/2024 10:10 AM Tasha Mehta, PT [facility]   7/25/2024  2:10 PM Rashad Hernández MD [facility]   7/26/2024  8:50 AM [facility] Keller[facility]   7/29/2024 10:10 AM [facility]   7/31/2024 10:10 AM Roshan Norton, PT [facility]   8/5/2024  1:15 PM Dejan Pritchard DO [facility]   4/14/2025  8:40 AM Henrry Hu MD [facility] AMB

## 2024-07-19 ENCOUNTER — HOSPITAL ENCOUNTER (OUTPATIENT)
Facility: HOSPITAL | Age: 54
Setting detail: RECURRING SERIES
Discharge: HOME | End: 2024-07-22
Payer: COMMERCIAL

## 2024-07-19 PROCEDURE — 97535 SELF CARE MNGMENT TRAINING: CPT

## 2024-07-19 PROCEDURE — 97112 NEUROMUSCULAR REEDUCATION: CPT

## 2024-07-19 PROCEDURE — 97110 THERAPEUTIC EXERCISES: CPT

## 2024-07-19 NOTE — PROGRESS NOTES
53-67 min = 4 units; 68-82 min = 5 units   Total Total     [x]  Patient Education billed concurrently with other procedures   [x] Review HEP    [] Progressed/Changed HEP, detail:    [] Other detail:       Objective Information/Functional Measures/Assessment    Pt continues to make wonderful progress with his PROM and is adhering nicely to his current phase in his protocol. Pt to come out of sling on 7/23/24 per latest MD note. We will begin gentle AAROM exercises at that time. Emphasizing no increase in pain during exercises. Pt states that he still gets 7/10 pain at night when rolling in his sleep, but otherwise pain is 0/10 throughout the day.    Patient will continue to benefit from skilled PT / OT services to modify and progress therapeutic interventions, analyze and address functional mobility deficits, analyze and address ROM deficits, analyze and address strength deficits, analyze and address soft tissue restrictions, analyze and cue for proper movement patterns, analyze and modify for postural abnormalities, analyze and address imbalance/dizziness, and instruct in home and community integration to address functional deficits and attain remaining goals.    Progress toward goals / Updated goals:  []  See Progress Note/Recertification    Pt will be able to report a </= 2/10 pain rating at worst to improve patient's ability to tolerate prolonged functional activities at home.               PN Status: 7/10 at worst when rolling over while sleeping, 0/10 on average throughout the day    PROGRESSING; reports continuing to have 7/10 when rolling over while sleeping [Date assessed: 07/19/2024]    Patient will be able to improve PROM in L shoulder flex and abd/scap to at least 160 deg and 140 deg respectively to improve patient's ability to reach in overhead cabinets and don shirts at home. (UPDATED GOAL).              PN Status: 155 deg, flexion, 127 deg abduction               Continues to make steady progress [Date

## 2024-07-22 ENCOUNTER — APPOINTMENT (OUTPATIENT)
Facility: HOSPITAL | Age: 54
End: 2024-07-22
Payer: COMMERCIAL

## 2024-07-23 ENCOUNTER — HOSPITAL ENCOUNTER (OUTPATIENT)
Facility: HOSPITAL | Age: 54
Setting detail: RECURRING SERIES
Discharge: HOME | End: 2024-07-26
Payer: COMMERCIAL

## 2024-07-23 PROCEDURE — 97530 THERAPEUTIC ACTIVITIES: CPT

## 2024-07-23 PROCEDURE — 97110 THERAPEUTIC EXERCISES: CPT

## 2024-07-23 PROCEDURE — 97535 SELF CARE MNGMENT TRAINING: CPT

## 2024-07-23 NOTE — PROGRESS NOTES
PHYSICAL / OCCUPATIONAL THERAPY - DAILY TREATMENT NOTE    Patient Name: Cameron Scott    Date: 2024    : 1970  Insurance: Payor: [employer] [facility] [place] / Plan: [employer] BETTER [facility] [place] / Product Type: *No Product type* /      Patient  verified Yes     Visit #   Current / Total 3 10   Time   In / Out 10:11 10:45   Pain   In / Out 0 0   Subjective Functional Status/Changes: Pt denies pain today. Pt is able to progress to AAROM per protocol and d/c'ed sling.      TREATMENT AREA =  Pain in left shoulder [M25.512]  S/P left rotator cuff repair [Z98.890]     OBJECTIVE  Therapeutic Procedures:    Tx Min Billable or 1:1 Min (if diff from Tx Min) Procedure, Rationale, Specifics   10  60928 Therapeutic Exercise (timed):  increase ROM, strength, coordination, balance, and proprioception to improve patient's ability to progress to PLOF and address remaining functional goals. (see flow sheet as applicable)     Details if applicable:       14  70131 Therapeutic Activity (timed):  use of dynamic activities replicating functional movements to increase ROM, strength, coordination, balance, and proprioception in order to improve patient's ability to progress to PLOF and address remaining functional goals.  (see flow sheet as applicable)     Details if applicable:     10  46670 Self Care/Home Management (timed):  improve patient knowledge and understanding of pain reducing techniques, positioning, posture/ergonomics, home safety, activity modification, diagnosis/prognosis, and physical therapy expectations, procedures and progression  to improve patient's ability to progress to PLOF and address remaining functional goals.  (see flow sheet as applicable)     Details if applicable:  pt education on avoiding pushing through pain with exercises, pt education on next phase of the protocol.    34  [facility] Totals Reminder: bill using total billable min of TIMED therapeutic procedures (example: do not include dry needle or

## 2024-07-24 ENCOUNTER — HOSPITAL ENCOUNTER (OUTPATIENT)
Facility: HOSPITAL | Age: 54
Setting detail: RECURRING SERIES
Discharge: HOME | End: 2024-07-27
Payer: COMMERCIAL

## 2024-07-24 PROCEDURE — 97110 THERAPEUTIC EXERCISES: CPT

## 2024-07-24 PROCEDURE — 97535 SELF CARE MNGMENT TRAINING: CPT

## 2024-07-24 PROCEDURE — 97530 THERAPEUTIC ACTIVITIES: CPT

## 2024-07-24 NOTE — PROGRESS NOTES
PHYSICAL / OCCUPATIONAL THERAPY - DAILY TREATMENT NOTE    Patient Name: Cameron Scott    Date: 2024    : 1970  Insurance: Payor: [employer] [facility] [place] / Plan: [employer]TER [facility] [place] / Product Type: *No Product type* /      Patient  verified Yes     Visit #   Current / Total 4 10   Time   In / Out 10:17 10:50   Pain   In / Out 0 0   Subjective Functional Status/Changes: Pt reports having a little soreness after yesterday's session but no pain.       TREATMENT AREA =  Pain in left shoulder [M25.512]  S/P left rotator cuff repair [Z98.890]     OBJECTIVE  Therapeutic Procedures:    Tx Min Billable or 1:1 Min (if diff from Tx Min) Procedure, Rationale, Specifics   10  20045 Therapeutic Exercise (timed):  increase ROM, strength, coordination, balance, and proprioception to improve patient's ability to progress to PLOF and address remaining functional goals. (see flow sheet as applicable)     Details if applicable:       11  31668 Therapeutic Activity (timed):  use of dynamic activities replicating functional movements to increase ROM, strength, coordination, balance, and proprioception in order to improve patient's ability to progress to PLOF and address remaining functional goals.  (see flow sheet as applicable)     Details if applicable:     12  26409 Self Care/Home Management (timed):  improve patient knowledge and understanding of pain reducing techniques, positioning, posture/ergonomics, home safety, activity modification, diagnosis/prognosis, and physical therapy expectations, procedures and progression  to improve patient's ability to progress to PLOF and address remaining functional goals.  (see flow sheet as applicable)     Details if applicable: continued pt education on avoiding pushing through pain with exercises, HEP update    33  [facility] Totals Reminder: bill using total billable min of TIMED therapeutic procedures (example: do not include dry needle or estim unattended, both untimed

## 2024-07-25 ENCOUNTER — OFFICE VISIT (OUTPATIENT)
Age: 54
End: 2024-07-25

## 2024-07-25 VITALS — BODY MASS INDEX: 21.95 KG/M2 | WEIGHT: 144.8 LBS | HEIGHT: 68 IN

## 2024-07-25 DIAGNOSIS — Z98.890 STATUS POST LEFT ROTATOR CUFF REPAIR: Primary | ICD-10-CM

## 2024-07-25 PROCEDURE — 99024 POSTOP FOLLOW-UP VISIT: CPT | Performed by: ORTHOPAEDIC SURGERY

## 2024-07-25 NOTE — PROGRESS NOTES
Cameron Scott  1970   Chief Complaint   Patient presents with    Post-Op Check     LEFT SHOULDER ARTHROSCOPIC ROTATOR CUFF REPAIR        HISTORY OF PRESENT ILLNESS  Cameron Scott is a 54 y.o. male who presents today for reevaluation of s/p left shoulder arthroscopic rotator cuff repair on 6/14/24. Pain is a 0/10. He has discontinued the sling. Pt notes some pain with some exercises in PT.    Patient denies any fever, chills, chest pain, shortness of breath or calf pain. The remainder of the review of systems is negative. There are no new illness or injuries other than that mentioned above to report since last seen in the office. No changes in medications, allergies, social or family history.      PHYSICAL EXAM:   Ht 1.727 m (5' 8\")   Wt 65.7 kg (144 lb 12.8 oz)   BMI 22.02 kg/m²   The patient is a well-developed, well-nourished male   in no acute distress.  The patient is alert and oriented times three.  The patient is alert and oriented times three. Mood and affect are normal.  LYMPHATIC: lymph nodes are not enlarged and are within normal limits  SKIN: normal in color and non tender to palpation. There are no bruises or abrasions noted.   NEUROLOGICAL: Motor sensory exam is within normal limits. Reflexes are equal bilaterally. There is normal sensation to pinprick and light touch  MUSCULOSKELETAL: Wounds are healed active abduction    PROCEDURE: none    IMAGING:  MRI of the left shoulder obtained by [facility] dated 5/13/24 reviewed and read by Dr. English:   1.  Supraspinatus insertion advanced adenopathy with ill-defined high-grade partial thickness tear as described, without significant fatty atrophy. Other rotator cuff tendons appear largely intact.  2.  Extensive patchy labral degeneration/tearing as described.  3.  Mild AC joint degenerative changes and acromion morphology which may cause subacromial impingement. Trace subacromial-subdeltoid bursal edema.  4.  Posterior glenoid high-grade cartilage

## 2024-07-26 ENCOUNTER — HOSPITAL ENCOUNTER (OUTPATIENT)
Facility: HOSPITAL | Age: 54
Setting detail: RECURRING SERIES
Discharge: HOME | End: 2024-07-29
Payer: COMMERCIAL

## 2024-07-26 PROCEDURE — 97110 THERAPEUTIC EXERCISES: CPT

## 2024-07-26 PROCEDURE — 97112 NEUROMUSCULAR REEDUCATION: CPT

## 2024-07-26 PROCEDURE — 97535 SELF CARE MNGMENT TRAINING: CPT

## 2024-07-26 NOTE — PROGRESS NOTES
PHYSICAL / OCCUPATIONAL THERAPY - DAILY TREATMENT NOTE    Patient Name: Cameron Scott    Date: 2024    : 1970  Insurance: Payor: [employer] BETTER [facility] [place] / Plan: AET BETTER [facility] [place] / Product Type: *No Product type* /      Patient  verified Yes     Visit #   Current / Total 5 10   Time   In / Out 850 923   Pain   In / Out 0 0   Subjective Functional Status/Changes: States no pain in shoulder today.     TREATMENT AREA =  Pain in left shoulder [M25.512]  S/P left rotator cuff repair [Z98.890]     OBJECTIVE         Therapeutic Procedures:    Tx Min Billable or 1:1 Min (if diff from Tx Min) Procedure, Rationale, Specifics   17 17 92017 Therapeutic Exercise (timed):  increase ROM, strength, coordination, balance, and proprioception to improve patient's ability to progress to PLOF and address remaining functional goals. (see flow sheet as applicable)     Details if applicable:       8 8 66554 Neuromuscular Re-Education (timed):  improve balance, coordination, kinesthetic sense, posture, core stability and proprioception to improve patient's ability to develop conscious control of individual muscles and awareness of position of extremities in order to progress to PLOF and address remaining functional goals. (see flow sheet as applicable)     Details if applicable:     8 8 72373 Self Care/Home Management (timed):  improve patient knowledge and understanding of pain reducing techniques, positioning, posture/ergonomics, home safety, activity modification, diagnosis/prognosis, and physical therapy expectations, procedures and progression  to improve patient's ability to progress to PLOF and address remaining functional goals.  (see flow sheet as applicable)     Details if applicable:     33 33 [facility] Totals Reminder: bill using total billable min of TIMED therapeutic procedures (example: do not include dry needle or estim unattended, both untimed codes, in totals to left)  8-22 min = 1 unit; 23-37 min = 2

## 2024-07-29 ENCOUNTER — HOSPITAL ENCOUNTER (OUTPATIENT)
Facility: HOSPITAL | Age: 54
Setting detail: RECURRING SERIES
Discharge: HOME | End: 2024-08-01
Payer: COMMERCIAL

## 2024-07-29 PROCEDURE — 97110 THERAPEUTIC EXERCISES: CPT

## 2024-07-29 PROCEDURE — 97535 SELF CARE MNGMENT TRAINING: CPT

## 2024-07-29 PROCEDURE — 97530 THERAPEUTIC ACTIVITIES: CPT

## 2024-07-29 PROCEDURE — 97112 NEUROMUSCULAR REEDUCATION: CPT

## 2024-07-29 NOTE — PROGRESS NOTES
PHYSICAL / OCCUPATIONAL THERAPY - DAILY TREATMENT NOTE    Patient Name: Cameron Scott    Date: 2024    : 1970  Insurance: Payor: [employer] [facility] [place] / Plan: AET BETTER [facility] [place] / Product Type: *No Product type* /      Patient  verified Yes     Visit #   Current / Total 6 10   Time   In / Out 1010 1045   Pain   In / Out 0 0   Subjective Functional Status/Changes: \"No pain.\"     TREATMENT AREA =  Pain in left shoulder [M25.512]  S/P left rotator cuff repair [Z98.890]     OBJECTIVE         Therapeutic Procedures:    Tx Min Billable or 1:1 Min (if diff from Tx Min) Procedure, Rationale, Specifics   10  07821 Therapeutic Exercise (timed):  increase ROM, strength, coordination, balance, and proprioception to improve patient's ability to progress to PLOF and address remaining functional goals. (see flow sheet as applicable)     Details if applicable:       8  26113 Neuromuscular Re-Education (timed):  improve balance, coordination, kinesthetic sense, posture, core stability and proprioception to improve patient's ability to develop conscious control of individual muscles and awareness of position of extremities in order to progress to PLOF and address remaining functional goals. (see flow sheet as applicable)     Details if applicable:     9  83059 Therapeutic Activity (timed):  use of dynamic activities replicating functional movements to increase ROM, strength, coordination, balance, and proprioception in order to improve patient's ability to progress to PLOF and address remaining functional goals.  (see flow sheet as applicable)     Details if applicable:     8  24801 Self Care/Home Management (timed):  improve patient knowledge and understanding of pain reducing techniques, positioning, posture/ergonomics, home safety, activity modification, diagnosis/prognosis, and physical therapy expectations, procedures and progression  to improve patient's ability to progress to PLOF and address

## 2024-07-31 ENCOUNTER — HOSPITAL ENCOUNTER (OUTPATIENT)
Facility: HOSPITAL | Age: 54
Setting detail: RECURRING SERIES
Discharge: HOME | End: 2024-08-03
Payer: COMMERCIAL

## 2024-07-31 PROCEDURE — 97110 THERAPEUTIC EXERCISES: CPT

## 2024-07-31 PROCEDURE — 97112 NEUROMUSCULAR REEDUCATION: CPT

## 2024-07-31 PROCEDURE — 97530 THERAPEUTIC ACTIVITIES: CPT

## 2024-07-31 NOTE — PROGRESS NOTES
PHYSICAL / OCCUPATIONAL THERAPY - DAILY TREATMENT NOTE    Patient Name: Cameron Scott    Date: 2024    : 1970  Insurance: Payor: [employer] BETTER [facility] [place] / Plan: AETNA BETTER [facility] [place] / Product Type: *No Product type* /      Patient  verified Yes     Visit #   Current / Total 7 10   Time   In / Out 1010 1041   Pain   In / Out 0 0   Subjective Functional Status/Changes: \"No pain.\"     TREATMENT AREA =  Pain in left shoulder [M25.512]  S/P left rotator cuff repair [Z98.890]     OBJECTIVE         Therapeutic Procedures:    Tx Min Billable or 1:1 Min (if diff from Tx Min) Procedure, Rationale, Specifics   10  82340 Therapeutic Exercise (timed):  increase ROM, strength, coordination, balance, and proprioception to improve patient's ability to progress to PLOF and address remaining functional goals. (see flow sheet as applicable)     Details if applicable:       11  82478 Neuromuscular Re-Education (timed):  improve balance, coordination, kinesthetic sense, posture, core stability and proprioception to improve patient's ability to develop conscious control of individual muscles and awareness of position of extremities in order to progress to PLOF and address remaining functional goals. (see flow sheet as applicable)     Details if applicable:     10  40830 Therapeutic Activity (timed):  use of dynamic activities replicating functional movements to increase ROM, strength, coordination, balance, and proprioception in order to improve patient's ability to progress to PLOF and address remaining functional goals.  (see flow sheet as applicable)     Details if applicable:     31  [facility] Totals Reminder: bill using total billable min of TIMED therapeutic procedures (example: do not include dry needle or estim unattended, both untimed codes, in totals to left)  8-22 min = 1 unit; 23-37 min = 2 units; 38-52 min = 3 units; 53-67 min = 4 units; 68-82 min = 5 units   Total Total     [x]  Patient Education billed

## 2024-08-09 ENCOUNTER — HOSPITAL ENCOUNTER (OUTPATIENT)
Facility: HOSPITAL | Age: 54
Setting detail: RECURRING SERIES
Discharge: HOME | End: 2024-08-12
Payer: COMMERCIAL

## 2024-08-09 PROCEDURE — 97110 THERAPEUTIC EXERCISES: CPT

## 2024-08-09 PROCEDURE — 97112 NEUROMUSCULAR REEDUCATION: CPT

## 2024-08-09 PROCEDURE — 97530 THERAPEUTIC ACTIVITIES: CPT

## 2024-08-09 PROCEDURE — 97535 SELF CARE MNGMENT TRAINING: CPT

## 2024-08-09 NOTE — PROGRESS NOTES
PHYSICAL / OCCUPATIONAL THERAPY - DAILY TREATMENT NOTE    Patient Name: Cameron Scott    Date: 2024    : 1970  Insurance: Payor: [employer] [facility] [place] / Plan: AET BETTER [facility] [place] / Product Type: *No Product type* /      Patient  verified Yes     Visit #   Current / Total 8 10   Time   In / Out 1052 1128   Pain   In / Out 0 0   Subjective Functional Status/Changes: Pt reports he's a little sore, but no pain.     TREATMENT AREA =  Pain in left shoulder [M25.512]  S/P left rotator cuff repair [Z98.890]     OBJECTIVE    Therapeutic Procedures:    Tx Min Billable or 1:1 Min (if diff from Tx Min) Procedure, Rationale, Specifics   8  95174 Therapeutic Exercise (timed):  increase ROM, strength, coordination, balance, and proprioception to improve patient's ability to progress to PLOF and address remaining functional goals. (see flow sheet as applicable)     Details if applicable:       8  19327 Neuromuscular Re-Education (timed):  improve balance, coordination, kinesthetic sense, posture, core stability and proprioception to improve patient's ability to develop conscious control of individual muscles and awareness of position of extremities in order to progress to PLOF and address remaining functional goals. (see flow sheet as applicable)     Details if applicable:  scapular re-ed   12  52143 Therapeutic Activity (timed):  use of dynamic activities replicating functional movements to increase ROM, strength, coordination, balance, and proprioception in order to improve patient's ability to progress to PLOF and address remaining functional goals.  (see flow sheet as applicable)     Details if applicable:  functional reaching   8  08492 Self Care/Home Management (timed):  improve patient knowledge and understanding of pain reducing techniques, positioning, posture/ergonomics, home safety, activity modification, diagnosis/prognosis, and physical therapy expectations, procedures and progression  to

## 2024-08-12 ENCOUNTER — HOSPITAL ENCOUNTER (OUTPATIENT)
Facility: HOSPITAL | Age: 54
Setting detail: RECURRING SERIES
Discharge: HOME | End: 2024-08-15
Payer: COMMERCIAL

## 2024-08-12 PROCEDURE — 97110 THERAPEUTIC EXERCISES: CPT

## 2024-08-12 PROCEDURE — 97112 NEUROMUSCULAR REEDUCATION: CPT

## 2024-08-12 PROCEDURE — 97535 SELF CARE MNGMENT TRAINING: CPT

## 2024-08-12 PROCEDURE — 97530 THERAPEUTIC ACTIVITIES: CPT

## 2024-08-12 NOTE — PROGRESS NOTES
PHYSICAL / OCCUPATIONAL THERAPY - DAILY TREATMENT NOTE    Patient Name: Cameron Scott    Date: 2024    : 1970  Insurance: Payor: [employer] BETTER [facility] [place] / Plan: AET BETTER [facility] [place] / Product Type: *No Product type* /      Patient  verified Yes     Visit #   Current / Total 9 10   Time   In / Out 1008 1040   Pain   In / Out 0 0   Subjective Functional Status/Changes: States that shoulder is feeling good today, no pain.     TREATMENT AREA =  Pain in left shoulder [M25.512]  S/P left rotator cuff repair [Z98.890]     OBJECTIVE         Therapeutic Procedures:    Tx Min Billable or 1:1 Min (if diff from Tx Min) Procedure, Rationale, Specifics   8  74293 Therapeutic Exercise (timed):  increase ROM, strength, coordination, balance, and proprioception to improve patient's ability to progress to PLOF and address remaining functional goals. (see flow sheet as applicable)     Details if applicable:       8 80808 Neuromuscular Re-Education (timed):  improve balance, coordination, kinesthetic sense, posture, core stability and proprioception to improve patient's ability to develop conscious control of individual muscles and awareness of position of extremities in order to progress to PLOF and address remaining functional goals. (see flow sheet as applicable)     Details if applicable:     8  42461 Therapeutic Activity (timed):  use of dynamic activities replicating functional movements to increase ROM, strength, coordination, balance, and proprioception in order to improve patient's ability to progress to PLOF and address remaining functional goals.  (see flow sheet as applicable)     Details if applicable:      57500 Self Care/Home Management (timed):  improve patient knowledge and understanding of pain reducing techniques, positioning, posture/ergonomics, home safety, activity modification, diagnosis/prognosis, and physical therapy expectations, procedures and progression  to improve patient's

## 2024-08-14 ENCOUNTER — HOSPITAL ENCOUNTER (OUTPATIENT)
Facility: HOSPITAL | Age: 54
Setting detail: RECURRING SERIES
Discharge: HOME | End: 2024-08-17
Payer: COMMERCIAL

## 2024-08-14 PROCEDURE — 97535 SELF CARE MNGMENT TRAINING: CPT

## 2024-08-14 PROCEDURE — 97112 NEUROMUSCULAR REEDUCATION: CPT

## 2024-08-14 PROCEDURE — 97110 THERAPEUTIC EXERCISES: CPT

## 2024-08-14 PROCEDURE — 97530 THERAPEUTIC ACTIVITIES: CPT

## 2024-08-14 NOTE — PROGRESS NOTES
[facility] [address]  [address]  [place] [address]  [phone] fax  PROGRESS NOTE  Patient Name: Cameron Scott : 1970   Treatment/Medical Diagnosis: Pain in left shoulder [M25.512]  S/P left rotator cuff repair [Z98.890]   Referral Source:  Payor Rashad Hernández,*  Payor: GARETT [facility] / Plan: AETNA BETTER [facility] / Product Type: *No Product type* /      Date of Initial Visit: 2024 Attended Visits: 20 Missed Visits: 0     SUMMARY OF TREATMENT  Mr. Scott self-reports 85% improvement since beginning PT with wonderful progress. Pt reports functional gains that include: reaching in all planes, decreased pain severity, wiping tables. Pt reports functional deficits that include: strength, sleeping, sweeping/mopping, household duties. Pt reports a recent average pain of 1/10, 7/10 at worst, and 0/10 at best. Pt demonstrates an increase in PROM and AROM of the left shoulder. He is not cleared for strengthening exercises at this time, but overall making great progress currently. He reports still getting pain of 7/10 when sleeping and is limited with performing household chores due to strength deficits. We will progress this per MD orders and protocol. Pt would benefit from continued skilled PT to address remaining functional deficits. We will continue with PT at 2x/wk for 5 weeks.    CURRENT STATUS  Pt will be able to report a </= 2/10 pain rating at worst to improve patient's ability to tolerate prolonged functional activities at home.               PN Status: 7/10 at worst when rolling over while sleeping, 0/10 on average throughout the day              NOT MET; 7/10 at worst when sleeping      Patient will be able to improve PROM in L shoulder flex and abd/scap to at least 160 deg and 140 deg respectively to improve patient's ability to reach in overhead cabinets and don shirts at home. (UPDATED GOAL).              PN Status: 
improve quality of life.               PN Status: reports daily compliance, will update as he progresses with protocol  PROGRESSING; reports daily compliance, will update once he reaches strengthening phase of protocol     8. Pt will improve Quick dash score to </= 33% to demonstrate improvement in patient's ability to perform unrestricted ADLs/IADLs at home & community.              PN Status: 97%              PROGRESSING; 75%    Functional Gains: reaching in all planes, decreased pain severity, wiping tables  Functional Deficits: strength, sleeping, sweeping/mopping, household duties  % improvement: 85%  Pain   Average: 1/10       Best: 0/10     Worst: 7/10  Patient Goal: \"to recover\"    Next PN/ RC due 09/12/2024  Auth due (visit number/ date) 4 remaining by 08/30/2024    PLAN  - Continue Plan of Care    Roshan Norton [facility], [facility]    8/14/2024    10:20 AM  If an interpreting service was utilized for treatment of this patient, the contents of this document represent the material reviewed with the patient via the .     Future Appointments   Date Time Provider Department Center   8/14/2024 10:50 AM Roshan Norton, PT [facility]   8/19/2024 10:10 AM [facility]   8/20/2024  9:30 AM Suzan Espinoza MD [facility] BS [facility]   8/21/2024 10:50 AM [facility]   8/26/2024 10:10 AM [facility]   8/28/2024 10:50 AM [facility]   8/29/2024  1:15 PM Dejan Pritchard DO [facility]   9/5/2024  8:10 AM Rashad Hernández MD [facility] BS AMB   9/26/2024  1:40 PM Kaye Gaytan MD [facility] BS AMB   4/14/2025  8:40 AM Henrry Hu MD [facility] BS AMB

## 2024-08-19 ENCOUNTER — HOSPITAL ENCOUNTER (OUTPATIENT)
Facility: HOSPITAL | Age: 54
Setting detail: RECURRING SERIES
Discharge: HOME | End: 2024-08-22
Payer: COMMERCIAL

## 2024-08-19 PROCEDURE — 97530 THERAPEUTIC ACTIVITIES: CPT

## 2024-08-19 PROCEDURE — 97112 NEUROMUSCULAR REEDUCATION: CPT

## 2024-08-19 PROCEDURE — 97110 THERAPEUTIC EXERCISES: CPT

## 2024-08-19 NOTE — PROGRESS NOTES
PHYSICAL / OCCUPATIONAL THERAPY - DAILY TREATMENT NOTE    Patient Name: Cameron Scott    Date: 2024    : 1970  Insurance: Payor: [employer] BETTER [facility] [place] / Plan: AETNA BETTER [facility] [place] / Product Type: *No Product type* /      Patient  verified Yes     Visit #   Current / Total 2 10   Time   In / Out 1010 1045   Pain   In / Out 0 0   Subjective Functional Status/Changes: States that his shoulder is a bit stiff this morning.     TREATMENT AREA =  Pain in left shoulder [M25.512]  S/P left rotator cuff repair [Z98.890]     OBJECTIVE         Therapeutic Procedures:    Tx Min Billable or 1:1 Min (if diff from Tx Min) Procedure, Rationale, Specifics   10 10 52881 Neuromuscular Re-Education (timed):  improve balance, coordination, kinesthetic sense, posture, core stability and proprioception to improve patient's ability to develop conscious control of individual muscles and awareness of position of extremities in order to progress to PLOF and address remaining functional goals. (see flow sheet as applicable)     Details if applicable:       10 10 26598 Therapeutic Exercise (timed):  increase ROM, strength, coordination, balance, and proprioception to improve patient's ability to progress to PLOF and address remaining functional goals. (see flow sheet as applicable)     Details if applicable:     15 15 93226 Therapeutic Activity (timed):  use of dynamic activities replicating functional movements to increase ROM, strength, coordination, balance, and proprioception in order to improve patient's ability to progress to PLOF and address remaining functional goals.  (see flow sheet as applicable)     Details if applicable:     35 35 [facility] Totals Reminder: bill using total billable min of TIMED therapeutic procedures (example: do not include dry needle or estim unattended, both untimed codes, in totals to left)  8-22 min = 1 unit; 23-37 min = 2 units; 38-52 min = 3 units; 53-67 min = 4 units; 68-82 min = 5 units

## 2024-08-20 ENCOUNTER — OFFICE VISIT (OUTPATIENT)
Age: 54
End: 2024-08-20
Payer: COMMERCIAL

## 2024-08-20 VITALS
BODY MASS INDEX: 21.64 KG/M2 | WEIGHT: 142.8 LBS | HEART RATE: 88 BPM | TEMPERATURE: 97.9 F | SYSTOLIC BLOOD PRESSURE: 128 MMHG | OXYGEN SATURATION: 96 % | HEIGHT: 68 IN | RESPIRATION RATE: 17 BRPM | DIASTOLIC BLOOD PRESSURE: 82 MMHG

## 2024-08-20 DIAGNOSIS — R53.1 LEFT-SIDED WEAKNESS: Primary | ICD-10-CM

## 2024-08-20 PROCEDURE — 3079F DIAST BP 80-89 MM HG: CPT | Performed by: PSYCHIATRY & NEUROLOGY

## 2024-08-20 PROCEDURE — 3074F SYST BP LT 130 MM HG: CPT | Performed by: PSYCHIATRY & NEUROLOGY

## 2024-08-20 PROCEDURE — 99214 OFFICE O/P EST MOD 30 MIN: CPT | Performed by: PSYCHIATRY & NEUROLOGY

## 2024-08-20 NOTE — PROGRESS NOTES
Cameron Scott is a 54 y.o. male (: 1970) presenting to address:    Chief Complaint   Patient presents with    Follow-up     Cervical radiculopathy       Vitals:    24 0930   BP: 128/82   Pulse: 88   Resp: 17   Temp: 97.9 °F (36.6 °C)   SpO2: 96%       Coordination of Care Questionaire:   1. \"Have you been to the ER, urgent care clinic since your last visit?  Hospitalized since your last visit?\" no    2. \"Have you seen or consulted any other health care providers outside of the [facility] since your last visit?\" no     3. For patients aged 45-75: Has the patient had a colonoscopy / FIT/ Cologuard? yes      If the patient is female:    4. For patients aged 40-74: Has the patient had a mammogram within the past 2 years? na      5. For patients aged 21-65: Has the patient had a pap smear? na    Advanced Directive:   1. Do you have an Advanced Directive? no    2. Would you like information on Advanced Directives? no  
history  3-Performing the medically appropriate exam and/or evaluation  4-Clinical documentation in the EHR or other health record  5-Counseling and educating the patient or caregiver  6-Interpreting results and/or communicating results to the patient/family/caregiver  7-Care coordination  8-Ordering medications, tests, or procedures  9-Referring and communicating with other health care professionals  10-Documentation in EHR.   Signed By: Suzan Espinoza MD. [email or web address]

## 2024-08-21 ENCOUNTER — HOSPITAL ENCOUNTER (OUTPATIENT)
Facility: HOSPITAL | Age: 54
Setting detail: RECURRING SERIES
Discharge: HOME | End: 2024-08-24
Payer: COMMERCIAL

## 2024-08-21 PROCEDURE — 97535 SELF CARE MNGMENT TRAINING: CPT

## 2024-08-21 PROCEDURE — 97112 NEUROMUSCULAR REEDUCATION: CPT

## 2024-08-21 PROCEDURE — 97530 THERAPEUTIC ACTIVITIES: CPT

## 2024-08-21 PROCEDURE — 97110 THERAPEUTIC EXERCISES: CPT

## 2024-08-21 NOTE — PROGRESS NOTES
Continues to progress [Date assessed: 08/21/2024]    3. Pt will improve functional strength of left shoulder flexion, abduction, IR, and ER to 5/5 in order to resume ADLs such as lifting and household chores. (NEW GOAL)              PN Status: not cleared for strengthening at this time    Not cleared yet [Date assessed: 08/21/2024]    4. Pt will report compliance with home HEP at end of care to enhance carry over of fuctional gains made with skilled therapy services in order to improve quality of life.               PN Status: reports daily compliance, will update once he reaches strengthening phase of protocol  Continues to report daily compliance   5. Pt will improve Quick dash score to </= 33% to demonstrate improvement in patient's ability to perform unrestricted ADLs/IADLs at home & community.              PN Status: 75%   Assess at 30 day magdy [Date assessed: 08/21/2024]    Next PN/ RC due 09/12/2024  Auth due (visit number/ date) 2 remaining by 08/30/2024    PLAN  - Continue Plan of Care    Roshan Norton PTA, [facility]    8/21/2024    10:19 AM  If an interpreting service was utilized for treatment of this patient, the contents of this document represent the material reviewed with the patient via the .     Future Appointments   Date Time Provider Department Center   8/21/2024 10:50 AM Roshan Norton, PT [facility]   8/26/2024 10:10 AM Roshan Norton, PT [facility]   8/28/2024 10:50 AM Jimmie Keller, PT [facility]   8/29/2024  1:15 PM Dejan Pritchard DO [facility]   9/5/2024  8:10 AM Rashad Hernández MD [facility] BS AMB   9/26/2024  1:40 PM Kaye Gaytan MD [facility] BS AMB   4/14/2025  8:40 AM Henrry Hu MD [facility] BS AMB

## 2024-08-26 ENCOUNTER — HOSPITAL ENCOUNTER (OUTPATIENT)
Facility: HOSPITAL | Age: 54
Setting detail: RECURRING SERIES
Discharge: HOME | End: 2024-08-29
Payer: COMMERCIAL

## 2024-08-26 PROCEDURE — 97112 NEUROMUSCULAR REEDUCATION: CPT

## 2024-08-26 PROCEDURE — 97110 THERAPEUTIC EXERCISES: CPT

## 2024-08-26 PROCEDURE — 97530 THERAPEUTIC ACTIVITIES: CPT

## 2024-08-26 PROCEDURE — 97535 SELF CARE MNGMENT TRAINING: CPT

## 2024-08-26 NOTE — PROGRESS NOTES
address remaining functional goals.  (see flow sheet as applicable)     Details if applicable:     40  MC BC Totals Reminder: bill using total billable min of TIMED therapeutic procedures (example: do not include dry needle or estim unattended, both untimed codes, in totals to left)  8-22 min = 1 unit; 23-37 min = 2 units; 38-52 min = 3 units; 53-67 min = 4 units; 68-82 min = 5 units   Total Total     [x]  Patient Education billed concurrently with other procedures   [x] Review HEP    [] Progressed/Changed HEP, detail:    [] Other detail:       Objective Information/Functional Measures/Assessment    Pt continues to make tremendous progress with his ROM and pain relief. We will progress to strengthening once cleared by physician. Pt has one more visit authorized on his insurance. Progress per protocol.     Patient will continue to benefit from skilled PT / OT services to modify and progress therapeutic interventions, analyze and address functional mobility deficits, analyze and address ROM deficits, analyze and address strength deficits, analyze and address soft tissue restrictions, analyze and cue for proper movement patterns, analyze and modify for postural abnormalities, analyze and address imbalance/dizziness, and instruct in home and community integration to address functional deficits and attain remaining goals.    Progress toward goals / Updated goals:  []  See Progress Note/Recertification    Pt will be able to report a </= 2/10 pain rating at worst to improve patient's ability to tolerate prolonged functional activities at home.               PN Status: 7/10 at worst when sleeping                Pain appears to be declining [Date assessed: 08/21/2024]     2.  Patient will be able to improve AROM left shoulder flexion to 160 deg to improve his ability to return to PLOF and reach into cabinets overhead. (UPDATED GOAL)              PN Status: 150 deg flexion,  141 deg abduction               Continues to  progress [Date assessed: 08/21/2024]     3. Pt will improve functional strength of left shoulder flexion, abduction, IR, and ER to 5/5 in order to resume ADLs such as lifting and household chores. (NEW GOAL)              PN Status: not cleared for strengthening at this time               Not cleared yet [Date assessed: 08/21/2024]     4. Pt will report compliance with home HEP at end of care to enhance carry over of fuctional gains made with skilled therapy services in order to improve quality of life.               PN Status: reports daily compliance, will update once he reaches strengthening phase of protocol  Continues to report daily compliance   5. Pt will improve Quick dash score to </= 33% to demonstrate improvement in patient's ability to perform unrestricted ADLs/IADLs at home & community.              PN Status: 75%              Assess at 30 day magdy [Date assessed: 08/21/2024]    Next PN/ RC due 09/12/2024  Auth due (visit number/ date) 1 remaining by 08/30/2024    PLAN  - Continue Plan of Care    Roshan Norton PTA, [facility]    8/26/2024    9:56 AM  If an interpreting service was utilized for treatment of this patient, the contents of this document represent the material reviewed with the patient via the .     Future Appointments   Date Time Provider Department Center   8/26/2024 10:10 AM Roshan Norton, PT [facility]   8/28/2024 10:50 AM Jimmie Keller, PT [facility]   8/29/2024  1:15 PM Dejan Pritchard DO [facility] AM[facility]   9/5/2024  8:10 AM Rashad Hernández MD [facility] BS AMB   9/26/2024  1:40 PM Kaye Gaytan MD [facility] AMB   4/14/2025  8:40 AM Henrry Hu MD [facility] BS AMB

## 2024-08-28 ENCOUNTER — HOSPITAL ENCOUNTER (OUTPATIENT)
Facility: HOSPITAL | Age: 54
Setting detail: RECURRING SERIES
Discharge: HOME | End: 2024-08-31
Payer: COMMERCIAL

## 2024-08-28 PROCEDURE — 97530 THERAPEUTIC ACTIVITIES: CPT

## 2024-08-28 PROCEDURE — 97110 THERAPEUTIC EXERCISES: CPT

## 2024-08-28 PROCEDURE — 97112 NEUROMUSCULAR REEDUCATION: CPT

## 2024-08-28 NOTE — PROGRESS NOTES
PHYSICAL / OCCUPATIONAL THERAPY - DAILY TREATMENT NOTE    Patient Name: Cameron Scott    Date: 2024    : 1970  Insurance: Payor: [employer] [facility] [place] / Plan: [employer] [facility] [place] / Product Type: *No Product type* /      Patient  verified Yes     Visit #   Current / Total 5 10   Time   In / Out 1050 1125   Pain   In / Out 0 0   Subjective Functional Status/Changes: Shoulder feels good, see's MD next week (24)     TREATMENT AREA =  Pain in left shoulder [M25.512]  S/P left rotator cuff repair [Z98.890]     OBJECTIVE         Therapeutic Procedures:    Tx Min Billable or 1:1 Min (if diff from Tx Min) Procedure, Rationale, Specifics   10 10 14768 Therapeutic Exercise (timed):  increase ROM, strength, coordination, balance, and proprioception to improve patient's ability to progress to PLOF and address remaining functional goals. (see flow sheet as applicable)     Details if applicable:       10 10 89240 Neuromuscular Re-Education (timed):  improve balance, coordination, kinesthetic sense, posture, core stability and proprioception to improve patient's ability to develop conscious control of individual muscles and awareness of position of extremities in order to progress to PLOF and address remaining functional goals. (see flow sheet as applicable)     Details if applicable:     15 15 70352 Therapeutic Activity (timed):  use of dynamic activities replicating functional movements to increase ROM, strength, coordination, balance, and proprioception in order to improve patient's ability to progress to PLOF and address remaining functional goals.  (see flow sheet as applicable)     Details if applicable:     35 35 MC BC Totals Reminder: bill using total billable min of TIMED therapeutic procedures (example: do not include dry needle or estim unattended, both untimed codes, in totals to left)  8-22 min = 1 unit; 23-37 min = 2 units; 38-52 min = 3 units; 53-67 min = 4 units; 68-82 min = 5 units  strengthening at this time               Not cleared yet [Date assessed: 08/21/2024]     4. Pt will report compliance with home HEP at end of care to enhance carry over of fuctional gains made with skilled therapy services in order to improve quality of life.               PN Status: reports daily compliance, will update once he reaches strengthening phase of protocol    Continues to report daily compliance   5. Pt will improve Quick dash score to </= 33% to demonstrate improvement in patient's ability to perform unrestricted ADLs/IADLs at home & community.              PN Status: 75%              Assess at 30 day magdy [Date assessed: 08/21/2024]    Next PN/ RC due 9/12/24  Auth due (visit number/ date) pending authorization, auth sent 8/22/24    PLAN  - Continue Plan of Care    Jimmie Keller, PT    8/28/2024    11:07 AM  If an interpreting service was utilized for treatment of this patient, the contents of this document represent the material reviewed with the patient via the .     Future Appointments   Date Time Provider Department Center   8/29/2024  1:15 PM Dejan Pritchard DO [facility] AM[facility]   9/5/2024  8:10 AM Rashad Hernández MD [facility] BS AMB   9/26/2024  1:40 PM Kaye Gaytan MD [facility] BS AMB   4/14/2025  8:40 AM Henrry Hu MD [facility] BS AMB

## 2024-08-29 ENCOUNTER — OFFICE VISIT (OUTPATIENT)
Age: 54
End: 2024-08-29
Payer: COMMERCIAL

## 2024-08-29 VITALS — BODY MASS INDEX: 21.52 KG/M2 | WEIGHT: 142 LBS | HEIGHT: 68 IN

## 2024-08-29 DIAGNOSIS — M54.12 CERVICAL RADICULOPATHY AT C7: Primary | ICD-10-CM

## 2024-08-29 DIAGNOSIS — G56.02 LEFT CARPAL TUNNEL SYNDROME: ICD-10-CM

## 2024-08-29 PROCEDURE — 99213 OFFICE O/P EST LOW 20 MIN: CPT | Performed by: ORTHOPAEDIC SURGERY

## 2024-08-29 NOTE — PROGRESS NOTES
Cameron Scott is a 54 y.o. male right handed.  Worker's Compensation and legal considerations: none    Chief Complaint   Patient presents with    Follow-up     Left hand cts     Pain Score:   0 - No pain    8/29/2024 HPI: Patient presents today for follow-up of his left hand numbness and tingling.  At his last visit he had a carpal tunnel injection and has been wearing a nighttime brace.  He had a negative EMG for any peripheral mononeuropathy but continued C7 radiculopathy.  He reports the injection did not help much but he thinks the brace is helping at night.    Initial HPI: Patient presents today with a history of left hand numbness and tingling.  Last year he had a cervical spine surgery for a left ring and little finger numbness symptoms.  He reports that since then he has had increasing numbness and tingling specifically in the thumb index and middle fingers.  He has recently had an EMG on the left side consistent with cervical pathology.    Date of onset: Indeterminate  Injury: No  Prior Treatment:  Yes: Comment: Recent left rotator cuff repair 6/2024.  Left Carpal tunnel Injection.  Hx of Cervical Spine Surgery.    ROS: Review of Systems - General ROS: negative except HPI    Past Medical History:   Diagnosis Date    Abnormal /NCV LUE 05/2024    Ongoing Left C7 radiculopathy    Asthma     no need for inhaler in years    Back pain     Elevated blood pressure reading     GERD (gastroesophageal reflux disease)     Hypercholesteremia     Left shoulder pain     Low TSH level     Low TSH level 04/27/2017    may have mild hyperthyroidism w/u per PCP I will rpt TSH and get freeT4    Primary hypertension 9/13/2022    Right inguinal hernia     TIA (transient ischemic attack) 8/13/2022    no residual       Past Surgical History:   Procedure Laterality Date    COLONOSCOPY      HERNIA REPAIR Bilateral 2/7/2024    ROBOTIC BILATERAL INGUINAL HERNIA REPAIR WITH PLACEMENT OF MESH performed by Robyn Woodson MD at [facility]  MAIN OR    SHOULDER ARTHROSCOPY Left 6/14/2024    LEFT SHOULDER ARTHROSCOPIC ROTATOR CUFF REPAIR; [[facility] [facility]] performed by Rashad Hernández MD at [facility] MAIN OR    SPINE SURGERY N/A 10/30/2023    CERVICAL LAMINOPLASTY CERVICAL THREE-THORACIC ONE; C-ARM; [[facility]] performed by Arjun Javier MD at [facility] MAIN OR        Current Outpatient Medications   Medication Sig Dispense Refill    gabapentin (NEURONTIN) 100 MG capsule 800mg tid: 90 days 270 capsule 5    lidocaine (LIDODERM) 5 % Place 1 patch onto the skin daily 12 hours on, 12 hours off. 30 patch 2    cyclobenzaprine (FLEXERIL) 10 MG tablet Take 1 tablet by mouth 2 times daily as needed      amLODIPine (NORVASC) 5 MG tablet Take 1 tablet by mouth daily      atorvastatin (LIPITOR) 40 MG tablet Take 1 tablet by mouth daily      aspirin 81 MG chewable tablet Take 1 tablet by mouth daily      ibuprofen (ADVIL;MOTRIN) 600 MG tablet Take 1 tablet by mouth every 12 hours as needed for Pain 40 tablet 2     No current facility-administered medications for this visit.       No Known Allergies      Ht 1.727 m (5' 8\")   Wt 64.4 kg (142 lb)   BMI 21.59 kg/m²   Physical Exam  Constitutional:       General: He is not in acute distress.     Appearance: Normal appearance. He is not ill-appearing.   Cardiovascular:      Pulses: Normal pulses.   Pulmonary:      Effort: Pulmonary effort is normal. No respiratory distress.   Abdominal:      General: Abdomen is flat. There is no distension.   Musculoskeletal:         General: Tenderness present. No swelling, deformity or signs of injury. Normal range of motion.      Cervical back: Normal range of motion and neck supple.      Right lower leg: No edema.      Left lower leg: No edema.   Skin:     General: Skin is warm and dry.      Capillary Refill: Capillary refill takes less than 2 seconds.      Findings: No bruising or erythema.   Neurological:      General: No focal deficit present.      Mental Status: He is alert

## 2024-09-04 NOTE — PROGRESS NOTES
Cameron Scott  1970   Chief Complaint   Patient presents with    Post-Op Check     LEFT SHOULDER ARTHROSCOPIC ROTATOR CUFF REPAIR MV 06/14/2024, global period ends 9/12          HISTORY OF PRESENT ILLNESS  Cameron Scott is a 54 y.o. male who presents today for reevaluation of s/p left shoulder arthroscopic rotator cuff repair on 6/14/24. Pain is a 0/10. His ROM has increased. He notes some stiffness. He is compliant with PT. He is not currently working.    Patient denies any fever, chills, chest pain, shortness of breath or calf pain. The remainder of the review of systems is negative. There are no new illness or injuries other than that mentioned above to report since last seen in the office. No changes in medications, allergies, social or family history.      PHYSICAL EXAM:   Ht 1.727 m (5' 8\")   Wt 63.5 kg (140 lb)   BMI 21.29 kg/m²   The patient is a well-developed, well-nourished male   in no acute distress.  The patient is alert and oriented times three.  The patient is alert and oriented times three. Mood and affect are normal.  LYMPHATIC: lymph nodes are not enlarged and are within normal limits  SKIN: normal in color and non tender to palpation. There are no bruises or abrasions noted.   NEUROLOGICAL: Motor sensory exam is within normal limits. Reflexes are equal bilaterally. There is normal sensation to pinprick and light touch  MUSCULOSKELETAL: Wounds are healed excellent abduction of 120 degrees without pain no instability         PROCEDURE: none    IMAGING: MRI of the left shoulder obtained by [facility] dated 5/13/24 reviewed and read by Dr. English:   1.  Supraspinatus insertion advanced adenopathy with ill-defined high-grade partial thickness tear as described, without significant fatty atrophy. Other rotator cuff tendons appear largely intact.  2.  Extensive patchy labral degeneration/tearing as described.  3.  Mild AC joint degenerative changes and acromion morphology which may cause

## 2024-09-05 ENCOUNTER — APPOINTMENT (OUTPATIENT)
Facility: HOSPITAL | Age: 54
End: 2024-09-05
Payer: COMMERCIAL

## 2024-09-05 ENCOUNTER — OFFICE VISIT (OUTPATIENT)
Age: 54
End: 2024-09-05

## 2024-09-05 VITALS — HEIGHT: 68 IN | WEIGHT: 140 LBS | BODY MASS INDEX: 21.22 KG/M2

## 2024-09-05 DIAGNOSIS — Z98.890 STATUS POST LEFT ROTATOR CUFF REPAIR: Primary | ICD-10-CM

## 2024-09-05 PROCEDURE — 99024 POSTOP FOLLOW-UP VISIT: CPT | Performed by: ORTHOPAEDIC SURGERY

## 2024-09-10 ENCOUNTER — HOSPITAL ENCOUNTER (OUTPATIENT)
Facility: HOSPITAL | Age: 54
Setting detail: RECURRING SERIES
Discharge: HOME | End: 2024-09-13
Payer: COMMERCIAL

## 2024-09-10 PROCEDURE — 97530 THERAPEUTIC ACTIVITIES: CPT

## 2024-09-10 PROCEDURE — 97110 THERAPEUTIC EXERCISES: CPT

## 2024-09-10 PROCEDURE — 97112 NEUROMUSCULAR REEDUCATION: CPT

## 2024-09-13 ENCOUNTER — HOSPITAL ENCOUNTER (OUTPATIENT)
Facility: HOSPITAL | Age: 54
Setting detail: RECURRING SERIES
Discharge: HOME | End: 2024-09-16
Payer: COMMERCIAL

## 2024-09-13 PROCEDURE — 97110 THERAPEUTIC EXERCISES: CPT

## 2024-09-13 PROCEDURE — 97530 THERAPEUTIC ACTIVITIES: CPT

## 2024-09-13 PROCEDURE — 97535 SELF CARE MNGMENT TRAINING: CPT

## 2024-09-13 PROCEDURE — 97112 NEUROMUSCULAR REEDUCATION: CPT

## 2024-09-16 ENCOUNTER — HOSPITAL ENCOUNTER (OUTPATIENT)
Facility: HOSPITAL | Age: 54
Setting detail: RECURRING SERIES
Discharge: HOME | End: 2024-09-19
Payer: COMMERCIAL

## 2024-09-16 PROCEDURE — 97112 NEUROMUSCULAR REEDUCATION: CPT

## 2024-09-16 PROCEDURE — 97110 THERAPEUTIC EXERCISES: CPT

## 2024-09-16 PROCEDURE — 97530 THERAPEUTIC ACTIVITIES: CPT

## 2024-09-16 PROCEDURE — 97535 SELF CARE MNGMENT TRAINING: CPT

## 2024-09-18 ENCOUNTER — HOSPITAL ENCOUNTER (OUTPATIENT)
Facility: HOSPITAL | Age: 54
Setting detail: RECURRING SERIES
Discharge: HOME | End: 2024-09-21
Payer: COMMERCIAL

## 2024-09-18 PROCEDURE — 97112 NEUROMUSCULAR REEDUCATION: CPT

## 2024-09-18 PROCEDURE — 97110 THERAPEUTIC EXERCISES: CPT

## 2024-09-18 PROCEDURE — 97530 THERAPEUTIC ACTIVITIES: CPT

## 2024-09-18 PROCEDURE — 97535 SELF CARE MNGMENT TRAINING: CPT

## 2024-09-25 ENCOUNTER — HOSPITAL ENCOUNTER (OUTPATIENT)
Facility: HOSPITAL | Age: 54
Setting detail: RECURRING SERIES
Discharge: HOME | End: 2024-09-28
Payer: COMMERCIAL

## 2024-09-25 PROCEDURE — 97535 SELF CARE MNGMENT TRAINING: CPT

## 2024-09-25 PROCEDURE — 97112 NEUROMUSCULAR REEDUCATION: CPT

## 2024-09-25 PROCEDURE — 97110 THERAPEUTIC EXERCISES: CPT

## 2024-09-25 PROCEDURE — 97530 THERAPEUTIC ACTIVITIES: CPT

## 2024-09-26 ENCOUNTER — OFFICE VISIT (OUTPATIENT)
Age: 54
End: 2024-09-26
Payer: COMMERCIAL

## 2024-09-26 VITALS
HEIGHT: 68 IN | SYSTOLIC BLOOD PRESSURE: 106 MMHG | BODY MASS INDEX: 22.88 KG/M2 | TEMPERATURE: 98.6 F | WEIGHT: 151 LBS | HEART RATE: 102 BPM | OXYGEN SATURATION: 95 % | DIASTOLIC BLOOD PRESSURE: 75 MMHG

## 2024-09-26 DIAGNOSIS — M54.12 CERVICAL RADICULOPATHY AT C7: ICD-10-CM

## 2024-09-26 DIAGNOSIS — R22.1 SUBCUTANEOUS NODULE OF NECK: ICD-10-CM

## 2024-09-26 DIAGNOSIS — M96.1 CERVICAL POST-LAMINECTOMY SYNDROME: ICD-10-CM

## 2024-09-26 DIAGNOSIS — G95.89 MYELOMALACIA OF CERVICAL CORD (HCC): Primary | ICD-10-CM

## 2024-09-26 PROCEDURE — 3074F SYST BP LT 130 MM HG: CPT | Performed by: PHYSICAL MEDICINE & REHABILITATION

## 2024-09-26 PROCEDURE — 3078F DIAST BP <80 MM HG: CPT | Performed by: PHYSICAL MEDICINE & REHABILITATION

## 2024-09-26 PROCEDURE — 99214 OFFICE O/P EST MOD 30 MIN: CPT | Performed by: PHYSICAL MEDICINE & REHABILITATION

## 2024-09-26 RX ORDER — CHLORHEXIDINE GLUCONATE ORAL RINSE 1.2 MG/ML
SOLUTION DENTAL
COMMUNITY
Start: 2024-09-20

## 2024-09-26 RX ORDER — MIRTAZAPINE 15 MG/1
15 TABLET, FILM COATED ORAL
COMMUNITY
Start: 2024-09-12

## 2024-09-26 RX ORDER — DIPHENHYDRAMINE HYDROCHLORIDE 25 MG/1
CAPSULE ORAL
COMMUNITY
Start: 2024-09-12

## 2024-09-26 RX ORDER — AMOXICILLIN 500 MG/1
500 TABLET, FILM COATED ORAL 2 TIMES DAILY
COMMUNITY
Start: 2024-09-20 | End: 2024-09-26 | Stop reason: ALTCHOICE

## 2024-09-26 RX ORDER — LIDOCAINE 50 MG/G
1 PATCH TOPICAL DAILY
Qty: 30 PATCH | Refills: 5 | Status: SHIPPED | OUTPATIENT
Start: 2024-09-26

## 2024-09-27 ENCOUNTER — APPOINTMENT (OUTPATIENT)
Facility: HOSPITAL | Age: 54
End: 2024-09-27
Payer: COMMERCIAL

## 2024-10-01 ENCOUNTER — HOSPITAL ENCOUNTER (OUTPATIENT)
Facility: HOSPITAL | Age: 54
Setting detail: RECURRING SERIES
Discharge: HOME | End: 2024-10-04
Payer: COMMERCIAL

## 2024-10-01 PROCEDURE — 97535 SELF CARE MNGMENT TRAINING: CPT

## 2024-10-01 PROCEDURE — 97110 THERAPEUTIC EXERCISES: CPT

## 2024-10-01 PROCEDURE — 97530 THERAPEUTIC ACTIVITIES: CPT

## 2024-10-01 PROCEDURE — 97112 NEUROMUSCULAR REEDUCATION: CPT

## 2024-10-01 NOTE — PROGRESS NOTES
3+/5 with moderate/severe pain in the AC joint into the upper arm   Shoulder ER 4/5 with mild pain in the scapula   Shoulder IR 4/5 with mild pain in the upper arm                  Making steady progress [Date assessed: 09/25/2024]     3. Pt will improve Quick dash score to </= 33% to demonstrate improvement in patient's ability to perform unrestricted ADLs/IADLs at home & community.              PN Status: 70.5%              Assess at 30 day magdy [Date assessed: 09/25/2024]    Next PN/ RC due 10/09/2024  Auth due (visit number/ date) 9 remaining by 11/29/2024    PLAN  - Continue Plan of Care    Roshan Norton PTA, [facility]    10/1/2024    10:22 AM  If an interpreting service was utilized for treatment of this patient, the contents of this document represent the material reviewed with the patient via the .     Future Appointments   Date Time Provider Department Center   10/3/2024  9:30 AM [facility]   10/7/2024  1:00 PM [facility] CT RM 2 [facility]   10/9/2024 10:10 AM Roshan Norton, PT [facility]   10/11/2024 10:10 AM Roshan Norton, PT [facility]   10/16/2024 10:10 AM KrishnaKayliJimmie, PT [facility]   10/18/2024 10:10 AM Krishna Jimmie, PT [facility]   10/23/2024 10:10 AM KrishnaKayliJimmie, PT [facility]   10/25/2024 10:10 AM Krishna Jimmie, PT [facility]   10/30/2024 10:10 AM Krishna Jimmie, PT [facility]   11/1/2024 10:10 AM KrishnaKayliJimmie, PT [facility]   11/7/2024  8:10 AM Rashad Hernández MD [facility] BS AMB   4/14/2025  8:40 AM Henrry Hu MD [facility] BS AMB

## 2024-10-03 ENCOUNTER — HOSPITAL ENCOUNTER (OUTPATIENT)
Facility: HOSPITAL | Age: 54
Setting detail: RECURRING SERIES
Discharge: HOME | End: 2024-10-06
Payer: COMMERCIAL

## 2024-10-03 PROCEDURE — 97112 NEUROMUSCULAR REEDUCATION: CPT

## 2024-10-03 PROCEDURE — 97530 THERAPEUTIC ACTIVITIES: CPT

## 2024-10-03 PROCEDURE — 97535 SELF CARE MNGMENT TRAINING: CPT

## 2024-10-03 PROCEDURE — 97110 THERAPEUTIC EXERCISES: CPT

## 2024-10-03 NOTE — PROGRESS NOTES
PHYSICAL / OCCUPATIONAL THERAPY - DAILY TREATMENT NOTE    Patient Name: Cameron Scott    Date: 10/3/2024    : 1970  Insurance: Payor: [employer] [facility] [place] / Plan: AET BETTER [facility] [place] / Product Type: *No Product type* /      Patient  verified Yes     Visit #   Current / Total 6 10   Time   In / Out 930 1013   Pain   In / Out 0 0   Subjective Functional Status/Changes: Patient reports no specific complaints     TREATMENT AREA =  Pain in left shoulder [M25.512]  S/P left rotator cuff repair [Z98.890]     OBJECTIVE         Therapeutic Procedures:    Tx Min Billable or 1:1 Min (if diff from Tx Min) Procedure, Rationale, Specifics   12  21155 Therapeutic Exercise (timed):  increase ROM, strength, coordination, balance, and proprioception to improve patient's ability to progress to PLOF and address remaining functional goals. (see flow sheet as applicable)     Details if applicable:       10  21997 Neuromuscular Re-Education (timed):  improve balance, coordination, kinesthetic sense, posture, core stability and proprioception to improve patient's ability to develop conscious control of individual muscles and awareness of position of extremities in order to progress to PLOF and address remaining functional goals. (see flow sheet as applicable)     Details if applicable:     10  36801 Therapeutic Activity (timed):  use of dynamic activities replicating functional movements to increase ROM, strength, coordination, balance, and proprioception in order to improve patient's ability to progress to PLOF and address remaining functional goals.  (see flow sheet as applicable)     Details if applicable:     10  54382 Self Care/Home Management (timed):  improve patient knowledge and understanding of pain reducing techniques, positioning, posture/ergonomics, home safety, activity modification, diagnosis/prognosis, and physical therapy expectations, procedures and progression  to improve patient's ability to

## 2024-10-04 ENCOUNTER — TELEPHONE (OUTPATIENT)
Age: 54
End: 2024-10-04

## 2024-10-04 NOTE — TELEPHONE ENCOUNTER
Margarette from [facility] CT Department called and is requesting clarification on the patient CT Order.            Margarette from [facility] Department   [phone]

## 2024-10-04 NOTE — TELEPHONE ENCOUNTER
I called the CT dept regarding the message. They are questioning whether the order needs to just be for the CT soft tissue of neck instead of CT cervical spine. They are concerned that it will not show the nodule if it is not on the c-spine directly. Just need clarification on where the nodule is. Pt is scheduled for 10/07/24 at 1 pm. Message will be forwarded to Dr. Gaytan for review.

## 2024-10-07 ENCOUNTER — HOSPITAL ENCOUNTER (OUTPATIENT)
Facility: HOSPITAL | Age: 54
Discharge: HOME | End: 2024-10-10
Attending: PHYSICAL MEDICINE & REHABILITATION
Payer: COMMERCIAL

## 2024-10-07 DIAGNOSIS — R22.1 SUBCUTANEOUS NODULE OF NECK: ICD-10-CM

## 2024-10-07 DIAGNOSIS — G95.89 MYELOMALACIA OF CERVICAL CORD (HCC): ICD-10-CM

## 2024-10-07 LAB — CREAT UR-MCNC: 1 MG/DL (ref 0.6–1.3)

## 2024-10-07 PROCEDURE — 82565 ASSAY OF CREATININE: CPT

## 2024-10-07 PROCEDURE — 70491 CT SOFT TISSUE NECK W/DYE: CPT

## 2024-10-07 PROCEDURE — 6360000004 HC RX CONTRAST MEDICATION: Performed by: PHYSICAL MEDICINE & REHABILITATION

## 2024-10-07 RX ORDER — IOPAMIDOL 612 MG/ML
85 INJECTION, SOLUTION INTRAVASCULAR
Status: COMPLETED | OUTPATIENT
Start: 2024-10-07 | End: 2024-10-07

## 2024-10-07 RX ADMIN — IOPAMIDOL 85 ML: 612 INJECTION, SOLUTION INTRAVENOUS at 14:15

## 2024-10-07 NOTE — TELEPHONE ENCOUNTER
I called the CT dept to see if the order needs be with or without contrast. I was not able to reach anyone and left a message requesting a call back with this information. Once received, the order can be placed and the other deleted. Office number provided.

## 2024-10-07 NOTE — TELEPHONE ENCOUNTER
A chart review shows that the order for CT soft tissue of neck has already been placed by the CT dept.

## 2024-10-09 ENCOUNTER — HOSPITAL ENCOUNTER (OUTPATIENT)
Facility: HOSPITAL | Age: 54
Setting detail: RECURRING SERIES
Discharge: HOME | End: 2024-10-12
Payer: COMMERCIAL

## 2024-10-09 PROCEDURE — 97530 THERAPEUTIC ACTIVITIES: CPT

## 2024-10-09 PROCEDURE — 97535 SELF CARE MNGMENT TRAINING: CPT

## 2024-10-09 PROCEDURE — 97112 NEUROMUSCULAR REEDUCATION: CPT

## 2024-10-09 PROCEDURE — 97110 THERAPEUTIC EXERCISES: CPT

## 2024-10-09 NOTE — PROGRESS NOTES
the scapula   Shoulder IR 4/5 with mild pain in the upper arm                  PROGRESSING; Shoulder Flexion/Abduction/IR/ER - 4+/5 in all planes, Elbow Flexion 5/5, Elbow Extension 3/5     3. Pt will improve Quick dash score to </= 33% to demonstrate improvement in patient's ability to perform unrestricted ADLs/IADLs at home & community.              PN Status: 70.5%              PROGRESSING; 65%    Functional Gains: ease with repetitive reaching, decreased pain during day, ADLs, AROM  Functional Deficits: sleeping, full strength, pushing off the floor with left shoulder to get off floor  % improvement: 75%  Pain   Average: 4/10       Best: 1/10     Worst: 8/10  Patient Goal: \"get it as good as it can get.\"    Non-Medicare, can change goals, can adjust or add frequency duration, no signature required      New Goals to be achieved in __5__ WEEKS  1. Pt will be able to report a </= 2/10 pain rating at worst with sleeping to improve patient's ability to tolerate prolonged functional activities at home.               PN Status:  8/10 with sleeping    2. Pt will improve functional strength of left shoulder flexion, abduction, IR, ER to 5/5 and left elbow extension to 4/5 in order to resume ADLs such as lifting and household chores. (UPDATED GOAL)   PN Status: PROGRESSING; Shoulder Flexion/Abduction/IR/ER - 4+/5 in all planes, Elbow Flexion 5/5, Elbow Extension 3/5    3. Pt will improve Quick dash score to </= 33% to demonstrate improvement in patient's ability to perform unrestricted ADLs/IADLs at home & community.              PN Status: 65%    4. Pt will improve left shoulder AROM flexion and abduction to 160 deg to equal right side to improve patient's ability to perform ADLs and reach top shelf of cabinets at home. (NEW GOAL)   PN Status: Flexion: Left 150 deg, Right 160 deg, Abduction: Left 146 deg, Right 160 deg    Frequency / Duration:   Patient to be seen   2   times per week for   5    
functional mobility deficits, analyze and address ROM deficits, analyze and address strength deficits, analyze and address soft tissue restrictions, analyze and cue for proper movement patterns, analyze and modify for postural abnormalities, analyze and address imbalance/dizziness, and instruct in home and community integration to address functional deficits and attain remaining goals.    Progress toward goals / Updated goals:  [x]  See Progress Note/Recertification    Pt will be able to report a </= 2/10 pain rating at worst with sleeping to improve patient's ability to tolerate prolonged functional activities at home.               PN Status: 8/10 with sleeping              NOT MET; 8/10 with sleeping     2.  Pt will improve functional strength of left shoulder flexion, abduction, IR, and ER to 5/5 in order to resume ADLs such as lifting and household chores.              PN Status:     Left   Shoulder Flex 4/5   Shoulder ABD 3+/5 with moderate/severe pain in the AC joint into the upper arm   Shoulder ER 4/5 with mild pain in the scapula   Shoulder IR 4/5 with mild pain in the upper arm                  PROGRESSING; Shoulder Flexion/Abduction/IR/ER - 4+/5 in all planes, Elbow Flexion 5/5, Elbow Extension 3/5     3. Pt will improve Quick dash score to </= 33% to demonstrate improvement in patient's ability to perform unrestricted ADLs/IADLs at home & community.              PN Status: 70.5%              PROGRESSING; 65%    Functional Gains: ease with repetitive reaching, decreased pain during day, ADLs, AROM  Functional Deficits: sleeping, full strength, pushing off the floor with left shoulder to get off floor  % improvement: 75%  Pain   Average: 4/10       Best: 1/10     Worst: 8/10  Patient Goal: \"get it as good as it can get.\"    Next PN/ RC due 11/07/2024  Auth due (visit number/ date) 7 remaining by 11/29/2024    PLAN  - Continue Plan of Care    Roshan Norton, PTA, CSCS    10/9/2024    8:57 AM  If an

## 2024-10-11 ENCOUNTER — HOSPITAL ENCOUNTER (OUTPATIENT)
Facility: HOSPITAL | Age: 54
Setting detail: RECURRING SERIES
Discharge: HOME | End: 2024-10-14
Payer: COMMERCIAL

## 2024-10-11 PROCEDURE — 97110 THERAPEUTIC EXERCISES: CPT

## 2024-10-11 PROCEDURE — 97112 NEUROMUSCULAR REEDUCATION: CPT

## 2024-10-11 PROCEDURE — 97535 SELF CARE MNGMENT TRAINING: CPT

## 2024-10-11 PROCEDURE — 97530 THERAPEUTIC ACTIVITIES: CPT

## 2024-10-11 NOTE — PROGRESS NOTES
PHYSICAL / OCCUPATIONAL THERAPY - DAILY TREATMENT NOTE    Patient Name: Cameron Scott    Date: 10/11/2024    : 1970  Insurance: Payor: [employer] [facility] [place] / Plan: AET BETTER [facility] [place] / Product Type: *No Product type* /      Patient  verified Yes     Visit #   Current / Total 1 10   Time   In / Out 1010 1048   Pain   In / Out 0 0   Subjective Functional Status/Changes: States that he has no pain in his left shoulder, but has some numbness in his right hand     TREATMENT AREA =  Pain in left shoulder [M25.512]  S/P left rotator cuff repair [Z98.890]     OBJECTIVE         Therapeutic Procedures:    Tx Min Billable or 1:1 Min (if diff from Tx Min) Procedure, Rationale, Specifics   10 10 37896 Therapeutic Exercise (timed):  increase ROM, strength, coordination, balance, and proprioception to improve patient's ability to progress to PLOF and address remaining functional goals. (see flow sheet as applicable)     Details if applicable:       10 10 70709 Neuromuscular Re-Education (timed):  improve balance, coordination, kinesthetic sense, posture, core stability and proprioception to improve patient's ability to develop conscious control of individual muscles and awareness of position of extremities in order to progress to PLOF and address remaining functional goals. (see flow sheet as applicable)     Details if applicable:     10 10 70406 Therapeutic Activity (timed):  use of dynamic activities replicating functional movements to increase ROM, strength, coordination, balance, and proprioception in order to improve patient's ability to progress to PLOF and address remaining functional goals.  (see flow sheet as applicable)     Details if applicable:      88956 Self Care/Home Management (timed):  improve patient knowledge and understanding of pain reducing techniques, positioning, posture/ergonomics, home safety, activity modification, diagnosis/prognosis, and physical therapy expectations,

## 2024-10-16 ENCOUNTER — HOSPITAL ENCOUNTER (OUTPATIENT)
Facility: HOSPITAL | Age: 54
Setting detail: RECURRING SERIES
Discharge: HOME | End: 2024-10-19
Payer: COMMERCIAL

## 2024-10-16 PROCEDURE — 97535 SELF CARE MNGMENT TRAINING: CPT

## 2024-10-16 PROCEDURE — 97110 THERAPEUTIC EXERCISES: CPT

## 2024-10-16 PROCEDURE — 97112 NEUROMUSCULAR REEDUCATION: CPT

## 2024-10-16 PROCEDURE — 97530 THERAPEUTIC ACTIVITIES: CPT

## 2024-10-16 NOTE — PROGRESS NOTES
shoulder flexion, abduction, IR, ER to 5/5 and left elbow extension to 4/5 in order to resume ADLs such as lifting and household chores. (UPDATED GOAL)              PN Status: PROGRESSING; Shoulder Flexion/Abduction/IR/ER - 4+/5 in all planes, Elbow Flexion 5/5, Elbow Extension 3/5              Current: progressing well [Date assessed: 10/11/24]        3. Pt will improve Quick dash score to </= 33% to demonstrate improvement in patient's ability to perform unrestricted ADLs/IADLs at home & community.              PN Status: 65%     4. Pt will improve left shoulder AROM flexion and abduction to 160 deg to equal right side to improve patient's ability to perform ADLs and reach top shelf of cabinets at home. (NEW GOAL)              PN Status: Flexion: Left 150 deg, Right 160 deg, Abduction: Left 146 deg, Right 160 deg   Current: flexion = 160 deg, Abduction: 150 degrees [Date assessed: 10/16/24]      Next PN/ RC due 11/07/24  Auth due (visit number/ date) 5 more by 11/29/24    PLAN  - Continue Plan of Care    Jimmie Keller PT    10/16/2024    10:30 AM  If an interpreting service was utilized for treatment of this patient, the contents of this document represent the material reviewed with the patient via the .     Future Appointments   Date Time Provider Department Center   10/18[facility]2024 10:10 [facility]   10/23/2024 10:10 AM [facility]   10/25/2024 10:10 AM [facility]   10/30/2024 10:10 AM [facility]   11/1/2024 10:10 AM Adenike East PTA [facility]   11/1/2024 11:00 AM [facility] CT RM 1 [facility]   11/7/2024  8:10 AM Rashad Hernández MD [facility] BS AMB   4/14/2025  8:40 AM Henrry Hu MD [facility] BS AMB

## 2024-10-18 ENCOUNTER — HOSPITAL ENCOUNTER (OUTPATIENT)
Facility: HOSPITAL | Age: 54
Setting detail: RECURRING SERIES
Discharge: HOME | End: 2024-10-21
Payer: COMMERCIAL

## 2024-10-18 PROCEDURE — 97530 THERAPEUTIC ACTIVITIES: CPT

## 2024-10-18 PROCEDURE — 97110 THERAPEUTIC EXERCISES: CPT

## 2024-10-18 PROCEDURE — 97535 SELF CARE MNGMENT TRAINING: CPT

## 2024-10-18 PROCEDURE — 97112 NEUROMUSCULAR REEDUCATION: CPT

## 2024-10-18 NOTE — PROGRESS NOTES
PHYSICAL / OCCUPATIONAL THERAPY - DAILY TREATMENT NOTE    Patient Name: Cameron Scott    Date: 10/18/2024    : 1970  Insurance: Payor: JANNETJANES BETTER [facility] [place] / Plan: AET BETTER [facility] [place] / Product Type: *No Product type* /      Patient  verified Yes     Visit #   Current / Total 3 10   Time   In / Out 1013 1048   Pain   In / Out 0 0   Subjective Functional Status/Changes: No pain today.      TREATMENT AREA =  Pain in left shoulder [M25.512]  S/P left rotator cuff repair [Z98.890]     OBJECTIVE         Therapeutic Procedures:    Tx Min Billable or 1:1 Min (if diff from Tx Min) Procedure, Rationale, Specifics   10 10 88844 Therapeutic Exercise (timed):  increase ROM, strength, coordination, balance, and proprioception to improve patient's ability to progress to PLOF and address remaining functional goals. (see flow sheet as applicable)     Details if applicable:       10 10 65179 Neuromuscular Re-Education (timed):  improve balance, coordination, kinesthetic sense, posture, core stability and proprioception to improve patient's ability to develop conscious control of individual muscles and awareness of position of extremities in order to progress to PLOF and address remaining functional goals. (see flow sheet as applicable)     Details if applicable:     10 10 23239 Therapeutic Activity (timed):  use of dynamic activities replicating functional movements to increase ROM, strength, coordination, balance, and proprioception in order to improve patient's ability to progress to PLOF and address remaining functional goals.  (see flow sheet as applicable)     Details if applicable:     8  09922 Self Care/Home Management (timed):  improve patient knowledge and understanding of pain reducing techniques, positioning, posture/ergonomics, home safety, activity modification, diagnosis/prognosis, and physical therapy expectations, procedures and progression  to improve patient's ability to progress to PLOF and

## 2024-10-23 ENCOUNTER — HOSPITAL ENCOUNTER (OUTPATIENT)
Facility: HOSPITAL | Age: 54
Setting detail: RECURRING SERIES
Discharge: HOME | End: 2024-10-26
Payer: COMMERCIAL

## 2024-10-23 PROCEDURE — 97110 THERAPEUTIC EXERCISES: CPT

## 2024-10-23 PROCEDURE — 97112 NEUROMUSCULAR REEDUCATION: CPT

## 2024-10-23 PROCEDURE — 97535 SELF CARE MNGMENT TRAINING: CPT

## 2024-10-23 PROCEDURE — 97530 THERAPEUTIC ACTIVITIES: CPT

## 2024-10-23 NOTE — PROGRESS NOTES
PHYSICAL / OCCUPATIONAL THERAPY - DAILY TREATMENT NOTE    Patient Name: Cameron Scott    Date: 10/23/2024    : 1970  Insurance: Payor: [employer] [facility] [place] / Plan: AET BETTER [facility] [place] / Product Type: *No Product type* /      Patient  verified Yes     Visit #   Current / Total 4 10   Time   In / Out 1010 1050   Pain   In / Out 0 0   Subjective Functional Status/Changes: Pt continues to report no pain and stiffness of the L arm     TREATMENT AREA =  Pain in left shoulder [M25.512]  S/P left rotator cuff repair [Z98.890]     OBJECTIVE         Therapeutic Procedures:    Tx Min Billable or 1:1 Min (if diff from Tx Min) Procedure, Rationale, Specifics   10 10 06422 Therapeutic Exercise (timed):  increase ROM, strength, coordination, balance, and proprioception to improve patient's ability to progress to PLOF and address remaining functional goals. (see flow sheet as applicable)     Details if applicable:       10 10 61649 Neuromuscular Re-Education (timed):  improve balance, coordination, kinesthetic sense, posture, core stability and proprioception to improve patient's ability to develop conscious control of individual muscles and awareness of position of extremities in order to progress to PLOF and address remaining functional goals. (see flow sheet as applicable)     Details if applicable:     12  32049 Therapeutic Activity (timed):  use of dynamic activities replicating functional movements to increase ROM, strength, coordination, balance, and proprioception in order to improve patient's ability to progress to PLOF and address remaining functional goals.  (see flow sheet as applicable)     Details if applicable:     8 8 53589 Self Care/Home Management (timed):  improve patient knowledge and understanding of pain reducing techniques, positioning, posture/ergonomics, home safety, activity modification, diagnosis/prognosis, and physical therapy expectations, procedures and progression  to improve

## 2024-10-25 ENCOUNTER — HOSPITAL ENCOUNTER (OUTPATIENT)
Facility: HOSPITAL | Age: 54
Setting detail: RECURRING SERIES
Discharge: HOME | End: 2024-10-28
Payer: COMMERCIAL

## 2024-10-25 PROCEDURE — 97110 THERAPEUTIC EXERCISES: CPT

## 2024-10-25 PROCEDURE — 97535 SELF CARE MNGMENT TRAINING: CPT

## 2024-10-25 PROCEDURE — 97112 NEUROMUSCULAR REEDUCATION: CPT

## 2024-10-25 PROCEDURE — 97530 THERAPEUTIC ACTIVITIES: CPT

## 2024-10-25 NOTE — PROGRESS NOTES
sleeping 10/23/2024     2. Pt will improve functional strength of left shoulder flexion, abduction, IR, ER to 5/5 and left elbow extension to 4/5 in order to resume ADLs such as lifting and household chores. (UPDATED GOAL)              PN Status: PROGRESSING; Shoulder Flexion/Abduction/IR/ER - 4+/5 in all planes, Elbow Flexion 5/5, Elbow Extension 3/5              Current: progressing well tolerating cane skull crushers with 3lbs        3. Pt will improve Quick dash score to </= 33% to demonstrate improvement in patient's ability to perform unrestricted ADLs/IADLs at home & community.              PN Status: 65%     4. Pt will improve left shoulder AROM flexion and abduction to 160 deg to equal right side to improve patient's ability to perform ADLs and reach top shelf of cabinets at home. (NEW GOAL)              PN Status: Flexion: Left 150 deg, Right 160 deg, Abduction: Left 146 deg, Right 160 deg              Current: flexion = 160 deg, Abduction: 150 degrees [Date assessed: 10/16/24    Next PN/ RC due 11/7/24  Auth due (visit number/ date) 2 more by 11/29/24    PLAN  - Continue Plan of Care    Jimmie Keller PT    10/25/2024    10:14 AM  If an interpreting service was utilized for treatment of this patient, the contents of this document represent the material reviewed with the patient via the .     Future Appointments   Date Time Provider Department Center   10/30/2024 10:10 AM Jimmie Keller PT [facility]   11/1/2024 10:10 AM Adenike East PTA [facility]   11/1/2024 11:00 AM [facility]   11/7/2024  8:10 AM Rashad Hernández MD [facility] BS AMB   4/14/2025  8:40 AM Henrry Hu MD [facility] BS AMB

## 2024-10-30 ENCOUNTER — HOSPITAL ENCOUNTER (OUTPATIENT)
Facility: HOSPITAL | Age: 54
Setting detail: RECURRING SERIES
Discharge: HOME | End: 2024-11-02
Payer: COMMERCIAL

## 2024-10-30 PROCEDURE — 97112 NEUROMUSCULAR REEDUCATION: CPT

## 2024-10-30 PROCEDURE — 97530 THERAPEUTIC ACTIVITIES: CPT

## 2024-10-30 PROCEDURE — 97535 SELF CARE MNGMENT TRAINING: CPT

## 2024-10-30 PROCEDURE — 97110 THERAPEUTIC EXERCISES: CPT

## 2024-10-30 NOTE — PROGRESS NOTES
PHYSICAL / OCCUPATIONAL THERAPY - DAILY TREATMENT NOTE    Patient Name: Cameron Scott    Date: 10/30/2024    : 1970  Insurance: Payor: [employer] [facility] [place] / Plan: AET BETTER [facility] [place] / Product Type: *No Product type* /      Patient  verified Yes     Visit #   Current / Total 6 10   Time   In / Out 1010 1045   Pain   In / Out 0 0   Subjective Functional Status/Changes: \"No pain. The back of my arm is getting stronger.\"     TREATMENT AREA =  Pain in left shoulder [M25.512]  S/P left rotator cuff repair [Z98.890]     OBJECTIVE         Therapeutic Procedures:    Tx Min Billable or 1:1 Min (if diff from Tx Min) Procedure, Rationale, Specifics   10  58408 Therapeutic Exercise (timed):  increase ROM, strength, coordination, balance, and proprioception to improve patient's ability to progress to PLOF and address remaining functional goals. (see flow sheet as applicable)     Details if applicable:       9  12528 Neuromuscular Re-Education (timed):  improve balance, coordination, kinesthetic sense, posture, core stability and proprioception to improve patient's ability to develop conscious control of individual muscles and awareness of position of extremities in order to progress to PLOF and address remaining functional goals. (see flow sheet as applicable)     Details if applicable:     8  60006 Therapeutic Activity (timed):  use of dynamic activities replicating functional movements to increase ROM, strength, coordination, balance, and proprioception in order to improve patient's ability to progress to PLOF and address remaining functional goals.  (see flow sheet as applicable)     Details if applicable:     8  47572 Self Care/Home Management (timed):  improve patient knowledge and understanding of home injury/symptom/pain management, positioning, posture/ergonomics, home safety, activity modification, transfer techniques, and joint protection strategies  to improve patient's ability to progress to

## 2024-10-31 ENCOUNTER — TELEPHONE (OUTPATIENT)
Facility: HOSPITAL | Age: 54
End: 2024-10-31

## 2024-11-01 ENCOUNTER — APPOINTMENT (OUTPATIENT)
Facility: HOSPITAL | Age: 54
End: 2024-11-01
Payer: COMMERCIAL

## 2024-11-01 ENCOUNTER — HOSPITAL ENCOUNTER (OUTPATIENT)
Facility: HOSPITAL | Age: 54
Discharge: HOME | End: 2024-11-01
Attending: RADIOLOGY | Admitting: RADIOLOGY
Payer: COMMERCIAL

## 2024-11-01 VITALS
RESPIRATION RATE: 18 BRPM | HEIGHT: 68 IN | TEMPERATURE: 98.4 F | WEIGHT: 145.6 LBS | OXYGEN SATURATION: 98 % | BODY MASS INDEX: 22.07 KG/M2 | HEART RATE: 81 BPM

## 2024-11-01 DIAGNOSIS — R22.1 SUBCUTANEOUS NODULE OF NECK: ICD-10-CM

## 2024-11-01 LAB
ANION GAP SERPL CALC-SCNC: 4 MMOL/L (ref 3–18)
APTT PPP: 26.1 SEC (ref 23–36.4)
BASOPHILS # BLD: 0.1 K/UL (ref 0–0.1)
BASOPHILS NFR BLD: 1 % (ref 0–2)
BUN SERPL-MCNC: 12 MG/DL (ref 7–18)
BUN/CREAT SERPL: 16 (ref 12–20)
CALCIUM SERPL-MCNC: 9.4 MG/DL (ref 8.5–10.1)
CHLORIDE SERPL-SCNC: 111 MMOL/L (ref 100–111)
CO2 SERPL-SCNC: 28 MMOL/L (ref 21–32)
CREAT SERPL-MCNC: 0.77 MG/DL (ref 0.6–1.3)
DIFFERENTIAL METHOD BLD: ABNORMAL
EOSINOPHIL # BLD: 0.2 K/UL (ref 0–0.4)
EOSINOPHIL NFR BLD: 3 % (ref 0–5)
ERYTHROCYTE [DISTWIDTH] IN BLOOD BY AUTOMATED COUNT: 13.6 % (ref 11.6–14.5)
GLUCOSE SERPL-MCNC: 90 MG/DL (ref 74–99)
HCT VFR BLD AUTO: 43.7 % (ref 36–48)
HGB BLD-MCNC: 13.7 G/DL (ref 13–16)
IMM GRANULOCYTES # BLD AUTO: 0 K/UL (ref 0–0.04)
IMM GRANULOCYTES NFR BLD AUTO: 0 % (ref 0–0.5)
INR PPP: 0.9 (ref 0.9–1.1)
LYMPHOCYTES # BLD: 1.6 K/UL (ref 0.9–3.6)
LYMPHOCYTES NFR BLD: 29 % (ref 21–52)
MCH RBC QN AUTO: 29.7 PG (ref 24–34)
MCHC RBC AUTO-ENTMCNC: 31.4 G/DL (ref 31–37)
MCV RBC AUTO: 94.8 FL (ref 78–100)
MONOCYTES # BLD: 0.6 K/UL (ref 0.05–1.2)
MONOCYTES NFR BLD: 11 % (ref 3–10)
NEUTS SEG # BLD: 3.1 K/UL (ref 1.8–8)
NEUTS SEG NFR BLD: 56 % (ref 40–73)
NRBC # BLD: 0 K/UL (ref 0–0.01)
NRBC BLD-RTO: 0 PER 100 WBC
PLATELET # BLD AUTO: 248 K/UL (ref 135–420)
PMV BLD AUTO: 10 FL (ref 9.2–11.8)
POTASSIUM SERPL-SCNC: 4.8 MMOL/L (ref 3.5–5.5)
PROTHROMBIN TIME: 12.8 SEC (ref 11.9–14.9)
RBC # BLD AUTO: 4.61 M/UL (ref 4.35–5.65)
SODIUM SERPL-SCNC: 143 MMOL/L (ref 136–145)
WBC # BLD AUTO: 5.6 K/UL (ref 4.6–13.2)

## 2024-11-01 PROCEDURE — 85025 COMPLETE CBC W/AUTO DIFF WBC: CPT

## 2024-11-01 PROCEDURE — 85610 PROTHROMBIN TIME: CPT

## 2024-11-01 PROCEDURE — 80048 BASIC METABOLIC PNL TOTAL CA: CPT

## 2024-11-01 PROCEDURE — 85730 THROMBOPLASTIN TIME PARTIAL: CPT

## 2024-11-01 PROCEDURE — 2580000003 HC RX 258: Performed by: RADIOLOGY

## 2024-11-01 RX ORDER — SODIUM CHLORIDE 9 MG/ML
INJECTION, SOLUTION INTRAVENOUS CONTINUOUS
Status: DISCONTINUED | OUTPATIENT
Start: 2024-11-01 | End: 2024-11-02 | Stop reason: HOSPADM

## 2024-11-01 RX ADMIN — SODIUM CHLORIDE: 9 INJECTION, SOLUTION INTRAVENOUS at 09:28

## 2024-11-01 ASSESSMENT — PAIN - FUNCTIONAL ASSESSMENT: PAIN_FUNCTIONAL_ASSESSMENT: 0-10

## 2024-11-01 NOTE — H&P
History and Physical    Patient: Cameron Scott           Sex: male       DOA: 11/1/2024  YOB: 1970      Age:  54 y.o.     LOS:  LOS: 0 days        HPI:     Cameron Scott is a 54 y.o. male with reports of painful cervical nodule presents for cervical nodule biopsy with moderate sedation     Past Medical History:   Diagnosis Date    Abnormal /NCV LUE 05/2024    Ongoing Left C7 radiculopathy    Asthma     no need for inhaler in years    Back pain     Elevated blood pressure reading     GERD (gastroesophageal reflux disease)     Hypercholesteremia     Left shoulder pain     Low TSH level     Low TSH level 04/27/2017    may have mild hyperthyroidism w/u per PCP I will rpt TSH and get freeT4    Primary hypertension 9/13/2022    Right inguinal hernia     TIA (transient ischemic attack) 8/13/2022    no residual       Past Surgical History:   Procedure Laterality Date    COLONOSCOPY      HERNIA REPAIR Bilateral 2/7/2024    ROBOTIC BILATERAL INGUINAL HERNIA REPAIR WITH PLACEMENT OF MESH performed by Robyn Woodson MD at [facility] MAIN OR    SHOULDER ARTHROSCOPY Left 6/14/2024    LEFT SHOULDER ARTHROSCOPIC ROTATOR CUFF REPAIR; [[facility]] performed by Rashad Hernández MD at [facility] MAIN OR    SPINE SURGERY N/A 10/30/2023    CERVICAL LAMINOPLASTY CERVICAL THREE-THORACIC ONE; C-ARM; [[facility]] performed by Arjun Javier MD at [facility] MAIN OR       Family History   Problem Relation Age of Onset    Hypertension Mother     Cancer Father     No Known Problems Sister     Hypertension Brother     No Known Problems Maternal Grandmother     No Known Problems Maternal Grandfather     No Known Problems Paternal Grandmother     Stroke Paternal Grandfather 67    Asthma Daughter     No Known Problems Other     Heart Disease Neg Hx     Heart Attack Neg Hx        Social History     Socioeconomic History    Marital status: Single     Spouse name: None    Number of children: None    Years of education: None

## 2024-11-05 ENCOUNTER — HOSPITAL ENCOUNTER (OUTPATIENT)
Facility: HOSPITAL | Age: 54
Setting detail: RECURRING SERIES
Discharge: HOME | End: 2024-11-08
Payer: COMMERCIAL

## 2024-11-05 PROCEDURE — 97530 THERAPEUTIC ACTIVITIES: CPT

## 2024-11-05 PROCEDURE — 97535 SELF CARE MNGMENT TRAINING: CPT

## 2024-11-05 PROCEDURE — 97112 NEUROMUSCULAR REEDUCATION: CPT

## 2024-11-05 PROCEDURE — 97110 THERAPEUTIC EXERCISES: CPT

## 2024-11-05 NOTE — PROGRESS NOTES
PHYSICAL / OCCUPATIONAL THERAPY - DAILY TREATMENT NOTE    Patient Name: Cameron Scott    Date: 2024    : 1970  Insurance: Payor: [employer] [facility] [place] / Plan: AET BETTER [facility] OF [place] / Product Type: *No Product type* /      Patient  verified Yes     Visit #   Current / Total 7 10   Time   In / Out 810 848   Pain   In / Out 0 0   Subjective Functional Status/Changes: \"No pain.\"     TREATMENT AREA =  Pain in left shoulder [M25.512]  S/P left rotator cuff repair [Z98.890]     OBJECTIVE         Therapeutic Procedures:    Tx Min Billable or 1:1 Min (if diff from Tx Min) Procedure, Rationale, Specifics   10  46908 Therapeutic Exercise (timed):  increase ROM, strength, coordination, balance, and proprioception to improve patient's ability to progress to PLOF and address remaining functional goals. (see flow sheet as applicable)     Details if applicable:       10  29398 Neuromuscular Re-Education (timed):  improve balance, coordination, kinesthetic sense, posture, core stability and proprioception to improve patient's ability to develop conscious control of individual muscles and awareness of position of extremities in order to progress to PLOF and address remaining functional goals. (see flow sheet as applicable)     Details if applicable:     10  38380 Therapeutic Activity (timed):  use of dynamic activities replicating functional movements to increase ROM, strength, coordination, balance, and proprioception in order to improve patient's ability to progress to PLOF and address remaining functional goals.  (see flow sheet as applicable)     Details if applicable:     8  33430 Self Care/Home Management (timed):  improve patient knowledge and understanding of home injury/symptom/pain management, positioning, posture/ergonomics, home safety, activity modification, transfer techniques, and joint protection strategies  to improve patient's ability to progress to PLOF and address remaining functional goals. 
Abduction 4+/5, Shoulder IR 5/5, Shoulder ER 5/5, Elbow Flexion 5/5, Elbow Extension 3+/5     3. Pt will improve Quick dash score to </= 33% to demonstrate improvement in patient's ability to perform unrestricted ADLs/IADLs at home & community.              PN Status: 65%               NOT MET; 71%     4. Pt will improve left shoulder AROM flexion and abduction to 160 deg to equal right side to improve patient's ability to perform ADLs and reach top shelf of cabinets at home. (NEW GOAL)              PN Status: Flexion: Left 150 deg, Right 160 deg, Abduction: Left 146 deg, Right 160 deg              PROGRESSING; Flexion: 152 deg, Abduction 155 deg    Functional Gains: reaching in all planes, ROM, strength, ADLs  Functional Deficits: sleeping, tricep activation, reaching across body, taking off shoes and socks with left arm  % improvement: 75%  Pain   Average: 2/10       Best: 0/10     Worst: 7/10  Patient Goal: \"get use of my tricep back\"    Non-Medicare, can change goals, can adjust or add frequency duration, no signature required      New Goals to be achieved in __5__ WEEKS  1. Pt will be able to report a </= 2/10 pain rating at worst with sleeping to improve patient's ability to tolerate prolonged functional activities at home.               PN Status: 7/10 with sleeping     2. Pt will improve functional strength of left shoulder flexion, abduction, IR, ER to 5/5 and left elbow extension to 4/5 in order to resume ADLs such as lifting and household chores. (UPDATED GOAL)              PN Status: Shoulder Flexion 5/5, Shoulder Abduction 4+/5, Shoulder IR 5/5, Shoulder ER 5/5, Elbow Flexion 5/5, Elbow Extension 3+/5     3. Pt will improve Quick dash score to </= 33% to demonstrate improvement in patient's ability to perform unrestricted ADLs/IADLs at home & community.              PN Status: 71%     4. Pt will improve left shoulder AROM flexion and abduction to 160 deg to equal right side to improve patient's ability

## 2024-11-06 NOTE — PROGRESS NOTES
Cameron Scott  1970   Chief Complaint   Patient presents with    Shoulder Pain     Left shoulder        HISTORY OF PRESENT ILLNESS  Cameron Scott is a 54 y.o. male who presents today for reevaluation of  s/p left shoulder arthroscopic rotator cuff repair on 6/14/24. Pain is a 4/10. He has night time pain. He is compliant with PT. He notes his arm goes weak at times. Pain begins in his shoulder blade and radiates down his arm. He is currently taking Gabapentin 100 mg for pain relief.    Patient denies any fever, chills, chest pain, shortness of breath or calf pain. The remainder of the review of systems is negative. There are no new illness or injuries other than that mentioned above to report since last seen in the office. No changes in medications, allergies, social or family history.      PHYSICAL EXAM:   Ht 1.727 m (5' 8\")   Wt 65.8 kg (145 lb)   BMI 22.05 kg/m²   The patient is a well-developed, well-nourished male   in no acute distress.  The patient is alert and oriented times three.  The patient is alert and oriented times three. Mood and affect are normal.  LYMPHATIC: lymph nodes are not enlarged and are within normal limits  SKIN: normal in color and non tender to palpation. There are no bruises or abrasions noted.   NEUROLOGICAL: Motor sensory exam is within normal limits. Reflexes are equal bilaterally. There is normal sensation to pinprick and light touch  MUSCULOSKELETAL: Paraspinal muscle spasm present in the cervical spine with positive Spurling    PROCEDURE: none    IMAGING:       MRI of the left shoulder obtained by [facility] dated 5/13/24 reviewed and read by Dr. English:   1.  Supraspinatus insertion advanced adenopathy with ill-defined high-grade partial thickness tear as described, without significant fatty atrophy. Other rotator cuff tendons appear largely intact.  2.  Extensive patchy labral degeneration/tearing as described.  3.  Mild AC joint degenerative changes and acromion morphology

## 2024-11-07 ENCOUNTER — OFFICE VISIT (OUTPATIENT)
Age: 54
End: 2024-11-07
Payer: COMMERCIAL

## 2024-11-07 VITALS — WEIGHT: 145 LBS | HEIGHT: 68 IN | BODY MASS INDEX: 21.98 KG/M2

## 2024-11-07 DIAGNOSIS — M96.1 CERVICAL POST-LAMINECTOMY SYNDROME: Primary | ICD-10-CM

## 2024-11-07 PROCEDURE — 99213 OFFICE O/P EST LOW 20 MIN: CPT | Performed by: ORTHOPAEDIC SURGERY

## 2024-11-07 RX ORDER — GABAPENTIN 100 MG/1
300 CAPSULE ORAL 3 TIMES DAILY
Qty: 270 CAPSULE | Refills: 0 | Status: SHIPPED | OUTPATIENT
Start: 2024-11-07 | End: 2024-12-07

## 2024-11-07 RX ORDER — CELECOXIB 200 MG/1
200 CAPSULE ORAL DAILY
Qty: 60 CAPSULE | Refills: 3 | Status: SHIPPED | OUTPATIENT
Start: 2024-11-07

## 2024-11-08 ENCOUNTER — HOSPITAL ENCOUNTER (OUTPATIENT)
Facility: HOSPITAL | Age: 54
Setting detail: RECURRING SERIES
Discharge: HOME | End: 2024-11-11
Payer: COMMERCIAL

## 2024-11-08 PROCEDURE — 97110 THERAPEUTIC EXERCISES: CPT

## 2024-11-08 PROCEDURE — 97530 THERAPEUTIC ACTIVITIES: CPT

## 2024-11-08 PROCEDURE — 97112 NEUROMUSCULAR REEDUCATION: CPT

## 2024-11-08 NOTE — PROGRESS NOTES
concurrently with other procedures   [x] Review HEP    [] Progressed/Changed HEP, detail:    [] Other detail:       Objective Information/Functional Measures/Assessment    Pt is making steady progress with his triceps activation as he was able to increase repetitions with resistance during reclined tricep extension. Reports that he had increased soreness following last visit. He also states that he had lateral shoulder pain while at the doctor's office earlier this week. For the most part pain remains well-managed.     Patient will continue to benefit from skilled PT / OT services to modify and progress therapeutic interventions, analyze and address functional mobility deficits, analyze and address ROM deficits, analyze and address strength deficits, analyze and address soft tissue restrictions, analyze and cue for proper movement patterns, analyze and modify for postural abnormalities, analyze and address imbalance/dizziness, and instruct in home and community integration to address functional deficits and attain remaining goals.    Progress toward goals / Updated goals:  []  See Progress Note/Recertification    1. Pt will be able to report a </= 2/10 pain rating at worst with sleeping to improve patient's ability to tolerate prolonged functional activities at home.               PN Status: 7/10 with sleeping     2. Pt will improve functional strength of left shoulder flexion, abduction, IR, ER to 5/5 and left elbow extension to 4/5 in order to resume ADLs such as lifting and household chores. (UPDATED GOAL)              PN Status: Shoulder Flexion 5/5, Shoulder Abduction 4+/5, Shoulder IR 5/5, Shoulder ER 5/5, Elbow Flexion 5/5, Elbow Extension 3+/5     3. Pt will improve Quick dash score to </= 33% to demonstrate improvement in patient's ability to perform unrestricted ADLs/IADLs at home & community.              PN Status: 71%     4. Pt will improve left shoulder AROM flexion and abduction to 160 deg to equal

## 2024-11-08 NOTE — ED NOTES
Pt remains NPO until MRI resulted.  Pt has no c/o at this time
Pt transferred to [facility] via [facility] ambulance
Tele neuro in room. Neurologist speaking with pt.
Universal Safety Interventions

## 2024-11-12 ENCOUNTER — HOSPITAL ENCOUNTER (OUTPATIENT)
Facility: HOSPITAL | Age: 54
Setting detail: RECURRING SERIES
Discharge: HOME | End: 2024-11-15
Payer: COMMERCIAL

## 2024-11-12 PROCEDURE — 97112 NEUROMUSCULAR REEDUCATION: CPT

## 2024-11-12 PROCEDURE — 97110 THERAPEUTIC EXERCISES: CPT

## 2024-11-12 PROCEDURE — 97530 THERAPEUTIC ACTIVITIES: CPT

## 2024-11-12 PROCEDURE — 97535 SELF CARE MNGMENT TRAINING: CPT

## 2024-11-12 NOTE — PROGRESS NOTES
PHYSICAL / OCCUPATIONAL THERAPY - DAILY TREATMENT NOTE    Patient Name: Cameron Scott    Date: 2024    : 1970  Insurance: Payor: GARETT BETTER [facility] [place] / Plan: AET BETTER [facility] [place] / Product Type: *No Product type* /      Patient  verified Yes     Visit #   Current / Total 2 10   Time   In / Out 810 850   Pain   In / Out 0 0   Subjective Functional Status/Changes: \"No pain, just my normal tingling in my left hand.\"     TREATMENT AREA =  Pain in left shoulder [M25.512]  S/P left rotator cuff repair [Z98.890]     OBJECTIVE         Therapeutic Procedures:    Tx Min Billable or 1:1 Min (if diff from Tx Min) Procedure, Rationale, Specifics   10  51266 Therapeutic Exercise (timed):  increase ROM, strength, coordination, balance, and proprioception to improve patient's ability to progress to PLOF and address remaining functional goals. (see flow sheet as applicable)     Details if applicable:       12  63522 Neuromuscular Re-Education (timed):  improve balance, coordination, kinesthetic sense, posture, core stability and proprioception to improve patient's ability to develop conscious control of individual muscles and awareness of position of extremities in order to progress to PLOF and address remaining functional goals. (see flow sheet as applicable)     Details if applicable:     10  59158 Therapeutic Activity (timed):  use of dynamic activities replicating functional movements to increase ROM, strength, coordination, balance, and proprioception in order to improve patient's ability to progress to PLOF and address remaining functional goals.  (see flow sheet as applicable)     Details if applicable:     8  06871 Self Care/Home Management (timed):  improve patient knowledge and understanding of home injury/symptom/pain management, positioning, posture/ergonomics, home safety, activity modification, transfer techniques, and joint protection strategies  to improve patient's ability to progress to

## 2024-11-15 ENCOUNTER — HOSPITAL ENCOUNTER (OUTPATIENT)
Facility: HOSPITAL | Age: 54
Setting detail: RECURRING SERIES
Discharge: HOME | End: 2024-11-18
Payer: COMMERCIAL

## 2024-11-15 PROCEDURE — 97112 NEUROMUSCULAR REEDUCATION: CPT

## 2024-11-15 PROCEDURE — 97110 THERAPEUTIC EXERCISES: CPT

## 2024-11-15 PROCEDURE — 97530 THERAPEUTIC ACTIVITIES: CPT

## 2024-11-15 NOTE — PROGRESS NOTES
Assess at 30 day magdy [Date assessed: 11/15/2024]    4. Pt will improve left shoulder AROM flexion and abduction to 160 deg to equal right side to improve patient's ability to perform ADLs and reach top shelf of cabinets at home. (NEW GOAL)              PN Status: Flexion: 152 deg, Abduction 155 deg       Next PN/ RC due 12/04/2024  Auth due (visit number/ date) pending    PLAN  - Continue Plan of Care    PhilipevelioDELFINO Maldonado    11/15/2024    11:02 AM  If an interpreting service was utilized for treatment of this patient, the contents of this document represent the material reviewed with the patient via the .     Future Appointments   Date Time Provider Department Center   11/15/2024 11:30 [facility]   11/18/2024 11:00 AM [facility] CT RM 1 [facility]   4/14/2025  8:40 AM Henrry Hu MD [facility] BS AMB

## 2024-11-18 ENCOUNTER — HOSPITAL ENCOUNTER (OUTPATIENT)
Facility: HOSPITAL | Age: 54
Discharge: HOME | End: 2024-11-18
Attending: PHYSICAL MEDICINE & REHABILITATION | Admitting: RADIOLOGY
Payer: COMMERCIAL

## 2024-11-18 VITALS
BODY MASS INDEX: 23.39 KG/M2 | TEMPERATURE: 98.3 F | OXYGEN SATURATION: 99 % | DIASTOLIC BLOOD PRESSURE: 94 MMHG | HEART RATE: 83 BPM | RESPIRATION RATE: 16 BRPM | SYSTOLIC BLOOD PRESSURE: 134 MMHG | WEIGHT: 153.8 LBS

## 2024-11-18 DIAGNOSIS — R22.1 SUBCUTANEOUS NODULE OF NECK: ICD-10-CM

## 2024-11-18 LAB
ANION GAP SERPL CALC-SCNC: 6 MMOL/L (ref 3–18)
APTT PPP: 26.9 SEC (ref 23–36.4)
BASOPHILS # BLD: 0.1 K/UL (ref 0–0.1)
BASOPHILS NFR BLD: 1 % (ref 0–2)
BUN SERPL-MCNC: 7 MG/DL (ref 7–18)
BUN/CREAT SERPL: 9 (ref 12–20)
CALCIUM SERPL-MCNC: 9.3 MG/DL (ref 8.5–10.1)
CHLORIDE SERPL-SCNC: 106 MMOL/L (ref 100–111)
CO2 SERPL-SCNC: 29 MMOL/L (ref 21–32)
CREAT SERPL-MCNC: 0.81 MG/DL (ref 0.6–1.3)
DIFFERENTIAL METHOD BLD: ABNORMAL
EOSINOPHIL # BLD: 0.1 K/UL (ref 0–0.4)
EOSINOPHIL NFR BLD: 3 % (ref 0–5)
ERYTHROCYTE [DISTWIDTH] IN BLOOD BY AUTOMATED COUNT: 13.3 % (ref 11.6–14.5)
GLUCOSE SERPL-MCNC: 98 MG/DL (ref 74–99)
HCT VFR BLD AUTO: 45.7 % (ref 36–48)
HGB BLD-MCNC: 14.6 G/DL (ref 13–16)
IMM GRANULOCYTES # BLD AUTO: 0 K/UL (ref 0–0.04)
IMM GRANULOCYTES NFR BLD AUTO: 0 % (ref 0–0.5)
INR PPP: 1 (ref 0.9–1.1)
LYMPHOCYTES # BLD: 1.5 K/UL (ref 0.9–3.6)
LYMPHOCYTES NFR BLD: 32 % (ref 21–52)
MCH RBC QN AUTO: 30.4 PG (ref 24–34)
MCHC RBC AUTO-ENTMCNC: 31.9 G/DL (ref 31–37)
MCV RBC AUTO: 95 FL (ref 78–100)
MONOCYTES # BLD: 0.5 K/UL (ref 0.05–1.2)
MONOCYTES NFR BLD: 12 % (ref 3–10)
NEUTS SEG # BLD: 2.5 K/UL (ref 1.8–8)
NEUTS SEG NFR BLD: 52 % (ref 40–73)
NRBC # BLD: 0 K/UL (ref 0–0.01)
NRBC BLD-RTO: 0 PER 100 WBC
PLATELET # BLD AUTO: 250 K/UL (ref 135–420)
PMV BLD AUTO: 10.5 FL (ref 9.2–11.8)
POTASSIUM SERPL-SCNC: 3.3 MMOL/L (ref 3.5–5.5)
PROTHROMBIN TIME: 13 SEC (ref 11.9–14.9)
RBC # BLD AUTO: 4.81 M/UL (ref 4.35–5.65)
SODIUM SERPL-SCNC: 141 MMOL/L (ref 136–145)
WBC # BLD AUTO: 4.7 K/UL (ref 4.6–13.2)

## 2024-11-18 PROCEDURE — 70490 CT SOFT TISSUE NECK W/O DYE: CPT

## 2024-11-18 PROCEDURE — 80048 BASIC METABOLIC PNL TOTAL CA: CPT

## 2024-11-18 PROCEDURE — 85025 COMPLETE CBC W/AUTO DIFF WBC: CPT

## 2024-11-18 PROCEDURE — 7100000010 HC PHASE II RECOVERY - FIRST 15 MIN

## 2024-11-18 PROCEDURE — 85730 THROMBOPLASTIN TIME PARTIAL: CPT

## 2024-11-18 PROCEDURE — 85610 PROTHROMBIN TIME: CPT

## 2024-11-18 RX ORDER — SODIUM CHLORIDE 9 MG/ML
INJECTION, SOLUTION INTRAVENOUS CONTINUOUS
Status: DISCONTINUED | OUTPATIENT
Start: 2024-11-18 | End: 2024-11-18 | Stop reason: HOSPADM

## 2024-11-18 ASSESSMENT — PAIN SCALES - GENERAL: PAINLEVEL_OUTOF10: 0

## 2024-11-18 ASSESSMENT — PAIN - FUNCTIONAL ASSESSMENT: PAIN_FUNCTIONAL_ASSESSMENT: 0-10

## 2024-11-18 NOTE — PROGRESS NOTES
TRANSFER - OUT REPORT:    Verbal report given to MEREDITH Melgar (name) on Camreon Scott being transferred to phase II (unit) for routine progression of patient care       Report consisted of patient's Situation, Background, Assessment and   Recommendations(SBAR).     Information from the following report(s) Nurse Handoff Report was reviewed with the receiving nurse.    Opportunity for questions and clarification was provided.      Patient transported with:   Registered Nurse    Procedure cancelled. Patient taken to phase II for discharge.

## 2024-11-22 NOTE — PROGRESS NOTES
[facility]    [facility] [address], [address]  [place] [address]  Phone: [phone]  Fax: [phone]        Cameron Scott  : 1970  PCP: Ronald Davila Jr., APRN - NP    PROGRESS NOTE      ASSESSMENT AND PLAN    Cameron was seen today for hand pain.    Diagnoses and all orders for this visit:    De Quervain's tenosynovitis, left    Cervical post-laminectomy syndrome    Myelomalacia of cervical cord (HCC)    Subcutaneous nodule of neck        Cameron Scott is a 54 y.o. male with myelomalacia prior to cervical decompression.  He is over 1 year postop.  I believe this is his new baseline.  Recommend wrist forearm splint for left hand. Follow up with Dr. Pritchard for  injection if symptoms persist.  Continue Gabapentin 900mg TID.  Continue refills through neurology..   Continue Celebrex PRN    Follow-up and Dispositions    Return if symptoms worsen or fail to improve.         HISTORY OF PRESENT ILLNESS    Cameron Scott is a 54 y.o. male presents for follow up of chronic left cervical myeloradiculopathy. At his last OV (24), Order C CT, poss biopsy. Continue PT. Rf Lidoderm patch    Unable to have biopsy as no lesion identified.    Pt c/o burning pain in left hand onset 3 days ago. Pt notes it feels like he has a watch on. Pt takes Gabapentin 900mg TID.    CT neck soft tissue 10/7/24 and 24 reviewed.         2024     3:23 PM   AMB PAIN ASSESSMENT   Location of Pain Hand   Location Modifiers Left   Severity of Pain 10   Quality of Pain Other (Comment)   Duration of Pain Persistent   Frequency of Pain Constant   Aggravating Factors Other (Comment)   Limiting Behavior Yes   Relieving Factors Other (Comment)   Result of Injury No   Work-Related Injury No   Are there other pain locations you wish to document? No     Onset of pain: Neck- 10/2023, post-op, Low back- 2024, no injury      Investigations:   Neck CTA (10/2024): no biopsy performed. No fluid

## 2024-11-25 ENCOUNTER — OFFICE VISIT (OUTPATIENT)
Age: 54
End: 2024-11-25
Payer: COMMERCIAL

## 2024-11-25 VITALS
OXYGEN SATURATION: 100 % | BODY MASS INDEX: 23.64 KG/M2 | SYSTOLIC BLOOD PRESSURE: 114 MMHG | TEMPERATURE: 96.8 F | DIASTOLIC BLOOD PRESSURE: 83 MMHG | HEART RATE: 89 BPM | WEIGHT: 156 LBS | HEIGHT: 68 IN

## 2024-11-25 DIAGNOSIS — R22.1 SUBCUTANEOUS NODULE OF NECK: ICD-10-CM

## 2024-11-25 DIAGNOSIS — G95.89 MYELOMALACIA OF CERVICAL CORD (HCC): ICD-10-CM

## 2024-11-25 DIAGNOSIS — M65.4 DE QUERVAIN'S TENOSYNOVITIS, LEFT: Primary | ICD-10-CM

## 2024-11-25 DIAGNOSIS — M96.1 CERVICAL POST-LAMINECTOMY SYNDROME: ICD-10-CM

## 2024-11-25 PROCEDURE — 99213 OFFICE O/P EST LOW 20 MIN: CPT | Performed by: PHYSICAL MEDICINE & REHABILITATION

## 2024-11-25 PROCEDURE — 3074F SYST BP LT 130 MM HG: CPT | Performed by: PHYSICAL MEDICINE & REHABILITATION

## 2024-11-25 PROCEDURE — 3079F DIAST BP 80-89 MM HG: CPT | Performed by: PHYSICAL MEDICINE & REHABILITATION

## 2024-11-25 NOTE — PROGRESS NOTES
Cameron Scott presents today for   Chief Complaint   Patient presents with    Hand Pain     Left        Is someone accompanying this pt? no    Is the patient using any DME equipment during OV? no      Coordination of Care:  1. Have you been to the ER, urgent care clinic since your last visit? no  Hospitalized since your last visit? no    2. Have you seen or consulted any other health care providers outside of the [facility] since your last visit? Yes, physical therapy and ortho Include any pap smears or colon screening. no

## 2024-12-02 ENCOUNTER — HOSPITAL ENCOUNTER (OUTPATIENT)
Facility: HOSPITAL | Age: 54
Setting detail: RECURRING SERIES
Discharge: HOME | End: 2024-12-05
Payer: COMMERCIAL

## 2024-12-02 PROCEDURE — 97112 NEUROMUSCULAR REEDUCATION: CPT

## 2024-12-02 PROCEDURE — 97110 THERAPEUTIC EXERCISES: CPT

## 2024-12-02 PROCEDURE — 97535 SELF CARE MNGMENT TRAINING: CPT

## 2024-12-02 PROCEDURE — 97530 THERAPEUTIC ACTIVITIES: CPT

## 2024-12-02 NOTE — PROGRESS NOTES
[facility] [address]  [address]  [place] [address]  [phone] fax  PROGRESS NOTE  Patient Name: Cameron Scott : 1970   Treatment/Medical Diagnosis: Pain in left shoulder [M25.512]  S/P left rotator cuff repair [Z98.890]   Referral Source:  Payor Rashad Hernández,*  Payor: GARETT [facility] / Plan: [employer]ET[employer] BETTER [facility] / Product Type: *No Product type* /      Date of Initial Visit: 2024 Attended Visits: 43 Missed Visits: 1     SUMMARY OF TREATMENT  Mr. Scott self-reports 60% improvement since beginning PT with steady progress. Pt reports functional gains that include: donning/doffing shoes/socks, personal hygiene, reaching, strength, ROM. Pt reports functional deficits that include: sleeping, reaching across body to grab seatbelt, washing back, brushing hair. Pt reports a recent average pain of 3/10, 7-8/10 at worst, and 0/10 at best. Pt continues to increase with his ROM and strength, but still left triceps weakness noted per objective measurements. Reports still having some difficulty with reaching across his body to buckle his seatbelt. Pt would benefit from continued skilled PT to address remaining functional deficits. We will continue with PT at 2x/wk for 5 weeks.    CURRENT STATUS  1. Pt will be able to report a </= 2/10 pain rating at worst with sleeping to improve patient's ability to tolerate prolonged functional activities at home.               PN Status: 7/10 with sleeping               NOT MET; 7-8/10 when sleeping, but reports improving     2. Pt will improve functional strength of left shoulder flexion, abduction, IR, ER to 5/5 and left elbow extension to 4/5 in order to resume ADLs such as lifting and household chores. (UPDATED GOAL)              PN Status: Shoulder Flexion 5/5, Shoulder Abduction 4+/5, Shoulder IR 5/5, Shoulder ER 5/5, Elbow Flexion 5/5, Elbow Extension 3+/5               PROGRESSING; Shoulder 
goals.  (see flow sheet as applicable)     Details if applicable:     38  [facility] Totals Reminder: bill using total billable min of TIMED therapeutic procedures (example: do not include dry needle or estim unattended, both untimed codes, in totals to left)  8-22 min = 1 unit; 23-37 min = 2 units; 38-52 min = 3 units; 53-67 min = 4 units; 68-82 min = 5 units   Total Total     [x]  Patient Education billed concurrently with other procedures   [x] Review HEP    [] Progressed/Changed HEP, detail:    [] Other detail:       Objective Information/Functional Measures/Assessment    QuickDASH 68%     AROM/PROM Left   Shoulder Flexion  153 deg   Shoulder Abduction  160 deg     MMT Left   Shoulder Flexion  5/5   Shoulder Abduction  5/5   Shoulder IR  5/5   Shoulder ER  5/5     MMT  Left   Elbow Flexion  5/5   Elbow Extension  4-/5     Mr. Scott self-reports 60% improvement since beginning PT with steady progress. Pt reports functional gains that include: donning/doffing shoes/socks, personal hygiene, reaching, strength, ROM. Pt reports functional deficits that include: sleeping, reaching across body to grab seatbelt, washing back, brushing hair. Pt reports a recent average pain of 3/10, 7-8/10 at worst, and 0/10 at best. Pt continues to increase with his ROM and strength, but still left triceps weakness noted per objective measurements. Reports still having some difficulty with reaching across his body to buckle his seatbelt. Pt would benefit from continued skilled PT to address remaining functional deficits. We will continue with PT at 2x/wk for 5 weeks.      Patient will continue to benefit from skilled PT / OT services to modify and progress therapeutic interventions, analyze and address functional mobility deficits, analyze and address ROM deficits, analyze and address strength deficits, analyze and address soft tissue restrictions, analyze and cue for proper movement patterns, analyze and modify for postural abnormalities,

## 2024-12-06 ENCOUNTER — HOSPITAL ENCOUNTER (OUTPATIENT)
Facility: HOSPITAL | Age: 54
Setting detail: RECURRING SERIES
Discharge: HOME | End: 2024-12-09
Payer: COMMERCIAL

## 2024-12-06 PROCEDURE — 97530 THERAPEUTIC ACTIVITIES: CPT

## 2024-12-06 PROCEDURE — 97112 NEUROMUSCULAR REEDUCATION: CPT

## 2024-12-06 PROCEDURE — 97110 THERAPEUTIC EXERCISES: CPT

## 2024-12-06 PROCEDURE — 97535 SELF CARE MNGMENT TRAINING: CPT

## 2024-12-06 NOTE — PROGRESS NOTES
PHYSICAL / OCCUPATIONAL THERAPY - DAILY TREATMENT NOTE    Patient Name: Cameron Scott    Date: 2024    : 1970  Insurance: Payor: G[employer]T BETTER [facility] [place] / Plan: AET BETTER [facility] [place] / Product Type: *No Product type* /      Patient  verified Yes     Visit #   Current / Total 5 10   Time   In / Out 1010 1048   Pain   In / Out 6 0   Subjective Functional Status/Changes: \" Feeling some numbness & tingling in the left hand \"     TREATMENT AREA =  Pain in left shoulder [M25.512]  S/P left rotator cuff repair [Z98.890]     OBJECTIVE         Therapeutic Procedures:    Tx Min Billable or 1:1 Min (if diff from Tx Min) Procedure, Rationale, Specifics   10  79458 Therapeutic Exercise (timed):  increase ROM, strength, coordination, balance, and proprioception to improve patient's ability to progress to PLOF and address remaining functional goals. (see flow sheet as applicable)     Details if applicable:       10  27058 Neuromuscular Re-Education (timed):  improve balance, coordination, kinesthetic sense, posture, core stability and proprioception to improve patient's ability to develop conscious control of individual muscles and awareness of position of extremities in order to progress to PLOF and address remaining functional goals. (see flow sheet as applicable)     Details if applicable:     10  26066 Therapeutic Activity (timed):  use of dynamic activities replicating functional movements to increase ROM, strength, coordination, balance, and proprioception in order to improve patient's ability to progress to PLOF and address remaining functional goals.  (see flow sheet as applicable)     Details if applicable:     8  84829 Self Care/Home Management (timed):  improve patient knowledge and understanding of home injury/symptom/pain management, positioning, posture/ergonomics, home safety, activity modification, transfer techniques, and joint protection strategies  to improve patient's ability to progress to

## 2024-12-09 ENCOUNTER — HOSPITAL ENCOUNTER (OUTPATIENT)
Facility: HOSPITAL | Age: 54
Setting detail: RECURRING SERIES
Discharge: HOME | End: 2024-12-12
Payer: COMMERCIAL

## 2024-12-09 PROCEDURE — 97112 NEUROMUSCULAR REEDUCATION: CPT

## 2024-12-09 PROCEDURE — 97110 THERAPEUTIC EXERCISES: CPT

## 2024-12-09 PROCEDURE — 97530 THERAPEUTIC ACTIVITIES: CPT

## 2024-12-09 PROCEDURE — 97535 SELF CARE MNGMENT TRAINING: CPT

## 2024-12-09 NOTE — PROGRESS NOTES
GOAL)              PN Status: Shoulder Flexion 5/5, Shoulder Abduction 4+/5, Shoulder IR 5/5, Shoulder ER 5/5, Elbow Flexion 5/5, Elbow Extension 3+/5               PROGRESSING; Shoulder Flexion 5/5, Shoulder Abduction 5/5, Shoulder IR 5/5, Shoulder ER 5/5, Elbow Flexion 5/5, Elbow Extension 4-/5     3. Pt will improve Quick dash score to </= 33% to demonstrate improvement in patient's ability to perform unrestricted ADLs/IADLs at home & community.              PN Status: 71%               PROGRESSING; 68%     4. Pt will improve left shoulder AROM flexion and abduction to 160 deg to equal right side to improve patient's ability to perform ADLs and reach top shelf of cabinets at home. (NEW GOAL)              PN Status: Flexion: 152 deg, Abduction 155 deg              PARTIALLY MET; Flexion: 153 deg, Abduction: 160 deg    Next PN/ RC due 12/31/24  Auth due (visit number/ date) 4 more by 1/31/25    PLAN  - Continue Plan of Care    Jimmie Keller, LEX    12/9/2024    10:34 AM  If an interpreting service was utilized for treatment of this patient, the contents of this document represent the material reviewed with the patient via the .     Future Appointments   Date Time Provider Department Center   12/12/2024 10:10 AM Roshan Norton, PT [facility]   12/16/2024 10:10 AM Henrietta Goddard, PT [facility]   12/18/2024  8:50 AM Roshan Norton, PT [facility]   12/24/2024  8:10 AM Jimmie Keller, PT [facility]   4/14/2025  8:40 AM Henrry Hu MD [facility] AMB

## 2024-12-12 ENCOUNTER — HOSPITAL ENCOUNTER (OUTPATIENT)
Facility: HOSPITAL | Age: 54
Setting detail: RECURRING SERIES
Discharge: HOME | End: 2024-12-15
Payer: COMMERCIAL

## 2024-12-12 PROCEDURE — 97535 SELF CARE MNGMENT TRAINING: CPT

## 2024-12-12 PROCEDURE — 97110 THERAPEUTIC EXERCISES: CPT

## 2024-12-12 PROCEDURE — 97112 NEUROMUSCULAR REEDUCATION: CPT

## 2024-12-12 PROCEDURE — 97530 THERAPEUTIC ACTIVITIES: CPT

## 2024-12-12 NOTE — PROGRESS NOTES
PHYSICAL / OCCUPATIONAL THERAPY - DAILY TREATMENT NOTE    Patient Name: Cameron Scott    Date: 2024    : 1970  Insurance: Payor: GARETT BETTER [facility] [place] / Plan: AET BETTER [facility] [place] / Product Type: *No Product type* /      Patient  verified yes   Visit #   Current / Total 3 10   Time   In / Out 1006 1050   Pain   In / Out 0 0   Subjective Functional Status/Changes: \" No pain just still numbness & tingling in the hand \"     TREATMENT AREA =  Pain in left shoulder [M25.512]  S/P left rotator cuff repair [Z98.890]     OBJECTIVE         Therapeutic Procedures:    Tx Min Billable or 1:1 Min (if diff from Tx Min) Procedure, Rationale, Specifics   16  34539 Therapeutic Exercise (timed):  increase ROM, strength, coordination, balance, and proprioception to improve patient's ability to progress to PLOF and address remaining functional goals. (see flow sheet as applicable)     Details if applicable:     10  25496 Neuromuscular Re-Education (timed):  improve balance, coordination, kinesthetic sense, posture, core stability and proprioception to improve patient's ability to develop conscious control of individual muscles and awareness of position of extremities in order to progress to PLOF and address remaining functional goals. (see flow sheet as applicable)     Details if applicable:     10  94791 Therapeutic Activity (timed):  use of dynamic activities replicating functional movements to increase ROM, strength, coordination, balance, and proprioception in order to improve patient's ability to progress to PLOF and address remaining functional goals.  (see flow sheet as applicable)     Details if applicable:     8  44896 Self Care/Home Management (timed):  improve patient knowledge and understanding of home injury/symptom/pain management, positioning, posture/ergonomics, home safety, activity modification, transfer techniques, and joint protection strategies  to improve patient's ability to progress to

## 2024-12-16 ENCOUNTER — HOSPITAL ENCOUNTER (OUTPATIENT)
Facility: HOSPITAL | Age: 54
Setting detail: RECURRING SERIES
Discharge: HOME | End: 2024-12-19
Payer: COMMERCIAL

## 2024-12-16 PROCEDURE — 97112 NEUROMUSCULAR REEDUCATION: CPT

## 2024-12-16 PROCEDURE — 97530 THERAPEUTIC ACTIVITIES: CPT

## 2024-12-16 PROCEDURE — 97535 SELF CARE MNGMENT TRAINING: CPT

## 2024-12-16 PROCEDURE — 97110 THERAPEUTIC EXERCISES: CPT

## 2024-12-16 NOTE — PROGRESS NOTES
PHYSICAL / OCCUPATIONAL THERAPY - DAILY TREATMENT NOTE    Patient Name: Cameron Scott    Date: 2024    : 1970  Insurance: Payor: GARETT [facility] [place] / Plan: AET BETTER [facility] [place] / Product Type: *No Product type* /      Patient  verified Yes     Visit #   Current / Total 4 10   Time   In / Out 1010 1050   Pain   In / Out 0/10 010   Subjective Functional Status/Changes: \"Just numbness and tingling in my left hand\"     TREATMENT AREA =  Pain in left shoulder [M25.512]  S/P left rotator cuff repair [Z98.890]     OBJECTIVE    Therapeutic Procedures:    Tx Min Billable or 1:1 Min (if diff from Tx Min) Procedure, Rationale, Specifics   10 10 24562 Therapeutic Exercise (timed):  increase ROM, strength, coordination, balance, and proprioception to improve patient's ability to progress to PLOF and address remaining functional goals. (see flow sheet as applicable)     Details if applicable:        88962 Neuromuscular Re-Education (timed):  improve balance, coordination, kinesthetic sense, posture, core stability and proprioception to improve patient's ability to develop conscious control of individual muscles and awareness of position of extremities in order to progress to PLOF and address remaining functional goals. (see flow sheet as applicable)     Details if applicable:  tricep activation, scapular re-ed    10 10 01104 Therapeutic Activity (timed):  use of dynamic activities replicating functional movements to increase ROM, strength, coordination, balance, and proprioception in order to improve patient's ability to progress to PLOF and address remaining functional goals.  (see flow sheet as applicable)     Details if applicable:  functional reaching/carries    8 8 83084 Self Care/Home Management (timed):  improve patient knowledge and understanding of home injury/symptom/pain management, positioning, posture/ergonomics, home safety, activity modification, transfer techniques, and joint

## 2024-12-18 ENCOUNTER — HOSPITAL ENCOUNTER (OUTPATIENT)
Facility: HOSPITAL | Age: 54
Setting detail: RECURRING SERIES
Discharge: HOME | End: 2024-12-21
Payer: COMMERCIAL

## 2024-12-18 PROCEDURE — 97112 NEUROMUSCULAR REEDUCATION: CPT

## 2024-12-18 PROCEDURE — 97535 SELF CARE MNGMENT TRAINING: CPT

## 2024-12-18 PROCEDURE — 97530 THERAPEUTIC ACTIVITIES: CPT

## 2024-12-18 PROCEDURE — 97110 THERAPEUTIC EXERCISES: CPT

## 2024-12-18 NOTE — PROGRESS NOTES
PHYSICAL / OCCUPATIONAL THERAPY - DAILY TREATMENT NOTE    Patient Name: Cameron Scott    Date: 2024    : 1970  Insurance: Payor: GARETT BETTER [facility] [place] / Plan: AET BETTER [facility] [place] / Product Type: *No Product type* /      Patient  verified Yes     Visit #   Current / Total 5 10   Time   In / Out 848 932   Pain   In / Out 0 0   Subjective Functional Status/Changes: \"No pain.\"     TREATMENT AREA =  Pain in left shoulder [M25.512]  S/P left rotator cuff repair [Z98.890]     OBJECTIVE         Therapeutic Procedures:    Tx Min Billable or 1:1 Min (if diff from Tx Min) Procedure, Rationale, Specifics   10  16037 Therapeutic Exercise (timed):  increase ROM, strength, coordination, balance, and proprioception to improve patient's ability to progress to PLOF and address remaining functional goals. (see flow sheet as applicable)     Details if applicable:       14  44533 Neuromuscular Re-Education (timed):  improve balance, coordination, kinesthetic sense, posture, core stability and proprioception to improve patient's ability to develop conscious control of individual muscles and awareness of position of extremities in order to progress to PLOF and address remaining functional goals. (see flow sheet as applicable)     Details if applicable:     10  11421 Therapeutic Activity (timed):  use of dynamic activities replicating functional movements to increase ROM, strength, coordination, balance, and proprioception in order to improve patient's ability to progress to PLOF and address remaining functional goals.  (see flow sheet as applicable)     Details if applicable:     10  56124 Self Care/Home Management (timed):  improve patient knowledge and understanding of home injury/symptom/pain management, positioning, posture/ergonomics, home safety, activity modification, transfer techniques, and joint protection strategies  to improve patient's ability to progress to PLOF and address remaining functional

## 2024-12-24 ENCOUNTER — HOSPITAL ENCOUNTER (OUTPATIENT)
Facility: HOSPITAL | Age: 54
Setting detail: RECURRING SERIES
Discharge: HOME | End: 2024-12-27
Payer: COMMERCIAL

## 2024-12-24 PROCEDURE — 97112 NEUROMUSCULAR REEDUCATION: CPT

## 2024-12-24 PROCEDURE — 97110 THERAPEUTIC EXERCISES: CPT

## 2024-12-24 PROCEDURE — 97530 THERAPEUTIC ACTIVITIES: CPT

## 2024-12-24 NOTE — PROGRESS NOTES
PHYSICAL / OCCUPATIONAL THERAPY - DAILY TREATMENT NOTE    Patient Name: Cameron Scott    Date: 2024    : 1970  Insurance: Payor: GARETT BETTER [facility] [place] / Plan: AETNA BETTER [facility] [place] / Product Type: *No Product type* /      Patient  verified Yes     Visit #   Current / Total 6 10   Time   In / Out 812 840   Pain   In / Out 0 0   Subjective Functional Status/Changes: States that he has no pain in shoulder just numbness into his hands.      TREATMENT AREA =  Pain in left shoulder [M25.512]  S/P left rotator cuff repair [Z98.890]     OBJECTIVE         Therapeutic Procedures:    Tx Min Billable or 1:1 Min (if diff from Tx Min) Procedure, Rationale, Specifics   10 10 02268 Therapeutic Activity (timed):  use of dynamic activities replicating functional movements to increase ROM, strength, coordination, balance, and proprioception in order to improve patient's ability to progress to PLOF and address remaining functional goals.  (see flow sheet as applicable)     Details if applicable:       8 8 55047 Therapeutic Exercise (timed):  increase ROM, strength, coordination, balance, and proprioception to improve patient's ability to progress to PLOF and address remaining functional goals. (see flow sheet as applicable)     Details if applicable:     10 10 76842 Neuromuscular Re-Education (timed):  improve balance, coordination, kinesthetic sense, posture, core stability and proprioception to improve patient's ability to develop conscious control of individual muscles and awareness of position of extremities in order to progress to PLOF and address remaining functional goals. (see flow sheet as applicable)     Details if applicable:     28 28 [facility] Totals Reminder: bill using total billable min of TIMED therapeutic procedures (example: do not include dry needle or estim unattended, both untimed codes, in totals to left)  8-22 min = 1 unit; 23-37 min = 2 units; 38-52 min = 3 units; 53-67 min = 4 units; 68-82

## 2024-12-24 NOTE — PROGRESS NOTES
[facility] [address]  [address]  [place] [address]  [phone] fax    DISCHARGE SUMMARY  Patient Name: Cameron Scott : 1970   Treatment/Medical Diagnosis: Pain in left shoulder [M25.512]  S/P left rotator cuff repair [Z98.890]   Referral Source: Rashad Hernández,*     Date of Initial Visit: 24 Attended Visits: 49 Missed Visits: 1     SUMMARY OF TREATMENT  Pt reporting 60% improvement since starting therapy, states that overhead reaching with shoulder & overall strength into shoulder has improved. Regarding deficits, he states that lifting heavy objects continues to be limited with most complaints towards hand numbness/tingling & tricep weakness; he has a hx of cervical fusion would could be more related to his weakness into his left UE. Objectively, he demonstrates good strengthing throughout left shoulder but weakness still persists into tricep mm; he also demonstrates full AROM into flexion/abduction. Pt opting to DC at this time to working strengthening independently with an updated HEP. Should he feel like he needs more skilled PT services, an updated referral would be needed from a physician.     CURRENT STATUS    1.  Pt will be able to report a </= 2/10 pain rating at worst with sleeping to improve patient's ability to tolerate prolonged functional activities at home.               PN Status:  7-8/10 when sleeping, but reports improving              DC: not met, 8/10 at worst when sleeping     2. Pt will improve functional strength of left shoulder flexion, abduction, IR, ER to 5/5 and left elbow extension to 4/5 in order to resume ADLs such as lifting and household chores. (UPDATED GOAL)              PN Status: Shoulder Flexion 5/5, Shoulder Abduction 5/5, Shoulder IR 5/5, Shoulder ER 5/5, Elbow Flexion 5/5, Elbow Extension 4-/5              DC: Shoulder Flexion 5/5, Shoulder Abduction 5/5, Shoulder IR 5/5, Shoulder ER 5/5, Elbow

## 2025-01-14 ENCOUNTER — APPOINTMENT (OUTPATIENT)
Facility: HOSPITAL | Age: 55
End: 2025-01-14
Payer: COMMERCIAL

## 2025-01-14 ENCOUNTER — HOSPITAL ENCOUNTER (EMERGENCY)
Facility: HOSPITAL | Age: 55
Discharge: HOME | End: 2025-01-14
Payer: COMMERCIAL

## 2025-01-14 VITALS
RESPIRATION RATE: 18 BRPM | OXYGEN SATURATION: 100 % | DIASTOLIC BLOOD PRESSURE: 88 MMHG | SYSTOLIC BLOOD PRESSURE: 140 MMHG | TEMPERATURE: 97.9 F | WEIGHT: 157 LBS | HEIGHT: 69 IN | BODY MASS INDEX: 23.25 KG/M2 | HEART RATE: 100 BPM

## 2025-01-14 DIAGNOSIS — M54.42 ACUTE LEFT-SIDED LOW BACK PAIN WITH LEFT-SIDED SCIATICA: ICD-10-CM

## 2025-01-14 DIAGNOSIS — M25.552 LEFT HIP PAIN: Primary | ICD-10-CM

## 2025-01-14 PROCEDURE — 96372 THER/PROPH/DIAG INJ SC/IM: CPT

## 2025-01-14 PROCEDURE — 6360000002 HC RX W HCPCS: Performed by: NURSE PRACTITIONER

## 2025-01-14 PROCEDURE — 6370000000 HC RX 637 (ALT 250 FOR IP): Performed by: NURSE PRACTITIONER

## 2025-01-14 PROCEDURE — 99284 EMERGENCY DEPT VISIT MOD MDM: CPT

## 2025-01-14 PROCEDURE — 73521 X-RAY EXAM HIPS BI 2 VIEWS: CPT

## 2025-01-14 RX ORDER — METHOCARBAMOL 500 MG/1
750 TABLET, FILM COATED ORAL ONCE
Status: COMPLETED | OUTPATIENT
Start: 2025-01-14 | End: 2025-01-14

## 2025-01-14 RX ORDER — KETOROLAC TROMETHAMINE 15 MG/ML
15 INJECTION, SOLUTION INTRAMUSCULAR; INTRAVENOUS ONCE
Status: COMPLETED | OUTPATIENT
Start: 2025-01-14 | End: 2025-01-14

## 2025-01-14 RX ORDER — PREDNISONE 20 MG/1
40 TABLET ORAL
Status: COMPLETED | OUTPATIENT
Start: 2025-01-14 | End: 2025-01-14

## 2025-01-14 RX ORDER — METHOCARBAMOL 500 MG/1
500 TABLET, FILM COATED ORAL 4 TIMES DAILY PRN
Qty: 20 TABLET | Refills: 0 | Status: SHIPPED | OUTPATIENT
Start: 2025-01-14 | End: 2025-01-19

## 2025-01-14 RX ORDER — LIDOCAINE 50 MG/G
1 PATCH TOPICAL EVERY 24 HOURS
Qty: 30 PATCH | Refills: 0 | Status: SHIPPED | OUTPATIENT
Start: 2025-01-14 | End: 2025-02-13

## 2025-01-14 RX ORDER — METHYLPREDNISOLONE 4 MG/1
TABLET ORAL
Qty: 1 KIT | Refills: 0 | Status: SHIPPED | OUTPATIENT
Start: 2025-01-14 | End: 2025-01-20

## 2025-01-14 RX ORDER — NAPROXEN 500 MG/1
500 TABLET ORAL 2 TIMES DAILY PRN
Qty: 20 TABLET | Refills: 0 | Status: SHIPPED | OUTPATIENT
Start: 2025-01-14 | End: 2025-01-24

## 2025-01-14 RX ADMIN — PREDNISONE 40 MG: 20 TABLET ORAL at 17:20

## 2025-01-14 RX ADMIN — METHOCARBAMOL 750 MG: 500 TABLET ORAL at 17:21

## 2025-01-14 RX ADMIN — KETOROLAC TROMETHAMINE 15 MG: 15 INJECTION, SOLUTION INTRAMUSCULAR; INTRAVENOUS at 17:21

## 2025-01-14 ASSESSMENT — PAIN SCALES - GENERAL: PAINLEVEL_OUTOF10: 10

## 2025-01-14 ASSESSMENT — PAIN DESCRIPTION - LOCATION: LOCATION: LEG;HIP

## 2025-01-14 ASSESSMENT — PAIN DESCRIPTION - DESCRIPTORS: DESCRIPTORS: ACHING

## 2025-01-14 ASSESSMENT — PAIN - FUNCTIONAL ASSESSMENT: PAIN_FUNCTIONAL_ASSESSMENT: 0-10

## 2025-01-14 ASSESSMENT — PAIN DESCRIPTION - ORIENTATION: ORIENTATION: LEFT

## 2025-01-14 NOTE — ED PROVIDER NOTES
[facility] EMERGENCY DEPT  EMERGENCY DEPARTMENT HISTORY AND PHYSICAL EXAM      Date: 1/14/2025  Patient Name: Cameron Scott  MRN: [id number]  YOB: 1970  Date of evaluation: 1/14/2025  Provider: DELONTE Jewell NP   Note Started: 4:12 PM EST 1/14/25      History of Presenting Illness     Chief Complaint   Patient presents with    Hip Pain    Leg Pain         History Provided By: Patient and medical chart review.    Additional History (Context): Cameron Scott is a 54 y.o. male with   Past Medical History:   Diagnosis Date    Abnormal /NCV LUE 05/2024    Ongoing Left C7 radiculopathy    Asthma     no need for inhaler in years    Back pain     Elevated blood pressure reading     GERD (gastroesophageal reflux disease)     Hypercholesteremia     Left shoulder pain     Low TSH level     Low TSH level 04/27/2017    may have mild hyperthyroidism w/u per PCP I will rpt TSH and get freeT4    Primary hypertension 9/13/2022    Right inguinal hernia     TIA (transient ischemic attack) 8/13/2022    no residual   who presents with complaints of left hip/ left low back pain that shoots down to his toes. States the pain started on Saturday. Patient denies any urinary symptoms. Pt denies fever/chills, recent injury/trauma. Denies any urinary/bowel incontinence/retention, or saddle anesthesia.  Patient states he tried 600 mg of ibuprofen minimal relief.  Currently rating pain 8 out of 10.  Patient ambulated from waiting room to super track with no noted difficulty carrying a large bag.    PCP: Ronald Davila Jr., APRN - NP    Current Facility-Administered Medications   Medication Dose Route Frequency Provider Last Rate Last Admin    ketorolac (TORADOL) injection 15 mg  15 mg IntraMUSCular Once Ashli Butler APRN - NP        methocarbamol (ROBAXIN) tablet 750 mg  750 mg Oral Once Ashli Butler APRN - NP        predniSONE (DELTASONE) tablet 40 mg  40 mg Oral NOW Ashli Butler APRN - NP         Current Outpatient

## 2025-01-14 NOTE — ED TRIAGE NOTES
Patient presented to the Emergency Dept with a complaint of left hip pain radiating down to leg and foot which started Saturday.    Patient states having difficulty walking and tingling to area     Patient rates pain 10/10 on pain scale . Patient denies recent injury    Patient alert and oriented x 4, patient breathes freely on room air in nil cardiopulmonary distress

## 2025-01-14 NOTE — DISCHARGE INSTRUCTIONS
X-ray is completely normal.    Sleep on right side with 2 pillows between legs.    Lots of heat lots of stretching.  Will google Becki stretches.    Take medication as prescribed. Follow-up with your ortho within 2 days for reassessment. Bring the results from this visit with you for their review. Return to the ED immediately for any new, worsening, or persistent symptoms, including fever, vomiting, chest pain, shortness of breath, or any other medical concerns.

## 2025-01-27 ENCOUNTER — OFFICE VISIT (OUTPATIENT)
Age: 55
End: 2025-01-27
Payer: COMMERCIAL

## 2025-01-27 VITALS
TEMPERATURE: 98.2 F | HEIGHT: 69 IN | HEART RATE: 85 BPM | OXYGEN SATURATION: 96 % | BODY MASS INDEX: 23.22 KG/M2 | WEIGHT: 156.8 LBS | RESPIRATION RATE: 18 BRPM

## 2025-01-27 DIAGNOSIS — M54.16 LUMBAR RADICULOPATHY: ICD-10-CM

## 2025-01-27 DIAGNOSIS — M54.50 LUMBAR PAIN: Primary | ICD-10-CM

## 2025-01-27 DIAGNOSIS — M47.816 LUMBAR SPONDYLOSIS: ICD-10-CM

## 2025-01-27 PROCEDURE — 99214 OFFICE O/P EST MOD 30 MIN: CPT | Performed by: NURSE PRACTITIONER

## 2025-01-27 PROCEDURE — 72100 X-RAY EXAM L-S SPINE 2/3 VWS: CPT | Performed by: NURSE PRACTITIONER

## 2025-01-27 RX ORDER — MELOXICAM 15 MG/1
15 TABLET ORAL DAILY
Qty: 30 TABLET | Refills: 3 | Status: SHIPPED | OUTPATIENT
Start: 2025-01-27

## 2025-01-27 RX ORDER — PREDNISONE 20 MG/1
TABLET ORAL
Qty: 20 TABLET | Refills: 0 | Status: SHIPPED | OUTPATIENT
Start: 2025-01-27

## 2025-01-27 NOTE — PROGRESS NOTES
Chief complaint   Chief Complaint   Patient presents with    Pain    Neck Pain       History of Present Illness:  Cameron Scott is a  54 y.o.  male who comes in today stating that he started with left-sided low back pain that radiates down into his buttock and posteriorly to his foot about 3 to 4 weeks ago without injury.  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  He ended up going to the ER on January 14 where they did a x-ray of his left hip which turned out to be normal.  They gave him Toradol and a Medrol Dosepak and naproxen along with Robaxin to take at home.  He states the Medrol Dosepak did help a little bit but the naproxen and Robaxin really did not help at all.  He describes the pain as sharp and shooting with some numbness and tingling around the hip area.  He states he also gets pain in that left knee.  He denies fever bowel bladder dysfunction.  He does have a history of cervical C3-C7 fusion with C1 laminectomy due to myelomalacia.  The surgery was done in 2003 with Dr. Javier.      Physical Exam: The patient is a 54-year-old male well-developed well-nourished who is alert and oriented with a normal mood and affect.  He has a full weightbearing stiff but nonantalgic gait.  Did not use any assist device.  He has 4 out of 5 strength bilateral lower extremities.  Negative straight leg raise.  He did not have pain with hyperextension lumbar spine.      Assessment and Plan: This is a patient who has acute onset of low back pain that radiates into his left buttock and foot.  We did a lumbar x-ray.  This demonstrates a little spondylosis.  I will give him a prednisone taper.  Knows to take with food not take anti-inflammatories with it.  Once he is done with that I will put him on Mobic 15 mg daily.  I am also giving put him in physical therapy.  He states he just finished physical therapy for a left rotator cuff surgery he had in June of last year.

## 2025-01-27 NOTE — PROGRESS NOTES
Cameron Scott presents today for   Chief Complaint   Patient presents with    Pain    Neck Pain       Is someone accompanying this pt? no    Is the patient using any DME equipment during OV? no    Depression Screening:       No data to display                Learning Assessment:  Failed to redirect to the Timeline version of the [employer] SmartLink.    Abuse Screening:       No data to display                Fall Risk  Failed to redirect to the Timeline version of the [employer] SmartLink.    OPIOID RISK TOOL  Failed to redirect to the Timeline version of the [employer] SmartLink.    Coordination of Care:  1. Have you been to the ER, urgent care clinic since your last visit? Yes pain  Hospitalized since your last visit? no    2. Have you seen or consulted any other health care providers outside of the [facility] since your last visit? no Include any pap smears or colon screening. no

## 2025-02-28 ENCOUNTER — HOSPITAL ENCOUNTER (OUTPATIENT)
Facility: HOSPITAL | Age: 55
Setting detail: RECURRING SERIES
End: 2025-02-28
Payer: COMMERCIAL

## 2025-02-28 PROCEDURE — 97161 PT EVAL LOW COMPLEX 20 MIN: CPT

## 2025-02-28 NOTE — PROGRESS NOTES
[facility] [address]  [address]  [place] [address]  [phone] fax    Plan of Care / Statement of Necessity for Physical Therapy Services     Patient Name: Cameron Scott : 1970   Medical   Diagnosis: Other low back pain [M54.59] Treatment Diagnosis: M54.59  OTHER LOWER BACK PAIN      Onset Date: 25 referral date Payor :  Payor: JANNETJANES [facility] / [facility] / Product Type: *No Product type* /    Referral Source: Shasta Walden, DELONTE - * Start of Care (SOC): 2025   Prior Hospitalization: See medical history Provider #: [id number]   Prior Level of Function: Functionally independent   Comorbidities: Asthma, HTN, High cholesterol, h/o inguinal hernia repair, TIA 2022, arthritis neck and back;  Cervical fusion 10/30/23; left RCR 24. JUAN C, DMII         Subjective Info:     RAJAN: Has had a chronic hx of lower back pain, reports insidious onset of current episode of lower back pain. Pain started about 1 month ago, had to go the ER as he had trouble walking.   Current Symptoms: Lower back pain, numbness/tingling into his feet/toes. Pain localized in left groin area, states that LE pain has improved into Left LE since onset but still affecting his ability to complete his daily task.   Current Current Pain: 5/10     Best Pain:  2-3/10 (after injection, after 3)     Worst Pain:  10/10  Constant/Intermittent: Constant  Progression since onset?: Getting worse, was initially getting better.   Current Mobility: no AD  Aggravating & Participation Limitations/Relieving factors: Getting in/out of chair, home-management chores, bed mobility such as rolling; Difficulty with steps/curbs due to pain. Difficulty with prolonged walking/standing/sitting. Laying supine with legs extended helps with pain; has not tried heat or ice.   Previous Treatment: Injections in lower back  Self Care: Sleep has been affected by his lower back

## 2025-02-28 NOTE — PROGRESS NOTES
PHYSICAL / OCCUPATIONAL THERAPY - DAILY TREATMENT NOTE    Patient Name: Cameron Scott    Date: 2025    : 1970  Insurance: Payor: [facility]ETT [facility] / Plan: AET BETTER [facility] [place] / Product Type: *No Product type* /      Patient  verified Yes     Visit #   Current / Total 1 10   Time   In / Out 930 1010   Pain   In / Out 5 5   Subjective Functional Status/Changes: See POC     TREATMENT AREA =  Other low back pain [M54.59]      If an interpreting service is utilized for treatment of this patient, the contents of this document represent the material reviewed with the patient via the .     OBJECTIVE    40 min [x]Eval - untimed                             Therapeutic Procedures:    Tx Min Billable or 1:1 Min (if diff from Tx Min) Procedure, Rationale, Specifics          Details if applicable:              Details if applicable:            Details if applicable:            Details if applicable:            Details if applicable:       [facility] Totals Reminder: bill using total billable min of TIMED therapeutic procedures (example: do not include dry needle or estim unattended, both untimed codes, in totals to left)  8-22 min = 1 unit; 23-37 min = 2 units; 38-52 min = 3 units; 53-67 min = 4 units; 68-82 min = 5 units   Total Total     [x]  Patient Education billed concurrently with other procedures   [x] Review HEP    [] Progressed/Changed HEP, detail:    [] Other detail:       Objective Information/Functional Measures/Assessment    See POC    Patient will continue to benefit from skilled PT / OT services to modify and progress therapeutic interventions, analyze and address functional mobility deficits, analyze and address ROM deficits, analyze and address strength deficits, analyze and address soft tissue restrictions, analyze and cue for proper movement patterns, analyze and modify for postural abnormalities, analyze and address imbalance/dizziness, and instruct in home and community

## 2025-03-06 ENCOUNTER — HOSPITAL ENCOUNTER (OUTPATIENT)
Facility: HOSPITAL | Age: 55
Setting detail: RECURRING SERIES
End: 2025-03-06
Payer: COMMERCIAL

## 2025-03-10 ENCOUNTER — OFFICE VISIT (OUTPATIENT)
Age: 55
End: 2025-03-10
Payer: COMMERCIAL

## 2025-03-10 VITALS
HEART RATE: 92 BPM | BODY MASS INDEX: 22.81 KG/M2 | RESPIRATION RATE: 18 BRPM | HEIGHT: 69 IN | WEIGHT: 154 LBS | TEMPERATURE: 98.1 F | OXYGEN SATURATION: 99 %

## 2025-03-10 DIAGNOSIS — M54.16 LUMBAR RADICULOPATHY: ICD-10-CM

## 2025-03-10 DIAGNOSIS — M54.50 LUMBAR PAIN: ICD-10-CM

## 2025-03-10 DIAGNOSIS — M47.816 LUMBAR SPONDYLOSIS: ICD-10-CM

## 2025-03-10 PROCEDURE — 99213 OFFICE O/P EST LOW 20 MIN: CPT | Performed by: NURSE PRACTITIONER

## 2025-03-10 RX ORDER — ERGOCALCIFEROL 1.25 MG/1
CAPSULE, LIQUID FILLED ORAL
COMMUNITY
Start: 2025-02-12

## 2025-03-10 RX ORDER — MELOXICAM 15 MG/1
15 TABLET ORAL DAILY
Qty: 30 TABLET | Refills: 1 | Status: SHIPPED | OUTPATIENT
Start: 2025-03-10

## 2025-03-10 NOTE — PROGRESS NOTES
Cameron Scott presents today for   Chief Complaint   Patient presents with    Back Problem    Pain    Back Pain       Is someone accompanying this pt? no    Is the patient using any DME equipment during OV? no    Depression Screening:       No data to display                Learning Assessment:  Failed to redirect to the Timeline version of the [employer] SmartLink.    Abuse Screening:       No data to display                Fall Risk  Failed to redirect to the Timeline version of the [employer] SmartLink.    OPIOID RISK TOOL  Failed to redirect to the Timeline version of the [employer] SmartLink.    Coordination of Care:  1. Have you been to the ER, urgent care clinic since your last visit? no  Hospitalized since your last visit? no    2. Have you seen or consulted any other health care providers outside of the [facility] since your last visit? no Include any pap smears or colon screening. no      
radiculopathy  -     meloxicam (MOBIC) 15 MG tablet; Take 1 tablet by mouth daily    Lumbar spondylosis  -     meloxicam (MOBIC) 15 MG tablet; Take 1 tablet by mouth daily        Return in about 2 months (around 5/10/2025) for fu with NP [place].      has been .reviewed and is appropriate    Medications:  Current Outpatient Medications   Medication Sig Dispense Refill    vitamin D (ERGOCALCIFEROL) 1.25 MG (69667 UT) CAPS capsule TAKE 1 CAPSULE BY MOUTH EACH WEEK FOR 12 WEEKS.      metFORMIN (GLUCOPHAGE) 500 MG tablet Take 1 tablet by mouth daily      meloxicam (MOBIC) 15 MG tablet Take 1 tablet by mouth daily 30 tablet 1    mirtazapine (REMERON) 15 MG tablet Take 1 tablet by mouth nightly      BANOPHEN 25 MG capsule TAKE 1-2 CAPSULES AS NEEDED AS SLEEP      lidocaine (LIDODERM) 5 % Place 1 patch onto the skin daily 12 hours on, 12 hours off. 30 patch 5    gabapentin (NEURONTIN) 100 MG capsule 800mg tid: 90 days 270 capsule 5    amLODIPine (NORVASC) 5 MG tablet Take 1 tablet by mouth daily      atorvastatin (LIPITOR) 40 MG tablet Take 1 tablet by mouth daily      aspirin 81 MG chewable tablet Take 1 tablet by mouth daily      predniSONE (DELTASONE) 20 MG tablet Take 3 tabs po qd x 3 days then 2 tabs po x 3 days then 1 tab po x 3 days then 1/2 tab po x 4 days with food. (Patient not taking: Reported on 3/10/2025) 20 tablet 0    gabapentin (NEURONTIN) 100 MG capsule Take 3 capsules by mouth 3 times daily for 30 days. Attending TYSHAWN English [id number] Max Daily Amount: 900 mg 270 capsule 0    chlorhexidine (PERIDEX) 0.12 % solution  (Patient not taking: Reported on 3/10/2025)       No current facility-administered medications for this visit.       Review of systems:    Past Medical History:   Diagnosis Date    Abnormal /NCV LUE 05/2024    Ongoing Left C7 radiculopathy    Asthma     no need for inhaler in years    Back pain     Elevated blood pressure reading     GERD (gastroesophageal reflux disease)

## 2025-03-11 ENCOUNTER — HOSPITAL ENCOUNTER (OUTPATIENT)
Facility: HOSPITAL | Age: 55
Setting detail: RECURRING SERIES
Discharge: HOME | End: 2025-03-14
Payer: COMMERCIAL

## 2025-03-11 PROCEDURE — 97112 NEUROMUSCULAR REEDUCATION: CPT

## 2025-03-11 PROCEDURE — 97530 THERAPEUTIC ACTIVITIES: CPT

## 2025-03-11 PROCEDURE — 97110 THERAPEUTIC EXERCISES: CPT

## 2025-03-11 NOTE — PROGRESS NOTES
PHYSICAL / OCCUPATIONAL THERAPY - DAILY TREATMENT NOTE    Patient Name: Cameron Scott    Date: 3/11/2025    : 1970  Insurance: Payor: GARETT BETTER [facility] [place] / Plan: [facility]NA [facility] [place] / Product Type: *No Product type* /      Patient  verified Yes     Visit #   Current / Total 2 10   Time   In / Out 1055  1130   Pain   In / Out 2 2   Subjective Functional Status/Changes: Pt reports feeling better from last tx session.     TREATMENT AREA =  Other low back pain    OBJECTIVE         Therapeutic Procedures:    Tx Min Billable or 1:1 Min (if diff from Tx Min) Procedure, Rationale, Specifics   13 13 67905 Therapeutic Exercise (timed):  increase ROM, strength, coordination, balance, and proprioception to improve patient's ability to progress to PLOF and address remaining functional goals. (see flow sheet as applicable)     Details if applicable:       12 12 61556 Neuromuscular Re-Education (timed):  improve balance, coordination, kinesthetic sense, posture, core stability and proprioception to improve patient's ability to develop conscious control of individual muscles and awareness of position of extremities in order to progress to PLOF and address remaining functional goals. (see flow sheet as applicable)     Details if applicable:     10 10 82572 Therapeutic Activity (timed):  use of dynamic activities replicating functional movements to increase ROM, strength, coordination, balance, and proprioception in order to improve patient's ability to progress to PLOF and address remaining functional goals.  (see flow sheet as applicable)     Details if applicable:            Details if applicable:            Details if applicable:     35 35 MC BC Totals Reminder: bill using total billable min of TIMED therapeutic procedures (example: do not include dry needle or estim unattended, both untimed codes, in totals to left)  8-22 min = 1 unit; 23-37 min = 2 units; 38-52 min = 3 units; 53-67 min = 4 units; 68-82 min

## 2025-03-13 ENCOUNTER — HOSPITAL ENCOUNTER (OUTPATIENT)
Facility: HOSPITAL | Age: 55
Setting detail: RECURRING SERIES
Discharge: HOME | End: 2025-03-16
Payer: COMMERCIAL

## 2025-03-13 PROCEDURE — 97530 THERAPEUTIC ACTIVITIES: CPT

## 2025-03-13 PROCEDURE — 97110 THERAPEUTIC EXERCISES: CPT

## 2025-03-13 PROCEDURE — 97535 SELF CARE MNGMENT TRAINING: CPT

## 2025-03-13 PROCEDURE — 97112 NEUROMUSCULAR REEDUCATION: CPT

## 2025-03-13 NOTE — PROGRESS NOTES
10:50 AM [facility]overde[facility]   4/14/2025  8:40 AM Henrry Hu MD [facility] BS AMB   4/16/2025  8:20 AM Margarita Vazquez [facility]  [facility]   5/12/2025  8:20 AM Shasta Walden, APRN - NP [facility] BS AMB

## 2025-03-17 ENCOUNTER — HOSPITAL ENCOUNTER (OUTPATIENT)
Facility: HOSPITAL | Age: 55
Setting detail: RECURRING SERIES
Discharge: HOME | End: 2025-03-20
Payer: COMMERCIAL

## 2025-03-17 PROCEDURE — 97535 SELF CARE MNGMENT TRAINING: CPT

## 2025-03-17 PROCEDURE — 97530 THERAPEUTIC ACTIVITIES: CPT

## 2025-03-17 PROCEDURE — 97112 NEUROMUSCULAR REEDUCATION: CPT

## 2025-03-17 PROCEDURE — 97110 THERAPEUTIC EXERCISES: CPT

## 2025-03-17 NOTE — PROGRESS NOTES
[facility]VIEDO[facility]   3/27/2025  1:30 PM Deborah Aguilar, [facility]   4/14/2025  8:40 AM Henrry Hu MD [facility] BS AMB   4/16/2025  8:20 AM [facility]   5/12/2025  8:20 AM Shasta Walden, APRN - NP [facility] BS AMB

## 2025-03-19 ENCOUNTER — HOSPITAL ENCOUNTER (OUTPATIENT)
Facility: HOSPITAL | Age: 55
Setting detail: RECURRING SERIES
Discharge: HOME | End: 2025-03-22
Payer: COMMERCIAL

## 2025-03-19 PROCEDURE — 97535 SELF CARE MNGMENT TRAINING: CPT

## 2025-03-19 PROCEDURE — 97112 NEUROMUSCULAR REEDUCATION: CPT

## 2025-03-19 PROCEDURE — 97110 THERAPEUTIC EXERCISES: CPT

## 2025-03-19 PROCEDURE — 97530 THERAPEUTIC ACTIVITIES: CPT

## 2025-03-19 NOTE — PROGRESS NOTES
number/ date) 19 remaining by 05/30/2025    PLAN  - Continue Plan of Care    Roshan Norton, SAÚL, [facility]    3/19/2025    1:58 PM  If an interpreting service was utilized for treatment of this patient, the contents of this document represent the material reviewed with the patient via the .     Future Appointments   Date Time Provider Department Center   3/19/2025  2:10 PM Roshan Norton, PT [facility]   3/24/2025  1:30 PM Adenike Barahona PTA [facility]   3/27/2025  1:30 PM Deborah Aguilar PTA [facility]   4/14/2025  8:40 AM Henrry Hu MD [facility] BS AMB   4/16/2025  8:20 AM [facility]quez [facility]   5/12/2025  8:20 AM Shasta Walden, APRN - NP [facility] BS AMB

## 2025-03-24 ENCOUNTER — HOSPITAL ENCOUNTER (OUTPATIENT)
Facility: HOSPITAL | Age: 55
Setting detail: RECURRING SERIES
Discharge: HOME | End: 2025-03-27
Payer: COMMERCIAL

## 2025-03-24 PROCEDURE — 97110 THERAPEUTIC EXERCISES: CPT

## 2025-03-24 PROCEDURE — 97535 SELF CARE MNGMENT TRAINING: CPT

## 2025-03-24 PROCEDURE — 97112 NEUROMUSCULAR REEDUCATION: CPT

## 2025-03-24 PROCEDURE — 97530 THERAPEUTIC ACTIVITIES: CPT

## 2025-03-24 NOTE — PROGRESS NOTES
PHYSICAL / OCCUPATIONAL THERAPY - DAILY TREATMENT NOTE    Patient Name: Cameron Scott    Date: 3/24/2025    : 1970  Insurance: Payor: GARETT [employer] [facility] [place] / Plan: AET BETTER [facility] [place] / Product Type: *No Product type* /      Patient  verified Yes     Visit #   Current / Total 6 10   Time   In / Out 130 210   Pain   In / Out 2 4- LBP   Subjective Functional Status/Changes: Pt reports he has a little pain in his left thigh/groin area and numbness in both feet.     TREATMENT AREA =  Other low back pain    OBJECTIVE    Therapeutic Procedures:    Tx Min Billable or 1:1 Min (if diff from Tx Min) Procedure, Rationale, Specifics   12  84877 Therapeutic Exercise (timed):  increase ROM, strength, coordination, balance, and proprioception to improve patient's ability to progress to PLOF and address remaining functional goals. (see flow sheet as applicable)     Details if applicable:       12  33078 Neuromuscular Re-Education (timed):  improve balance, coordination, kinesthetic sense, posture, core stability and proprioception to improve patient's ability to develop conscious control of individual muscles and awareness of position of extremities in order to progress to PLOF and address remaining functional goals. (see flow sheet as applicable)     Details if applicable:  prone extension series   8  21130 Therapeutic Activity (timed):  use of dynamic activities replicating functional movements to increase ROM, strength, coordination, balance, and proprioception in order to improve patient's ability to progress to PLOF and address remaining functional goals.  (see flow sheet as applicable)     Details if applicable:  standing functional hip strength   8  28260 Self Care/Home Management (timed):  improve patient knowledge and understanding of home injury/symptom/pain management and centralization of symptoms  to improve patient's ability to progress to PLOF and address remaining functional goals.  (see flow

## 2025-03-27 ENCOUNTER — HOSPITAL ENCOUNTER (OUTPATIENT)
Facility: HOSPITAL | Age: 55
Setting detail: RECURRING SERIES
Discharge: HOME | End: 2025-03-30
Payer: COMMERCIAL

## 2025-03-27 PROCEDURE — 97535 SELF CARE MNGMENT TRAINING: CPT

## 2025-03-27 PROCEDURE — 97530 THERAPEUTIC ACTIVITIES: CPT

## 2025-03-27 PROCEDURE — 97110 THERAPEUTIC EXERCISES: CPT

## 2025-03-27 PROCEDURE — 97112 NEUROMUSCULAR REEDUCATION: CPT

## 2025-03-27 NOTE — PROGRESS NOTES
PHYSICAL / OCCUPATIONAL THERAPY - DAILY TREATMENT NOTE    Patient Name: Cameron Scott    Date: 3/27/2025    : 1970  Insurance: Payor: JANNETJANES [employer] [facility] / Plan: [facility] BETTER [facility] [place] / Product Type: *No Product type* /      Patient  verified Yes     Visit #   Current / Total 7 10   Time   In / Out 131 210   Pain   In / Out 2 1   Subjective Functional Status/Changes: Patient reports doing good today.     TREATMENT AREA =  Other low back pain    OBJECTIVE        Therapeutic Procedures:    Tx Min Billable or 1:1 Min (if diff from Tx Min) Procedure, Rationale, Specifics   9  95652 Therapeutic Exercise (timed):  increase ROM, strength, coordination, balance, and proprioception to improve patient's ability to progress to PLOF and address remaining functional goals. (see flow sheet as applicable)     Details if applicable:        41488 Neuromuscular Re-Education (timed):  improve balance, coordination, kinesthetic sense, posture, core stability and proprioception to improve patient's ability to develop conscious control of individual muscles and awareness of position of extremities in order to progress to PLOF and address remaining functional goals. (see flow sheet as applicable)     Details if applicable:      22591 Therapeutic Activity (timed):  use of dynamic activities replicating functional movements to increase ROM, strength, coordination, balance, and proprioception in order to improve patient's ability to progress to PLOF and address remaining functional goals.  (see flow sheet as applicable)     Details if applicable:      51568 Self Care/Home Management (timed):  improve patient knowledge and understanding of home injury/symptom/pain management, positioning, posture/ergonomics, home safety, activity modification, transfer techniques, and joint protection strategies  to improve patient's ability to progress to PLOF and address remaining functional goals.  (see flow sheet as

## 2025-03-27 NOTE — PROGRESS NOTES
[facility] [address]  [address]  [place] [address]  [phone] fax  PROGRESS NOTE  Patient Name: Cameron Scott : 1970   Treatment/Medical Diagnosis: Other low back pain [M54.59]   Referral Source:  Shasta Faulkner APRN - *  Payor: JANNETTJANES BETTER [facility] [place] / Plan: AETNA BETTER [facility] [place] / Product Type: *No Product type* /      Date of Initial Visit: 2025 Attended Visits: 7 Missed Visits: 1     SUMMARY OF TREATMENT  Pt attended 7 visits consistently and made steady progress with skilled physical therapy services.  At time of last visit, Pt reported the following:  Functional Gains - walking tolerance, improvement with gait pattern; Functional Deficits - turning, household chores, standing tolerance, stairs, bed mobility; and 45% improvement since start of care.  Pt has met or is progressing towards all goals and is compliant with comprehensive HEP.  Pt would benefit from continued skilled therapy to improve patient's functional strength and ability to walk community distances with increased ease and safety.     Functional Gains: walking tolerance, improvement with gait pattern,  Functional Deficits: turning, household chores, standing tolerance, stairs, bed mobility, walking tolerance >0.1miles  % improvement: 45%  Pain   Average: 2-3/10                  Best: 2/10                Worst: 5/10  Patient Goal: \"Get back to normal\"     CURRENT STATUS  Short Term Goals: To be accomplished in 2 weeks     Pt will be able to report a 8/10 pain rating at worst to improve patient's ability to tolerate prolonged functional activities at home. (Initial STG)              Eval: 10/10 at worst, especially in left LE   Current: met, 7/10 max pain 3/13/25  PN Status: 5/10 at worst (MET)     Pt will be independent with HEP to facilitate carry-over of functional gains made in PT at home & community. (Initial STG)              Eval: established at [facility];

## 2025-04-02 ENCOUNTER — HOSPITAL ENCOUNTER (OUTPATIENT)
Facility: HOSPITAL | Age: 55
Setting detail: RECURRING SERIES
Discharge: HOME | End: 2025-04-05
Payer: COMMERCIAL

## 2025-04-02 PROCEDURE — 97535 SELF CARE MNGMENT TRAINING: CPT

## 2025-04-02 PROCEDURE — 97112 NEUROMUSCULAR REEDUCATION: CPT

## 2025-04-02 PROCEDURE — 97530 THERAPEUTIC ACTIVITIES: CPT

## 2025-04-02 PROCEDURE — 97110 THERAPEUTIC EXERCISES: CPT

## 2025-04-02 NOTE — PROGRESS NOTES
PHYSICAL / OCCUPATIONAL THERAPY - DAILY TREATMENT NOTE    Patient Name: Cameron Scott    Date: 2025    : 1970  Insurance: Payor: JANNETJANES BETTER [facility] [place] / Plan: AET BETTER [facility] [place] / Product Type: *No Product type* /      Patient  verified Yes     Visit #   Current / Total 1 10   Time   In / Out 1010 1050   Pain   In / Out 5 0   Subjective Functional Status/Changes: Left leg pain in groin was hurting a lot these past couple of days.      TREATMENT AREA =  Other low back pain    OBJECTIVE         Therapeutic Procedures:    Tx Min Billable or 1:1 Min (if diff from Tx Min) Procedure, Rationale, Specifics   10 10 74289 Therapeutic Exercise (timed):  increase ROM, strength, coordination, balance, and proprioception to improve patient's ability to progress to PLOF and address remaining functional goals. (see flow sheet as applicable)     Details if applicable:       10 10 70803 Neuromuscular Re-Education (timed):  improve balance, coordination, kinesthetic sense, posture, core stability and proprioception to improve patient's ability to develop conscious control of individual muscles and awareness of position of extremities in order to progress to PLOF and address remaining functional goals. (see flow sheet as applicable)     Details if applicable:     10 10 07599 Therapeutic Activity (timed):  use of dynamic activities replicating functional movements to increase ROM, strength, coordination, balance, and proprioception in order to improve patient's ability to progress to PLOF and address remaining functional goals.  (see flow sheet as applicable)     Details if applicable:     10 10 35988 Self Care/Home Management (timed):  improve patient knowledge and understanding of home injury/symptom/pain management and positioning  to improve patient's ability to progress to PLOF and address remaining functional goals.  (see flow sheet as applicable)     Details if applicable:  Pt education   40 40  BC

## 2025-04-04 ENCOUNTER — HOSPITAL ENCOUNTER (OUTPATIENT)
Facility: HOSPITAL | Age: 55
Setting detail: RECURRING SERIES
Discharge: HOME | End: 2025-04-07
Payer: COMMERCIAL

## 2025-04-04 PROCEDURE — 97112 NEUROMUSCULAR REEDUCATION: CPT

## 2025-04-04 PROCEDURE — 97110 THERAPEUTIC EXERCISES: CPT

## 2025-04-04 PROCEDURE — 97530 THERAPEUTIC ACTIVITIES: CPT

## 2025-04-04 PROCEDURE — 97535 SELF CARE MNGMENT TRAINING: CPT

## 2025-04-04 NOTE — PROGRESS NOTES
PHYSICAL / OCCUPATIONAL THERAPY - DAILY TREATMENT NOTE    Patient Name: Cameron Scott    Date: 2025    : 1970  Insurance: Payor: JANNETJANES [employer] [facility] [place] / Plan: AET BETTER [facility] [place] / Product Type: *No Product type* /      Patient  verified Yes     Visit #   Current / Total 2 10   Time   In / Out 1050 1130   Pain   In / Out 0 0   Subjective Functional Status/Changes: States that he's been pain free the last 2 days     TREATMENT AREA =  Other low back pain    OBJECTIVE         Therapeutic Procedures:    Tx Min Billable or 1:1 Min (if diff from Tx Min) Procedure, Rationale, Specifics   10 10 24633 Therapeutic Exercise (timed):  increase ROM, strength, coordination, balance, and proprioception to improve patient's ability to progress to PLOF and address remaining functional goals. (see flow sheet as applicable)     Details if applicable:       10 10 48062 Neuromuscular Re-Education (timed):  improve balance, coordination, kinesthetic sense, posture, core stability and proprioception to improve patient's ability to develop conscious control of individual muscles and awareness of position of extremities in order to progress to PLOF and address remaining functional goals. (see flow sheet as applicable)     Details if applicable:      97970 Therapeutic Activity (timed):  use of dynamic activities replicating functional movements to increase ROM, strength, coordination, balance, and proprioception in order to improve patient's ability to progress to PLOF and address remaining functional goals.  (see flow sheet as applicable)     Details if applicable:      89579 Self Care/Home Management (timed):  improve patient knowledge and understanding of home injury/symptom/pain management, positioning, and posture/ergonomics  to improve patient's ability to progress to PLOF and address remaining functional goals.  (see flow sheet as applicable)     Details if applicable:  Pt edu   40 40 MC BC Totals

## 2025-04-09 ENCOUNTER — HOSPITAL ENCOUNTER (OUTPATIENT)
Facility: HOSPITAL | Age: 55
Setting detail: RECURRING SERIES
Discharge: HOME | End: 2025-04-12
Payer: COMMERCIAL

## 2025-04-09 PROCEDURE — 97530 THERAPEUTIC ACTIVITIES: CPT

## 2025-04-09 PROCEDURE — 97112 NEUROMUSCULAR REEDUCATION: CPT

## 2025-04-09 PROCEDURE — 97535 SELF CARE MNGMENT TRAINING: CPT

## 2025-04-09 PROCEDURE — 97110 THERAPEUTIC EXERCISES: CPT

## 2025-04-09 NOTE — PROGRESS NOTES
Details if applicable:     44  [facility] Totals Reminder: bill using total billable min of TIMED therapeutic procedures (example: do not include dry needle or estim unattended, both untimed codes, in totals to left)  8-22 min = 1 unit; 23-37 min = 2 units; 38-52 min = 3 units; 53-67 min = 4 units; 68-82 min = 5 units   Total Total     Charge Capture    [x]  Patient Education billed concurrently with other procedures   [x] Review HEP    [] Progressed/Changed HEP, detail:    [] Other detail:       Objective Information/Functional Measures/Assessment    Pt's pain continues to decline. Reports that he has been walking more lately, but not sure of the distance. Does state that when he carries heavier grocery bags he starts to get radicular sx down left LE. He reports starting to have radicular sx down left LE following getting off the combo bike today. Pt reports resolution of radicular sx following prone exercises. Able to progress resistance on several exercises afterwards without return of radicular sx. Continue to progress as able.     Patient will continue to benefit from skilled PT / OT services to modify and progress therapeutic interventions, analyze and address functional mobility deficits, analyze and address ROM deficits, analyze and address strength deficits, analyze and address soft tissue restrictions, analyze and cue for proper movement patterns, analyze and modify for postural abnormalities, analyze and address imbalance/dizziness, and instruct in home and community integration to address functional deficits and attain remaining goals.    Progress toward goals / Updated goals:  []  See Progress Note/Recertification    Pt will be able to report a 2/10 pain rating at worst to improve patient's ability to tolerate prolonged functional activities at home. (UPDATED GOAL)  PN Status: 5/10 at worst  Current: seems to be decreasing 0/10 since last f/u [Date assessed: 4/4/25]     2. Pt will be independent with HEP

## 2025-04-11 ENCOUNTER — HOSPITAL ENCOUNTER (OUTPATIENT)
Facility: HOSPITAL | Age: 55
Setting detail: RECURRING SERIES
Discharge: HOME | End: 2025-04-14
Payer: COMMERCIAL

## 2025-04-11 PROCEDURE — 97535 SELF CARE MNGMENT TRAINING: CPT

## 2025-04-11 PROCEDURE — 97110 THERAPEUTIC EXERCISES: CPT

## 2025-04-11 PROCEDURE — 97530 THERAPEUTIC ACTIVITIES: CPT

## 2025-04-11 PROCEDURE — 97112 NEUROMUSCULAR REEDUCATION: CPT

## 2025-04-11 NOTE — PROGRESS NOTES
goals.  (see flow sheet as applicable)     Details if applicable:     44  [facility] Totals Reminder: bill using total billable min of TIMED therapeutic procedures (example: do not include dry needle or estim unattended, both untimed codes, in totals to left)  8-22 min = 1 unit; 23-37 min = 2 units; 38-52 min = 3 units; 53-67 min = 4 units; 68-82 min = 5 units   Total Total     Charge Capture    [x]  Patient Education billed concurrently with other procedures   [x] Review HEP    [] Progressed/Changed HEP, detail:    [] Other detail:       Objective Information/Functional Measures/Assessment    Pt continues to respond well to treatments, especially prone exercises. Reports that yesterday he wasn't able to complete sidelying hip abduction at home, but able to complete independently today in clinic. Continues to have steady improvements with core stability and TA draw throughout treatments. Continue to progress to more dynamic activities as able.     Patient will continue to benefit from skilled PT / OT services to modify and progress therapeutic interventions, analyze and address functional mobility deficits, analyze and address ROM deficits, analyze and address strength deficits, analyze and address soft tissue restrictions, analyze and cue for proper movement patterns, analyze and modify for postural abnormalities, analyze and address imbalance/dizziness, and instruct in home and community integration to address functional deficits and attain remaining goals.    Progress toward goals / Updated goals:  []  See Progress Note/Recertification    Pt will be able to report a 2/10 pain rating at worst to improve patient's ability to tolerate prolonged functional activities at home. (UPDATED GOAL)  PN Status: 5/10 at worst  Current: seems to be decreasing 0/10 since last f/u [Date assessed: 4/4/25]     2. Pt will be independent with HEP to facilitate carry-over of functional gains made in PT at home & community. (Initial STG)

## 2025-04-15 ENCOUNTER — HOSPITAL ENCOUNTER (OUTPATIENT)
Facility: HOSPITAL | Age: 55
Setting detail: RECURRING SERIES
End: 2025-04-15
Payer: COMMERCIAL

## 2025-04-16 ENCOUNTER — OFFICE VISIT (OUTPATIENT)
Age: 55
End: 2025-04-16
Payer: COMMERCIAL

## 2025-04-16 VITALS
OXYGEN SATURATION: 96 % | SYSTOLIC BLOOD PRESSURE: 142 MMHG | RESPIRATION RATE: 16 BRPM | BODY MASS INDEX: 22.81 KG/M2 | TEMPERATURE: 97.1 F | HEART RATE: 92 BPM | WEIGHT: 154 LBS | DIASTOLIC BLOOD PRESSURE: 84 MMHG | HEIGHT: 69 IN

## 2025-04-16 DIAGNOSIS — M54.12 CERVICAL RADICULOPATHY: Primary | ICD-10-CM

## 2025-04-16 PROCEDURE — 99214 OFFICE O/P EST MOD 30 MIN: CPT | Performed by: STUDENT IN AN ORGANIZED HEALTH CARE EDUCATION/TRAINING PROGRAM

## 2025-04-16 PROCEDURE — 3079F DIAST BP 80-89 MM HG: CPT | Performed by: STUDENT IN AN ORGANIZED HEALTH CARE EDUCATION/TRAINING PROGRAM

## 2025-04-16 PROCEDURE — 3077F SYST BP >= 140 MM HG: CPT | Performed by: STUDENT IN AN ORGANIZED HEALTH CARE EDUCATION/TRAINING PROGRAM

## 2025-04-16 RX ORDER — GABAPENTIN 300 MG/1
300 CAPSULE ORAL 2 TIMES DAILY
Qty: 60 CAPSULE | Refills: 4 | Status: SHIPPED | OUTPATIENT
Start: 2025-04-16 | End: 2025-05-16

## 2025-04-16 NOTE — PROGRESS NOTES
\"Have you been to the ER, urgent care clinic since your last visit?  Hospitalized since your last visit?\"    Yes 1/14/25 Jam     “Have you seen or consulted any other health care providers outside our system since your last visit?”    NO      “Have you had a colorectal cancer screening such as a colonoscopy/FIT/Cologuard?    NO    No colonoscopy on file  No cologuard on file  No FIT/FOBT on file   No flexible sigmoidoscopy on file

## 2025-04-16 NOTE — PROGRESS NOTES
A  55 y.o. male here  for follow-up of cervical radiculopathy/myelopathy.  Last seen in the clinic in August 2024 by Dr Espinoza.  I saw patient last in October 2023.  He had ACDF on October 30, 2023.  Left shoulder rotator cuff surgery in 2024.  No neck pain currently; mentions popping sound over the lower neck to movement.  No shooting pain to the upper extremities.  Has neck discomfort when he tries to sleep on his belly.  Left upper extremity weakness is better; still has not come back to normal.  Able to use it.  Mentioned intermittent pain from the shoulder to the elbow.  Has numbness and tingling sensation over the left hand; has burning sensation.  Had a steroid injection with significant benefit.  On gabapentin 100 mg p.o. 3 times daily.  Following with spine.  Mentioned intermittent spasms in her left forearm.    From initial encounter:  A 53 yo male patient with medical history of asthma, GERD, hypertension here for follow-up after hospital discharge.  Patient was admitted to [facility] on August 11, 2022 after presenting with numbness over the left-sided weakness; in addition was feeling unsteady/dizzy when trying to walk.  Numbness was involving both the left upper and lower extremities.  No obvious weakness.  No facial involvement and no significant change in his speech.  MRI of the brain did not show any acute stroke.  MRI of the cervical spine was done which showed degenerative changes with facet hypertrophy causing central canal stenosis and cord compression; multilevel foraminal stenosis also seen.  Had a CT angiography which was unremarkable.  Transthoracic echo showed a positive bubble study [grade 1; with provocation]; otherwise no thrombus and has a normal ejection fraction.  Patient was discharged with aspirin and Plavix to be taken for 21 days; but he is still taking both.  Was seen by spine surgery; outpatient follow-up recommended.  He continues to have intermittent numbness over his left upper

## 2025-04-23 ENCOUNTER — HOSPITAL ENCOUNTER (OUTPATIENT)
Facility: HOSPITAL | Age: 55
Setting detail: RECURRING SERIES
Discharge: HOME | End: 2025-04-26
Payer: COMMERCIAL

## 2025-04-23 PROCEDURE — 97530 THERAPEUTIC ACTIVITIES: CPT

## 2025-04-23 PROCEDURE — 97110 THERAPEUTIC EXERCISES: CPT

## 2025-04-23 PROCEDURE — 97535 SELF CARE MNGMENT TRAINING: CPT

## 2025-04-23 PROCEDURE — 97112 NEUROMUSCULAR REEDUCATION: CPT

## 2025-04-23 NOTE — PROGRESS NOTES
PHYSICAL / OCCUPATIONAL THERAPY - DAILY TREATMENT NOTE    Patient Name: Cameron Scott    Date: 2025    : 1970  Insurance: Payor: [employer] [facility] / Plan: AET BETTER [facility] [place] / Product Type: *No Product type* /      Patient  verified Yes     Visit #   Current / Total 5 10   Time   In / Out 930 1015   Pain   In / Out 7 legs  3 back 5   Subjective Functional Status/Changes: States that     TREATMENT AREA =  Other low back pain    OBJECTIVE         Therapeutic Procedures:    Tx Min Billable or 1:1 Min (if diff from Tx Min) Procedure, Rationale, Specifics   10 10 44068 Therapeutic Exercise (timed):  increase ROM, strength, coordination, balance, and proprioception to improve patient's ability to progress to PLOF and address remaining functional goals. (see flow sheet as applicable)     Details if applicable:       10 10 51419 Neuromuscular Re-Education (timed):  improve balance, coordination, kinesthetic sense, posture, core stability and proprioception to improve patient's ability to develop conscious control of individual muscles and awareness of position of extremities in order to progress to PLOF and address remaining functional goals. (see flow sheet as applicable)     Details if applicable:     15 15 42219 Therapeutic Activity (timed):  use of dynamic activities replicating functional movements to increase ROM, strength, coordination, balance, and proprioception in order to improve patient's ability to progress to PLOF and address remaining functional goals.  (see flow sheet as applicable)     Details if applicable:     10 10 13491 Self Care/Home Management (timed):  improve patient knowledge and understanding of home injury/symptom/pain management and positioning  to improve patient's ability to progress to PLOF and address remaining functional goals.  (see flow sheet as applicable)     Details if applicable:     45 45 [facility] Totals Reminder: bill using total billable min of TIMED

## 2025-04-25 ENCOUNTER — HOSPITAL ENCOUNTER (OUTPATIENT)
Facility: HOSPITAL | Age: 55
Setting detail: RECURRING SERIES
Discharge: HOME | End: 2025-04-28
Payer: COMMERCIAL

## 2025-04-25 PROCEDURE — 97535 SELF CARE MNGMENT TRAINING: CPT

## 2025-04-25 PROCEDURE — 97112 NEUROMUSCULAR REEDUCATION: CPT

## 2025-04-25 PROCEDURE — 97110 THERAPEUTIC EXERCISES: CPT

## 2025-04-25 PROCEDURE — 97530 THERAPEUTIC ACTIVITIES: CPT

## 2025-04-25 NOTE — PROGRESS NOTES
[facility] [address]  [address]  [place] [address]  [phone] fax  PROGRESS NOTE  Patient Name: Cameron Scott : 1970   Treatment/Medical Diagnosis: Other low back pain [M54.59]   Referral Source:  Shasta Faulkner APRN - *  Payor: JANNETT[employer]ANES BETTER [facility] [place] / Plan: AETNA BETTER [facility] [place] / Product Type: *No Product type* /      Date of Initial Visit: 25 Attended Visits: 13 Missed Visits: 2     SUMMARY OF TREATMENT  Mr. Scott reports fluctuating improvement since starting therapy stating sometimes he is able to walk better without limping. He continues to have difficulty with increased leg pain at night requiring the use of his arms to get in/out of bed, occasional pain and buckling when ascending curbs and with quick turns, has decreased walking tolerance, and is unable to stand to put pants on. He has made progress with left hip extension strength and squatting mechanics, however continues to need cuing to improve mechanics. He also demonstrates postural compensations with core activation and needs cuing to improve abdominal activation. His left hip flexion strength was 5/5 at his last PN, but buckled with pain today. He will benefit from continued skilled therapy to improve core and hip strength as well as squatting/lifting mechanics to improve his ability to walk for longer periods, get in/out of bed, and dress without having to sit. Also recommended he contact his referring physician for further diagnostic testing due to the fluctuating and continued nature of his pain.    CURRENT STATUS  Pt will be able to report a 2/10 pain rating at worst to improve patient's ability to tolerate prolonged functional activities at home. (UPDATED GOAL)  Last PN Status: 5/10 at worst  PN: not met, recently exacerbated 7-8/10     2. Pt will be independent with HEP to facilitate carry-over of functional gains made in PT at home & community. (Initial 
    Details if applicable:  pt ed   40  [facility] Totals Reminder: bill using total billable min of TIMED therapeutic procedures (example: do not include dry needle or estim unattended, both untimed codes, in totals to left)  8-22 min = 1 unit; 23-37 min = 2 units; 38-52 min = 3 units; 53-67 min = 4 units; 68-82 min = 5 units   Total Total     Charge Capture    [x]  Patient Education billed concurrently with other procedures   [x] Review HEP    [] Progressed/Changed HEP, detail:    [] Other detail:       Objective Information/Functional Measures/Assessment    See PN.    Patient will continue to benefit from skilled PT / OT services to modify and progress therapeutic interventions, analyze and address functional mobility deficits, analyze and address ROM deficits, analyze and address strength deficits, analyze and address soft tissue restrictions, analyze and cue for proper movement patterns, analyze and modify for postural abnormalities, analyze and address imbalance/dizziness, and instruct in home and community integration to address functional deficits and attain remaining goals.    Progress toward goals / Updated goals:  []  See Progress Note/Recertification    Pt will be able to report a 2/10 pain rating at worst to improve patient's ability to tolerate prolonged functional activities at home. (UPDATED GOAL)  PN Status: 5/10 at worst  Current: recently exacerbated 7-8/10     2. Pt will be independent with HEP to facilitate carry-over of functional gains made in PT at home & community. (Initial STG)              PN Status: Verbalizes Compliance; update as appropriate    Current: continued compliance, will update as appropriate     3. Pt will be able to improve strength in Left hip flexion, hip abduction, hip extension, s/l ER to at least 4+/5 to improve patient's ability to negotiate curbs with less pain in the community. (Initial LTG)              PN Status: Hip Flexion 5/5 (B);  Hip Abduction: 5/5 (B);  Hip

## 2025-04-30 ENCOUNTER — HOSPITAL ENCOUNTER (OUTPATIENT)
Facility: HOSPITAL | Age: 55
Setting detail: RECURRING SERIES
Discharge: HOME | End: 2025-05-03
Payer: COMMERCIAL

## 2025-04-30 PROCEDURE — 97112 NEUROMUSCULAR REEDUCATION: CPT

## 2025-04-30 PROCEDURE — 97530 THERAPEUTIC ACTIVITIES: CPT

## 2025-04-30 PROCEDURE — 97110 THERAPEUTIC EXERCISES: CPT

## 2025-04-30 NOTE — PROGRESS NOTES
Last PN Status: Hip Flexion 5/5 (B);  Hip Abduction: 5/5 (B);  Hip Extension: 5/5 (R), 4/5 (L)               PN: hip flex 5/5 R, 4/5 L with pain; hip abd 5/5 B, hip ext 5/5 R, 4+/5 L      4. Pt will improve OLGA LIDIA (functional outcome name) score to 14 to demonstrate improvement in patient's ability to perform unrestricted ADLs/IADLs at home & community. (UPDATED GOAL)              Last PN Status: 27 points               PN: regressed, 40 pts = 80%     5. Pt will be able to perform 10 functional squats with good form to faciliate return to efficient home chore management.               Last PN Status: x10 with anterior weight shift, limited squat depth, slight valgus collapse, limited hip hinge               PN: Demonstrates improved squatting with right weight shifting, but increased depth      6. Pt will be able to ambulate 1/2 mile without increase in sx to improve patient's ability to ambulate in community with increased ease and safety. (NEW GOAL)              Last PN Status: currently unable to walk 0.1 miles              PN: Hasn't measure distance, but states has been doing more walking lately. Starts to get radicular sx down left LE when carrying heavier grocery bags.     Next PN/ RC due 4/24/25  Auth due (visit number/ date) 10 more til 5/30/25    PLAN  - Continue Plan of Care    Jimmie Krishna, PT    4/30/2025    10:20 AM  If an interpreting service was utilized for treatment of this patient, the contents of this document represent the material reviewed with the patient via the .     Future Appointments   Date Time Provider Department Center   5/5/2025  9:00 AM Henrry Hu MD [facility] BS AMB   5/6/2025  8:50 AM Roshan Norton, PT [facility]   5/8/2025  8:50 AM Deborah Aguilar, PTA [facility]   5/12/2025  8:20 AM Shasta Walden APRN - NP [facility] BS AMB   5/13/2025  8:50 AM Benson Castle, PTA [facility]   5/15/2025  8:50 AM Henrietta Goddard, PT [facility]   5/19/2025  8:50 AM [facility]

## 2025-05-05 ENCOUNTER — OFFICE VISIT (OUTPATIENT)
Age: 55
End: 2025-05-05
Payer: COMMERCIAL

## 2025-05-05 VITALS
DIASTOLIC BLOOD PRESSURE: 88 MMHG | HEART RATE: 96 BPM | SYSTOLIC BLOOD PRESSURE: 124 MMHG | BODY MASS INDEX: 23.4 KG/M2 | HEIGHT: 69 IN | WEIGHT: 158 LBS | OXYGEN SATURATION: 97 %

## 2025-05-05 DIAGNOSIS — I77.810 DILATED AORTIC ROOT: Primary | ICD-10-CM

## 2025-05-05 DIAGNOSIS — R94.31 ABNORMAL EKG: ICD-10-CM

## 2025-05-05 DIAGNOSIS — Q21.12 PFO (PATENT FORAMEN OVALE): ICD-10-CM

## 2025-05-05 DIAGNOSIS — I10 ESSENTIAL (PRIMARY) HYPERTENSION: ICD-10-CM

## 2025-05-05 PROCEDURE — 99214 OFFICE O/P EST MOD 30 MIN: CPT | Performed by: INTERNAL MEDICINE

## 2025-05-05 PROCEDURE — 3079F DIAST BP 80-89 MM HG: CPT | Performed by: INTERNAL MEDICINE

## 2025-05-05 PROCEDURE — 3074F SYST BP LT 130 MM HG: CPT | Performed by: INTERNAL MEDICINE

## 2025-05-05 PROCEDURE — 93000 ELECTROCARDIOGRAM COMPLETE: CPT | Performed by: INTERNAL MEDICINE

## 2025-05-05 ASSESSMENT — PATIENT HEALTH QUESTIONNAIRE - PHQ9
1. LITTLE INTEREST OR PLEASURE IN DOING THINGS: NOT AT ALL
SUM OF ALL RESPONSES TO PHQ QUESTIONS 1-9: 0
2. FEELING DOWN, DEPRESSED OR HOPELESS: NOT AT ALL

## 2025-05-05 ASSESSMENT — ANXIETY QUESTIONNAIRES
5. BEING SO RESTLESS THAT IT IS HARD TO SIT STILL: NOT AT ALL
6. BECOMING EASILY ANNOYED OR IRRITABLE: NOT AT ALL
GAD7 TOTAL SCORE: 0
2. NOT BEING ABLE TO STOP OR CONTROL WORRYING: NOT AT ALL
1. FEELING NERVOUS, ANXIOUS, OR ON EDGE: NOT AT ALL
3. WORRYING TOO MUCH ABOUT DIFFERENT THINGS: NOT AT ALL
7. FEELING AFRAID AS IF SOMETHING AWFUL MIGHT HAPPEN: NOT AT ALL
4. TROUBLE RELAXING: NOT AT ALL

## 2025-05-05 ASSESSMENT — ENCOUNTER SYMPTOMS
ABDOMINAL DISTENTION: 0
NAUSEA: 0
SORE THROAT: 0
ABDOMINAL PAIN: 0
COUGH: 0
SHORTNESS OF BREATH: 0
VOMITING: 0

## 2025-05-05 NOTE — PROGRESS NOTES
Cameron Scott presents today for   Chief Complaint   Patient presents with    Follow-up     1 year       Cameron Scott preferred language for health care discussion is english/other.    Is someone accompanying this pt? no    Is the patient using any DME equipment during OV? no    Depression Screening:  Depression: Not at risk (5/5/2025)    PHQ-2     PHQ-2 Score: 0        Learning Assessment:  Who is the primary learner? Patient    What is the preferred language for health care of the primary learner? ENGLISH    How does the primary learner prefer to learn new concepts? DEMONSTRATION    Answered By patient    Relationship to Learner SELF           Pt currently taking Anticoagulant therapy? no    Pt currently taking Antiplatelet therapy ? Aspirin 81 mg daily      Coordination of Care:  1. Have you been to the ER, urgent care clinic since your last visit? Hospitalized since your last visit? no    2. Have you seen or consulted any other health care providers outside of the [facility] since your last visit? Include any pap smears or colon screening. no

## 2025-05-05 NOTE — PROGRESS NOTES
05/05/25     Cameron Scott  is a 55 y.o. male     Chief Complaint   Patient presents with    Follow-up     1 year       HPI  Patient presents for a follow-up office visit.  He was hospitalized in August 2022 with concerns for a TIA when he presented with left-sided weakness.  However, his brain MRI did not show any evidence of acute infarct.  There was mention of cervical spine arthritis, spinal stenosis and possibly a radiculopathy.  He was monitored on telemetry where he did not have any evidence of an arrhythmia.  His echocardiogram demonstrated preserved LV function, EF 55- 60% with a very small PFO with a tiny right to left shunt.    Patient states she ultimately had cervical spine surgery in October 2023 which significantly improved his neck pain.  I suspect his neurological symptoms were all related to this and not a true TIA.  He states he has been undergoing extensive physical therapy and developed precordial chest pain on both sides of his sternum which did worsen when he was doing physical therapy, so he decided to seek medical attention and was seen in the ER 1 month ago.  He was diagnosed with costochondritis and treated with a short course of NSAIDs which he states improved his symptoms.  However he has still noted intermittent chest pain with activity but not as severe as it had been previously.  He denies any associated shortness of breath, diaphoresis, nausea or vomiting.    A CTA of his thorax in the ER in March 2024 did show a mild to moderately dilated aortic root measuring 4.4 cm in diameter at the sinuses of Valsalva.      Patient underwent an exercise nuclear stress test in May 2024 which is a normal low risk study.  He exercised for nearly 80 minutes using the standard Wesley protocol, achieving a total workload of 10 METS.  No ischemia, EF 56%.    He was last seen in our office approximately a year ago.  Since last visit he has been feeling well.  He describes a pinpoint chest pain to the

## 2025-05-06 ENCOUNTER — HOSPITAL ENCOUNTER (OUTPATIENT)
Facility: HOSPITAL | Age: 55
Setting detail: RECURRING SERIES
Discharge: HOME | End: 2025-05-09
Payer: COMMERCIAL

## 2025-05-06 PROCEDURE — 97110 THERAPEUTIC EXERCISES: CPT

## 2025-05-06 PROCEDURE — 97530 THERAPEUTIC ACTIVITIES: CPT

## 2025-05-06 PROCEDURE — 97112 NEUROMUSCULAR REEDUCATION: CPT

## 2025-05-06 PROCEDURE — 97535 SELF CARE MNGMENT TRAINING: CPT

## 2025-05-06 NOTE — PROGRESS NOTES
Deborah, PTA [facility]   5/12/2025  8:20 AM Shasta Walden, APRN - NP [facility] BS AMB   5/13/2025  8:50 AM Benson Castle, PTA [facility]   5/15/2025  8:50 AM Henrietta Goddard, PT [facility]   5/19/2025  8:50 AM Roshan Norton, PT [facility]   5/21/2025  8:50 AM Roshan Norton, PT [facility]   5/22/2025  9:30 AM Robyn Woodson MD [facility] BS AMB   5/27/2025  8:50 AM Roshan Norton, PT [facility]   5/29/2025  8:50 AM Henrietta Goddard, PT [facility]   6/6/2025  2:00 PM Kerwin Roman, PA [facility]   8/19/2025  9:00 AM [facility]quez [facility]   5/5/2026  8:30 AM [facility] ECHO GE [facility] BS AMB   5/5/2026  9:20 AM Henrry Hu MD [facility] BS AMB

## 2025-05-08 ENCOUNTER — HOSPITAL ENCOUNTER (OUTPATIENT)
Facility: HOSPITAL | Age: 55
Setting detail: RECURRING SERIES
Discharge: HOME | End: 2025-05-11
Payer: COMMERCIAL

## 2025-05-08 PROCEDURE — 97112 NEUROMUSCULAR REEDUCATION: CPT

## 2025-05-08 PROCEDURE — 97535 SELF CARE MNGMENT TRAINING: CPT

## 2025-05-08 PROCEDURE — 97110 THERAPEUTIC EXERCISES: CPT

## 2025-05-08 PROCEDURE — 97530 THERAPEUTIC ACTIVITIES: CPT

## 2025-05-08 NOTE — PROGRESS NOTES
PHYSICAL / OCCUPATIONAL THERAPY - DAILY TREATMENT NOTE    Patient Name: Cameron Scott    Date: 2025    : 1970  Insurance: Payor: [employer]T [facility] [place] / Plan: AET BETTER [facility] [place] / Product Type: *No Product type* /      Patient  verified Yes     Visit #   Current / Total 3 10   Time   In / Out 851 932   Pain   In / Out 3-back  2-leg 4- back  1- leg   Subjective Functional Status/Changes: Patient reports doing okay but is having 3/10 pain in back and 2/10 pain in leg.     TREATMENT AREA =  Other low back pain    OBJECTIVE        Therapeutic Procedures:    Tx Min Billable or 1:1 Min (if diff from Tx Min) Procedure, Rationale, Specifics   10 10 09723 Therapeutic Exercise (timed):  increase ROM, strength, coordination, balance, and proprioception to improve patient's ability to progress to PLOF and address remaining functional goals. (see flow sheet as applicable)     Details if applicable:        81242 Neuromuscular Re-Education (timed):  improve balance, coordination, kinesthetic sense, posture, core stability and proprioception to improve patient's ability to develop conscious control of individual muscles and awareness of position of extremities in order to progress to PLOF and address remaining functional goals. (see flow sheet as applicable)     Details if applicable:     9  91220 Therapeutic Activity (timed):  use of dynamic activities replicating functional movements to increase ROM, strength, coordination, balance, and proprioception in order to improve patient's ability to progress to PLOF and address remaining functional goals.  (see flow sheet as applicable)     Details if applicable:     8  03718 Self Care/Home Management (timed):  improve patient knowledge and understanding of home injury/symptom/pain management, positioning, posture/ergonomics, home safety, activity modification, and joint protection strategies  to improve patient's ability to progress to PLOF and address

## 2025-05-12 ENCOUNTER — OFFICE VISIT (OUTPATIENT)
Age: 55
End: 2025-05-12
Payer: COMMERCIAL

## 2025-05-12 VITALS
WEIGHT: 157.6 LBS | RESPIRATION RATE: 16 BRPM | HEART RATE: 90 BPM | TEMPERATURE: 98 F | BODY MASS INDEX: 23.34 KG/M2 | HEIGHT: 69 IN

## 2025-05-12 DIAGNOSIS — M43.16 SPONDYLOLISTHESIS, LUMBAR REGION: ICD-10-CM

## 2025-05-12 DIAGNOSIS — M54.16 LUMBAR RADICULOPATHY: ICD-10-CM

## 2025-05-12 DIAGNOSIS — M54.50 LUMBAR PAIN: Primary | ICD-10-CM

## 2025-05-12 DIAGNOSIS — M47.816 LUMBAR SPONDYLOSIS: ICD-10-CM

## 2025-05-12 PROCEDURE — 99214 OFFICE O/P EST MOD 30 MIN: CPT | Performed by: NURSE PRACTITIONER

## 2025-05-12 NOTE — PROGRESS NOTES
Chief complaint   Chief Complaint   Patient presents with    Lower Back Pain    Leg Pain    Knee Pain       History of Present Illness:  Cameron Scott is a  55 y.o.  male  who comes in today for follow-up of his low back pain that radiates down his left buttock posteriorly to his foot.  This has been going on now over 6 months.  He denies any injury.  He describes the pain as sharp and shooting with some numbness and tingling around the hip area.  He states he also gets pain in that left knee from an old injury.  We have tried a prednisone taper and Mobic 15 mg daily which have not really helped.  Additionally the Mobic elevates his blood pressure so we are going to stop that for now.  He is on gabapentin 300 mg twice a day through neurology.  We put him in physical therapy and he has done 16 sessions from February 28 through May 8.  He states it really has not been helping but he is doing the home exercise program.  Lumbar x-ray demonstrating grade 1 anterolisthesis at L3-4 degenerative disc disease.   He does have a history of cervical C3-C7 fusion with C1 laminectomy due to myelomalacia.  The surgery was done in 2003 with Dr. Javier.        Physical Exam: The patient is a 55-year-old male well-developed well-nourished who is alert and oriented with a normal mood and affect.  He has a full weightbearing stiff but nonantalgic gait.  Did not use any assist device.  He has 4 out of 5 strength bilateral lower extremities.  Negative straight leg raise.  He has pain with hyperextension lumbar spine.        Assessment and Plan: This is a patient who has low back pain that radiates into his left buttock and foot.  His pain has continued despite physical therapy, medications.  We are going to DC the Mobic due to his blood pressure.  He has been diagnosed with type 2 diabetes now and we discussed how high blood sugar can affect the nerves from his spine and legs.  He will try to keep that under good control.  He will

## 2025-05-13 ENCOUNTER — HOSPITAL ENCOUNTER (OUTPATIENT)
Facility: HOSPITAL | Age: 55
Setting detail: RECURRING SERIES
Discharge: HOME | End: 2025-05-16
Payer: COMMERCIAL

## 2025-05-13 PROCEDURE — 97530 THERAPEUTIC ACTIVITIES: CPT

## 2025-05-13 PROCEDURE — 97112 NEUROMUSCULAR REEDUCATION: CPT

## 2025-05-13 PROCEDURE — 97110 THERAPEUTIC EXERCISES: CPT

## 2025-05-13 PROCEDURE — 97535 SELF CARE MNGMENT TRAINING: CPT

## 2025-05-13 NOTE — PROGRESS NOTES
community. (Initial STG)              Last PN Status: Verbalizes Compliance; update as appropriate               PN: continued compliance, will update as appropriate     3. Pt will be able to improve strength in Left hip flexion, hip abduction, hip extension, s/l ER to at least 4+/5 to improve patient's ability to negotiate curbs with less pain in the community. (Initial LTG)              Last PN Status: Hip Flexion 5/5 (B);  Hip Abduction: 5/5 (B);  Hip Extension: 5/5 (R), 4/5 (L)               PN: hip flex 5/5 R, 4/5 L with pain; hip abd 5/5 B, hip ext 5/5 R, 4+/5 L                Making steady progress with bilateral LE strength [Date assessed: 05/06/2025]     4. Pt will improve OLGA LIDIA (functional outcome name) score to 14 to demonstrate improvement in patient's ability to perform unrestricted ADLs/IADLs at home & community. (UPDATED GOAL)              Last PN Status: 27 points               PN: regressed, 40 pts = 80%     5. Pt will be able to perform 10 functional squats with good form to faciliate return to efficient home chore management.               Last PN Status: x10 with anterior weight shift, limited squat depth, slight valgus collapse, limited hip hinge               PN: Demonstrates improved squatting with right weight shifting, but increased depth      6. Pt will be able to ambulate 1/2 mile without increase in sx to improve patient's ability to ambulate in community with increased ease and safety. (NEW GOAL)              Last PN Status: currently unable to walk 0.1 miles              PN: Hasn't measure distance, but states has been doing more walking lately. Starts to get radicular sx down left LE when carrying heavier grocery bags.     Next PN/ RC due 5/24/25  Auth due (visit number/ date) 8 remaining by 5/30/25    PLAN  - Continue Plan of Care    Benson Castle, PTA    5/13/2025    9:00 AM  If an interpreting service was utilized for treatment of this patient, the contents of this document  Consent (Scalp)/Introductory Paragraph: The rationale for Mohs was explained to the patient and consent was obtained. The risks, benefits and alternatives to therapy were discussed in detail. Specifically, the risks of changes in hair growth pattern secondary to repair, infection, scarring, bleeding, prolonged wound healing, incomplete removal, allergy to anesthesia, nerve injury and recurrence were addressed. Prior to the procedure, the treatment site was clearly identified and confirmed by the patient. All components of Universal Protocol/PAUSE Rule completed.

## 2025-05-15 ENCOUNTER — APPOINTMENT (OUTPATIENT)
Facility: HOSPITAL | Age: 55
End: 2025-05-15
Payer: COMMERCIAL

## 2025-05-19 ENCOUNTER — HOSPITAL ENCOUNTER (OUTPATIENT)
Facility: HOSPITAL | Age: 55
Setting detail: RECURRING SERIES
Discharge: HOME | End: 2025-05-22
Payer: COMMERCIAL

## 2025-05-19 PROCEDURE — 97535 SELF CARE MNGMENT TRAINING: CPT

## 2025-05-19 PROCEDURE — 97110 THERAPEUTIC EXERCISES: CPT

## 2025-05-19 PROCEDURE — 97112 NEUROMUSCULAR REEDUCATION: CPT

## 2025-05-19 PROCEDURE — 97530 THERAPEUTIC ACTIVITIES: CPT

## 2025-05-19 NOTE — PROGRESS NOTES
[facility] [address]  [address]  [place] [address]  [phone] fax  PROGRESS NOTE  Patient Name: Cameron Scott : 1970   Treatment/Medical Diagnosis: Other low back pain [M54.59]   Referral Source:  Shasta Faulkner APRN - *  Payor: [employer] BETTER [facility] [place] / Plan: [employer] BETTER [facility] [place] / Product Type: *No Product type* /      Date of Initial Visit: 2025 Attended Visits: 18 Missed Visits: 2     SUMMARY OF TREATMENT  Mr. Scott self-reports 75% improvement since beginning PT with good progress. Pt reports functional gains that include: centralization of radicular sx, stepping up to curb, sleeping, bed mobility. Pt reports functional deficits that include: lower back pain. Pt reports a recent average pain of 2-3/10, 3/10 at worst, and 2/10 at best. Objectively pt is making steady progress per MMT measurements, but still has some hip flexor and extensor weakness on the left. Pt demonstrates improved squatting mechanics with mild weight shifting to the right, but able to perform without pain today. Reports ease with most ADLs and a centralization of radiculars sx. He primarily just has mild lower back pain at this point. He is scheduled for an MRI later this week and would like to continue with PT until getting results. Pt would benefit from continued skilled PT to address remaining functional deficits. We will continue with PT at 2x/wk for 5 weeks.    CURRENT STATUS  Pt will be able to report a 2/10 pain rating at worst to improve patient's ability to tolerate prolonged functional activities at home. (UPDATED GOAL)  Last PN Status: 5/10 at worst  PN: not met, recently exacerbated 7-8/10    PROGRESSING; 3/10 at worst recently    2. Pt will be independent with HEP to facilitate carry-over of functional gains made in PT at home & community. (Initial STG)              Last PN Status: Verbalizes Compliance; update as appropriate

## 2025-05-19 NOTE — PROGRESS NOTES
PHYSICAL / OCCUPATIONAL THERAPY - DAILY TREATMENT NOTE    Patient Name: Cameron Scott    Date: 2025    : 1970  [facility] / Plan: [facility] BETTER [facility] [place] / Product Type: *No Product type* /      Patient  verified Yes     Visit #   Current / Total 5 10   Time   In / Out 850 930   Pain   In / Out 2  0   Subjective Functional Status/Changes: \"Just some pain in my lower back.\"     TREATMENT AREA =  Other low back pain    OBJECTIVE         Therapeutic Procedures:    Tx Min Billable or 1:1 Min (if diff from Tx Min) Procedure, Rationale, Specifics   10  40451 Therapeutic Exercise (timed):  increase ROM, strength, coordination, balance, and proprioception to improve patient's ability to progress to PLOF and address remaining functional goals. (see flow sheet as applicable)     Details if applicable:       10  08284 Neuromuscular Re-Education (timed):  improve balance, coordination, kinesthetic sense, posture, core stability and proprioception to improve patient's ability to develop conscious control of individual muscles and awareness of position of extremities in order to progress to PLOF and address remaining functional goals. (see flow sheet as applicable)     Details if applicable:     10  18448 Therapeutic Activity (timed):  use of dynamic activities replicating functional movements to increase ROM, strength, coordination, balance, and proprioception in order to improve patient's ability to progress to PLOF and address remaining functional goals.  (see flow sheet as applicable)     Details if applicable:     10  17626 Self Care/Home Management (timed):  improve patient knowledge and understanding of home injury/symptom/pain management, positioning, posture/ergonomics, home safety, activity modification, transfer techniques, and joint protection strategies  to improve patient's ability to progress to PLOF and address remaining functional goals.  (see flow sheet as

## 2025-05-21 ENCOUNTER — HOSPITAL ENCOUNTER (OUTPATIENT)
Facility: HOSPITAL | Age: 55
Setting detail: RECURRING SERIES
Discharge: HOME | End: 2025-05-24
Payer: COMMERCIAL

## 2025-05-21 PROCEDURE — 97535 SELF CARE MNGMENT TRAINING: CPT

## 2025-05-21 PROCEDURE — 97530 THERAPEUTIC ACTIVITIES: CPT

## 2025-05-21 PROCEDURE — 97112 NEUROMUSCULAR REEDUCATION: CPT

## 2025-05-21 PROCEDURE — 97110 THERAPEUTIC EXERCISES: CPT

## 2025-05-21 NOTE — PROGRESS NOTES
PHYSICAL / OCCUPATIONAL THERAPY - DAILY TREATMENT NOTE    Patient Name: Cameron Soctt    Date: 2025    : 1970  Insurance: Payor: [employer] BETTER [facility] [place] / Plan: [employer] BETTER [facility] [place] / Product Type: *No Product type* /      Patient  verified Yes     Visit #   Current / Total 1 10   Time   In / Out 850 930   Pain   In / Out 2 0   Subjective Functional Status/Changes: \"I have a little bit of pain going into my left groin.\"     TREATMENT AREA =  Other low back pain    OBJECTIVE         Therapeutic Procedures:    Tx Min Billable or 1:1 Min (if diff from Tx Min) Procedure, Rationale, Specifics   10  13347 Therapeutic Exercise (timed):  increase ROM, strength, coordination, balance, and proprioception to improve patient's ability to progress to PLOF and address remaining functional goals. (see flow sheet as applicable)     Details if applicable:       10  23492 Neuromuscular Re-Education (timed):  improve balance, coordination, kinesthetic sense, posture, core stability and proprioception to improve patient's ability to develop conscious control of individual muscles and awareness of position of extremities in order to progress to PLOF and address remaining functional goals. (see flow sheet as applicable)     Details if applicable:     10  00693 Therapeutic Activity (timed):  use of dynamic activities replicating functional movements to increase ROM, strength, coordination, balance, and proprioception in order to improve patient's ability to progress to PLOF and address remaining functional goals.  (see flow sheet as applicable)     Details if applicable:     10  11831 Self Care/Home Management (timed):  improve patient knowledge and understanding of home injury/symptom/pain management, positioning, posture/ergonomics, home safety, activity modification, transfer techniques, and joint protection strategies  to improve patient's ability to progress to PLOF and address remaining functional goals.  (see

## 2025-05-22 ENCOUNTER — OFFICE VISIT (OUTPATIENT)
Age: 55
End: 2025-05-22
Payer: COMMERCIAL

## 2025-05-22 VITALS
WEIGHT: 159 LBS | OXYGEN SATURATION: 98 % | DIASTOLIC BLOOD PRESSURE: 81 MMHG | BODY MASS INDEX: 23.55 KG/M2 | SYSTOLIC BLOOD PRESSURE: 112 MMHG | HEIGHT: 69 IN | RESPIRATION RATE: 12 BRPM | TEMPERATURE: 96.8 F | HEART RATE: 96 BPM

## 2025-05-22 DIAGNOSIS — N50.82 SCROTAL PAIN: ICD-10-CM

## 2025-05-22 DIAGNOSIS — R10.31 RIGHT GROIN PAIN: Primary | ICD-10-CM

## 2025-05-22 PROCEDURE — 99214 OFFICE O/P EST MOD 30 MIN: CPT | Performed by: SURGERY

## 2025-05-22 PROCEDURE — 3074F SYST BP LT 130 MM HG: CPT | Performed by: SURGERY

## 2025-05-22 PROCEDURE — 3079F DIAST BP 80-89 MM HG: CPT | Performed by: SURGERY

## 2025-05-22 NOTE — PROGRESS NOTES
General Surgery Consult      Cameron Scott  Admit date: (Not on file)    MRN: [id number]     : 1970     Age: 55 y.o.        Attending Physician: Robyn Woodson MD, [facility]      History of Present Illness:     Cameron Scott is a 55 y.o. male who is very well-known to me because last year I did the robotic bilateral inguinal hernia repair on him and he was doing very well.  He said that he has been feeling some discomfort in the right groin area close to the scrotum for the past few months.  He does not see a bulge.  He said that the pain is worse with movement.     Patient Active Problem List    Diagnosis Date Noted    Cervical post-laminectomy syndrome 2024    Myelomalacia of cervical cord (HCC) 2024    Subcutaneous nodule of neck 2024    Cervical myelopathy (HCC) 10/30/2023    Primary hypertension 2022    PFO (patent foramen ovale) 2022    TIA (transient ischemic attack) 2022    Left-sided weakness 2022    Left sided numbness 2022    Cervical radiculopathy 2022    Asthma 10/01/2020    Low back pain 10/01/2020    Low TSH level 2017    Tobacco abuse counseling 2017    Nasal congestion 2017    Chest pain 2017    Alcohol abuse 2017    Abnormal stress test 03/15/2017     Past Medical History:   Diagnosis Date    Abnormal /NCV LUE 2024    Ongoing Left C7 radiculopathy    Asthma     no need for inhaler in years    Back pain     Diabetes mellitus (HCC)     Elevated blood pressure reading     GERD (gastroesophageal reflux disease)     Hypercholesteremia     Left shoulder pain     Low TSH level     Low TSH level 2017    may have mild hyperthyroidism w/u per PCP I will rpt TSH and get freeT4    Primary hypertension 2022    Right inguinal hernia     TIA (transient ischemic attack) 2022    no residual      Past Surgical History:   Procedure Laterality Date    COLONOSCOPY      HERNIA REPAIR Bilateral 2024    ROBOTIC

## 2025-05-22 NOTE — PROGRESS NOTES
1. Have you been to the ER, urgent care clinic since your last visit?  Hospitalized since your last visit?No    2. Have you seen or consulted any other health care providers outside of the [facility] since your last visit?  Include any pap smears or colon screening. No

## 2025-05-23 ENCOUNTER — HOSPITAL ENCOUNTER (OUTPATIENT)
Age: 55
Discharge: HOME | End: 2025-05-26
Payer: COMMERCIAL

## 2025-05-23 DIAGNOSIS — M54.50 LUMBAR PAIN: ICD-10-CM

## 2025-05-23 DIAGNOSIS — M43.16 SPONDYLOLISTHESIS, LUMBAR REGION: ICD-10-CM

## 2025-05-23 DIAGNOSIS — M54.16 LUMBAR RADICULOPATHY: ICD-10-CM

## 2025-05-23 DIAGNOSIS — M47.816 LUMBAR SPONDYLOSIS: ICD-10-CM

## 2025-05-23 PROCEDURE — 72148 MRI LUMBAR SPINE W/O DYE: CPT

## 2025-05-27 ENCOUNTER — HOSPITAL ENCOUNTER (OUTPATIENT)
Facility: HOSPITAL | Age: 55
Setting detail: RECURRING SERIES
Discharge: HOME | End: 2025-05-30
Payer: COMMERCIAL

## 2025-05-27 PROCEDURE — 97112 NEUROMUSCULAR REEDUCATION: CPT

## 2025-05-27 PROCEDURE — 97535 SELF CARE MNGMENT TRAINING: CPT

## 2025-05-27 PROCEDURE — 97530 THERAPEUTIC ACTIVITIES: CPT

## 2025-05-27 PROCEDURE — 97110 THERAPEUTIC EXERCISES: CPT

## 2025-05-27 NOTE — PROGRESS NOTES
PHYSICAL / OCCUPATIONAL THERAPY - DAILY TREATMENT NOTE    Patient Name: Cameron Scott    Date: 2025    : 1970  Insurance: Payor: [facility] / Plan: [facility]TER [facility] [place] / Product Type: *No Product type* /      Patient  verified Yes     Visit #   Current / Total 2 10   Time   In / Out 845 928   Pain   In / Out 0 0   Subjective Functional Status/Changes: \"No pain today.\"     TREATMENT AREA =  Other low back pain    OBJECTIVE         Therapeutic Procedures:    Tx Min Billable or 1:1 Min (if diff from Tx Min) Procedure, Rationale, Specifics   10  12121 Therapeutic Exercise (timed):  increase ROM, strength, coordination, balance, and proprioception to improve patient's ability to progress to PLOF and address remaining functional goals. (see flow sheet as applicable)     Details if applicable:       13  00424 Neuromuscular Re-Education (timed):  improve balance, coordination, kinesthetic sense, posture, core stability and proprioception to improve patient's ability to develop conscious control of individual muscles and awareness of position of extremities in order to progress to PLOF and address remaining functional goals. (see flow sheet as applicable)     Details if applicable:     10  31528 Therapeutic Activity (timed):  use of dynamic activities replicating functional movements to increase ROM, strength, coordination, balance, and proprioception in order to improve patient's ability to progress to PLOF and address remaining functional goals.  (see flow sheet as applicable)     Details if applicable:     10  11441 Self Care/Home Management (timed):  improve patient knowledge and understanding of home injury/symptom/pain management, positioning, posture/ergonomics, home safety, activity modification, transfer techniques, and joint protection strategies  to improve patient's ability to progress to PLOF and address remaining functional goals.  (see flow sheet as applicable)     Details

## 2025-05-29 ENCOUNTER — HOSPITAL ENCOUNTER (OUTPATIENT)
Facility: HOSPITAL | Age: 55
Setting detail: RECURRING SERIES
End: 2025-05-29
Payer: COMMERCIAL

## 2025-05-29 ENCOUNTER — RESULTS FOLLOW-UP (OUTPATIENT)
Age: 55
End: 2025-05-29

## 2025-05-29 PROCEDURE — 97535 SELF CARE MNGMENT TRAINING: CPT

## 2025-05-29 PROCEDURE — 97530 THERAPEUTIC ACTIVITIES: CPT

## 2025-05-29 PROCEDURE — 97110 THERAPEUTIC EXERCISES: CPT

## 2025-05-29 PROCEDURE — 97112 NEUROMUSCULAR REEDUCATION: CPT

## 2025-05-29 NOTE — PROGRESS NOTES
PHYSICAL / OCCUPATIONAL THERAPY - DAILY TREATMENT NOTE    Patient Name: Cameron Scott    Date: 2025    : 1970  Insurance: Payor: [facility] [place] / Plan: [employer]ET BETTER [facility] [place] / Product Type: *No Product type* /      Patient  verified Yes     Visit #   Current / Total 3 10   Time   In / Out 0850 0934   Pain   In / Out 6/10 2/10   Subjective Functional Status/Changes: Patient reports an increase in pain upon arrival, just in center of low back without radiating sx.   \"I was moving some water yesterday and I feel it\"      TREATMENT AREA =  Other low back pain    OBJECTIVE    Therapeutic Procedures:    Tx Min Billable or 1:1 Min (if diff from Tx Min) Procedure, Rationale, Specifics   12  81343 Therapeutic Exercise (timed):  increase ROM, strength, coordination, balance, and proprioception to improve patient's ability to progress to PLOF and address remaining functional goals. (see flow sheet as applicable)     Details if applicable:       14  54601 Neuromuscular Re-Education (timed):  improve balance, coordination, kinesthetic sense, posture, core stability and proprioception to improve patient's ability to develop conscious control of individual muscles and awareness of position of extremities in order to progress to PLOF and address remaining functional goals. (see flow sheet as applicable)     Details if applicable:  core re-ed, hip stability    8  88722 Therapeutic Activity (timed):  use of dynamic activities replicating functional movements to increase ROM, strength, coordination, balance, and proprioception in order to improve patient's ability to progress to PLOF and address remaining functional goals.  (see flow sheet as applicable)     Details if applicable:  standing functional BLE strength    10  16232 Self Care/Home Management (timed):  improve patient knowledge and understanding of home injury/symptom/pain management, positioning, posture/ergonomics, home safety, activity

## 2025-05-30 ENCOUNTER — HOSPITAL ENCOUNTER (OUTPATIENT)
Facility: HOSPITAL | Age: 55
Discharge: HOME | End: 2025-05-30
Attending: SURGERY
Payer: COMMERCIAL

## 2025-05-30 ENCOUNTER — HOSPITAL ENCOUNTER (OUTPATIENT)
Facility: HOSPITAL | Age: 55
End: 2025-05-30
Attending: SURGERY
Payer: COMMERCIAL

## 2025-05-30 DIAGNOSIS — R10.31 RIGHT GROIN PAIN: ICD-10-CM

## 2025-05-30 DIAGNOSIS — N50.82 SCROTAL PAIN: ICD-10-CM

## 2025-05-30 PROCEDURE — 76705 ECHO EXAM OF ABDOMEN: CPT

## 2025-05-30 PROCEDURE — 93976 VASCULAR STUDY: CPT

## 2025-06-05 ENCOUNTER — HOSPITAL ENCOUNTER (OUTPATIENT)
Facility: HOSPITAL | Age: 55
Setting detail: RECURRING SERIES
Discharge: HOME | End: 2025-06-08
Payer: COMMERCIAL

## 2025-06-05 PROCEDURE — 97112 NEUROMUSCULAR REEDUCATION: CPT

## 2025-06-05 PROCEDURE — 97110 THERAPEUTIC EXERCISES: CPT

## 2025-06-05 PROCEDURE — 97530 THERAPEUTIC ACTIVITIES: CPT

## 2025-06-05 PROCEDURE — 97535 SELF CARE MNGMENT TRAINING: CPT

## 2025-06-05 NOTE — PROGRESS NOTES
(05/29/25)      2. Pt will be independent with HEP to facilitate carry-over of functional gains made in PT at home & community. (Initial STG)              PN Status: reports continued daily compliance       3. Pt will be able to improve strength in Left hip flexion, hip abduction, hip extension, s/l ER to at least 4+/5 to improve patient's ability to negotiate curbs with less pain in the community. (Initial LTG)              PN Status: Hip Flexion: Right 5/5, Left 4+/5, Hip Abduction: Bilaterally 5/5, Hip Extension: Right 5/5, Left 4+/5               Steady increases with bilateral LE strength as evident by progression of exercises today [Date assessed: 05/27/2025]     4. Pt will improve OLGA LIDIA (functional outcome name) score to 14 to demonstrate improvement in patient's ability to perform unrestricted ADLs/IADLs at home & community. (UPDATED GOAL)              PN Status: 32 points = 64%    Assess at next 30-day magdy (06/05/25)     5. Pt will be able to perform 10 functional squats with good form to faciliate return to efficient home chore management.               PN Status: Demonstrates 10 functional squats with minor weight shifting to right without pain     6. Pt will be able to ambulate 1/2 mile without increase in sx to improve patient's ability to ambulate in community with increased ease and safety. (NEW GOAL)              PN Status: reports continued increased distance, but hasn't measured distance              Reports continuing to increase walking distance [Date assessed: 05/21/2025]    Next PN/ RC due 06/17/25  Auth due (visit number/ date) 11 visits remaining through 08/01/25     PLAN  - Continue Plan of Care    Henrietta Goddard PT    6/5/2025    8:09 AM  If an interpreting service was utilized for treatment of this patient, the contents of this document represent the material reviewed with the patient via the .     Future Appointments   Date Time Provider Department Center   6/5/2025  8:10 AM [facility]

## 2025-06-10 ENCOUNTER — HOSPITAL ENCOUNTER (OUTPATIENT)
Facility: HOSPITAL | Age: 55
Setting detail: RECURRING SERIES
Discharge: HOME | End: 2025-06-13
Payer: COMMERCIAL

## 2025-06-10 PROCEDURE — 97112 NEUROMUSCULAR REEDUCATION: CPT

## 2025-06-10 PROCEDURE — 97535 SELF CARE MNGMENT TRAINING: CPT

## 2025-06-10 PROCEDURE — 97530 THERAPEUTIC ACTIVITIES: CPT

## 2025-06-10 PROCEDURE — 97110 THERAPEUTIC EXERCISES: CPT

## 2025-06-10 NOTE — PROGRESS NOTES
[place]   6/10/2025  8:10 AM Deborah Aguilar, PTA [facility]   6/12/2025  8:10 AM Deborah Aguilar, PTA [facility]   6/16/2025  8:45 AM Robyn Woodson MD [facility] BS AMB   6/17/2025  8:50 AM Benson Castle, PTA [facility]   6/19/2025  8:50 AM Veronica Strakey, PTA [facility]   7/3/2025  2:00 PM Zuleika Candelaria MD [facility] BS AMB   8/19/2025  9:00 AM Margarita Vazquez MD  [facility] BS AMB   11/17/2025 10:40 AM [facility] NURSE [facility] Stendal Sched   12/2/2025 10:15 AM Kerwin Roman PA [facility] Mandy Sched   5/5/2026  8:30 AM [facility] ECHO GE [address] [facility] BS AMB   5/5/2026  9:20 AM Henrry Hu MD [facility] BS AMB

## 2025-06-12 ENCOUNTER — HOSPITAL ENCOUNTER (OUTPATIENT)
Facility: HOSPITAL | Age: 55
Setting detail: RECURRING SERIES
Discharge: HOME | End: 2025-06-15
Payer: COMMERCIAL

## 2025-06-12 PROCEDURE — 97110 THERAPEUTIC EXERCISES: CPT

## 2025-06-12 PROCEDURE — 97535 SELF CARE MNGMENT TRAINING: CPT

## 2025-06-12 PROCEDURE — 97530 THERAPEUTIC ACTIVITIES: CPT

## 2025-06-12 PROCEDURE — 97112 NEUROMUSCULAR REEDUCATION: CPT

## 2025-06-12 NOTE — PROGRESS NOTES
applicable)     Details if applicable:     44 [address] [facility] Totals Reminder: bill using total billable min of TIMED therapeutic procedures (example: do not include dry needle or estim unattended, both untimed codes, in totals to left)  8-22 min = 1 unit; 23-37 min = 2 units; 38-52 min = 3 units; 53-67 min = 4 units; 68-82 min = 5 units   Total Total     Charge Capture    [x]  Patient Education billed concurrently with other procedures   [x] Review HEP    [] Progressed/Changed HEP, detail:    [] Other detail:       Objective Information/Functional Measures/Assessment    Patient participated in today's session without adverse effects. Reports increased ease with KB excises at today's session. Therapist able to progress most exercises with good patient tolerance. Patient had a (R) lateral weight shift during KB squats - able to correct with vc. Possibly progress KB [employer] NV to kneeling on BOSU. Patient continues to be challenged with Matrix knee flexion/extension. Patient reported movement of pain from (L) hip/groin area to low back. Therapist instructed in prone series with patient reported overall decrease in sx (0/10 p!). Progress note needed NV. Continue to progress as able.    Patient will continue to benefit from skilled PT / OT services to modify and progress therapeutic interventions, analyze and address functional mobility deficits, analyze and address ROM deficits, analyze and address strength deficits, analyze and address soft tissue restrictions, analyze and cue for proper movement patterns, analyze and modify for postural abnormalities, analyze and address imbalance/dizziness, and instruct in home and community integration to address functional deficits and attain remaining goals.    Progress toward goals / Updated goals:  []  See Progress Note/Recertification    1. Pt will be able to report a 2/10 pain rating at worst to improve patient's ability to tolerate prolonged functional activities at home.

## 2025-06-16 ENCOUNTER — OFFICE VISIT (OUTPATIENT)
Age: 55
End: 2025-06-16
Payer: COMMERCIAL

## 2025-06-16 VITALS
HEART RATE: 100 BPM | BODY MASS INDEX: 24.14 KG/M2 | SYSTOLIC BLOOD PRESSURE: 114 MMHG | HEIGHT: 69 IN | DIASTOLIC BLOOD PRESSURE: 78 MMHG | OXYGEN SATURATION: 99 % | WEIGHT: 163 LBS

## 2025-06-16 DIAGNOSIS — N50.82 SCROTAL PAIN: ICD-10-CM

## 2025-06-16 DIAGNOSIS — R10.31 RIGHT GROIN PAIN: Primary | ICD-10-CM

## 2025-06-16 PROCEDURE — 3078F DIAST BP <80 MM HG: CPT | Performed by: SURGERY

## 2025-06-16 PROCEDURE — 3074F SYST BP LT 130 MM HG: CPT | Performed by: SURGERY

## 2025-06-16 PROCEDURE — 99214 OFFICE O/P EST MOD 30 MIN: CPT | Performed by: SURGERY

## 2025-06-16 NOTE — PROGRESS NOTES
General Surgery Consult      Cameron Scott  Admit date: (Not on file)    MRN: [id number]     : 1970     Age: 55 y.o.        Attending Physician: Robyn Woodson MD [facility]      History of Present Illness:     Cameron Scott is a 55 y.o. male who is here for follow-up on his right groin pain and to discuss the ultrasound of the scrotum and his abdominal ultrasound.  The patient is very well-known to me and had performed robotic bilateral inguinal hernia repair on him in the past and he presented with right groin discomfort and scrotal pain.  The pain was mild and comes and goes and I ordered a scrotal ultrasound that showed no pathology in the scrotum and also ordered an abdominal ultrasound that showed no evidence of recurrence of the hernia.  The patient states that he feels much better and his discomfort is almost gone.    Patient Active Problem List    Diagnosis Date Noted    Cervical post-laminectomy syndrome 2024    Myelomalacia of cervical cord (HCC) 2024    Subcutaneous nodule of neck 2024    Cervical myelopathy (HCC) 10/30/2023    Primary hypertension 2022    PFO (patent foramen ovale) 2022    TIA (transient ischemic attack) 2022    Left-sided weakness 2022    Left sided numbness 2022    Cervical radiculopathy 2022    Asthma 10/01/2020    Low back pain 10/01/2020    Low TSH level 2017    Tobacco abuse counseling 2017    Nasal congestion 2017    Chest pain 2017    Alcohol abuse 2017    Abnormal stress test 03/15/2017     Past Medical History:   Diagnosis Date    Abnormal /NCV LUE 2024    Ongoing Left C7 radiculopathy    Asthma     no need for inhaler in years    Back pain     Diabetes mellitus (HCC)     Elevated blood pressure reading     GERD (gastroesophageal reflux disease)     Hypercholesteremia     Left shoulder pain     Low TSH level     Low TSH level 2017    may have mild hyperthyroidism w/u per PCP I will

## 2025-06-16 NOTE — PROGRESS NOTES
Cameron Scott is a 55 y.o. male (: 1970) presenting to address:    Chief Complaint   Patient presents with    Follow-up     Discuss US scrotum and US Abd Mcneil done on 25 for right groin pain       Medication list and allergies have been reviewed with Cameron Scott and updated as of today's date.     I have gone over all Medical, Surgical and Social History with Cameron Scott and updated/added the information accordingly.       1. Have you been to the ER, Urgent Care or Hospitalized since your last visit? No          2. Have you followed up with your PCP or any other Physicians since your procedure/ last office visit?   No

## 2025-06-17 ENCOUNTER — HOSPITAL ENCOUNTER (OUTPATIENT)
Facility: HOSPITAL | Age: 55
Setting detail: RECURRING SERIES
Discharge: HOME | End: 2025-06-20
Payer: COMMERCIAL

## 2025-06-17 PROCEDURE — 97530 THERAPEUTIC ACTIVITIES: CPT

## 2025-06-17 PROCEDURE — 97112 NEUROMUSCULAR REEDUCATION: CPT

## 2025-06-17 PROCEDURE — 97535 SELF CARE MNGMENT TRAINING: CPT

## 2025-06-17 PROCEDURE — 97110 THERAPEUTIC EXERCISES: CPT

## 2025-06-18 NOTE — PROGRESS NOTES
PHYSICAL / OCCUPATIONAL THERAPY - DAILY TREATMENT NOTE    Patient Name: Cameron Scott    Date: 2025    : 1970  Insurance: Payor: [facility]ETT [facility] [place] / Plan: AET BETTER [facility] [place] / Product Type: *No Product type* /      Patient  verified Yes     Visit #   Current / Total 1 10   Time   In / Out 850 929   Pain   In / Out L leg 0/back 2 L leg 0/back 2   Subjective Functional Status/Changes: Pt reports PT is helping with his back pain.     TREATMENT AREA =  Other low back pain    OBJECTIVE         Therapeutic Procedures:    Tx Min Billable or 1:1 Min (if diff from Tx Min) Procedure, Rationale, Specifics   10  02777 Therapeutic Exercise (timed):  increase ROM, strength, coordination, balance, and proprioception to improve patient's ability to progress to PLOF and address remaining functional goals. (see flow sheet as applicable)     Details if applicable:       10  61759 Neuromuscular Re-Education (timed):  improve balance, coordination, kinesthetic sense, posture, core stability and proprioception to improve patient's ability to develop conscious control of individual muscles and awareness of position of extremities in order to progress to PLOF and address remaining functional goals. (see flow sheet as applicable)     Details if applicable:     10  21451 Therapeutic Activity (timed):  use of dynamic activities replicating functional movements to increase ROM, strength, coordination, balance, and proprioception in order to improve patient's ability to progress to PLOF and address remaining functional goals.  (see flow sheet as applicable)     Details if applicable:     9  18832 Self Care/Home Management (timed):  improve patient knowledge and understanding of home injury/symptom/pain management, positioning, posture/ergonomics, home safety, activity modification, transfer techniques, and joint protection strategies  to improve patient's ability to progress to PLOF and address remaining functional 
(Initial STG)              PN Status: Performs HEP 1x per day.     3. Pt will be able to improve strength in Left hip flexion, hip abduction, hip extension, s/l ER to at least 4+/5 to improve patient's ability to negotiate curbs with less pain in the community. (Initial LTG)              PN Status: Hip Flexion: Right 5/5, Left 4+/5, Hip Abduction: Bilaterally 5/5, Hip Extension: Right 5/5, Left 5/5     4. Pt will improve OLGA LIDIA (functional outcome name) score to 14 to demonstrate improvement in patient's ability to perform unrestricted ADLs/IADLs at home & community. (UPDATED GOAL)              PN Status: 26 points =52%     5. Pt will be able to perform 10 functional squats with good form to faciliate return to efficient home chore management.               PN Status: Performed 10 functional squats with minimal weight shifting to right without pain     6. Pt will be able to ambulate 1/2 mile without increase in sx to improve patient's ability to ambulate in community with increased ease and safety. (NEW GOAL)              PN Status: Pt reports walking more but not 1/2 mile at this time.    Frequency / Duration:   Patient to be seen   2   times per week for   5    WEEKS    RECOMMENDATIONS  Patient would benefit from the continuation of skilled rehab interventions for functional progress to achieving above stated clinically significant goals.  Continue per initial Plan of Care.    If you have any questions/comments please contact us directly.  Thank you for allowing us to assist in the care of your patient.    Roshan Norton, PT       6/18/2025       8:22 AM    
new onset  with RVR leading to shock  EP consulted - on amio, transitioned to oral now s/p PEG  TSH, trop, CKmb normal  TTE with normal cardiac fx - mild MR, mod TR - no stigma of rheumatic DZ  now back to NSR   benefits from full AC based on CHADS score -dw pt, agreeable - TTE with mild MR, no valvular pathology  on eliquis for AC

## 2025-06-19 ENCOUNTER — HOSPITAL ENCOUNTER (OUTPATIENT)
Facility: HOSPITAL | Age: 55
Setting detail: RECURRING SERIES
Discharge: HOME | End: 2025-06-22
Payer: COMMERCIAL

## 2025-06-19 PROCEDURE — 97112 NEUROMUSCULAR REEDUCATION: CPT

## 2025-06-19 PROCEDURE — 97110 THERAPEUTIC EXERCISES: CPT

## 2025-06-19 PROCEDURE — 97530 THERAPEUTIC ACTIVITIES: CPT

## 2025-06-19 PROCEDURE — 97535 SELF CARE MNGMENT TRAINING: CPT

## 2025-06-19 NOTE — PROGRESS NOTES
PHYSICAL / OCCUPATIONAL THERAPY - DAILY TREATMENT NOTE    Patient Name: Cameron Scott    Date: 2025    : 1970  Insurance: Payor: GARETT [employer] [facility] [place] / Plan: AET BETTER [facility] [place] / Product Type: *No Product type* /      Patient  verified Yes     Visit #   Current / Total 1 10   Time   In / Out 850  931   Pain   In / Out 2 2   Subjective Functional Status/Changes: Pt reports PT is helping with the pain.     TREATMENT AREA =  Other low back pain    OBJECTIVE         Therapeutic Procedures:    Tx Min Billable or 1:1 Min (if diff from Tx Min) Procedure, Rationale, Specifics   11  95882 Therapeutic Exercise (timed):  increase ROM, strength, coordination, balance, and proprioception to improve patient's ability to progress to PLOF and address remaining functional goals. (see flow sheet as applicable)     Details if applicable:       10 10 18023 Neuromuscular Re-Education (timed):  improve balance, coordination, kinesthetic sense, posture, core stability and proprioception to improve patient's ability to develop conscious control of individual muscles and awareness of position of extremities in order to progress to PLOF and address remaining functional goals. (see flow sheet as applicable)     Details if applicable:     10 10 56026 Therapeutic Activity (timed):  use of dynamic activities replicating functional movements to increase ROM, strength, coordination, balance, and proprioception in order to improve patient's ability to progress to PLOF and address remaining functional goals.  (see flow sheet as applicable)     Details if applicable:     10 10 59417 Self Care/Home Management (timed):  improve patient knowledge and understanding of home injury/symptom/pain management, positioning, posture/ergonomics, home safety, activity modification, transfer techniques, and joint protection strategies  to improve patient's ability to progress to PLOF and address remaining functional goals.  (see flow

## 2025-06-24 ENCOUNTER — HOSPITAL ENCOUNTER (OUTPATIENT)
Facility: HOSPITAL | Age: 55
Setting detail: RECURRING SERIES
Discharge: HOME | End: 2025-06-27
Payer: COMMERCIAL

## 2025-06-24 PROCEDURE — 97110 THERAPEUTIC EXERCISES: CPT

## 2025-06-24 PROCEDURE — 97112 NEUROMUSCULAR REEDUCATION: CPT

## 2025-06-24 PROCEDURE — 97535 SELF CARE MNGMENT TRAINING: CPT

## 2025-06-24 PROCEDURE — 97530 THERAPEUTIC ACTIVITIES: CPT

## 2025-06-24 NOTE — PROGRESS NOTES
PHYSICAL / OCCUPATIONAL THERAPY - DAILY TREATMENT NOTE    Patient Name: Cameron Scott    Date: 2025    : 1970  Insurance: Payor: GARETT [employer] [facility] [place] / Plan: AET BETTER [facility] [place] / Product Type: *No Product type* /      Patient  verified Yes     Visit #   Current / Total 2 10   Time   In / Out 810 851   Pain   In / Out 5 leg 2   Subjective Functional Status/Changes: Will hear about his hip MRI findings this thursday     TREATMENT AREA =  Other low back pain    OBJECTIVE         Therapeutic Procedures:    Tx Min Billable or 1:1 Min (if diff from Tx Min) Procedure, Rationale, Specifics   13 13 99164 Therapeutic Exercise (timed):  increase ROM, strength, coordination, balance, and proprioception to improve patient's ability to progress to PLOF and address remaining functional goals. (see flow sheet as applicable)     Details if applicable:       10 10 92601 Neuromuscular Re-Education (timed):  improve balance, coordination, kinesthetic sense, posture, core stability and proprioception to improve patient's ability to develop conscious control of individual muscles and awareness of position of extremities in order to progress to PLOF and address remaining functional goals. (see flow sheet as applicable)     Details if applicable:  core stability & postural control work   10 10 57360 Therapeutic Activity (timed):  use of dynamic activities replicating functional movements to increase ROM, strength, coordination, balance, and proprioception in order to improve patient's ability to progress to PLOF and address remaining functional goals.  (see flow sheet as applicable)     Details if applicable:     8 8 13830 Self Care/Home Management (timed):  improve patient knowledge and understanding of home injury/symptom/pain management, positioning, and posture/ergonomics  to improve patient's ability to progress to PLOF and address remaining functional goals.  (see flow sheet as applicable)     Details if

## 2025-06-25 NOTE — PROGRESS NOTES
[facility]  [address], [address]  [place] [address]  Phone: [phone]  Fax: [phone]    Patient's YOB: 1970    ASSESSMENT   Cameron was seen today for back pain.    Diagnoses and all orders for this visit:    Lumbar radiculopathy    Lumbar facet arthropathy    Cervical pain  -     AMB POC XRAY, SPINE, CERVICAL; 2 OR 3    Cervical post-laminectomy syndrome    Cervical vertebral fusion         IMPRESSION AND PLAN:  Cameron Scott is a 55 y.o. male with history of DM, GERD, HTN, and TIA. Pt complains of lower back and cervical pain. Patient is currently taking Gabapentin 300mg BID and Mobic 15mg QD. Patient endorses a history of cervical laminoplasty at C3-T1 fusion(10/30/23) with Dr. Javier.     Patient was given information on back arthritis exercises.   Patient was given information on medial branch neurotomy.  Patient was given information on facet joint injections.   Reviewed Lumbar Spine MRI WO Contrast images from 5/23/25 with patient.   Ordered and reviewed 2V AP/LAT Cervical XR images in office.   Mr. Scott has a reminder for a \"due or due soon\" health maintenance. I have asked that he contact his primary care provider, Ronald Davila Jr., APRN - NP, for follow-up on this health maintenance.   demonstrated consistency with prescribing.     Return in about 2 months (around 9/3/2025) for With Aparna max.      HISTORY OF PRESENT ILLNESS:  Cameron Scott is a 55 y.o. male who presents to the office today for a diagnostic follow up. At his last OV (5/12/25 - Shasta Walden APRN - NP), Ordered Lumbar Spine MRI WO Contrast for surgical and injection evaluation.    Patient presents here for a diagnostic follow-up. Patient c/o neck pain stemming from a \"bulge\" in the back of his neck and reports a h/o posterior cervical fusion by Dr. Javier on 10/30/23.Patient also c/o lower back pain. Patient notes sleeping on his back exacerbates his pain.

## 2025-06-26 ENCOUNTER — HOSPITAL ENCOUNTER (OUTPATIENT)
Facility: HOSPITAL | Age: 55
Setting detail: RECURRING SERIES
Discharge: HOME | End: 2025-06-29
Payer: COMMERCIAL

## 2025-06-26 PROCEDURE — 97530 THERAPEUTIC ACTIVITIES: CPT

## 2025-06-26 PROCEDURE — 97112 NEUROMUSCULAR REEDUCATION: CPT

## 2025-06-26 PROCEDURE — 97110 THERAPEUTIC EXERCISES: CPT

## 2025-06-26 PROCEDURE — 97535 SELF CARE MNGMENT TRAINING: CPT

## 2025-06-26 NOTE — PROGRESS NOTES
PHYSICAL / OCCUPATIONAL THERAPY - DAILY TREATMENT NOTE    Patient Name: Cameron Scott    Date: 2025    : 1970  Insurance: Payor: [facility] [employer] [facility] [place] / Plan: AET BETTER [facility] [place] / Product Type: *No Product type* /      Patient  verified Yes     Visit #   Current / Total 3 10   Time   In / Out 0813 0934   Pain   In / Out 6/10 into LLE 1/10   Subjective Functional Status/Changes: Reports increased pain into LLE today, not sure why -- was up to 8/10 last night but 6/10 today.   Sees Dr. Candelaria on 25 to review MRI results.     TREATMENT AREA =  Other low back pain    OBJECTIVE    Therapeutic Procedures:    Tx Min Billable or 1:1 Min (if diff from Tx Min) Procedure, Rationale, Specifics   12  80368 Therapeutic Exercise (timed):  increase ROM, strength, coordination, balance, and proprioception to improve patient's ability to progress to PLOF and address remaining functional goals. (see flow sheet as applicable)     Details if applicable:       10  32765 Neuromuscular Re-Education (timed):  improve balance, coordination, kinesthetic sense, posture, core stability and proprioception to improve patient's ability to develop conscious control of individual muscles and awareness of position of extremities in order to progress to PLOF and address remaining functional goals. (see flow sheet as applicable)     Details if applicable:  core stability/postural control    10  91277 Therapeutic Activity (timed):  use of dynamic activities replicating functional movements to increase ROM, strength, coordination, balance, and proprioception in order to improve patient's ability to progress to PLOF and address remaining functional goals.  (see flow sheet as applicable)     Details if applicable:  step ups, farmer carry    8  17590 Self Care/Home Management (timed):  improve patient knowledge and understanding of home injury/symptom/pain management, positioning, posture/ergonomics, home safety, activity

## 2025-06-30 ENCOUNTER — HOSPITAL ENCOUNTER (OUTPATIENT)
Facility: HOSPITAL | Age: 55
Setting detail: RECURRING SERIES
Discharge: HOME | End: 2025-07-03
Payer: COMMERCIAL

## 2025-06-30 PROCEDURE — 97530 THERAPEUTIC ACTIVITIES: CPT

## 2025-06-30 PROCEDURE — 97110 THERAPEUTIC EXERCISES: CPT

## 2025-06-30 PROCEDURE — 97112 NEUROMUSCULAR REEDUCATION: CPT

## 2025-06-30 NOTE — PROGRESS NOTES
PHYSICAL / OCCUPATIONAL THERAPY - DAILY TREATMENT NOTE    Patient Name: Cameron Scott    Date: 2025    : 1970  Insurance: Payor: AETJANES BETTER [facility] [place] / Plan: AETNA BETTER [facility] [place] / Product Type: *No Product type* /      Patient  verified Yes     Visit #   Current / Total 4 10   Time   In / Out 8:50 9:30   Pain   In / Out 2 0   Subjective Functional Status/Changes: \"No big change\"     TREATMENT AREA =  Other low back pain    OBJECTIVE         Therapeutic Procedures:    Tx Min Billable or 1:1 Min (if diff from Tx Min) Procedure, Rationale, Specifics   13 13 38673 Therapeutic Exercise (timed):  increase ROM, strength, coordination, balance, and proprioception to improve patient's ability to progress to PLOF and address remaining functional goals. (see flow sheet as applicable)     Details if applicable:  Combo, steamboats, Matrix flex/ext, quad stretch, TB Rows, S/L hip abduction w/#     14 14 32822 Neuromuscular Re-Education (timed):  improve balance, coordination, kinesthetic sense, posture, core stability and proprioception to improve patient's ability to develop conscious control of individual muscles and awareness of position of extremities in order to progress to PLOF and address remaining functional goals. (see flow sheet as applicable)     Details if applicable:  SLR, Prone TKE, ARIELA around the world, Bj Aguilar    13 81145 Therapeutic Activity (timed):  use of dynamic activities replicating functional movements to increase ROM, strength, coordination, balance, and proprioception in order to improve patient's ability to progress to PLOF and address remaining functional goals.  (see flow sheet as applicable)     Details if applicable:  ARIELA sqauts, Prone press up, prone lying, TB ext  40 [facility] Totals Reminder: bill using total billable min of TIMED therapeutic procedures (example: do not include dry needle or estim unattended, both untimed codes, in totals to

## 2025-07-02 ENCOUNTER — HOSPITAL ENCOUNTER (OUTPATIENT)
Facility: HOSPITAL | Age: 55
Setting detail: RECURRING SERIES
Discharge: HOME | End: 2025-07-05
Payer: COMMERCIAL

## 2025-07-02 PROCEDURE — 97112 NEUROMUSCULAR REEDUCATION: CPT

## 2025-07-02 PROCEDURE — 97530 THERAPEUTIC ACTIVITIES: CPT

## 2025-07-02 PROCEDURE — 97535 SELF CARE MNGMENT TRAINING: CPT

## 2025-07-02 PROCEDURE — 97110 THERAPEUTIC EXERCISES: CPT

## 2025-07-02 NOTE — PROGRESS NOTES
7/22/2025  8:50 AM Deborah Aguilar, PTA [facility]   7/24/2025  8:50 AM Veronica Starkey, PTA [facility]   7/29/2025  8:50 AM Henrietta Goddard, PT [facility]   7/31/2025  8:50 AM Henrietta Goddard, PT [facility]   8/19/2025  9:00 AM [facility]azquez [facility]   11/17/2025 10:40 AM [facility] NURSE [facility] [place] Sched   11/20/2025  9:45 AM Kerwin Roman, PA [facility] [place] Sched   12/2/2025 10:15 AM Kerwin Roman, PA [facility] [place] Sched   5/5/2026  8:30 AM [facility] ECHO GE [facility] BS AMB   5/5/2026  9:20 AM Henrry Hu MD [facility] BS AMB         Detail Level: Detailed Body Location Override (Optional - Billing Will Still Be Based On Selected Body Map Location If Applicable): right radial dorsal hand Size Of Lesion: 0.9 Name Of The Referring Provider For Procedure: KANDICE CARDONA Incorporate Mauc In Note: Yes

## 2025-07-03 ENCOUNTER — OFFICE VISIT (OUTPATIENT)
Age: 55
End: 2025-07-03
Payer: COMMERCIAL

## 2025-07-03 VITALS
HEIGHT: 69 IN | OXYGEN SATURATION: 99 % | BODY MASS INDEX: 23.85 KG/M2 | HEART RATE: 92 BPM | WEIGHT: 161 LBS | TEMPERATURE: 98 F

## 2025-07-03 DIAGNOSIS — M43.22 CERVICAL VERTEBRAL FUSION: ICD-10-CM

## 2025-07-03 DIAGNOSIS — M47.816 LUMBAR FACET ARTHROPATHY: ICD-10-CM

## 2025-07-03 DIAGNOSIS — M54.2 CERVICAL PAIN: ICD-10-CM

## 2025-07-03 DIAGNOSIS — M54.16 LUMBAR RADICULOPATHY: Primary | ICD-10-CM

## 2025-07-03 DIAGNOSIS — M96.1 CERVICAL POST-LAMINECTOMY SYNDROME: ICD-10-CM

## 2025-07-03 PROCEDURE — 99214 OFFICE O/P EST MOD 30 MIN: CPT | Performed by: PHYSICAL MEDICINE & REHABILITATION

## 2025-07-03 PROCEDURE — 72040 X-RAY EXAM NECK SPINE 2-3 VW: CPT | Performed by: PHYSICAL MEDICINE & REHABILITATION

## 2025-07-03 ASSESSMENT — PATIENT HEALTH QUESTIONNAIRE - PHQ9
1. LITTLE INTEREST OR PLEASURE IN DOING THINGS: NOT AT ALL
2. FEELING DOWN, DEPRESSED OR HOPELESS: NOT AT ALL
SUM OF ALL RESPONSES TO PHQ QUESTIONS 1-9: 0

## 2025-07-08 ENCOUNTER — HOSPITAL ENCOUNTER (OUTPATIENT)
Facility: HOSPITAL | Age: 55
Setting detail: RECURRING SERIES
Discharge: HOME | End: 2025-07-11
Payer: COMMERCIAL

## 2025-07-08 PROCEDURE — 97112 NEUROMUSCULAR REEDUCATION: CPT

## 2025-07-08 PROCEDURE — 97535 SELF CARE MNGMENT TRAINING: CPT

## 2025-07-08 PROCEDURE — 97110 THERAPEUTIC EXERCISES: CPT

## 2025-07-08 PROCEDURE — 97530 THERAPEUTIC ACTIVITIES: CPT

## 2025-07-08 NOTE — PROGRESS NOTES
applicable)     Details if applicable:            Details if applicable:     40 40 [facility] Totals Reminder: bill using total billable min of TIMED therapeutic procedures (example: do not include dry needle or estim unattended, both untimed codes, in totals to left)  8-22 min = 1 unit; 23-37 min = 2 units; 38-52 min = 3 units; 53-67 min = 4 units; 68-82 min = 5 units   Total Total     Charge Capture    [x]  Patient Education billed concurrently with other procedures   [x] Review HEP    [] Progressed/Changed HEP, detail:    [] Other detail:       Objective Information/Functional Measures/Assessment    Pt reports no adverse effects from treatment session.Pt able to increase wt with matrix with good strength and endurance.Pt exhibit some shakiness on eccentric portion of knee extension on matrix which decreased with reps.Reviewed HEP and Pt was able to perform demo.Progress as able.    Patient will continue to benefit from skilled PT / OT services to modify and progress therapeutic interventions, analyze and address functional mobility deficits, analyze and address ROM deficits, analyze and address strength deficits, analyze and address soft tissue restrictions, analyze and cue for proper movement patterns, analyze and modify for postural abnormalities, analyze and address imbalance/dizziness, and instruct in home and community integration to address functional deficits and attain remaining goals.    Progress toward goals / Updated goals:  []  See Progress Note/Recertification    1. Pt will be able to report a 2/10 pain rating at worst to improve patient's ability to tolerate prolonged functional activities at home. (UPDATED GOAL)  PN Status:4/10 at worst  Current: 8/10 at worst yesterday [Date assessed: 6/24/25]     2. Pt will be independent with HEP to facilitate carry-over of functional gains made in PT at home & community. (Initial STG)              PN Status: Performs HEP 1x per day.              Progressing:

## 2025-07-10 ENCOUNTER — HOSPITAL ENCOUNTER (OUTPATIENT)
Facility: HOSPITAL | Age: 55
Setting detail: RECURRING SERIES
Discharge: HOME | End: 2025-07-13
Payer: COMMERCIAL

## 2025-07-10 PROCEDURE — 97110 THERAPEUTIC EXERCISES: CPT

## 2025-07-10 PROCEDURE — 97112 NEUROMUSCULAR REEDUCATION: CPT

## 2025-07-10 PROCEDURE — 97530 THERAPEUTIC ACTIVITIES: CPT

## 2025-07-10 PROCEDURE — 97535 SELF CARE MNGMENT TRAINING: CPT

## 2025-07-10 NOTE — PROGRESS NOTES
PHYSICAL / OCCUPATIONAL THERAPY - DAILY TREATMENT NOTE    Patient Name: Cameron Scott    Date: 7/10/2025    : 1970  [facility] [employer] [facility] [place] / Plan: AET BETTER [facility] [place] / Product Type: *No Product type* /      Patient  verified Yes     Visit #   Current / Total 7 10   Time   In / Out 850 934   Pain   In / Out 2 2   Subjective Functional Status/Changes: \"I just have a little pain in my back.\"     TREATMENT AREA =  Other low back pain    OBJECTIVE         Therapeutic Procedures:    Tx Min Billable or 1:1 Min (if diff from Tx Min) Procedure, Rationale, Specifics   10  30865 Therapeutic Exercise (timed):  increase ROM, strength, coordination, balance, and proprioception to improve patient's ability to progress to PLOF and address remaining functional goals. (see flow sheet as applicable)     Details if applicable:       14  84516 Neuromuscular Re-Education (timed):  improve balance, coordination, kinesthetic sense, posture, core stability and proprioception to improve patient's ability to develop conscious control of individual muscles and awareness of position of extremities in order to progress to PLOF and address remaining functional goals. (see flow sheet as applicable)     Details if applicable:     10  01522 Therapeutic Activity (timed):  use of dynamic activities replicating functional movements to increase ROM, strength, coordination, balance, and proprioception in order to improve patient's ability to progress to PLOF and address remaining functional goals.  (see flow sheet as applicable)     Details if applicable:     10  93059 Self Care/Home Management (timed):  improve patient knowledge and understanding of home injury/symptom/pain management, positioning, posture/ergonomics, home safety, activity modification, transfer techniques, and joint protection strategies  to improve patient's ability to progress to PLOF and address remaining functional goals.  (see flow sheet as

## 2025-07-15 ENCOUNTER — HOSPITAL ENCOUNTER (OUTPATIENT)
Facility: HOSPITAL | Age: 55
Setting detail: RECURRING SERIES
Discharge: HOME | End: 2025-07-18
Payer: COMMERCIAL

## 2025-07-15 PROCEDURE — 97112 NEUROMUSCULAR REEDUCATION: CPT

## 2025-07-15 PROCEDURE — 97530 THERAPEUTIC ACTIVITIES: CPT

## 2025-07-15 PROCEDURE — 97110 THERAPEUTIC EXERCISES: CPT

## 2025-07-15 PROCEDURE — 97535 SELF CARE MNGMENT TRAINING: CPT

## 2025-07-15 NOTE — PROGRESS NOTES
PHYSICAL / OCCUPATIONAL THERAPY - DAILY TREATMENT NOTE    Patient Name: Cameron Scott    Date: 7/15/2025    : 1970  Insurance: Payor: GARETT BETTER [facility] [place] / Plan: AET BETTER [facility] [place] / Product Type: *No Product type* /      Patient  verified Yes     Visit #   Current / Total 8 10   Time   In / Out 850 930   Pain   In / Out 3 0   Subjective Functional Status/Changes: \"I have a little bit of pain on the right side of my lower back.\"     TREATMENT AREA =  Other low back pain    OBJECTIVE         Therapeutic Procedures:    Tx Min Billable or 1:1 Min (if diff from Tx Min) Procedure, Rationale, Specifics   10  86770 Therapeutic Exercise (timed):  increase ROM, strength, coordination, balance, and proprioception to improve patient's ability to progress to PLOF and address remaining functional goals. (see flow sheet as applicable)     Details if applicable:       8  62682 Neuromuscular Re-Education (timed):  improve balance, coordination, kinesthetic sense, posture, core stability and proprioception to improve patient's ability to develop conscious control of individual muscles and awareness of position of extremities in order to progress to PLOF and address remaining functional goals. (see flow sheet as applicable)     Details if applicable:     12  80219 Therapeutic Activity (timed):  use of dynamic activities replicating functional movements to increase ROM, strength, coordination, balance, and proprioception in order to improve patient's ability to progress to PLOF and address remaining functional goals.  (see flow sheet as applicable)     Details if applicable:     10  78055 Self Care/Home Management (timed):  improve patient knowledge and understanding of home injury/symptom/pain management, positioning, posture/ergonomics, home safety, activity modification, transfer techniques, and joint protection strategies  to improve patient's ability to progress to PLOF and address remaining functional

## 2025-07-15 NOTE — PROGRESS NOTES
[facility] [address]  [address]  [place] [address]  [phone] fax    DISCHARGE SUMMARY  Patient Name: Cameron Scott : 1970   Treatment/Medical Diagnosis: Other low back pain [M54.59]   Referral Source: Shasta Walden APRN - *     Date of Initial Visit: 2025 Attended Visits: 33 Missed Visits: 2     SUMMARY OF TREATMENT  Mr. Scott has attended 33 visits consistently and made good progress with skilled physical therapy services.  At time of last visit, Pt reported the following:  Functional Gains - response to extension exercises, showering, ADLs; Functional Deficits - left groin pain, lower back pain, prolonged sitting, prolonged walking/standing tolerance, donning pants while standing; and 70-80% improvement since start of care. Pt reports a recent average pain of 7/10, 9/10 at worst, and 0/10 at best. Objectively pt still has slight hip flexor weakness bilaterally, but re-checked after prone series and pt had full strength. He continues to have radicular sx into his left LE, but dissipates with extension based exercises. Advised pt to continue performing these as often as needed throughout the day whenever he gets radicular sx. Pt has met or is progressing towards all goals and is compliant with comprehensive HEP.  Pt is appropriate for D/C at this time to continue to manage care independently and maintain long term gains made with skilled therapy.      CURRENT STATUS  1. Pt will be able to report a 2/10 pain rating at worst to improve patient's ability to tolerate prolonged functional activities at home. (UPDATED GOAL)  PN Status:4/10 at worst  NOT MET; 9/10 at worst     2. Pt will be independent with HEP to facilitate carry-over of functional gains made in PT at home & community. (Initial STG)              PN Status: Performs HEP 1x per day.              MET; reports daily compliance, updated today at discharge     3. Pt will be able to

## 2025-07-15 NOTE — PROGRESS NOTES
Physical Therapy Discharge Instructions      [facility]  [address]  [place] [address]  [phone] fax      Patient: Cameron Scott  : 1970      Continue Home Exercise Program 1-2 times per day      Continue with    [x] Ice  as needed      [] Heat           Follow up with MD:     [] Upon completion of therapy     [x] As needed      Recommendations:     [x]   Return to activity with home program    []   Return to activity with the following modifications:       []Post Rehab Program    []Join Independent aquatic program     [x]Return to/join local gym      Roshan Norton PTA, CSCS   7/15/2025 8:53 AM

## 2025-07-17 ENCOUNTER — APPOINTMENT (OUTPATIENT)
Facility: HOSPITAL | Age: 55
End: 2025-07-17
Payer: COMMERCIAL

## 2025-07-21 ENCOUNTER — OFFICE VISIT (OUTPATIENT)
Age: 55
End: 2025-07-21
Payer: COMMERCIAL

## 2025-07-21 VITALS — WEIGHT: 161 LBS | BODY MASS INDEX: 23.85 KG/M2 | HEIGHT: 69 IN

## 2025-07-21 DIAGNOSIS — M79.642 PAIN OF LEFT HAND: Primary | ICD-10-CM

## 2025-07-21 DIAGNOSIS — M96.1 CERVICAL POST-LAMINECTOMY SYNDROME: ICD-10-CM

## 2025-07-21 DIAGNOSIS — M54.2 CERVICAL PAIN: ICD-10-CM

## 2025-07-21 PROCEDURE — 99213 OFFICE O/P EST LOW 20 MIN: CPT | Performed by: ORTHOPAEDIC SURGERY

## 2025-07-21 NOTE — PROGRESS NOTES
Cameron Scott is a 55 y.o. male right handed.  Worker's Compensation and legal considerations: none    Chief Complaint   Patient presents with    Follow-up     Left hand      Pain Score:   8    7/21/2025 HPI:Patient presents today with concerns of continuing pain in his left hand.  Of note he does have a history of cervical spine surgery.  At his last visit we had referred him to spine who is since seen and they confirmed that his symptoms are most likely coming from his cervical spine.  He reports he is unable to see his spine surgeon from a couple years ago due to insurance.    8/29/2024 HPI: Patient presents today for follow-up of his left hand numbness and tingling.  At his last visit he had a carpal tunnel injection and has been wearing a nighttime brace.  He had a negative EMG for any peripheral mononeuropathy but continued C7 radiculopathy.  He reports the injection did not help much but he thinks the brace is helping at night.    Initial HPI: Patient presents today with a history of left hand numbness and tingling.  Last year he had a cervical spine surgery for a left ring and little finger numbness symptoms.  He reports that since then he has had increasing numbness and tingling specifically in the thumb index and middle fingers.  He has recently had an EMG on the left side consistent with cervical pathology.    Date of onset: Indeterminate  Injury: No  Prior Treatment:  Yes: Comment: Recent left rotator cuff repair 6/2024.  Left Carpal tunnel Injection.  Hx of Cervical Spine Surgery.    ROS: Review of Systems - General ROS: negative except HPI    Past Medical History:   Diagnosis Date    Abnormal /NCV LUE 05/2024    Ongoing Left C7 radiculopathy    Asthma     no need for inhaler in years    Back pain     Diabetes mellitus (HCC)     Elevated blood pressure reading     GERD (gastroesophageal reflux disease)     Hypercholesteremia     Left shoulder pain     Low TSH level     Low TSH level 04/27/2017    may

## 2025-07-22 ENCOUNTER — APPOINTMENT (OUTPATIENT)
Facility: HOSPITAL | Age: 55
End: 2025-07-22
Payer: COMMERCIAL

## 2025-07-24 ENCOUNTER — APPOINTMENT (OUTPATIENT)
Facility: HOSPITAL | Age: 55
End: 2025-07-24
Payer: COMMERCIAL

## 2025-07-29 ENCOUNTER — APPOINTMENT (OUTPATIENT)
Facility: HOSPITAL | Age: 55
End: 2025-07-29
Payer: COMMERCIAL

## 2025-07-31 ENCOUNTER — APPOINTMENT (OUTPATIENT)
Facility: HOSPITAL | Age: 55
End: 2025-07-31
Payer: COMMERCIAL

## 2025-08-19 ENCOUNTER — OFFICE VISIT (OUTPATIENT)
Age: 55
End: 2025-08-19
Payer: COMMERCIAL

## 2025-08-19 VITALS
HEART RATE: 86 BPM | SYSTOLIC BLOOD PRESSURE: 130 MMHG | HEIGHT: 69 IN | OXYGEN SATURATION: 99 % | WEIGHT: 156.1 LBS | DIASTOLIC BLOOD PRESSURE: 94 MMHG | TEMPERATURE: 98 F | BODY MASS INDEX: 23.12 KG/M2

## 2025-08-19 DIAGNOSIS — M54.16 LEFT LUMBAR RADICULOPATHY: Primary | ICD-10-CM

## 2025-08-19 DIAGNOSIS — M54.12 CERVICAL RADICULOPATHY: ICD-10-CM

## 2025-08-19 PROCEDURE — 3080F DIAST BP >= 90 MM HG: CPT | Performed by: STUDENT IN AN ORGANIZED HEALTH CARE EDUCATION/TRAINING PROGRAM

## 2025-08-19 PROCEDURE — 99214 OFFICE O/P EST MOD 30 MIN: CPT | Performed by: STUDENT IN AN ORGANIZED HEALTH CARE EDUCATION/TRAINING PROGRAM

## 2025-08-19 PROCEDURE — 3075F SYST BP GE 130 - 139MM HG: CPT | Performed by: STUDENT IN AN ORGANIZED HEALTH CARE EDUCATION/TRAINING PROGRAM

## 2025-08-19 RX ORDER — CYCLOBENZAPRINE HCL 10 MG
10 TABLET ORAL 2 TIMES DAILY PRN
Qty: 60 TABLET | Refills: 3 | Status: SHIPPED | OUTPATIENT
Start: 2025-08-19

## 2025-08-19 RX ORDER — GABAPENTIN 300 MG/1
300 CAPSULE ORAL 3 TIMES DAILY
Qty: 90 CAPSULE | Refills: 4 | Status: SHIPPED | OUTPATIENT
Start: 2025-08-19 | End: 2025-09-18

## 2025-08-19 RX ORDER — CYCLOBENZAPRINE HCL 10 MG
10 TABLET ORAL 2 TIMES DAILY PRN
COMMUNITY
Start: 2025-05-16 | End: 2025-08-19 | Stop reason: SDUPTHER

## 2025-08-25 ENCOUNTER — OFFICE VISIT (OUTPATIENT)
Age: 55
End: 2025-08-25
Payer: COMMERCIAL

## 2025-08-25 VITALS — HEART RATE: 90 BPM | WEIGHT: 172 LBS | OXYGEN SATURATION: 97 % | HEIGHT: 69 IN | BODY MASS INDEX: 25.48 KG/M2

## 2025-08-25 DIAGNOSIS — M96.1 CERVICAL POST-LAMINECTOMY SYNDROME: ICD-10-CM

## 2025-08-25 DIAGNOSIS — M54.2 CERVICAL PAIN: ICD-10-CM

## 2025-08-25 DIAGNOSIS — M54.12 CERVICAL RADICULOPATHY AT C7: Primary | ICD-10-CM

## 2025-08-25 DIAGNOSIS — M43.22 CERVICAL VERTEBRAL FUSION: ICD-10-CM

## 2025-08-25 DIAGNOSIS — R29.898 LEFT ARM WEAKNESS: ICD-10-CM

## 2025-08-25 DIAGNOSIS — M62.838 MUSCLE SPASM: ICD-10-CM

## 2025-08-25 PROCEDURE — 99214 OFFICE O/P EST MOD 30 MIN: CPT | Performed by: PHYSICAL MEDICINE & REHABILITATION

## (undated) DEVICE — CANNULA THREADED FLEX 8.0 X 72MM: Brand: CLEAR-TRAC

## (undated) DEVICE — BLADE ES L2.75IN ELASTOMERIC COAT DURABLE BEND UPTO 90DEG

## (undated) DEVICE — SUTURE MCRYL SZ 4-0 L27IN ABSRB UD L24MM PS-1 3/8 CIR PRIM Y935H

## (undated) DEVICE — SUTURE ABSORBABLE BRAIDED 0 CTXB 36 IN VIO PDS II ZB370

## (undated) DEVICE — TRAY,URINE METER,100% SILICONE,16FR10ML: Brand: MEDLINE

## (undated) DEVICE — LIQUIBAND RAPID ADHESIVE 36/CS 0.8ML: Brand: MEDLINE

## (undated) DEVICE — APPLICATOR MEDICATED 26 CC SOLUTION HI LT ORNG CHLORAPREP

## (undated) DEVICE — Device

## (undated) DEVICE — [AGGRESSIVE 6-FLUTE BARREL BUR, ARTHROSCOPIC SHAVER BLADE,  DO NOT RESTERILIZE,  DO NOT USE IF PACKAGE IS DAMAGED,  KEEP DRY,  KEEP AWAY FROM SUNLIGHT]: Brand: FORMULA

## (undated) DEVICE — 3.0MM PRECISION NEURO (MATCH HEAD)

## (undated) DEVICE — SUTURE MCRYL SZ 4-0 L18IN ABSRB UD L19MM PS-2 3/8 CIR PRIM Y496G

## (undated) DEVICE — DRAIN SURG W7MMXL20CM SIL FULL PERF HUBLESS FLAT RADPQ STRP

## (undated) DEVICE — COLUMN DRAPE

## (undated) DEVICE — SUTURE MONOCRYL SZ 4-0 L18IN ABSRB UD L19MM PS-2 3/8 CIR PRIM Y496G

## (undated) DEVICE — ELECTRO LUBE IS A SINGLE PATIENT USE DEVICE THAT IS INTENDED TO BE USED ON ELECTROSURGICAL ELECTRODES TO REDUCE STICKING.: Brand: KEY SURGICAL ELECTRO LUBE

## (undated) DEVICE — STERILE LATEX POWDER-FREE SURGICAL GLOVESWITH NITRILE COATING: Brand: PROTEXIS

## (undated) DEVICE — SOLUTION IRRIG 3000ML 0.9% SOD CHL FLX CONT 0797208] ICU MEDICAL INC]

## (undated) DEVICE — SUTURE FIBERWIRE SZ 2 W/ TAPERED NEEDLE BLUE L38IN NONABSORB BLU L26.5MM 1/2 CIRCLE AR7200

## (undated) DEVICE — ADHESIVE SKIN CLOSURE 4X22 CM PREMIERPRO EXOFINFUSION DISP

## (undated) DEVICE — SUTURE MCRYL SZ 3-0 L27IN ABSRB UD L24MM PS-1 3/8 CIR PRIM Y936H

## (undated) DEVICE — DRILL BIT: Brand: ESCALATE

## (undated) DEVICE — BRACE SHLDR L FA L15 16IN UNIV AIRMESH BRTH SLNG 15DEG ABD

## (undated) DEVICE — SOLUTION IRRIG 1000ML 0.9% SOD CHL USP POUR PLAS BTL

## (undated) DEVICE — DRESSING FOAM DISK DIA1IN H 7MM HYDRPHLC CHG IMPREG IN SL

## (undated) DEVICE — SUTURE FIBERLINK SZ 2-0 L26IN NONABSORBABLE BLU CLS LOOP AR7235

## (undated) DEVICE — ELECTRODE PT RET AD L9FT HI MOIST COND ADH HYDRGEL CORDED

## (undated) DEVICE — SYSTEM IMPLANT ECLIPSE SPEEDCAP

## (undated) DEVICE — CORD ES L12FT BPLR FRCP

## (undated) DEVICE — INTENDED FOR TISSUE SEPARATION, AND OTHER PROCEDURES THAT REQUIRE A SHARP SURGICAL BLADE TO PUNCTURE OR CUT.: Brand: BARD-PARKER ® STAINLESS STEEL BLADES

## (undated) DEVICE — 3M™ TEGADERM™ TRANSPARENT FILM DRESSING FRAME STYLE, 1626, 4 IN X 4-3/4 IN (10 CM X 12 CM), 50/CT 4CT/CASE: Brand: 3M™ TEGADERM™

## (undated) DEVICE — APPLICATOR BNDG 1MM ADH PREMIERPRO EXOFIN

## (undated) DEVICE — SUTURE VLOC 90 2/0 VL 6 GS-22 VLOCM2105

## (undated) DEVICE — PAD,NON-ADHERENT,3X8,STERILE,LF,1/PK: Brand: MEDLINE

## (undated) DEVICE — SHOULDER SUSPENSION KIT 6 PER BOX

## (undated) DEVICE — SUTURE VICRYL SZ 3-0 L27IN ABSRB UD L36MM CT-1 1/2 CIR J258H

## (undated) DEVICE — SYRINGE MED 10ML LUERLOCK TIP W/O SFTY DISP

## (undated) DEVICE — SYRINGE MED 30ML STD CLR PLAS LUERLOCK TIP N CTRL DISP

## (undated) DEVICE — WAX SURG 2.5GM HEMSTAT BNE BEESWAX PARAFFIN ISO PALMITATE

## (undated) DEVICE — 90-S MAX, SUCTION PROBE, NON-BENDABLE, MAX CUT LEVEL 11: Brand: SERFAS ENERGY

## (undated) DEVICE — NEEDLE SYR 18GA L1.5IN RED PLAS HUB S STL BLNT FILL W/O

## (undated) DEVICE — JACKSON TABLE POSITIONER KIT: Brand: MEDLINE INDUSTRIES, INC.

## (undated) DEVICE — BLANKET WRM W40.2XL55.9IN IORT LO BODY + MISTRAL AIR

## (undated) DEVICE — SUTURE STRATAFIX SPRL MCRYL + SZ 2-0 L18IN ABSRB UD CT-1 SXMP1B413

## (undated) DEVICE — TUBING SUCT 10FR MAL ALUM SHFT FN CAP VENT UNIV CONN W/ OBT

## (undated) DEVICE — 4-PORT MANIFOLD: Brand: NEPTUNE 2

## (undated) DEVICE — KIT OR TURNOVER

## (undated) DEVICE — NEEDLE SUT PASS FOR ROT CUF LABRAL REP MULTFI SCORPION

## (undated) DEVICE — INFLOW CASSETTE TUBING, DO NOT USE IF PACKAGE IS DAMAGED: Brand: CROSSFLOW

## (undated) DEVICE — SUTURE STRATAFIX SYMMETRIC PDS + SZ 2-0 L18IN ABSRB VLT SXPP1A403

## (undated) DEVICE — SEAL

## (undated) DEVICE — STERILE POLYISOPRENE POWDER-FREE SURGICAL GLOVES: Brand: PROTEXIS

## (undated) DEVICE — SUTURE VCRL SZ 2-0 L27IN ABSRB UD L26MM SH 1/2 CIR J417H

## (undated) DEVICE — SKIN MARKER,REGULAR TIP WITH RULER AND LABELS: Brand: DEVON

## (undated) DEVICE — TIP COVER ACCESSORY

## (undated) DEVICE — Device: Brand: PILLOW, GENTLETOUCH, 7 INCH RT

## (undated) DEVICE — SPONGE LAP W18XL18IN WHT COT 4 PLY FLD STRUNG RADPQ DISP ST 2 PER PACK

## (undated) DEVICE — SOLUTION IV 1000ML 0.9% SOD CHL

## (undated) DEVICE — [RESECTOR CUTTER, ARTHROSCOPIC SHAVER BLADE,  DO NOT RESTERILIZE,  DO NOT USE IF PACKAGE IS DAMAGED,  KEEP DRY,  KEEP AWAY FROM SUNLIGHT]: Brand: FORMULA

## (undated) DEVICE — DRAPE,REIN 53X77,STERILE: Brand: MEDLINE

## (undated) DEVICE — ARM DRAPE

## (undated) DEVICE — DRAPE TOWEL: Brand: CONVERTORS

## (undated) DEVICE — PACK PROCEDURE SURG ORTH SHLDR CUST

## (undated) DEVICE — SUTURE STRATAFIX SYMMETRIC PDS + SZ 1 L18IN ABSRB VLT L48MM SXPP1A400

## (undated) DEVICE — BLADELESS OBTURATOR: Brand: WECK VISTA